# Patient Record
Sex: MALE | Race: WHITE | NOT HISPANIC OR LATINO | Employment: OTHER | URBAN - METROPOLITAN AREA
[De-identification: names, ages, dates, MRNs, and addresses within clinical notes are randomized per-mention and may not be internally consistent; named-entity substitution may affect disease eponyms.]

---

## 2017-01-23 RX ORDER — ERGOCALCIFEROL 1.25 MG/1
CAPSULE ORAL
Qty: 12 CAPSULE | Refills: 0 | Status: SHIPPED | OUTPATIENT
Start: 2017-01-23 | End: 2017-02-20 | Stop reason: SDUPTHER

## 2017-02-20 DIAGNOSIS — E55.9 HYPOVITAMINOSIS D: Primary | ICD-10-CM

## 2017-02-20 RX ORDER — ERGOCALCIFEROL 1.25 MG/1
CAPSULE ORAL
Qty: 12 CAPSULE | Refills: 0 | Status: SHIPPED | OUTPATIENT
Start: 2017-02-20 | End: 2017-05-16 | Stop reason: SDUPTHER

## 2017-02-20 RX ORDER — BETHANECHOL CHLORIDE 50 MG/1
TABLET ORAL
Qty: 90 TABLET | Refills: 0 | Status: SHIPPED | OUTPATIENT
Start: 2017-02-20 | End: 2017-03-23 | Stop reason: SDUPTHER

## 2017-02-21 ENCOUNTER — PATIENT MESSAGE (OUTPATIENT)
Dept: FAMILY MEDICINE | Facility: CLINIC | Age: 82
End: 2017-02-21

## 2017-03-10 ENCOUNTER — OFFICE VISIT (OUTPATIENT)
Dept: CARDIOLOGY | Facility: CLINIC | Age: 82
End: 2017-03-10
Payer: MEDICARE

## 2017-03-10 VITALS
BODY MASS INDEX: 47.74 KG/M2 | SYSTOLIC BLOOD PRESSURE: 120 MMHG | OXYGEN SATURATION: 99 % | WEIGHT: 315 LBS | HEART RATE: 63 BPM | HEIGHT: 68 IN | DIASTOLIC BLOOD PRESSURE: 64 MMHG

## 2017-03-10 DIAGNOSIS — Z87.898 HISTORY OF SEIZURE: ICD-10-CM

## 2017-03-10 DIAGNOSIS — Z86.718 PERSONAL HISTORY OF DVT (DEEP VEIN THROMBOSIS): ICD-10-CM

## 2017-03-10 DIAGNOSIS — I25.83 CORONARY ARTERY DISEASE DUE TO LIPID RICH PLAQUE: Primary | ICD-10-CM

## 2017-03-10 DIAGNOSIS — E78.00 HYPERCHOLESTEROLEMIA: ICD-10-CM

## 2017-03-10 DIAGNOSIS — I10 ESSENTIAL HYPERTENSION: ICD-10-CM

## 2017-03-10 DIAGNOSIS — I25.10 CORONARY ARTERY DISEASE DUE TO LIPID RICH PLAQUE: Primary | ICD-10-CM

## 2017-03-10 PROCEDURE — 1157F ADVNC CARE PLAN IN RCRD: CPT | Mod: S$GLB,,, | Performed by: INTERNAL MEDICINE

## 2017-03-10 PROCEDURE — 3074F SYST BP LT 130 MM HG: CPT | Mod: S$GLB,,, | Performed by: INTERNAL MEDICINE

## 2017-03-10 PROCEDURE — 3078F DIAST BP <80 MM HG: CPT | Mod: S$GLB,,, | Performed by: INTERNAL MEDICINE

## 2017-03-10 PROCEDURE — 1160F RVW MEDS BY RX/DR IN RCRD: CPT | Mod: S$GLB,,, | Performed by: INTERNAL MEDICINE

## 2017-03-10 PROCEDURE — 99999 PR PBB SHADOW E&M-EST. PATIENT-LVL III: CPT | Mod: PBBFAC,,, | Performed by: INTERNAL MEDICINE

## 2017-03-10 PROCEDURE — 1126F AMNT PAIN NOTED NONE PRSNT: CPT | Mod: S$GLB,,, | Performed by: INTERNAL MEDICINE

## 2017-03-10 PROCEDURE — 99214 OFFICE O/P EST MOD 30 MIN: CPT | Mod: S$GLB,,, | Performed by: INTERNAL MEDICINE

## 2017-03-10 PROCEDURE — 1159F MED LIST DOCD IN RCRD: CPT | Mod: S$GLB,,, | Performed by: INTERNAL MEDICINE

## 2017-03-10 PROCEDURE — 99499 UNLISTED E&M SERVICE: CPT | Mod: S$GLB,,, | Performed by: INTERNAL MEDICINE

## 2017-03-10 NOTE — PROGRESS NOTES
Subjective:    Patient ID:  Jaydon Mccarthy is a 85 y.o. male who presents for follow-up of No chief complaint on file.      Coronary Artery Disease   Pertinent negatives include no chest pain, dizziness, leg swelling, palpitations or shortness of breath.   Hypertension   Pertinent negatives include no chest pain, orthopnea, palpitations, PND or shortness of breath.     Previous history:  Here for follow-up of coronary artery disease.  He denies any worsening cardiopulmonary complaints.  He had a nuclear stresses risk stratification medication mainly to evaluate LAD ischemia.  This shows no significant multiple territory in the LAD distribution.  He denies any chest pain, shortness breath or palpitations.  He's express no PND, orthopnea or lower edema.  He denies any dizziness, presyncope or syncope.  He's no longer doing cardiac rehabilitation.  We discussed may be get a exercise bicycle.  He's no longer walking without a walker and only does a little bit of activity around the house.  Otherwise been in his usual state of health.    Today:  Patient is here for follow-up of coronary artery disease.  He denies any worsening cardiopulmonary complaints.  He's express no chest pain at all.  He's not overly active but is able to do all his daily activities without any problems.  He does have his good days and bad days.  He does have weakness occasionally but rests when he does.  He denies any PND, orthopnea or lower edema.  He's not expressing dizziness, presyncope or syncope.      Review of Systems   Constitution: Negative.   HENT: Negative.    Eyes: Negative.    Cardiovascular: Negative for chest pain, dyspnea on exertion, irregular heartbeat, leg swelling, near-syncope, orthopnea, palpitations, paroxysmal nocturnal dyspnea and syncope.   Respiratory: Negative for shortness of breath.    Skin: Negative.    Musculoskeletal: Negative.    Gastrointestinal: Negative for abdominal pain, constipation and diarrhea.    Genitourinary: Negative for dysuria.   Neurological: Negative for dizziness.   Psychiatric/Behavioral: Negative.         Objective:    Physical Exam   Constitutional: He is oriented to person, place, and time. He appears well-developed and well-nourished. No distress.   HENT:   Head: Normocephalic and atraumatic.   Eyes: Conjunctivae and EOM are normal. Pupils are equal, round, and reactive to light.   Neck: Normal range of motion. Neck supple. No thyromegaly present.   Cardiovascular: Normal rate, regular rhythm and normal heart sounds.    No murmur heard.  Pulmonary/Chest: Effort normal and breath sounds normal. No respiratory distress. He has no wheezes. He has no rales. He exhibits no tenderness.   Abdominal: Soft. Bowel sounds are normal.   Musculoskeletal: He exhibits no edema.   Neurological: He is alert and oriented to person, place, and time.   Skin: Skin is warm and dry.   Psychiatric: He has a normal mood and affect. His behavior is normal.         Assessment:       1. Coronary artery disease due to lipid rich plaque    2. Essential hypertension    3. History of seizure    4. Hypercholesterolemia    5. Personal history of DVT (deep vein thrombosis)         Plan:       -med manage LAD disease --> worsening sx consider PCI  -cont med tx  -asa held with bleed risk on triple tx  -on xarelto for OAC  -stable off ace *held with Scr issues and low SBP  -compression stockings for swelling    RTC 6 mo

## 2017-03-10 NOTE — MR AVS SNAPSHOT
[facility]  [address] [facility] [place] [address]  Phone: [phone]                  Jaydon ROSARIO Fabio   3/10/2017 9:40 AM   Office Visit    Description:  Male : 1932   Provider:  Efren Prado MD   Department:  [facility] - Cardiology           Reason for Visit     Coronary Artery Disease           Diagnoses this Visit        Comments    Coronary artery disease due to lipid rich plaque    -  Primary     Essential hypertension         History of seizure         Hypercholesterolemia         Personal history of DVT (deep vein thrombosis)                To Do List           Goals (5 Years of Data)     None      [facility]     [facility] On Call Nurse Care Line -  Assistance  Registered nurses in the [facility] provide clinical advisement, health education, appointment booking, and other advisory services.  Call for this free service at [phone].             Medications           Message regarding Medications     Verify the changes and/or additions to your medication regime listed below are the same as discussed with your clinician today.  If any of these changes or additions are incorrect, please notify your healthcare provider.             Verify that the below list of medications is an accurate representation of the medications you are currently taking.  If none reported, the list may be blank. If incorrect, please contact your healthcare provider. Carry this list with you in case of emergency.           Current Medications     atorvastatin (LIPITOR) 20 MG tablet Take 1 tablet (20 mg total) by mouth once daily.    bethanechol (URECHOLINE) 50 MG tablet TAKE ONE TABLET BY MOUTH THREE TIMES DAILY ON AN EMPTY STOMACH *DO NOT TAKE WITH MILK OR ANTACIDS, TAKE WITH WATER *    carbamazepine (TEGRETOL) 100 mg chewable tablet Take 2 tablets (200 mg total) by mouth once daily.    clopidogrel (PLAVIX) 75 mg tablet Take 1 tablet (75 mg total) by mouth once daily.     "finasteride (PROSCAR) 5 mg tablet TAKE ONE TABLET BY MOUTH EVERY DAY    levetiracetam (KEPPRA) 500 MG Tab Take 1 tablet (500 mg total) by mouth 2 (two) times daily.    metoprolol tartrate (LOPRESSOR) 25 MG tablet Take 0.5 tablets (12.5 mg total) by mouth 2 (two) times daily.    mupirocin (BACTROBAN) 2 % ointment     rivaroxaban (XARELTO) 20 mg Tab Take 1 tablet (20 mg total) by mouth once daily.    tamsulosin (FLOMAX) 0.4 mg Cp24 Take 1 capsule (0.4 mg total) by mouth once daily.    VITAMIN D2 50,000 unit capsule TAKE ONE CAPSULE BY MOUTH TWICE WEEKLY           Clinical Reference Information           Your Vitals Were     BP Pulse Height Weight SpO2 BMI    120/64 (BP Location: Left arm, Patient Position: Sitting, BP Method: Automatic) 63 5' 8" (1.727 m) 156.5 kg (345 lb 0.3 oz) 99% 52.46 kg/m2      Blood Pressure          Most Recent Value    BP  120/64      Allergies as of 3/10/2017     No Known Allergies      Immunizations Administered on Date of Encounter - 3/10/2017     None      Language Assistance Services     ATTENTION: Language assistance services are available, free of charge. Please call [phone].      ATENCIÓN: Si felecia marie, tiene a morales disposición servicios gratuitos de asistencia lingüística. Llame al [phone].     [facility] Ý: N?u b?n nói Ti?ng Vi?t, có các d?ch v? h? tr? ngôn ng? mi?n phí dành cho b?n. G?i s? [phone].         [facility] complies with applicable Federal civil rights laws and does not discriminate on the basis of race, color, national origin, age, disability, or sex.        "

## 2017-03-23 RX ORDER — BETHANECHOL CHLORIDE 50 MG/1
TABLET ORAL
Qty: 90 TABLET | Refills: 1 | Status: SHIPPED | OUTPATIENT
Start: 2017-03-23 | End: 2017-05-18 | Stop reason: SDUPTHER

## 2017-05-16 DIAGNOSIS — E55.9 HYPOVITAMINOSIS D: ICD-10-CM

## 2017-05-16 RX ORDER — ERGOCALCIFEROL 1.25 MG/1
CAPSULE ORAL
Qty: 12 CAPSULE | Refills: 0 | Status: SHIPPED | OUTPATIENT
Start: 2017-05-16 | End: 2017-06-19 | Stop reason: SDUPTHER

## 2017-05-18 RX ORDER — BETHANECHOL CHLORIDE 50 MG/1
TABLET ORAL
Qty: 90 TABLET | Refills: 0 | Status: SHIPPED | OUTPATIENT
Start: 2017-05-18 | End: 2017-06-19 | Stop reason: SDUPTHER

## 2017-06-19 DIAGNOSIS — E55.9 HYPOVITAMINOSIS D: ICD-10-CM

## 2017-06-19 RX ORDER — BETHANECHOL CHLORIDE 50 MG/1
TABLET ORAL
Qty: 90 TABLET | Refills: 0 | Status: SHIPPED | OUTPATIENT
Start: 2017-06-19 | End: 2017-07-19 | Stop reason: SDUPTHER

## 2017-06-19 RX ORDER — ERGOCALCIFEROL 1.25 MG/1
CAPSULE ORAL
Qty: 12 CAPSULE | Refills: 0 | Status: SHIPPED | OUTPATIENT
Start: 2017-06-19 | End: 2017-07-19 | Stop reason: SDUPTHER

## 2017-07-19 DIAGNOSIS — E55.9 HYPOVITAMINOSIS D: ICD-10-CM

## 2017-07-19 RX ORDER — ERGOCALCIFEROL 1.25 MG/1
CAPSULE ORAL
Qty: 12 CAPSULE | Refills: 0 | Status: SHIPPED | OUTPATIENT
Start: 2017-07-19 | End: 2017-09-18 | Stop reason: SDUPTHER

## 2017-07-19 RX ORDER — BETHANECHOL CHLORIDE 50 MG/1
TABLET ORAL
Qty: 90 TABLET | Refills: 0 | Status: SHIPPED | OUTPATIENT
Start: 2017-07-19 | End: 2017-09-06 | Stop reason: SDUPTHER

## 2017-09-05 RX ORDER — BETHANECHOL CHLORIDE 50 MG/1
TABLET ORAL
Qty: 90 TABLET | OUTPATIENT
Start: 2017-09-05

## 2017-09-06 ENCOUNTER — PATIENT MESSAGE (OUTPATIENT)
Dept: FAMILY MEDICINE | Facility: CLINIC | Age: 82
End: 2017-09-06

## 2017-09-07 RX ORDER — BETHANECHOL CHLORIDE 50 MG/1
TABLET ORAL
Qty: 90 TABLET | Refills: 3 | Status: SHIPPED | OUTPATIENT
Start: 2017-09-07 | End: 2017-09-18 | Stop reason: SDUPTHER

## 2017-09-07 RX ORDER — BETHANECHOL CHLORIDE 50 MG/1
TABLET ORAL
Qty: 90 TABLET | Refills: 3 | OUTPATIENT
Start: 2017-09-07

## 2017-09-07 RX ORDER — BETHANECHOL CHLORIDE 50 MG/1
TABLET ORAL
Qty: 90 TABLET | Refills: 0 | OUTPATIENT
Start: 2017-09-07

## 2017-09-18 ENCOUNTER — OFFICE VISIT (OUTPATIENT)
Dept: FAMILY MEDICINE | Facility: CLINIC | Age: 82
End: 2017-09-18
Payer: MEDICARE

## 2017-09-18 VITALS
HEART RATE: 66 BPM | OXYGEN SATURATION: 100 % | HEIGHT: 67 IN | BODY MASS INDEX: 24.94 KG/M2 | WEIGHT: 158.94 LBS | DIASTOLIC BLOOD PRESSURE: 64 MMHG | SYSTOLIC BLOOD PRESSURE: 134 MMHG | TEMPERATURE: 98 F

## 2017-09-18 DIAGNOSIS — E78.2 MIXED HYPERLIPIDEMIA: Chronic | ICD-10-CM

## 2017-09-18 DIAGNOSIS — M75.01 ADHESIVE BURSITIS OF RIGHT SHOULDER: ICD-10-CM

## 2017-09-18 DIAGNOSIS — I25.10 CORONARY ARTERY DISEASE INVOLVING NATIVE CORONARY ARTERY OF NATIVE HEART WITHOUT ANGINA PECTORIS: Chronic | ICD-10-CM

## 2017-09-18 DIAGNOSIS — Z79.01 CHRONIC ANTICOAGULATION: Chronic | ICD-10-CM

## 2017-09-18 DIAGNOSIS — Z00.00 ANNUAL PHYSICAL EXAM: Primary | ICD-10-CM

## 2017-09-18 DIAGNOSIS — Z86.718 PERSONAL HISTORY OF DVT (DEEP VEIN THROMBOSIS): Chronic | ICD-10-CM

## 2017-09-18 DIAGNOSIS — G40.909 NONINTRACTABLE EPILEPSY WITHOUT STATUS EPILEPTICUS, UNSPECIFIED EPILEPSY TYPE: Chronic | ICD-10-CM

## 2017-09-18 DIAGNOSIS — H93.13 TINNITUS, BILATERAL: ICD-10-CM

## 2017-09-18 DIAGNOSIS — D63.8 ANEMIA OF CHRONIC DISEASE: Chronic | ICD-10-CM

## 2017-09-18 DIAGNOSIS — H90.3 SENSORINEURAL HEARING LOSS (SNHL) OF BOTH EARS: ICD-10-CM

## 2017-09-18 DIAGNOSIS — N40.0 BENIGN NON-NODULAR PROSTATIC HYPERPLASIA WITHOUT LOWER URINARY TRACT SYMPTOMS: ICD-10-CM

## 2017-09-18 DIAGNOSIS — N40.0 BENIGN PROSTATIC HYPERPLASIA WITHOUT LOWER URINARY TRACT SYMPTOMS: ICD-10-CM

## 2017-09-18 DIAGNOSIS — M19.111 POST-TRAUMATIC OSTEOARTHRITIS OF RIGHT SHOULDER: ICD-10-CM

## 2017-09-18 DIAGNOSIS — I10 ESSENTIAL HYPERTENSION: Chronic | ICD-10-CM

## 2017-09-18 DIAGNOSIS — E55.9 HYPOVITAMINOSIS D: ICD-10-CM

## 2017-09-18 PROCEDURE — 99499 UNLISTED E&M SERVICE: CPT | Mod: S$GLB,,, | Performed by: FAMILY MEDICINE

## 2017-09-18 PROCEDURE — 99397 PER PM REEVAL EST PAT 65+ YR: CPT | Mod: S$GLB,,, | Performed by: FAMILY MEDICINE

## 2017-09-18 PROCEDURE — 99999 PR PBB SHADOW E&M-EST. PATIENT-LVL III: CPT | Mod: PBBFAC,,, | Performed by: FAMILY MEDICINE

## 2017-09-18 RX ORDER — ATORVASTATIN CALCIUM 20 MG/1
20 TABLET, FILM COATED ORAL DAILY
Qty: 90 TABLET | Refills: 3 | Status: SHIPPED | OUTPATIENT
Start: 2017-09-18 | End: 2018-03-22 | Stop reason: SDUPTHER

## 2017-09-18 RX ORDER — BETHANECHOL CHLORIDE 50 MG/1
TABLET ORAL
Qty: 90 TABLET | Refills: 3 | Status: SHIPPED | OUTPATIENT
Start: 2017-09-18 | End: 2018-03-22 | Stop reason: SDUPTHER

## 2017-09-18 RX ORDER — LEVETIRACETAM 500 MG/1
500 TABLET ORAL 2 TIMES DAILY
Qty: 180 TABLET | Refills: 3 | Status: SHIPPED | OUTPATIENT
Start: 2017-09-18 | End: 2018-03-22 | Stop reason: SDUPTHER

## 2017-09-18 RX ORDER — FINASTERIDE 5 MG/1
5 TABLET, FILM COATED ORAL DAILY
Qty: 90 TABLET | Refills: 3 | Status: SHIPPED | OUTPATIENT
Start: 2017-09-18 | End: 2018-03-22 | Stop reason: SDUPTHER

## 2017-09-18 RX ORDER — CLOPIDOGREL BISULFATE 75 MG/1
75 TABLET ORAL DAILY
Qty: 90 TABLET | Refills: 3 | Status: SHIPPED | OUTPATIENT
Start: 2017-09-18 | End: 2018-03-22 | Stop reason: SDUPTHER

## 2017-09-18 RX ORDER — ERGOCALCIFEROL 1.25 MG/1
CAPSULE ORAL
Qty: 12 CAPSULE | Refills: 0 | Status: SHIPPED | OUTPATIENT
Start: 2017-09-18 | End: 2017-10-31 | Stop reason: SDUPTHER

## 2017-09-18 RX ORDER — METOPROLOL TARTRATE 25 MG/1
12.5 TABLET, FILM COATED ORAL 2 TIMES DAILY
Qty: 30 TABLET | Refills: 11 | Status: SHIPPED | OUTPATIENT
Start: 2017-09-18 | End: 2018-10-11 | Stop reason: SDUPTHER

## 2017-09-18 RX ORDER — CARBAMAZEPINE 100 MG/1
200 TABLET, CHEWABLE ORAL DAILY
Qty: 180 TABLET | Refills: 3 | Status: SHIPPED | OUTPATIENT
Start: 2017-09-18 | End: 2018-03-22 | Stop reason: SDUPTHER

## 2017-09-18 RX ORDER — TAMSULOSIN HYDROCHLORIDE 0.4 MG/1
CAPSULE ORAL
Qty: 90 CAPSULE | Refills: 3 | Status: SHIPPED | OUTPATIENT
Start: 2017-09-18 | End: 2018-03-22 | Stop reason: SDUPTHER

## 2017-09-18 NOTE — PROGRESS NOTES
"Chief Complaint   Patient presents with    Annual Exam     SUBJECTIVE:   Jaydon Mccarthy is a 85 y.o. male presenting for his annual checkup.  Current Outpatient Prescriptions   Medication Sig Dispense Refill    atorvastatin (LIPITOR) 20 MG tablet Take 1 tablet (20 mg total) by mouth once daily. 90 tablet 3    bethanechol (URECHOLINE) 50 MG tablet TAKE ONE TABLET BY MOUTH THREE TIMES DAILY ON AN EMPTY STOMACH *DO NOT TAKE WITH MILK OR ANTACIDS, TAKE WITH WATER * 90 tablet 3    carbamazepine (TEGRETOL) 100 mg chewable tablet Take 2 tablets (200 mg total) by mouth once daily. 180 tablet 3    clopidogrel (PLAVIX) 75 mg tablet Take 1 tablet (75 mg total) by mouth once daily. 90 tablet 3    ergocalciferol (VITAMIN D2) 50,000 unit Cap TAKE ONE CAPSULE BY MOUTH TWICE WEEKLY 12 capsule 0    finasteride (PROSCAR) 5 mg tablet Take 1 tablet (5 mg total) by mouth once daily. 90 tablet 3    levetiracetam (KEPPRA) 500 MG Tab Take 1 tablet (500 mg total) by mouth 2 (two) times daily. 180 tablet 3    metoprolol tartrate (LOPRESSOR) 25 MG tablet Take 0.5 tablets (12.5 mg total) by mouth 2 (two) times daily. 30 tablet 11    mupirocin (BACTROBAN) 2 % ointment   1    rivaroxaban (XARELTO) 20 mg Tab Take 1 tablet (20 mg total) by mouth once daily. 30 tablet 11    tamsulosin (FLOMAX) 0.4 mg Cp24 Take 1 capsule (0.4 mg total) by mouth once daily. 90 capsule 3     No current facility-administered medications for this visit.      Allergies: Review of patient's allergies indicates no known allergies.     ROS:  Feeling well. No dyspnea or chest pain on exertion. No abdominal pain, change in bowel habits, black or bloody stools. No urinary tract or prostatic symptoms. No neurological complaints.    OBJECTIVE:   The patient appears well, alert, oriented x 3, in no distress.   /64   Pulse 66   Temp 97.8 °F (36.6 °C) (Oral)   Ht 5' 7" (1.702 m)   Wt 72.1 kg (158 lb 15.2 oz)   SpO2 100%   BMI 24.90 kg/m²   ENT normal.  Neck " supple. No adenopathy or thyromegaly. NITZA. Lungs are clear, good air entry, no wheezes, rhonchi or rales. S1 and S2 normal, no murmurs, regular rate and rhythm. Abdomen is soft without tenderness, guarding, mass or organomegaly.  exam: deferred.  Extremities show no edema, normal peripheral pulses. Neurological is normal without focal findings. Chronic vascular change from plavix on forearms and chest/face  Hearing is diminished.  Right shoulder with reduced ROM.  Antalgic wide based gait.    ASSESSMENT:   1. Annual physical exam    2. Benign non-nodular prostatic hyperplasia without lower urinary tract symptoms    3. Hypovitaminosis D    4. Nonintractable epilepsy without status epilepticus, unspecified epilepsy type    5. Post-traumatic osteoarthritis of right shoulder    6. Adhesive bursitis of right shoulder    7. Anemia of chronic disease    8. Benign prostatic hyperplasia without lower urinary tract symptoms    9. Coronary artery disease involving native coronary artery of native heart without angina pectoris    10. Chronic anticoagulation    11. Essential hypertension    12. Sensorineural hearing loss (SNHL) of both ears    13. Mixed hyperlipidemia    14. Personal history of DVT (deep vein thrombosis)    15. Tinnitus, bilateral          PLAN:   Jaydon was seen today for annual exam.    Diagnoses and all orders for this visit:    Annual physical exam  No screening tests due.  Keep him independent, has good family    Benign non-nodular prostatic hyperplasia without lower urinary tract symptoms  -     finasteride (PROSCAR) 5 mg tablet; Take 1 tablet (5 mg total) by mouth once daily.  The current medical regimen is effective;  continue present plan and medications.    Hypovitaminosis D  -     ergocalciferol (VITAMIN D2) 50,000 unit Cap; TAKE ONE CAPSULE BY MOUTH TWICE WEEKLY  The current medical regimen is effective;  continue present plan and medications.  Fall risk reduction ?  Nonintractable epilepsy  without status epilepticus, unspecified epilepsy type  No new seizures in last >10 + years  Post-traumatic osteoarthritis of right shoulder  stable  Adhesive bursitis of right shoulder  ROM reduced but nothing more to do  Anemia of chronic disease  Noted, monitor  Benign prostatic hyperplasia without lower urinary tract symptoms  The current medical regimen is effective;  continue present plan and medications.    Coronary artery disease involving native coronary artery of native heart without angina pectoris  The current medical regimen is effective;  continue present plan and medications.    Chronic anticoagulation  The current medical regimen is effective;  continue present plan and medications.    Essential hypertension  The current medical regimen is effective;  continue present plan and medications.    Sensorineural hearing loss (SNHL) of both ears  Noted, does not bother him  Mixed hyperlipidemia  The current medical regimen is effective;  continue present plan and medications.    Personal history of DVT (deep vein thrombosis)  The current medical regimen is effective;  continue present plan and medications.    Tinnitus, bilateral  Noted, does not bother him.  Other orders  -     atorvastatin (LIPITOR) 20 MG tablet; Take 1 tablet (20 mg total) by mouth once daily.  -     bethanechol (URECHOLINE) 50 MG tablet; TAKE ONE TABLET BY MOUTH THREE TIMES DAILY ON AN EMPTY STOMACH *DO NOT TAKE WITH MILK OR ANTACIDS, TAKE WITH WATER *  -     clopidogrel (PLAVIX) 75 mg tablet; Take 1 tablet (75 mg total) by mouth once daily.  -     levetiracetam (KEPPRA) 500 MG Tab; Take 1 tablet (500 mg total) by mouth 2 (two) times daily.  -     metoprolol tartrate (LOPRESSOR) 25 MG tablet; Take 0.5 tablets (12.5 mg total) by mouth 2 (two) times daily.  -     rivaroxaban (XARELTO) 20 mg Tab; Take 1 tablet (20 mg total) by mouth once daily.  -     tamsulosin (FLOMAX) 0.4 mg Cp24; Take 1 capsule (0.4 mg total) by mouth once daily.  -      carbamazepine (TEGRETOL) 100 mg chewable tablet; Take 2 tablets (200 mg total) by mouth once daily.

## 2017-10-18 ENCOUNTER — TELEPHONE (OUTPATIENT)
Dept: FAMILY MEDICINE | Facility: CLINIC | Age: 82
End: 2017-10-18

## 2017-10-18 NOTE — TELEPHONE ENCOUNTER
----- Message from Javier Villanueva MD sent at 9/27/2017  9:25 PM CDT -----  Schedule 6 month recall, thank you!

## 2017-10-31 DIAGNOSIS — E55.9 HYPOVITAMINOSIS D: ICD-10-CM

## 2017-11-02 RX ORDER — ERGOCALCIFEROL 1.25 MG/1
CAPSULE ORAL
Qty: 12 CAPSULE | Refills: 0 | Status: SHIPPED | OUTPATIENT
Start: 2017-11-02 | End: 2018-02-23 | Stop reason: SDUPTHER

## 2017-11-06 ENCOUNTER — LAB VISIT (OUTPATIENT)
Dept: LAB | Facility: HOSPITAL | Age: 82
End: 2017-11-06
Attending: FAMILY MEDICINE
Payer: MEDICARE

## 2017-11-06 DIAGNOSIS — E55.9 HYPOVITAMINOSIS D: ICD-10-CM

## 2017-11-06 LAB — 25(OH)D3+25(OH)D2 SERPL-MCNC: 45 NG/ML

## 2017-11-06 PROCEDURE — 82306 VITAMIN D 25 HYDROXY: CPT

## 2017-11-06 PROCEDURE — 36415 COLL VENOUS BLD VENIPUNCTURE: CPT

## 2017-11-20 ENCOUNTER — TELEPHONE (OUTPATIENT)
Dept: FAMILY MEDICINE | Facility: CLINIC | Age: 82
End: 2017-11-20

## 2017-11-20 NOTE — TELEPHONE ENCOUNTER
They wanted to verify that both Plavix and Xeralto were  orderd for patient . Informed him that both were ordered on 09/18/2017.

## 2017-11-20 NOTE — TELEPHONE ENCOUNTER
----- Message from Dariana Carlson sent at 11/20/2017 10:05 AM CST -----  Contact: [facility] [phone]  [facility] is requesting to speak to you regarding pt scripts. Wants to verify meds. Pls call [facility] [phone]. Thanks........Mayuri

## 2017-11-20 NOTE — TELEPHONE ENCOUNTER
----- Message from Dariana Carlson sent at 11/20/2017 10:05 AM CST -----  Contact: [facility] [phone]  [facility] is requesting to speak to you regarding pt scripts. Wants to verify meds. Pls call [facility] [phone]. Thanks........Mayuri

## 2017-12-20 DIAGNOSIS — E55.9 HYPOVITAMINOSIS D: ICD-10-CM

## 2017-12-20 RX ORDER — ERGOCALCIFEROL 1.25 MG/1
CAPSULE ORAL
Qty: 12 CAPSULE | OUTPATIENT
Start: 2017-12-20

## 2018-02-23 DIAGNOSIS — E55.9 HYPOVITAMINOSIS D: ICD-10-CM

## 2018-02-23 RX ORDER — ERGOCALCIFEROL 1.25 MG/1
CAPSULE ORAL
Qty: 12 CAPSULE | Refills: 0 | Status: SHIPPED | OUTPATIENT
Start: 2018-02-23 | End: 2018-05-18 | Stop reason: SDUPTHER

## 2018-03-22 ENCOUNTER — OFFICE VISIT (OUTPATIENT)
Dept: FAMILY MEDICINE | Facility: CLINIC | Age: 83
End: 2018-03-22
Payer: MEDICARE

## 2018-03-22 VITALS
WEIGHT: 160.25 LBS | OXYGEN SATURATION: 99 % | HEIGHT: 68 IN | DIASTOLIC BLOOD PRESSURE: 60 MMHG | TEMPERATURE: 98 F | BODY MASS INDEX: 24.29 KG/M2 | SYSTOLIC BLOOD PRESSURE: 120 MMHG

## 2018-03-22 DIAGNOSIS — Z79.01 CHRONIC ANTICOAGULATION: Chronic | ICD-10-CM

## 2018-03-22 DIAGNOSIS — D63.8 ANEMIA OF CHRONIC DISEASE: Chronic | ICD-10-CM

## 2018-03-22 DIAGNOSIS — I25.10 CORONARY ARTERY DISEASE INVOLVING NATIVE CORONARY ARTERY OF NATIVE HEART WITHOUT ANGINA PECTORIS: Chronic | ICD-10-CM

## 2018-03-22 DIAGNOSIS — E78.2 MIXED HYPERLIPIDEMIA: Chronic | ICD-10-CM

## 2018-03-22 DIAGNOSIS — G40.909 NONINTRACTABLE EPILEPSY WITHOUT STATUS EPILEPTICUS, UNSPECIFIED EPILEPSY TYPE: Chronic | ICD-10-CM

## 2018-03-22 DIAGNOSIS — N40.0 BENIGN NON-NODULAR PROSTATIC HYPERPLASIA WITHOUT LOWER URINARY TRACT SYMPTOMS: ICD-10-CM

## 2018-03-22 DIAGNOSIS — N40.0 BENIGN PROSTATIC HYPERPLASIA WITHOUT LOWER URINARY TRACT SYMPTOMS: Primary | ICD-10-CM

## 2018-03-22 PROCEDURE — 99499 UNLISTED E&M SERVICE: CPT | Mod: S$GLB,,, | Performed by: FAMILY MEDICINE

## 2018-03-22 PROCEDURE — 99214 OFFICE O/P EST MOD 30 MIN: CPT | Mod: S$GLB,,, | Performed by: FAMILY MEDICINE

## 2018-03-22 PROCEDURE — 81000 URINALYSIS NONAUTO W/SCOPE: CPT

## 2018-03-22 PROCEDURE — 99999 PR PBB SHADOW E&M-EST. PATIENT-LVL III: CPT | Mod: PBBFAC,,, | Performed by: FAMILY MEDICINE

## 2018-03-22 RX ORDER — CARBAMAZEPINE 100 MG/1
200 TABLET, CHEWABLE ORAL DAILY
Qty: 180 TABLET | Refills: 3 | Status: SHIPPED | OUTPATIENT
Start: 2018-03-22 | End: 2019-04-23 | Stop reason: SDUPTHER

## 2018-03-22 RX ORDER — FINASTERIDE 5 MG/1
5 TABLET, FILM COATED ORAL DAILY
Qty: 90 TABLET | Refills: 3 | Status: SHIPPED | OUTPATIENT
Start: 2018-03-22 | End: 2019-06-06 | Stop reason: SDUPTHER

## 2018-03-22 RX ORDER — CLOPIDOGREL BISULFATE 75 MG/1
75 TABLET ORAL DAILY
Qty: 90 TABLET | Refills: 3 | Status: SHIPPED | OUTPATIENT
Start: 2018-03-22 | End: 2019-05-10 | Stop reason: SDUPTHER

## 2018-03-22 RX ORDER — LEVETIRACETAM 500 MG/1
500 TABLET ORAL 2 TIMES DAILY
Qty: 180 TABLET | Refills: 3 | Status: SHIPPED | OUTPATIENT
Start: 2018-03-22 | End: 2019-05-10 | Stop reason: SDUPTHER

## 2018-03-22 RX ORDER — BETHANECHOL CHLORIDE 50 MG/1
TABLET ORAL
Qty: 90 TABLET | Refills: 3 | Status: SHIPPED | OUTPATIENT
Start: 2018-03-22 | End: 2018-10-11 | Stop reason: SDUPTHER

## 2018-03-22 RX ORDER — TAMSULOSIN HYDROCHLORIDE 0.4 MG/1
CAPSULE ORAL
Qty: 90 CAPSULE | Refills: 3 | Status: SHIPPED | OUTPATIENT
Start: 2018-03-22 | End: 2019-05-10 | Stop reason: SDUPTHER

## 2018-03-22 RX ORDER — ATORVASTATIN CALCIUM 20 MG/1
20 TABLET, FILM COATED ORAL DAILY
Qty: 90 TABLET | Refills: 3 | Status: SHIPPED | OUTPATIENT
Start: 2018-03-22 | End: 2019-05-10 | Stop reason: SDUPTHER

## 2018-03-23 ENCOUNTER — PATIENT MESSAGE (OUTPATIENT)
Dept: FAMILY MEDICINE | Facility: CLINIC | Age: 83
End: 2018-03-23

## 2018-03-23 LAB
AMORPH CRY URNS QL MICRO: ABNORMAL
BILIRUB UR QL STRIP: NEGATIVE
CAOX CRY URNS QL MICRO: ABNORMAL
CLARITY UR: ABNORMAL
COLOR UR: YELLOW
GLUCOSE UR QL STRIP: NEGATIVE
HGB UR QL STRIP: NEGATIVE
KETONES UR QL STRIP: NEGATIVE
LEUKOCYTE ESTERASE UR QL STRIP: ABNORMAL
MICROSCOPIC COMMENT: ABNORMAL
NITRITE UR QL STRIP: NEGATIVE
PH UR STRIP: 5 [PH] (ref 5–8)
PROT UR QL STRIP: NEGATIVE
SP GR UR STRIP: 1.03 (ref 1–1.03)
URN SPEC COLLECT METH UR: ABNORMAL
UROBILINOGEN UR STRIP-ACNC: NEGATIVE EU/DL
WBC #/AREA URNS HPF: 2 /HPF (ref 0–5)

## 2018-03-23 RX ORDER — CIPROFLOXACIN 500 MG/1
500 TABLET ORAL 2 TIMES DAILY
Qty: 14 TABLET | Refills: 0 | Status: SHIPPED | OUTPATIENT
Start: 2018-03-23 | End: 2018-03-30

## 2018-03-30 NOTE — PROGRESS NOTES
Patient, Jaydon Mccarthy (MRN #[id number]), presented with a recent Platelet count less than 150 K/uL consistent with the definition of thrombocytopenia (ICD10 - D69.6).    Platelets   Date Value Ref Range Status   10/04/2016 138 (L) 150 - 350 K/uL Final     The patient's thrombocytopenia was monitored, evaluated, addressed and/or treated. This addendum to the medical record is made on 03/30/2018.

## 2018-04-19 DIAGNOSIS — E55.9 HYPOVITAMINOSIS D: ICD-10-CM

## 2018-04-23 RX ORDER — ERGOCALCIFEROL 1.25 MG/1
CAPSULE ORAL
Qty: 12 CAPSULE | OUTPATIENT
Start: 2018-04-23

## 2018-05-18 DIAGNOSIS — E55.9 HYPOVITAMINOSIS D: ICD-10-CM

## 2018-05-18 RX ORDER — ERGOCALCIFEROL 1.25 MG/1
CAPSULE ORAL
Qty: 12 CAPSULE | Refills: 0 | Status: ON HOLD | OUTPATIENT
Start: 2018-05-18 | End: 2020-12-09 | Stop reason: CLARIF

## 2018-05-29 ENCOUNTER — HOSPITAL ENCOUNTER (INPATIENT)
Facility: HOSPITAL | Age: 83
LOS: 7 days | Discharge: NURSING, REHAB OR LONG-TERM CARE | DRG: 481 | End: 2018-06-05
Attending: EMERGENCY MEDICINE | Admitting: EMERGENCY MEDICINE
Payer: MEDICARE

## 2018-05-29 DIAGNOSIS — N40.0 BENIGN PROSTATIC HYPERPLASIA WITHOUT LOWER URINARY TRACT SYMPTOMS: ICD-10-CM

## 2018-05-29 DIAGNOSIS — Z79.01 CHRONIC ANTICOAGULATION: Chronic | ICD-10-CM

## 2018-05-29 DIAGNOSIS — M75.01 ADHESIVE BURSITIS OF RIGHT SHOULDER: ICD-10-CM

## 2018-05-29 DIAGNOSIS — H93.13 TINNITUS, BILATERAL: ICD-10-CM

## 2018-05-29 DIAGNOSIS — E78.2 MIXED HYPERLIPIDEMIA: Chronic | ICD-10-CM

## 2018-05-29 DIAGNOSIS — I10 ESSENTIAL HYPERTENSION: Chronic | ICD-10-CM

## 2018-05-29 DIAGNOSIS — M19.111 POST-TRAUMATIC OSTEOARTHRITIS OF RIGHT SHOULDER: ICD-10-CM

## 2018-05-29 DIAGNOSIS — Z86.718 PERSONAL HISTORY OF DVT (DEEP VEIN THROMBOSIS): Chronic | ICD-10-CM

## 2018-05-29 DIAGNOSIS — D63.8 ANEMIA OF CHRONIC DISEASE: Chronic | ICD-10-CM

## 2018-05-29 DIAGNOSIS — G40.909 NONINTRACTABLE EPILEPSY WITHOUT STATUS EPILEPTICUS, UNSPECIFIED EPILEPSY TYPE: Chronic | ICD-10-CM

## 2018-05-29 DIAGNOSIS — Z01.810 PREOP CARDIOVASCULAR EXAM: ICD-10-CM

## 2018-05-29 DIAGNOSIS — S72.142A INTERTROCHANTERIC FRACTURE OF LEFT HIP, CLOSED, INITIAL ENCOUNTER: Primary | ICD-10-CM
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
## 2018-05-29 DIAGNOSIS — G40.909 SEIZURE DISORDER: ICD-10-CM

## 2018-05-29 DIAGNOSIS — I25.10 CORONARY ARTERY DISEASE INVOLVING NATIVE CORONARY ARTERY OF NATIVE HEART WITHOUT ANGINA PECTORIS: Chronic | ICD-10-CM

## 2018-05-29 DIAGNOSIS — H90.3 SENSORINEURAL HEARING LOSS (SNHL) OF BOTH EARS: ICD-10-CM

## 2018-05-29 LAB
ALBUMIN SERPL BCP-MCNC: 3.7 G/DL
ALP SERPL-CCNC: 175 U/L
ALT SERPL W/O P-5'-P-CCNC: 26 U/L
ANION GAP SERPL CALC-SCNC: 10 MMOL/L
AST SERPL-CCNC: 27 U/L
BASOPHILS # BLD AUTO: 0.03 K/UL
BASOPHILS NFR BLD: 0.3 %
BILIRUB SERPL-MCNC: 0.4 MG/DL
BUN SERPL-MCNC: 20 MG/DL
CALCIUM SERPL-MCNC: 8.7 MG/DL
CHLORIDE SERPL-SCNC: 110 MMOL/L
CO2 SERPL-SCNC: 21 MMOL/L
CREAT SERPL-MCNC: 1.2 MG/DL
DIFFERENTIAL METHOD: ABNORMAL
EOSINOPHIL # BLD AUTO: 0.2 K/UL
EOSINOPHIL NFR BLD: 2.2 %
ERYTHROCYTE [DISTWIDTH] IN BLOOD BY AUTOMATED COUNT: 13 %
EST. GFR  (AFRICAN AMERICAN): >60 ML/MIN/1.73 M^2
EST. GFR  (NON AFRICAN AMERICAN): 54 ML/MIN/1.73 M^2
GLUCOSE SERPL-MCNC: 110 MG/DL
HCT VFR BLD AUTO: 33.7 %
HGB BLD-MCNC: 12 G/DL
INR PPP: 1
LYMPHOCYTES # BLD AUTO: 1.4 K/UL
LYMPHOCYTES NFR BLD: 15.8 %
MCH RBC QN AUTO: 31.3 PG
MCHC RBC AUTO-ENTMCNC: 35.6 G/DL
MCV RBC AUTO: 88 FL
MONOCYTES # BLD AUTO: 0.7 K/UL
MONOCYTES NFR BLD: 8.4 %
NEUTROPHILS # BLD AUTO: 6.4 K/UL
NEUTROPHILS NFR BLD: 73.1 %
PLATELET # BLD AUTO: 185 K/UL
PMV BLD AUTO: 10 FL
POTASSIUM SERPL-SCNC: 4.3 MMOL/L
PROT SERPL-MCNC: 6.3 G/DL
PROTHROMBIN TIME: 10.8 SEC
RBC # BLD AUTO: 3.84 M/UL
SODIUM SERPL-SCNC: 141 MMOL/L
WBC # BLD AUTO: 8.8 K/UL

## 2018-05-29 PROCEDURE — 25000003 PHARM REV CODE 250: Performed by: EMERGENCY MEDICINE

## 2018-05-29 PROCEDURE — 12000002 HC ACUTE/MED SURGE SEMI-PRIVATE ROOM

## 2018-05-29 PROCEDURE — 93010 ELECTROCARDIOGRAM REPORT: CPT | Mod: ,,, | Performed by: INTERNAL MEDICINE

## 2018-05-29 PROCEDURE — 85025 COMPLETE CBC W/AUTO DIFF WBC: CPT

## 2018-05-29 PROCEDURE — 80053 COMPREHEN METABOLIC PANEL: CPT

## 2018-05-29 PROCEDURE — 93005 ELECTROCARDIOGRAM TRACING: CPT

## 2018-05-29 PROCEDURE — 85610 PROTHROMBIN TIME: CPT

## 2018-05-29 PROCEDURE — 99285 EMERGENCY DEPT VISIT HI MDM: CPT

## 2018-05-29 RX ORDER — ACETAMINOPHEN 325 MG/1
650 TABLET ORAL
Status: COMPLETED | OUTPATIENT
Start: 2018-05-29 | End: 2018-05-29

## 2018-05-29 RX ADMIN — ACETAMINOPHEN 650 MG: 325 TABLET ORAL at 09:05

## 2018-05-30 ENCOUNTER — ANESTHESIA (OUTPATIENT)
Dept: SURGERY | Facility: HOSPITAL | Age: 83
DRG: 481 | End: 2018-05-30
Payer: MEDICARE

## 2018-05-30 ENCOUNTER — ANESTHESIA EVENT (OUTPATIENT)
Dept: SURGERY | Facility: HOSPITAL | Age: 83
DRG: 481 | End: 2018-05-30
Payer: MEDICARE

## 2018-05-30 PROBLEM — G40.909 SEIZURE DISORDER: Status: ACTIVE | Noted: 2018-05-30

## 2018-05-30 PROBLEM — Z01.810 PREOP CARDIOVASCULAR EXAM: Status: ACTIVE | Noted: 2018-05-30

## 2018-05-30 LAB
ALBUMIN SERPL BCP-MCNC: 3.4 G/DL
ALP SERPL-CCNC: 157 U/L
ALT SERPL W/O P-5'-P-CCNC: 24 U/L
ANION GAP SERPL CALC-SCNC: 6 MMOL/L
AST SERPL-CCNC: 22 U/L
BASOPHILS # BLD AUTO: 0.02 K/UL
BASOPHILS NFR BLD: 0.2 %
BILIRUB SERPL-MCNC: 0.5 MG/DL
BUN SERPL-MCNC: 19 MG/DL
CALCIUM SERPL-MCNC: 8.5 MG/DL
CHLORIDE SERPL-SCNC: 110 MMOL/L
CO2 SERPL-SCNC: 23 MMOL/L
CREAT SERPL-MCNC: 1.1 MG/DL
DIFFERENTIAL METHOD: ABNORMAL
EOSINOPHIL # BLD AUTO: 0.1 K/UL
EOSINOPHIL NFR BLD: 1.1 %
ERYTHROCYTE [DISTWIDTH] IN BLOOD BY AUTOMATED COUNT: 13.1 %
EST. GFR  (AFRICAN AMERICAN): >60 ML/MIN/1.73 M^2
EST. GFR  (NON AFRICAN AMERICAN): >60 ML/MIN/1.73 M^2
GLUCOSE SERPL-MCNC: 135 MG/DL
HCT VFR BLD AUTO: 31.2 %
HGB BLD-MCNC: 10.9 G/DL
LYMPHOCYTES # BLD AUTO: 1.4 K/UL
LYMPHOCYTES NFR BLD: 16.9 %
MCH RBC QN AUTO: 31.1 PG
MCHC RBC AUTO-ENTMCNC: 34.9 G/DL
MCV RBC AUTO: 89 FL
MONOCYTES # BLD AUTO: 0.8 K/UL
MONOCYTES NFR BLD: 10.1 %
NEUTROPHILS # BLD AUTO: 6 K/UL
NEUTROPHILS NFR BLD: 71.6 %
PLATELET # BLD AUTO: 168 K/UL
PMV BLD AUTO: 10.3 FL
POTASSIUM SERPL-SCNC: 4 MMOL/L
PROT SERPL-MCNC: 5.8 G/DL
RBC # BLD AUTO: 3.51 M/UL
SODIUM SERPL-SCNC: 139 MMOL/L
WBC # BLD AUTO: 8.34 K/UL

## 2018-05-30 PROCEDURE — 25000003 PHARM REV CODE 250: Performed by: ANESTHESIOLOGY

## 2018-05-30 PROCEDURE — D9220A PRA ANESTHESIA: Mod: CRNA,,, | Performed by: NURSE ANESTHETIST, CERTIFIED REGISTERED

## 2018-05-30 PROCEDURE — 80053 COMPREHEN METABOLIC PANEL: CPT

## 2018-05-30 PROCEDURE — 37000008 HC ANESTHESIA 1ST 15 MINUTES: Performed by: ORTHOPAEDIC SURGERY

## 2018-05-30 PROCEDURE — 36415 COLL VENOUS BLD VENIPUNCTURE: CPT

## 2018-05-30 PROCEDURE — 63600175 PHARM REV CODE 636 W HCPCS: Performed by: ANESTHESIOLOGY

## 2018-05-30 PROCEDURE — 27201423 OPTIME MED/SURG SUP & DEVICES STERILE SUPPLY: Performed by: ORTHOPAEDIC SURGERY

## 2018-05-30 PROCEDURE — S5010 5% DEXTROSE AND 0.45% SALINE: HCPCS | Performed by: HOSPITALIST

## 2018-05-30 PROCEDURE — S5010 5% DEXTROSE AND 0.45% SALINE: HCPCS | Performed by: EMERGENCY MEDICINE

## 2018-05-30 PROCEDURE — 11000001 HC ACUTE MED/SURG PRIVATE ROOM

## 2018-05-30 PROCEDURE — 0QS704Z REPOSITION LEFT UPPER FEMUR WITH INTERNAL FIXATION DEVICE, OPEN APPROACH: ICD-10-PCS | Performed by: ORTHOPAEDIC SURGERY

## 2018-05-30 PROCEDURE — 25000003 PHARM REV CODE 250: Performed by: INTERNAL MEDICINE

## 2018-05-30 PROCEDURE — 25000003 PHARM REV CODE 250: Performed by: EMERGENCY MEDICINE

## 2018-05-30 PROCEDURE — 36000711: Performed by: ORTHOPAEDIC SURGERY

## 2018-05-30 PROCEDURE — 85025 COMPLETE CBC W/AUTO DIFF WBC: CPT

## 2018-05-30 PROCEDURE — 99223 1ST HOSP IP/OBS HIGH 75: CPT | Mod: ,,, | Performed by: INTERNAL MEDICINE

## 2018-05-30 PROCEDURE — 36000710: Performed by: ORTHOPAEDIC SURGERY

## 2018-05-30 PROCEDURE — 25000003 PHARM REV CODE 250: Performed by: NURSE ANESTHETIST, CERTIFIED REGISTERED

## 2018-05-30 PROCEDURE — C1769 GUIDE WIRE: HCPCS | Performed by: ORTHOPAEDIC SURGERY

## 2018-05-30 PROCEDURE — 25000003 PHARM REV CODE 250: Performed by: ORTHOPAEDIC SURGERY

## 2018-05-30 PROCEDURE — 25000003 PHARM REV CODE 250: Performed by: HOSPITALIST

## 2018-05-30 PROCEDURE — 71000033 HC RECOVERY, INTIAL HOUR: Performed by: ORTHOPAEDIC SURGERY

## 2018-05-30 PROCEDURE — C1713 ANCHOR/SCREW BN/BN,TIS/BN: HCPCS | Performed by: ORTHOPAEDIC SURGERY

## 2018-05-30 PROCEDURE — 37000009 HC ANESTHESIA EA ADD 15 MINS: Performed by: ORTHOPAEDIC SURGERY

## 2018-05-30 PROCEDURE — D9220A PRA ANESTHESIA: Mod: ANES,,, | Performed by: ANESTHESIOLOGY

## 2018-05-30 DEVICE — ROD TFNA HELI BLADE 105MM STRL: Type: IMPLANTABLE DEVICE | Site: HIP | Status: FUNCTIONAL

## 2018-05-30 DEVICE — NAIL IM CANN 130 DEG 11X170: Type: IMPLANTABLE DEVICE | Site: HIP | Status: FUNCTIONAL

## 2018-05-30 DEVICE — SCREW LOCKING 40MM: Type: IMPLANTABLE DEVICE | Site: HIP | Status: FUNCTIONAL

## 2018-05-30 DEVICE — WIRE GUIDE 3.2MM 400MM: Type: IMPLANTABLE DEVICE | Site: HIP | Status: FUNCTIONAL

## 2018-05-30 RX ORDER — ETOMIDATE 2 MG/ML
INJECTION INTRAVENOUS
Status: DISCONTINUED | OUTPATIENT
Start: 2018-05-30 | End: 2018-05-30

## 2018-05-30 RX ORDER — DEXTROSE MONOHYDRATE AND SODIUM CHLORIDE 5; .45 G/100ML; G/100ML
INJECTION, SOLUTION INTRAVENOUS CONTINUOUS
Status: DISCONTINUED | OUTPATIENT
Start: 2018-05-30 | End: 2018-05-31

## 2018-05-30 RX ORDER — ACETAMINOPHEN 325 MG/1
650 TABLET ORAL EVERY 8 HOURS PRN
Status: DISCONTINUED | OUTPATIENT
Start: 2018-05-30 | End: 2018-05-31

## 2018-05-30 RX ORDER — ESMOLOL HYDROCHLORIDE 10 MG/ML
INJECTION INTRAVENOUS
Status: DISCONTINUED | OUTPATIENT
Start: 2018-05-30 | End: 2018-05-30

## 2018-05-30 RX ORDER — SODIUM CHLORIDE, SODIUM LACTATE, POTASSIUM CHLORIDE, CALCIUM CHLORIDE 600; 310; 30; 20 MG/100ML; MG/100ML; MG/100ML; MG/100ML
INJECTION, SOLUTION INTRAVENOUS CONTINUOUS PRN
Status: DISCONTINUED | OUTPATIENT
Start: 2018-05-30 | End: 2018-05-30

## 2018-05-30 RX ORDER — TAMSULOSIN HYDROCHLORIDE 0.4 MG/1
0.4 CAPSULE ORAL DAILY
Status: DISCONTINUED | OUTPATIENT
Start: 2018-05-30 | End: 2018-06-05 | Stop reason: HOSPADM

## 2018-05-30 RX ORDER — CARBAMAZEPINE 100 MG/1
200 TABLET, CHEWABLE ORAL DAILY
Status: DISCONTINUED | OUTPATIENT
Start: 2018-05-30 | End: 2018-06-05 | Stop reason: HOSPADM

## 2018-05-30 RX ORDER — FENTANYL CITRATE 50 UG/ML
INJECTION, SOLUTION INTRAMUSCULAR; INTRAVENOUS
Status: DISCONTINUED | OUTPATIENT
Start: 2018-05-30 | End: 2018-05-30

## 2018-05-30 RX ORDER — ROCURONIUM BROMIDE 10 MG/ML
INJECTION, SOLUTION INTRAVENOUS
Status: DISCONTINUED | OUTPATIENT
Start: 2018-05-30 | End: 2018-05-30

## 2018-05-30 RX ORDER — FENTANYL CITRATE 50 UG/ML
25 INJECTION, SOLUTION INTRAMUSCULAR; INTRAVENOUS EVERY 5 MIN PRN
Status: DISCONTINUED | OUTPATIENT
Start: 2018-05-30 | End: 2018-05-31

## 2018-05-30 RX ORDER — ATORVASTATIN CALCIUM 10 MG/1
20 TABLET, FILM COATED ORAL NIGHTLY
Status: DISCONTINUED | OUTPATIENT
Start: 2018-05-30 | End: 2018-06-05 | Stop reason: HOSPADM

## 2018-05-30 RX ORDER — SUCCINYLCHOLINE CHLORIDE 20 MG/ML
INJECTION INTRAMUSCULAR; INTRAVENOUS
Status: DISCONTINUED | OUTPATIENT
Start: 2018-05-30 | End: 2018-05-30

## 2018-05-30 RX ORDER — METOPROLOL TARTRATE 25 MG/1
12.5 TABLET ORAL 2 TIMES DAILY
Status: DISCONTINUED | OUTPATIENT
Start: 2018-05-30 | End: 2018-06-05 | Stop reason: HOSPADM

## 2018-05-30 RX ORDER — ACETAMINOPHEN AND CODEINE PHOSPHATE 300; 60 MG/1; MG/1
1 TABLET ORAL EVERY 6 HOURS PRN
Status: DISCONTINUED | OUTPATIENT
Start: 2018-05-30 | End: 2018-05-31

## 2018-05-30 RX ORDER — FINASTERIDE 5 MG/1
5 TABLET, FILM COATED ORAL DAILY
Status: DISCONTINUED | OUTPATIENT
Start: 2018-05-30 | End: 2018-06-05 | Stop reason: HOSPADM

## 2018-05-30 RX ORDER — HYDROMORPHONE HYDROCHLORIDE 1 MG/ML
0.1 INJECTION, SOLUTION INTRAMUSCULAR; INTRAVENOUS; SUBCUTANEOUS EVERY 5 MIN PRN
Status: DISCONTINUED | OUTPATIENT
Start: 2018-05-30 | End: 2018-05-31

## 2018-05-30 RX ORDER — SODIUM CHLORIDE 0.9 % (FLUSH) 0.9 %
3 SYRINGE (ML) INJECTION
Status: DISCONTINUED | OUTPATIENT
Start: 2018-05-30 | End: 2018-06-05 | Stop reason: HOSPADM

## 2018-05-30 RX ORDER — BETHANECHOL CHLORIDE 25 MG/1
50 TABLET ORAL 3 TIMES DAILY
Status: DISCONTINUED | OUTPATIENT
Start: 2018-05-30 | End: 2018-06-05 | Stop reason: HOSPADM

## 2018-05-30 RX ORDER — FAMOTIDINE 20 MG/1
20 TABLET, FILM COATED ORAL 2 TIMES DAILY
Status: DISCONTINUED | OUTPATIENT
Start: 2018-05-30 | End: 2018-06-04

## 2018-05-30 RX ORDER — ONDANSETRON 8 MG/1
8 TABLET, ORALLY DISINTEGRATING ORAL EVERY 8 HOURS PRN
Status: DISCONTINUED | OUTPATIENT
Start: 2018-05-30 | End: 2018-06-05 | Stop reason: HOSPADM

## 2018-05-30 RX ORDER — ACETAMINOPHEN 10 MG/ML
1000 INJECTION, SOLUTION INTRAVENOUS ONCE
Status: COMPLETED | OUTPATIENT
Start: 2018-05-30 | End: 2018-05-30

## 2018-05-30 RX ORDER — ONDANSETRON 2 MG/ML
INJECTION INTRAMUSCULAR; INTRAVENOUS
Status: DISCONTINUED | OUTPATIENT
Start: 2018-05-30 | End: 2018-05-30

## 2018-05-30 RX ORDER — CEFAZOLIN SODIUM 2 G/50ML
2 SOLUTION INTRAVENOUS
Status: DISCONTINUED | OUTPATIENT
Start: 2018-05-30 | End: 2018-05-31

## 2018-05-30 RX ORDER — ERGOCALCIFEROL 1.25 MG/1
50000 CAPSULE ORAL
Status: DISCONTINUED | OUTPATIENT
Start: 2018-05-31 | End: 2018-06-05 | Stop reason: HOSPADM

## 2018-05-30 RX ORDER — LEVETIRACETAM 500 MG/1
500 TABLET ORAL 2 TIMES DAILY
Status: DISCONTINUED | OUTPATIENT
Start: 2018-05-30 | End: 2018-06-05 | Stop reason: HOSPADM

## 2018-05-30 RX ADMIN — FENTANYL CITRATE 50 MCG: 50 INJECTION INTRAMUSCULAR; INTRAVENOUS at 06:05

## 2018-05-30 RX ADMIN — ACETAMINOPHEN 1000 MG: 10 INJECTION, SOLUTION INTRAVENOUS at 07:05

## 2018-05-30 RX ADMIN — ETOMIDATE 2 MG: 2 INJECTION, SOLUTION INTRAVENOUS at 06:05

## 2018-05-30 RX ADMIN — BETHANECHOL CHLORIDE 50 MG: 25 TABLET ORAL at 09:05

## 2018-05-30 RX ADMIN — LEVETIRACETAM 500 MG: 500 TABLET, FILM COATED ORAL at 09:05

## 2018-05-30 RX ADMIN — ONDANSETRON 4 MG: 2 INJECTION, SOLUTION INTRAMUSCULAR; INTRAVENOUS at 06:05

## 2018-05-30 RX ADMIN — DEXTROSE AND SODIUM CHLORIDE: 5; .45 INJECTION, SOLUTION INTRAVENOUS at 02:05

## 2018-05-30 RX ADMIN — ACETAMINOPHEN AND CODEINE PHOSPHATE 1 TABLET: 300; 60 TABLET ORAL at 02:05

## 2018-05-30 RX ADMIN — CEFAZOLIN SODIUM 2 G: 1 POWDER, FOR SOLUTION INTRAMUSCULAR; INTRAVENOUS at 06:05

## 2018-05-30 RX ADMIN — SUCCINYLCHOLINE CHLORIDE 100 MG: 20 INJECTION, SOLUTION INTRAMUSCULAR; INTRAVENOUS at 06:05

## 2018-05-30 RX ADMIN — ETOMIDATE 10 MG: 2 INJECTION, SOLUTION INTRAVENOUS at 06:05

## 2018-05-30 RX ADMIN — FAMOTIDINE 20 MG: 20 TABLET ORAL at 09:05

## 2018-05-30 RX ADMIN — SODIUM CHLORIDE, SODIUM LACTATE, POTASSIUM CHLORIDE, AND CALCIUM CHLORIDE: .6; .31; .03; .02 INJECTION, SOLUTION INTRAVENOUS at 05:05

## 2018-05-30 RX ADMIN — DEXTROSE AND SODIUM CHLORIDE: 5; .45 INJECTION, SOLUTION INTRAVENOUS at 08:05

## 2018-05-30 RX ADMIN — ATORVASTATIN CALCIUM 20 MG: 10 TABLET, FILM COATED ORAL at 09:05

## 2018-05-30 RX ADMIN — ROCURONIUM BROMIDE 10 MG: 10 INJECTION, SOLUTION INTRAVENOUS at 06:05

## 2018-05-30 RX ADMIN — ROCURONIUM BROMIDE 5 MG: 10 INJECTION, SOLUTION INTRAVENOUS at 06:05

## 2018-05-30 RX ADMIN — ACETAMINOPHEN AND CODEINE PHOSPHATE 1 TABLET: 300; 60 TABLET ORAL at 09:05

## 2018-05-30 RX ADMIN — ESMOLOL HYDROCHLORIDE 10 MG: 10 INJECTION, SOLUTION INTRAVENOUS at 06:05

## 2018-05-30 RX ADMIN — ETOMIDATE 4 MG: 2 INJECTION, SOLUTION INTRAVENOUS at 06:05

## 2018-05-30 RX ADMIN — ACETAMINOPHEN AND CODEINE PHOSPHATE 1 TABLET: 300; 60 TABLET ORAL at 08:05

## 2018-05-30 RX ADMIN — Medication 12.5 MG: at 09:05

## 2018-05-30 RX ADMIN — Medication 12.5 MG: at 10:05

## 2018-05-30 NOTE — SUBJECTIVE & OBJECTIVE
Past Medical History:   Diagnosis Date    Anticoagulant long-term use     Arthritis     Cancer     right  hand    Coronary artery disease     Deep vein thrombosis     Hyperlipidemia     Hypertension     Seizures 1988    none since on tegretol       Past Surgical History:   Procedure Laterality Date    APPENDECTOMY      FRACTURE SURGERY Right     elbow    FRACTURE SURGERY Right     hip    JOSUÉ FILTER PLACEMENT      hx of deep vein thrombosis    TOTAL SHOULDER ARTHROPLASTY Right        Review of patient's allergies indicates:  No Known Allergies    No current facility-administered medications on file prior to encounter.      Current Outpatient Prescriptions on File Prior to Encounter   Medication Sig    atorvastatin (LIPITOR) 20 MG tablet Take 1 tablet (20 mg total) by mouth once daily.    bethanechol (URECHOLINE) 50 MG tablet TAKE ONE TABLET BY MOUTH THREE TIMES DAILY ON AN EMPTY STOMACH *DO NOT TAKE WITH MILK OR ANTACIDS, TAKE WITH WATER *    carBAMazepine (TEGRETOL) 100 mg chewable tablet Take 2 tablets (200 mg total) by mouth once daily.    clopidogrel (PLAVIX) 75 mg tablet Take 1 tablet (75 mg total) by mouth once daily.    ergocalciferol (ERGOCALCIFEROL) 50,000 unit Cap TAKE ONE CAPSULE BY MOUTH TWICE WEEKLY    finasteride (PROSCAR) 5 mg tablet Take 1 tablet (5 mg total) by mouth once daily.    levETIRAcetam (KEPPRA) 500 MG Tab Take 1 tablet (500 mg total) by mouth 2 (two) times daily.    metoprolol tartrate (LOPRESSOR) 25 MG tablet Take 0.5 tablets (12.5 mg total) by mouth 2 (two) times daily.    rivaroxaban (XARELTO) 20 mg Tab Take 1 tablet (20 mg total) by mouth once daily.    tamsulosin (FLOMAX) 0.4 mg Cp24 Take 1 capsule (0.4 mg total) by mouth once daily.    mupirocin (BACTROBAN) 2 % ointment      Family History     None        Social History Main Topics    Smoking status: Former Smoker     Packs/day: 0.50     Years: 6.00     Types: Cigarettes     Quit date: 10/27/1975     Smokeless tobacco: Never Used    Alcohol use No    Drug use: No    Sexual activity: Not Currently     Partners: Female     Review of Systems   Constitutional: Positive for activity change. Negative for appetite change.   HENT: Negative for congestion and dental problem.    Eyes: Negative for discharge and itching.   Respiratory: Negative for apnea and chest tightness.    Cardiovascular: Negative for chest pain and leg swelling.   Gastrointestinal: Negative for abdominal distention and abdominal pain.   Endocrine: Negative for cold intolerance and heat intolerance.   Genitourinary: Negative for difficulty urinating and dysuria.   Musculoskeletal: Positive for arthralgias.   Skin: Negative for color change and pallor.   Allergic/Immunologic: Negative for environmental allergies.   Neurological: Negative for dizziness and facial asymmetry.   Hematological: Negative for adenopathy. Does not bruise/bleed easily.   Psychiatric/Behavioral: Negative for agitation and behavioral problems.     Objective:     Vital Signs (Most Recent):  Temp: 98.7 °F (37.1 °C) (05/30/18 1130)  Pulse: 77 (05/30/18 1130)  Resp: 18 (05/30/18 1130)  BP: 105/61 (05/30/18 1130)  SpO2: 96 % (05/30/18 1130) Vital Signs (24h Range):  Temp:  [97.4 °F (36.3 °C)-99.5 °F (37.5 °C)] 98.7 °F (37.1 °C)  Pulse:  [70-82] 77  Resp:  [16-19] 18  SpO2:  [96 %-99 %] 96 %  BP: (105-152)/(61-81) 105/61     Weight: 72.5 kg (159 lb 13.3 oz)  Body mass index is 24.3 kg/m².    Physical Exam   Constitutional: No distress.   HENT:   Head: Normocephalic.   Eyes: Pupils are equal, round, and reactive to light.   Neck: Normal range of motion.   Cardiovascular: Normal rate and regular rhythm.    Pulmonary/Chest: Effort normal and breath sounds normal.   Abdominal: Soft. Bowel sounds are normal.   Musculoskeletal: Normal range of motion. He exhibits tenderness.   Neurological: He is alert. No cranial nerve deficit. Coordination normal.   Skin: Skin is warm and dry. He is not  diaphoretic.   Psychiatric: He has a normal mood and affect. His behavior is normal.       Significant Labs:   BMP:   Recent Labs  Lab 05/30/18 0445   *      K 4.0      CO2 23   BUN 19   CREATININE 1.1   CALCIUM 8.5*     CBC:   Recent Labs  Lab 05/29/18 2020 05/30/18 0445   WBC 8.80 8.34   HGB 12.0* 10.9*   HCT 33.7* 31.2*    168       Significant Imaging: reviewed.

## 2018-05-30 NOTE — CONSULTS
[facility]  Cardiology  Consult Note    Patient Name: Jaydon Mccarthy  MRN: [id number]  Admission Date: 5/29/2018  Hospital Length of Stay: 1 days  Code Status: Full Code   Attending Provider: Pop Martin III, MD   Consulting Provider: Easton Nuno MD  Primary Care Physician: Javier Villanueva MD  Principal Problem:Intertrochanteric fracture of left hip, closed, initial encounter    Patient information was obtained from patient and ER records.     Inpatient consult to Cardiology  Consult performed by: EASTON NUNO  Consult ordered by: ISAAK ROGERS  Reason for consult: preop CV eval        Subjective:     Chief Complaint:  Fall/hip fx     HPI:   86 y.o. Male, with a medical history of hypertension, hyperlipidemia, seizures, deep vein thrombosis, arthritis, coronary artery disease, anticoagulant long term use and cancer (right hand), presents to the ED via EMS transportation accompanied by his daughter s/p a mechanical fall that occurred about 1x hour ago. Pt reports tripping and falling onto his left hip while at home. He presently c/o left hip pain. Symptoms are acute, constant and mild (3/10). Pt notes that he has not ambulated since the fall. Pt denies abdominal pain and numbness or tingling to the lower extremities. No other associated symptoms. No alleviating factors.     Pt follows with Dr. Prado, last seen 3/10/17.  Pt reports nonsyncoal fall and injury to L hip, found to be fractured in ER.  He reports acceptable premorbid exercise capacity and reported that he can climb a flight of stairs.  He denies angina or SOB.  Review of med rx includes Plavix (had RCA stent in 2015) and Xarelto for hx of DVT and prior IVC filter.  Not clear why Xarelto has been continued.    Past Medical History:   Diagnosis Date    Anticoagulant long-term use     Arthritis     Cancer     right  hand    Coronary artery disease     Deep vein thrombosis     Hyperlipidemia     Hypertension      Seizures 1988    none since on tegretol       Past Surgical History:   Procedure Laterality Date    APPENDECTOMY      FRACTURE SURGERY Right     elbow    FRACTURE SURGERY Right     hip    JOSUÉ FILTER PLACEMENT      hx of deep vein thrombosis    TOTAL SHOULDER ARTHROPLASTY Right        Review of patient's allergies indicates:  No Known Allergies    No current facility-administered medications on file prior to encounter.      Current Outpatient Prescriptions on File Prior to Encounter   Medication Sig    atorvastatin (LIPITOR) 20 MG tablet Take 1 tablet (20 mg total) by mouth once daily.    bethanechol (URECHOLINE) 50 MG tablet TAKE ONE TABLET BY MOUTH THREE TIMES DAILY ON AN EMPTY STOMACH *DO NOT TAKE WITH MILK OR ANTACIDS, TAKE WITH WATER *    carBAMazepine (TEGRETOL) 100 mg chewable tablet Take 2 tablets (200 mg total) by mouth once daily.    clopidogrel (PLAVIX) 75 mg tablet Take 1 tablet (75 mg total) by mouth once daily.    ergocalciferol (ERGOCALCIFEROL) 50,000 unit Cap TAKE ONE CAPSULE BY MOUTH TWICE WEEKLY    finasteride (PROSCAR) 5 mg tablet Take 1 tablet (5 mg total) by mouth once daily.    levETIRAcetam (KEPPRA) 500 MG Tab Take 1 tablet (500 mg total) by mouth 2 (two) times daily.    metoprolol tartrate (LOPRESSOR) 25 MG tablet Take 0.5 tablets (12.5 mg total) by mouth 2 (two) times daily.    rivaroxaban (XARELTO) 20 mg Tab Take 1 tablet (20 mg total) by mouth once daily.    tamsulosin (FLOMAX) 0.4 mg Cp24 Take 1 capsule (0.4 mg total) by mouth once daily.    mupirocin (BACTROBAN) 2 % ointment      Family History     None        Social History Main Topics    Smoking status: Former Smoker     Packs/day: 0.50     Years: 6.00     Types: Cigarettes     Quit date: 10/27/1975    Smokeless tobacco: Never Used    Alcohol use No    Drug use: No    Sexual activity: Not Currently     Partners: Female     Review of Systems   Constitution: Negative for chills, diaphoresis, fever, weakness and  malaise/fatigue.   HENT: Negative for nosebleeds.    Eyes: Negative for blurred vision and double vision.   Cardiovascular: Negative for chest pain, claudication, cyanosis, dyspnea on exertion, leg swelling, orthopnea, palpitations, paroxysmal nocturnal dyspnea and syncope.   Respiratory: Negative for cough, shortness of breath and wheezing.    Skin: Negative for dry skin and poor wound healing.   Musculoskeletal: Positive for falls and joint pain (L hip). Negative for back pain, joint swelling and myalgias.   Gastrointestinal: Negative for abdominal pain, nausea and vomiting.   Genitourinary: Negative for hematuria.   Neurological: Positive for sensory change (presbycusis). Negative for dizziness, headaches, numbness and seizures.   Psychiatric/Behavioral: Negative for altered mental status and depression.     Objective:     Vital Signs (Most Recent):  Temp: 98.1 °F (36.7 °C) (05/30/18 0440)  Pulse: 76 (05/30/18 0440)  Resp: 18 (05/30/18 0440)  BP: 124/65 (05/30/18 0440)  SpO2: 98 % (05/30/18 0440) Vital Signs (24h Range):  Temp:  [97.4 °F (36.3 °C)-99.5 °F (37.5 °C)] 98.1 °F (36.7 °C)  Pulse:  [70-82] 76  Resp:  [16-18] 18  SpO2:  [98 %-99 %] 98 %  BP: (123-152)/(65-81) 124/65     Weight: 72.5 kg (159 lb 13.3 oz)  Body mass index is 24.3 kg/m².    SpO2: 98 %  O2 Device (Oxygen Therapy): room air    No intake or output data in the 24 hours ending 05/30/18 0621    Lines/Drains/Airways     Peripheral Intravenous Line                 Peripheral IV - Single Lumen 05/29/18 2020 Right Antecubital less than 1 day                Physical Exam   Constitutional: He is oriented to person, place, and time. He appears well-developed and well-nourished.   HENT:   Head: Normocephalic and atraumatic.   Eyes: Conjunctivae and EOM are normal. Pupils are equal, round, and reactive to light. No scleral icterus.   Neck: Normal range of motion. Neck supple. No JVD present. Carotid bruit is not present. No tracheal deviation present. No  thyromegaly present.   Cardiovascular: Normal rate, regular rhythm, S1 normal and S2 normal.  Exam reveals no gallop and no friction rub.    No murmur heard.  Pulmonary/Chest: Effort normal and breath sounds normal. He has no wheezes. He exhibits no tenderness.   Abdominal: Soft. He exhibits no distension.   Musculoskeletal: He exhibits no edema.   L hip pain   Neurological: He is alert and oriented to person, place, and time. He has normal strength. A cranial nerve deficit (presbycusis) is present.   Skin: Skin is warm and dry. No rash noted.   Psychiatric: He has a normal mood and affect. His behavior is normal.       Current Medications:   carBAMazepine  200 mg Oral Daily    famotidine  20 mg Oral BID    levETIRAcetam  500 mg Oral BID    metoprolol tartrate  12.5 mg Oral BID    tamsulosin  0.4 mg Oral Daily      dextrose 5 % and 0.45 % NaCl 100 mL/hr at 05/30/18 0212     acetaminophen, acetaminophen, acetaminophen-codeine 300-60mg, ondansetron    Laboratory:  CBC:    Recent Labs  Lab 10/30/15  0444 05/25/16  1529 10/04/16  1620 05/29/18  2020 05/30/18  0445   WHITE BLOOD CELL COUNT 8.91 5.72 5.68 8.80 8.34   HEMOGLOBIN 11.6 L 13.3 L 13.1 L 12.0 L 10.9 L   HEMATOCRIT 34.3 L 38.4 L 37.5 L 33.7 L 31.2 L   PLATELETS 254 142 L 138 L 185 168       CHEMISTRIES:    Recent Labs  Lab 09/17/15  1720  09/22/15  0056  10/28/15  0350 10/29/15  0338 10/30/15  0444 05/25/16  1529 10/04/16  1620 05/29/18 2020   GLUCOSE 119 H  < > 122 H  < > 107 101 147 H 114 H 103 110   SODIUM 140  < > 143  < > 139 138 135 L 144 145 141   POTASSIUM 4.0  < > 3.1 L  < > 4.0 3.6 4.0 4.3 4.3 4.3   BUN BLD 17  < > 14  < > 22 20 21 19 14 20   CREATININE 1.1  < > 0.8  < > 1.1 1.1 1.0 1.2 1.1 1.2   EGFR IF AFRICAN AMERICAN >60  < > >60  < > >60 >60 >60 >60 >60 >60   EGFR IF NON- >60  < > >60  < > >60 >60 >60 55 A >60 54 A   CALCIUM 9.0  < > 8.7  < > 8.5 L 9.3 8.9 9.0 8.9 8.7   MAGNESIUM 1.8  --  1.7  --  1.9  --   --   --   --   --     < > = values in this interval not displayed.    CARDIAC BIOMARKERS:    Recent Labs  Lab 09/17/15  1720 09/18/15  0202 09/22/15  0056 09/22/15  0531 09/22/15  1238   TROPONIN I 0.021 0.224 H 0.727 H 0.855 H 1.166 H       COAGS:    Recent Labs  Lab 10/27/15  1114 05/29/18 2020   INR 1.1 1.0       LIPIDS/LFTS:    Recent Labs  Lab 09/21/15  0150 09/26/15  0222 10/28/15  0350 05/25/16  1529 05/29/18 2020   CHOLESTEROL  --  140  --   --   --    TRIGLYCERIDES  --  100  --   --   --    HDL  --  22 L  --   --   --    LDL CHOLESTEROL  --  98.0  --   --   --    NON-HDL CHOLESTEROL  --  118  --   --   --    AST 50 H 26 17 25 27   ALT 72 H 36 19 22 26       BNP:    Recent Labs  Lab 09/17/15  1720   BNP 54       TSH:    Recent Labs  Lab 09/17/15  2308   TSH 1.756       Free T4:        Diagnostic Results:  ECG (personally reviewed tracings):  5/29/18 2034 SR 68    Chest X-Ray (personally reviewed image(s)): 5/29/18 NAD    Echo: 11/21/16    1 - Normal left ventricular systolic function (EF 55-60%).     Stress Test: L MPI 11/21/16  Nuclear Quantitative Functional Analysis:   LVEF: 60 %  Impression: ABNORMAL MYOCARDIAL PERFUSION  1. The perfusion scan is free of evidence for myocardial ischemia.   2. There is moderate intensity fixed defect in the inferolateral wall of the left ventricle, consistent with myocardial injury.   3. There is a mild to moderate intensity fixed defect in the inferior wall of the left ventricle, secondary to diaphragm attenuation.   4. Resting wall motion is physiologic.   5. Resting LV function is normal.   6. The ventricular volumes are normal at rest and stress.   7. The extracardiac distribution of radioactivity is normal.   8. When compared to the previous study from 07/23/2015, no change.    Cath: 9/25/15  B. HEMODYNAMIC RESULTS:  LVEDP: 10  C. ANGIOGRAPHIC RESULTS:       Patient has a right dominant coronary artery.      - Left Main Coronary Artery:             The LM is normal. There is JAS 3  flow.     - Left Anterior Descending Artery:             The mid LAD has a 80% stenosis. There is JAS 3 flow. The remaining portion of the vessel is diffusely diseased.     - Left Circumflex Artery:             The proximal LCX has subtotal. There is JAS 0 flow. The remaining portion of the vessel is diffusely diseased.     - Right Coronary Artery:             The mid RCA has a 80% stenosis. There is JAS 3 flow.  INTERVENTION:       Mid RCA:              The lesion was successfully intervened. Post-stenosis of 0% and post-JAS 3 flow. The vessel was accessed natively.  The following items were used: Stent Vision 3.0 X 23 and Blln Emerge 2.5 X12.  D. SUMMARY/POST-OPERATIVE DIAGNOSIS:  1. Three vessel coronary artery disease.  2. Successful PCI.  E. RECOMMENDATIONS:  1. Routine post PCI care.  2. Maximize medical management.  3. Risk factor reductions.  4. ASA 81mg.  5. Clopidogrel (Plavix) for one year.  6. Cardiac rehab referral.  7. Follow up with Dr. Efren Prado.      Assessment and Plan:     * Intertrochanteric fracture of left hip, closed, initial encounter    S/p nonsyncopal fall with L hip fx for planned operative repair.  Pt reports acceptable exercise capacity prior to injury.  While he not at low risk, his risk is not prohibitive for the planned surgery and anesthesia as deemed necessary.  No preop cardiac testing required.  It is acceptable to hold the plavix in the periop period if absolutely necessary.  It is also acceptable to hold the Xarelto in the periop period (see below).        CAD s/p PCI     Known 3V CAD s/p RCA PCI 9/2015. EF normal, asymptomatic from an anginal/CHF perspective.  Suggest continuation of BBL and statin.  OK to hold Plavix periop if absolutely necessary.  Resume atorva 20mg qhs        Personal history of DVT (deep vein thrombosis)    Pt has hx of LLE DVT on US 2012.  Apparently has been on Rivaroxaban for this and also has IVC filter.  OK to hold Xarelto  perioperatively.  Consider repeat venous US.  If neg, could consider stopping OAC.        Essential hypertension    Cont med rx        Hyperlipidemia    Resume atorva 20mg qhs        Preop cardiovascular exam    As above            VTE Risk Mitigation     None          Thank you for your consult. I will follow-up with patient. Please contact us if you have any additional questions.    Easton Serrano MD  Cardiology   [facility]

## 2018-05-30 NOTE — HPI
86 y.o. Male, with a medical history of hypertension, hyperlipidemia, seizures, deep vein thrombosis, arthritis, coronary artery disease, anticoagulant long term use and cancer (right hand), presents to the ED via EMS transportation accompanied by his daughter s/p a mechanical fall that occurred about 1x hour ago. Pt reports tripping and falling onto his left hip while at home. He presently c/o left hip pain. Symptoms are acute, constant and mild (3/10). Pt notes that he has not ambulated since the fall. Pt denies abdominal pain and numbness or tingling to the lower extremities. No other associated symptoms. No alleviating factors.     Pt follows with Dr. Prado, last seen 3/10/17.  Pt reports nonsyncoal fall and injury to L hip, found to be fractured in ER.  He reports acceptable premorbid exercise capacity and reported that he can climb a flight of stairs.  He denies angina or SOB.  Review of med rx includes Plavix (had RCA stent in 2015) and Xarelto for hx of DVT and prior IVC filter.  Not clear why Xarelto has been continued.

## 2018-05-30 NOTE — CONSULTS
[facility] Hospital Medicine  Consult Note    Patient Name: Jaydon Mccarthy  MRN: [id number]  Admission Date: 5/29/2018  Hospital Length of Stay: 1 days  Attending Physician: Pop Martin III, MD   Primary Care Provider: Javier Villanueva MD           Patient information was obtained from patient and ER records.     Consults  Subjective:     Principal Problem: Intertrochanteric fracture of left hip, closed, initial encounter    Chief Complaint:   Chief Complaint   Patient presents with    Fall     Pt reports left hip pain following trip and fall, no LOC.         HPI: See H&P.    Past Medical History:   Diagnosis Date    Anticoagulant long-term use     Arthritis     Cancer     right  hand    Coronary artery disease     Deep vein thrombosis     Hyperlipidemia     Hypertension     Seizures 1988    none since on tegretol       Past Surgical History:   Procedure Laterality Date    APPENDECTOMY      FRACTURE SURGERY Right     elbow    FRACTURE SURGERY Right     hip    JOSUÉ FILTER PLACEMENT      hx of deep vein thrombosis    TOTAL SHOULDER ARTHROPLASTY Right        Review of patient's allergies indicates:  No Known Allergies    No current facility-administered medications on file prior to encounter.      Current Outpatient Prescriptions on File Prior to Encounter   Medication Sig    atorvastatin (LIPITOR) 20 MG tablet Take 1 tablet (20 mg total) by mouth once daily.    bethanechol (URECHOLINE) 50 MG tablet TAKE ONE TABLET BY MOUTH THREE TIMES DAILY ON AN EMPTY STOMACH *DO NOT TAKE WITH MILK OR ANTACIDS, TAKE WITH WATER *    carBAMazepine (TEGRETOL) 100 mg chewable tablet Take 2 tablets (200 mg total) by mouth once daily.    clopidogrel (PLAVIX) 75 mg tablet Take 1 tablet (75 mg total) by mouth once daily.    ergocalciferol (ERGOCALCIFEROL) 50,000 unit Cap TAKE ONE CAPSULE BY MOUTH TWICE WEEKLY    finasteride (PROSCAR) 5 mg tablet Take 1 tablet (5 mg total) by mouth once daily.     levETIRAcetam (KEPPRA) 500 MG Tab Take 1 tablet (500 mg total) by mouth 2 (two) times daily.    metoprolol tartrate (LOPRESSOR) 25 MG tablet Take 0.5 tablets (12.5 mg total) by mouth 2 (two) times daily.    rivaroxaban (XARELTO) 20 mg Tab Take 1 tablet (20 mg total) by mouth once daily.    tamsulosin (FLOMAX) 0.4 mg Cp24 Take 1 capsule (0.4 mg total) by mouth once daily.    mupirocin (BACTROBAN) 2 % ointment      Family History     None        Social History Main Topics    Smoking status: Former Smoker     Packs/day: 0.50     Years: 6.00     Types: Cigarettes     Quit date: 10/27/1975    Smokeless tobacco: Never Used    Alcohol use No    Drug use: No    Sexual activity: Not Currently     Partners: Female     Review of Systems   Constitutional: Positive for activity change. Negative for appetite change.   HENT: Negative for congestion and dental problem.    Eyes: Negative for discharge and itching.   Respiratory: Negative for apnea and chest tightness.    Cardiovascular: Negative for chest pain and leg swelling.   Gastrointestinal: Negative for abdominal distention and abdominal pain.   Endocrine: Negative for cold intolerance and heat intolerance.   Genitourinary: Negative for difficulty urinating and dysuria.   Musculoskeletal: Positive for arthralgias.   Skin: Negative for color change and pallor.   Allergic/Immunologic: Negative for environmental allergies.   Neurological: Negative for dizziness and facial asymmetry.   Hematological: Negative for adenopathy. Does not bruise/bleed easily.   Psychiatric/Behavioral: Negative for agitation and behavioral problems.     Objective:     Vital Signs (Most Recent):  Temp: 98.7 °F (37.1 °C) (05/30/18 1130)  Pulse: 77 (05/30/18 1130)  Resp: 18 (05/30/18 1130)  BP: 105/61 (05/30/18 1130)  SpO2: 96 % (05/30/18 1130) Vital Signs (24h Range):  Temp:  [97.4 °F (36.3 °C)-99.5 °F (37.5 °C)] 98.7 °F (37.1 °C)  Pulse:  [70-82] 77  Resp:  [16-19] 18  SpO2:  [96 %-99 %] 96  %  BP: (105-152)/(61-81) 105/61     Weight: 72.5 kg (159 lb 13.3 oz)  Body mass index is 24.3 kg/m².    Physical Exam   Constitutional: No distress.   HENT:   Head: Normocephalic.   Eyes: Pupils are equal, round, and reactive to light.   Neck: Normal range of motion.   Cardiovascular: Normal rate and regular rhythm.    Pulmonary/Chest: Effort normal and breath sounds normal.   Abdominal: Soft. Bowel sounds are normal.   Musculoskeletal: Normal range of motion. He exhibits tenderness.   Neurological: He is alert. No cranial nerve deficit. Coordination normal.   Skin: Skin is warm and dry. He is not diaphoretic.   Psychiatric: He has a normal mood and affect. His behavior is normal.       Significant Labs:   BMP:   Recent Labs  Lab 05/30/18  0445   *      K 4.0      CO2 23   BUN 19   CREATININE 1.1   CALCIUM 8.5*     CBC:   Recent Labs  Lab 05/29/18  2020 05/30/18  0445   WBC 8.80 8.34   HGB 12.0* 10.9*   HCT 33.7* 31.2*    168       Significant Imaging: reviewed.    Assessment/Plan:     * Intertrochanteric fracture of left hip, closed, initial encounter    Has been admitted for ortho service for intervention,will speak with ortho since patient was  on xarelto until 24 hours ago.          Seizure disorder    Stable on keppra and Tegretol.          Anemia of chronic disease    Stable,will monitor.          Chronic anticoagulation    Was on Xaretol as above for DVT.          CAD s/p PCI       On medical treatment,last Echo show preserved EF,cardiology clear he patient at moderte risk.        Hyperlipidemia      On statin         Essential hypertension    Controlled with home medication,will monitor.          BPH (benign prostatic hyperplasia)    On proscar and flomax,          Personal history of DVT (deep vein thrombosis)    Was on xarelto until yesterday,has been hold for procedure,will check leg DVT study.            VTE Risk Mitigation         Ordered     Place MALDONADO hose  Until discontinued       05/30/18 0852              Thank you for your consult. I will follow-up with patient. Please contact us if you have any additional questions.    Ana Wing MD  Department of Hospital Medicine   [facility]

## 2018-05-30 NOTE — NURSING
Pt was admitted for a left hip fracture. Pt is awake alert and oriented. Pt is [place] and has hearing aids in bilaterally. Pt is cognitive and can answer all questions. Pt oriented to room bed and call light. Will continue to monitor.

## 2018-05-30 NOTE — ANESTHESIA PREPROCEDURE EVALUATION
05/30/2018  Jaydon Mccarthy is a 86 y.o., male.    CC: Fall     HPI: This 86 y.o. Male, with a medical history of hypertension, hyperlipidemia, seizures, deep vein thrombosis, arthritis, coronary artery disease, anticoagulant long term use and cancer (right hand), presents to the ED via EMS transportation accompanied by his daughter s/p a mechanical fall that occurred about 1x hour ago. Pt reports tripping and falling onto his left hip while at home. He presently c/o left hip pain. Symptoms are acute, constant and mild (3/10). Pt notes that he has not ambulated since the fall. Pt denies abdominal pain and numbness or tingling to the lower extremities. No other associated symptoms. No alleviating factors.     Pre-operative evaluation for Procedure(s) (LRB):  ORIF, FRACTURE, FEMUR, INTERTROCHANTERIC (Left)    Review of patient's allergies indicates:  No Known Allergies    No current facility-administered medications on file prior to encounter.      Current Outpatient Prescriptions on File Prior to Encounter   Medication Sig Dispense Refill    atorvastatin (LIPITOR) 20 MG tablet Take 1 tablet (20 mg total) by mouth once daily. 90 tablet 3    bethanechol (URECHOLINE) 50 MG tablet TAKE ONE TABLET BY MOUTH THREE TIMES DAILY ON AN EMPTY STOMACH *DO NOT TAKE WITH MILK OR ANTACIDS, TAKE WITH WATER * 90 tablet 3    carBAMazepine (TEGRETOL) 100 mg chewable tablet Take 2 tablets (200 mg total) by mouth once daily. 180 tablet 3    clopidogrel (PLAVIX) 75 mg tablet Take 1 tablet (75 mg total) by mouth once daily. 90 tablet 3    ergocalciferol (ERGOCALCIFEROL) 50,000 unit Cap TAKE ONE CAPSULE BY MOUTH TWICE WEEKLY 12 capsule 0    finasteride (PROSCAR) 5 mg tablet Take 1 tablet (5 mg total) by mouth once daily. 90 tablet 3    levETIRAcetam (KEPPRA) 500 MG Tab Take 1 tablet (500 mg total) by mouth 2 (two) times daily.  180 tablet 3    metoprolol tartrate (LOPRESSOR) 25 MG tablet Take 0.5 tablets (12.5 mg total) by mouth 2 (two) times daily. 30 tablet 11    rivaroxaban (XARELTO) 20 mg Tab Take 1 tablet (20 mg total) by mouth once daily. 30 tablet 11    tamsulosin (FLOMAX) 0.4 mg Cp24 Take 1 capsule (0.4 mg total) by mouth once daily. 90 capsule 3    mupirocin (BACTROBAN) 2 % ointment   1       Patient Active Problem List   Diagnosis    Personal history of DVT (deep vein thrombosis)    Adhesive bursitis of right shoulder    BPH (benign prostatic hyperplasia)    Tinnitus, bilateral    Sensorineural hearing loss (SNHL) of both ears    DJD of shoulder    Essential hypertension    Hyperlipidemia    CAD s/p PCI     Chronic anticoagulation    Anemia of chronic disease    Epilepsy    Intertrochanteric fracture of left hip, closed, initial encounter    Preop cardiovascular exam       Past Surgical History:   Procedure Laterality Date    APPENDECTOMY      FRACTURE SURGERY Right     elbow    FRACTURE SURGERY Right     hip    JOSUÉ FILTER PLACEMENT      hx of deep vein thrombosis    TOTAL SHOULDER ARTHROPLASTY Right            Recent Labs      05/30/18   0445   HCT  31.2*     Recent Labs      05/30/18   0445   PLT  168     Recent Labs      05/30/18   0445   K  4.0     Recent Labs      05/30/18   0445   CREATININE  1.1     Recent Labs      05/30/18   0445   GLU  135*     No results for input(s): PT in the last 72 hours.                      Anesthesia Evaluation     I have reviewed the Nursing Notes.      Review of Systems  Anesthesia Hx:  No problems with previous Anesthesia    Social:  Former Smoker    Hematology/Oncology:        Hematology Comments: Took plavix yesterday.  Hx dvt, s/p IVC filter   Cardiovascular:   Denies Pacemaker. Hypertension  Denies Valvular problems/Murmurs. CAD   CABG/stent (LAD stent 2015)  Denies Dysrhythmias.   Denies Angina.             denies PVD hyperlipidemia ECG has been reviewed.  Echo 2016:\  Grossly WNL   Pulmonary:  Pulmonary Normal    Renal/:   BPH    Hepatic/GI:  Hepatic/GI Normal    Musculoskeletal:   Arthritis     Neurological:   Denies CVA. Seizures, well controlled    Endocrine:  Endocrine Normal        Physical Exam  General:  Well nourished    Airway/Jaw/Neck:  AIRWAY FINDINGS: Normal           Mental Status:  Extremely hard of hearing but when he hears he appears to be oriented X 3 and able to answer questions appropriately       Anesthesia Plan  Type of Anesthesia, risks & benefits discussed:  Anesthesia Type:  general  Patient's Preference:   Intra-op Monitoring Plan: standard ASA monitors  Intra-op Monitoring Plan Comments:   Post Op Pain Control Plan: multimodal analgesia  Post Op Pain Control Plan Comments: As per surgeon's plan  Induction:   IV  Beta Blocker:  Patient is on a Beta-Blocker and has received one dose within the past 24 hours (No further documentation required).       Informed Consent: Patient understands risks and agrees with Anesthesia plan.  Questions answered. Anesthesia consent signed with patient.  ASA Score: 3  emergent   Day of Surgery Review of History & Physical:    H&P update referred to the surgeon.         Ready For Surgery From Anesthesia Perspective.     Placement Date: 10/06/16; Placement Time: 0739; Inserted by: CRNA; Airway Device: LMA; Mask Ventilation: Not Attempted; Intubated: Postinduction; Style: Cuffed; Placement Verified By: Auscultation, Capnometry; Intubation Findings: Positive EtCO2, Bilateral breath sounds; Removal Date: 10/06/16;  Removal Time: 0940    Present Prior to Hospital Arrival?: No; Placement Date: 10/29/15; Placement Time: 1521; Method of Intubation: Direct laryngoscopy; Inserted by: CRNA; Airway Device: Endotracheal Tube; Mask Ventilation: Easy; Intubated: Postinduction; Blade: Ravi #2; Airway Device Size: 7.5; Style: Cuffed; Cuff Inflation: Minimal occlusive pressure; Inflation Amount: 6; Placement Verified By:  Auscultation, Capnometry; Grade: Grade I; Complicating Factors: None; Intubation Findings: Positive EtCO2, Bilateral breath sounds, Atraumatic/Condition of teeth unchanged;  Depth of Insertion: 21; Securment: Lips; Complications: None; Breath Sounds: Equal Bilateral; Insertion Attempts: 1; Removal Date: 10/29/15;  Removal Time: 1715     Ref Range & Units 1yr ago  (11/21/16) 2yr ago  (9/18/15) 2yr ago  (7/23/15) 2yr ago  (7/23/15)     EF 55 - 65 55  60   55     Mitral Valve Regurgitation  MILD  TRIVIAL   TRIVIAL TO MILD     Diastolic Dysfunction  No  No  No  No     Est. PA Systolic Pressure  20.64  36.41   20.14     Pericardial Effusion  NONE    NONE     Tricuspid Valve Regurgitation  TRIVIAL TO MILD  MILD   TRIVIAL TO MILD

## 2018-05-30 NOTE — ASSESSMENT & PLAN NOTE
S/p nonsyncopal fall with L hip fx for planned operative repair.  Pt reports acceptable exercise capacity prior to injury.  While he not at low risk, his risk is not prohibitive for the planned surgery and anesthesia as deemed necessary.  No preop cardiac testing required.  It is acceptable to hold the plavix in the periop period if absolutely necessary.  It is also acceptable to hold the Xarelto in the periop period (see below).

## 2018-05-30 NOTE — PLAN OF CARE
"TN met with patient and patient's family at bedside to complete discharge needs assessment. TN explained duties of case management to patient and patient's son Vicente.  TN reviewed and provided  "[facility] Packet", "Discharge Planning Begins on Admission" brochure and discussed "Help at Home". Patient lives alone but stays with his son Vicente once a week. Patient receives help from his son and daughter when needed. TN also discussed his responsibilities to manage his health at home. Patient was informed to leave folder at bedside during hospital stay. Contact information added to white board.    Patient Preferred Pharmacy:   [facility] - [place] - [address]  [address]  [place] [address]  Phone: [phone] Fax: [phone]      Appointment Time Preference: mid afternoon       05/30/18 1500   Discharge Assessment   Assessment Type Discharge Planning Assessment   Confirmed/corrected address and phone number on facesheet? Yes   Assessment information obtained from? Patient   Communicated expected length of stay with patient/caregiver yes   Prior to hospitilization cognitive status: Alert/Oriented   Prior to hospitalization functional status: Independent;Assistive Equipment   Current cognitive status: Alert/Oriented   Current Functional Status: Independent;Assistive Equipment   Lives With alone  (visits son once a week )   Able to Return to Prior Arrangements yes   Is patient able to care for self after discharge? Yes   Who are your caregiver(s) and their phone number(s)? Darryl vivas @ [phone]   Patient's perception of discharge disposition home or selfcare   Readmission Within The Last 30 Days no previous admission in last 30 days   Patient currently being followed by outpatient case management? No   Patient currently receives any other outside agency services? No   Equipment Currently Used at Home walker, rolling;wheelchair;shower chair   Do you have any problems affording any of " your prescribed medications? No   Is the patient taking medications as prescribed? yes   Does the patient have transportation home? Yes   Transportation Available car;family or friend will provide   Does the patient receive services at the [facility]? No   Discharge Plan A Home   Discharge Plan B (TBD )   Patient/Family In Agreement With Plan yes

## 2018-05-30 NOTE — ASSESSMENT & PLAN NOTE
Pt has hx of LLE DVT on US 2012.  Apparently has been on Rivaroxaban for this and also has IVC filter.  OK to hold Xarelto perioperatively.  Consider repeat venous US.  If neg, could consider stopping OAC.

## 2018-05-30 NOTE — ASSESSMENT & PLAN NOTE
Known 3V CAD s/p RCA PCI 9/2015. EF normal, asymptomatic from an anginal/CHF perspective.  Suggest continuation of BBL and statin.  OK to hold Plavix periop if absolutely necessary.  Resume atorva 20mg qhs

## 2018-05-30 NOTE — HOSPITAL COURSE
This 86 y.o. Male, with a medical history of hypertension, hyperlipidemia, seizures, deep vein thrombosis, arthritis, coronary artery disease, anticoagulant long term use and cancer (right hand), presents to the ED via EMS transportation accompanied by his daughter s/p a mechanical fall that occurred about 1x hour ago before ER visit,. Pt reports tripping and falling onto his left hip while at home. He presently c/o left hip pain. Symptoms are acute, constant and mild (3/10). Pt notes that he has not ambulated since the fall. Pt denies abdominal pain and numbness or tingling,X ancelmo show acute displaced  IT fracture of left femur,ortho has admitted the patient with plan for intervention,cardiology has been consulted as well for cardiac clearance.he denies chest pain of SOB at this time.  S/P OIRF of left femur on 5.30.18 ,pain control,PT,OT.  SNF recommended,consulted SW,.placed PPD.  Stable,   WDL

## 2018-05-30 NOTE — PLAN OF CARE
05/30/18 1356   Discharge Assessment   Assessment Type Discharge Planning Assessment  (TN attempted to complete dc assessment. Patient not in room. Will attempt later )

## 2018-05-30 NOTE — H&P
CHIEF COMPLAINT:  Left hip fracture.    HISTORY OF PRESENT ILLNESS:  The patient is an 86-year-old male with a history   of fall at home yesterday evening, suffering a left hip intertrochanteric   fracture.  He was evaluated in the Emergency Room and was found to have a left   hip intertrochanteric fracture and was admitted.    PAST MEDICAL HISTORY:  For hypertension, hyperlipidemia, arthritis, cancer,   coronary artery disease, DVT and seizures, which have been controlled since   1988.    PAST SURGICAL HISTORY:  Appendectomy, fracture surgery in the left elbow, right   hip fracture surgery, total shoulder arthroplasty on the right and [place]   filter placement.    SOCIAL HISTORY:  Reveals he is a former smoker, quit in 1975.  He does not   consume alcohol.    ALLERGIES:  He has no known drug allergies.    HOME MEDICATIONS:  Lipitor, Urecholine, Tegretol, Plavix, vitamin D, Proscar,   Keppra, Lopressor, Xarelto, Flomax and Bactroban.    REVIEW OF SYSTEMS:  Positive for falls and left hip pain.    PHYSICAL EXAMINATION:  GENERAL:  Reveals an alert male in bed, is appropriate and conversant.  HEAD AND NECK:  Examination reveals he wears glasses.  He is nontender about his   head and neck.  CHEST:  Clear.  HEART:  Has a regular rate and rhythm.  ABDOMEN:  Soft and nontender.  EXTREMITIES:  He has an abrasion on his left elbow, which is dressed.  It is not   actively bleeding.  He has full range of motion.  There is no instability.    There is no crepitus.  He is nontender about the hands and wrists on both sides.    His right leg is nontender with palpation of range of motion.  His left hip is   painful with range of motion.  He is nontender about the distal thigh, knee,   tibia or ankle and foot.  He is able to plantarflex and dorsiflex his ankle.  He   has sensibility present to light touch.    IMAGING DATA:  X-rays were reviewed, which reveal a nondisplaced   intertrochanteric fracture of the left  hip.    LABORATORY DATA:  Laboratory studies reveal INR of 1.  His hemoglobin is 10.9.    ASSESSMENT:  An 86-year-old, sustained a fall with left hip intertrochanteric   fracture.  I discussed operative treatment with him including bed rest.  I   suggested he have intramedullary nailing as he did for left hip and get him out   of bed to be get moving as fast and ambulatory.  He is very much interested in   having surgery today.  We have had him sign consents for surgery.  Cardiology   evaluation has been performed and it is appreciated and surmise that he is   acceptable risk for surgery and anesthesia.    PLAN:  Continue to hold the Plavix and Xarelto preoperatively.      KQR/IN  dd: 05/30/2018 08:48:48 (CDT)  td: 05/30/2018 14:52:28 (CDT)  Doc ID   #[id number]  Job ID #[id number]    CC:

## 2018-05-30 NOTE — PLAN OF CARE
Problem: Fall Risk (Adult)  Goal: Absence of Falls  Patient will demonstrate the desired outcomes by discharge/transition of care.   Outcome: Ongoing (interventions implemented as appropriate)  Remains free of fall. Surgery on today. Vital signs are stable.  Pain being controlled with po medication.NPO. IVF for hydration. Will continue to monitor.

## 2018-05-30 NOTE — SUBJECTIVE & OBJECTIVE
Past Medical History:   Diagnosis Date    Anticoagulant long-term use     Arthritis     Cancer     right  hand    Coronary artery disease     Deep vein thrombosis     Hyperlipidemia     Hypertension     Seizures 1988    none since on tegretol       Past Surgical History:   Procedure Laterality Date    APPENDECTOMY      FRACTURE SURGERY Right     elbow    FRACTURE SURGERY Right     hip    JOSUÉ FILTER PLACEMENT      hx of deep vein thrombosis    TOTAL SHOULDER ARTHROPLASTY Right        Review of patient's allergies indicates:  No Known Allergies    No current facility-administered medications on file prior to encounter.      Current Outpatient Prescriptions on File Prior to Encounter   Medication Sig    atorvastatin (LIPITOR) 20 MG tablet Take 1 tablet (20 mg total) by mouth once daily.    bethanechol (URECHOLINE) 50 MG tablet TAKE ONE TABLET BY MOUTH THREE TIMES DAILY ON AN EMPTY STOMACH *DO NOT TAKE WITH MILK OR ANTACIDS, TAKE WITH WATER *    carBAMazepine (TEGRETOL) 100 mg chewable tablet Take 2 tablets (200 mg total) by mouth once daily.    clopidogrel (PLAVIX) 75 mg tablet Take 1 tablet (75 mg total) by mouth once daily.    ergocalciferol (ERGOCALCIFEROL) 50,000 unit Cap TAKE ONE CAPSULE BY MOUTH TWICE WEEKLY    finasteride (PROSCAR) 5 mg tablet Take 1 tablet (5 mg total) by mouth once daily.    levETIRAcetam (KEPPRA) 500 MG Tab Take 1 tablet (500 mg total) by mouth 2 (two) times daily.    metoprolol tartrate (LOPRESSOR) 25 MG tablet Take 0.5 tablets (12.5 mg total) by mouth 2 (two) times daily.    rivaroxaban (XARELTO) 20 mg Tab Take 1 tablet (20 mg total) by mouth once daily.    tamsulosin (FLOMAX) 0.4 mg Cp24 Take 1 capsule (0.4 mg total) by mouth once daily.    mupirocin (BACTROBAN) 2 % ointment      Family History     None        Social History Main Topics    Smoking status: Former Smoker     Packs/day: 0.50     Years: 6.00     Types: Cigarettes     Quit date: 10/27/1975     Smokeless tobacco: Never Used    Alcohol use No    Drug use: No    Sexual activity: Not Currently     Partners: Female     Review of Systems   Constitution: Negative for chills, diaphoresis, fever, weakness and malaise/fatigue.   HENT: Negative for nosebleeds.    Eyes: Negative for blurred vision and double vision.   Cardiovascular: Negative for chest pain, claudication, cyanosis, dyspnea on exertion, leg swelling, orthopnea, palpitations, paroxysmal nocturnal dyspnea and syncope.   Respiratory: Negative for cough, shortness of breath and wheezing.    Skin: Negative for dry skin and poor wound healing.   Musculoskeletal: Positive for falls and joint pain (L hip). Negative for back pain, joint swelling and myalgias.   Gastrointestinal: Negative for abdominal pain, nausea and vomiting.   Genitourinary: Negative for hematuria.   Neurological: Positive for sensory change (presbycusis). Negative for dizziness, headaches, numbness and seizures.   Psychiatric/Behavioral: Negative for altered mental status and depression.     Objective:     Vital Signs (Most Recent):  Temp: 98.1 °F (36.7 °C) (05/30/18 0440)  Pulse: 76 (05/30/18 0440)  Resp: 18 (05/30/18 0440)  BP: 124/65 (05/30/18 0440)  SpO2: 98 % (05/30/18 0440) Vital Signs (24h Range):  Temp:  [97.4 °F (36.3 °C)-99.5 °F (37.5 °C)] 98.1 °F (36.7 °C)  Pulse:  [70-82] 76  Resp:  [16-18] 18  SpO2:  [98 %-99 %] 98 %  BP: (123-152)/(65-81) 124/65     Weight: 72.5 kg (159 lb 13.3 oz)  Body mass index is 24.3 kg/m².    SpO2: 98 %  O2 Device (Oxygen Therapy): room air    No intake or output data in the 24 hours ending 05/30/18 0621    Lines/Drains/Airways     Peripheral Intravenous Line                 Peripheral IV - Single Lumen 05/29/18 2020 Right Antecubital less than 1 day                Physical Exam   Constitutional: He is oriented to person, place, and time. He appears well-developed and well-nourished.   HENT:   Head: Normocephalic and atraumatic.   Eyes: Conjunctivae  and EOM are normal. Pupils are equal, round, and reactive to light. No scleral icterus.   Neck: Normal range of motion. Neck supple. No JVD present. Carotid bruit is not present. No tracheal deviation present. No thyromegaly present.   Cardiovascular: Normal rate, regular rhythm, S1 normal and S2 normal.  Exam reveals no gallop and no friction rub.    No murmur heard.  Pulmonary/Chest: Effort normal and breath sounds normal. He has no wheezes. He exhibits no tenderness.   Abdominal: Soft. He exhibits no distension.   Musculoskeletal: He exhibits no edema.   L hip pain   Neurological: He is alert and oriented to person, place, and time. He has normal strength. A cranial nerve deficit (presbycusis) is present.   Skin: Skin is warm and dry. No rash noted.   Psychiatric: He has a normal mood and affect. His behavior is normal.       Current Medications:   carBAMazepine  200 mg Oral Daily    famotidine  20 mg Oral BID    levETIRAcetam  500 mg Oral BID    metoprolol tartrate  12.5 mg Oral BID    tamsulosin  0.4 mg Oral Daily      dextrose 5 % and 0.45 % NaCl 100 mL/hr at 05/30/18 0212     acetaminophen, acetaminophen, acetaminophen-codeine 300-60mg, ondansetron    Laboratory:  CBC:    Recent Labs  Lab 10/30/15  0444 05/25/16  1529 10/04/16  1620 05/29/18 2020 05/30/18  0445   WHITE BLOOD CELL COUNT 8.91 5.72 5.68 8.80 8.34   HEMOGLOBIN 11.6 L 13.3 L 13.1 L 12.0 L 10.9 L   HEMATOCRIT 34.3 L 38.4 L 37.5 L 33.7 L 31.2 L   PLATELETS 254 142 L 138 L 185 168       CHEMISTRIES:    Recent Labs  Lab 09/17/15  1720  09/22/15  0056  10/28/15  0350 10/29/15  0338 10/30/15  0444 05/25/16  1529 10/04/16  1620 05/29/18 2020   GLUCOSE 119 H  < > 122 H  < > 107 101 147 H 114 H 103 110   SODIUM 140  < > 143  < > 139 138 135 L 144 145 141   POTASSIUM 4.0  < > 3.1 L  < > 4.0 3.6 4.0 4.3 4.3 4.3   BUN BLD 17  < > 14  < > 22 20 21 19 14 20   CREATININE 1.1  < > 0.8  < > 1.1 1.1 1.0 1.2 1.1 1.2   EGFR IF AFRICAN AMERICAN >60  < > >60  <  > >60 >60 >60 >60 >60 >60   EGFR IF NON- >60  < > >60  < > >60 >60 >60 55 A >60 54 A   CALCIUM 9.0  < > 8.7  < > 8.5 L 9.3 8.9 9.0 8.9 8.7   MAGNESIUM 1.8  --  1.7  --  1.9  --   --   --   --   --    < > = values in this interval not displayed.    CARDIAC BIOMARKERS:    Recent Labs  Lab 09/17/15  1720 09/18/15  0202 09/22/15  0056 09/22/15  0531 09/22/15  1238   TROPONIN I 0.021 0.224 H 0.727 H 0.855 H 1.166 H       COAGS:    Recent Labs  Lab 10/27/15  1114 05/29/18 2020   INR 1.1 1.0       LIPIDS/LFTS:    Recent Labs  Lab 09/21/15  0150 09/26/15  0222 10/28/15  0350 05/25/16  1529 05/29/18 2020   CHOLESTEROL  --  140  --   --   --    TRIGLYCERIDES  --  100  --   --   --    HDL  --  22 L  --   --   --    LDL CHOLESTEROL  --  98.0  --   --   --    NON-HDL CHOLESTEROL  --  118  --   --   --    AST 50 H 26 17 25 27   ALT 72 H 36 19 22 26       BNP:    Recent Labs  Lab 09/17/15  1720   BNP 54       TSH:    Recent Labs  Lab 09/17/15  2308   TSH 1.756       Free T4:        Diagnostic Results:  ECG (personally reviewed tracings):  5/29/18 2034 SR 68    Chest X-Ray (personally reviewed image(s)): 5/29/18 NAD    Echo: 11/21/16    1 - Normal left ventricular systolic function (EF 55-60%).     Stress Test: L MPI 11/21/16  Nuclear Quantitative Functional Analysis:   LVEF: 60 %  Impression: ABNORMAL MYOCARDIAL PERFUSION  1. The perfusion scan is free of evidence for myocardial ischemia.   2. There is moderate intensity fixed defect in the inferolateral wall of the left ventricle, consistent with myocardial injury.   3. There is a mild to moderate intensity fixed defect in the inferior wall of the left ventricle, secondary to diaphragm attenuation.   4. Resting wall motion is physiologic.   5. Resting LV function is normal.   6. The ventricular volumes are normal at rest and stress.   7. The extracardiac distribution of radioactivity is normal.   8. When compared to the previous study from 07/23/2015, no  change.    Cath: 9/25/15  B. HEMODYNAMIC RESULTS:  LVEDP: 10  C. ANGIOGRAPHIC RESULTS:       Patient has a right dominant coronary artery.      - Left Main Coronary Artery:             The LM is normal. There is JAS 3 flow.     - Left Anterior Descending Artery:             The mid LAD has a 80% stenosis. There is JAS 3 flow. The remaining portion of the vessel is diffusely diseased.     - Left Circumflex Artery:             The proximal LCX has subtotal. There is JAS 0 flow. The remaining portion of the vessel is diffusely diseased.     - Right Coronary Artery:             The mid RCA has a 80% stenosis. There is JAS 3 flow.  INTERVENTION:       Mid RCA:              The lesion was successfully intervened. Post-stenosis of 0% and post-JAS 3 flow. The vessel was accessed natively.  The following items were used: Stent Vision 3.0 X 23 and Blln Emerge 2.5 X12.  D. SUMMARY/POST-OPERATIVE DIAGNOSIS:  1. Three vessel coronary artery disease.  2. Successful PCI.  E. RECOMMENDATIONS:  1. Routine post PCI care.  2. Maximize medical management.  3. Risk factor reductions.  4. ASA 81mg.  5. Clopidogrel (Plavix) for one year.  6. Cardiac rehab referral.  7. Follow up with Dr. Efren Prado.

## 2018-05-30 NOTE — ASSESSMENT & PLAN NOTE
Has been admitted for ortho service for intervention,will speak with ortho since patient was  on xarelto until 24 hours ago.

## 2018-05-31 LAB
ALBUMIN SERPL BCP-MCNC: 3.2 G/DL
ALP SERPL-CCNC: 136 U/L
ALT SERPL W/O P-5'-P-CCNC: 19 U/L
ANION GAP SERPL CALC-SCNC: 8 MMOL/L
AST SERPL-CCNC: 23 U/L
BASOPHILS # BLD AUTO: 0.02 K/UL
BASOPHILS NFR BLD: 0.3 %
BILIRUB SERPL-MCNC: 0.7 MG/DL
BUN SERPL-MCNC: 14 MG/DL
CALCIUM SERPL-MCNC: 8.2 MG/DL
CHLORIDE SERPL-SCNC: 107 MMOL/L
CO2 SERPL-SCNC: 23 MMOL/L
CREAT SERPL-MCNC: 1 MG/DL
DIFFERENTIAL METHOD: ABNORMAL
EOSINOPHIL # BLD AUTO: 0.1 K/UL
EOSINOPHIL NFR BLD: 1.7 %
ERYTHROCYTE [DISTWIDTH] IN BLOOD BY AUTOMATED COUNT: 13.3 %
EST. GFR  (AFRICAN AMERICAN): >60 ML/MIN/1.73 M^2
EST. GFR  (NON AFRICAN AMERICAN): >60 ML/MIN/1.73 M^2
GLUCOSE SERPL-MCNC: 125 MG/DL
HCT VFR BLD AUTO: 30.4 %
HGB BLD-MCNC: 10.5 G/DL
LYMPHOCYTES # BLD AUTO: 1.2 K/UL
LYMPHOCYTES NFR BLD: 16.4 %
MCH RBC QN AUTO: 31 PG
MCHC RBC AUTO-ENTMCNC: 34.5 G/DL
MCV RBC AUTO: 90 FL
MONOCYTES # BLD AUTO: 1.1 K/UL
MONOCYTES NFR BLD: 14.2 %
NEUTROPHILS # BLD AUTO: 5 K/UL
NEUTROPHILS NFR BLD: 67.4 %
PLATELET # BLD AUTO: 138 K/UL
PMV BLD AUTO: 9.9 FL
POTASSIUM SERPL-SCNC: 3.8 MMOL/L
PROT SERPL-MCNC: 5.6 G/DL
RBC # BLD AUTO: 3.39 M/UL
SODIUM SERPL-SCNC: 138 MMOL/L
WBC # BLD AUTO: 7.46 K/UL

## 2018-05-31 PROCEDURE — 25000003 PHARM REV CODE 250: Performed by: HOSPITALIST

## 2018-05-31 PROCEDURE — 25000003 PHARM REV CODE 250: Performed by: ORTHOPAEDIC SURGERY

## 2018-05-31 PROCEDURE — G8979 MOBILITY GOAL STATUS: HCPCS | Mod: CK

## 2018-05-31 PROCEDURE — 25000003 PHARM REV CODE 250: Performed by: INTERNAL MEDICINE

## 2018-05-31 PROCEDURE — 25000003 PHARM REV CODE 250: Performed by: EMERGENCY MEDICINE

## 2018-05-31 PROCEDURE — 80053 COMPREHEN METABOLIC PANEL: CPT

## 2018-05-31 PROCEDURE — 99232 SBSQ HOSP IP/OBS MODERATE 35: CPT | Mod: ,,, | Performed by: INTERNAL MEDICINE

## 2018-05-31 PROCEDURE — 85025 COMPLETE CBC W/AUTO DIFF WBC: CPT

## 2018-05-31 PROCEDURE — 36415 COLL VENOUS BLD VENIPUNCTURE: CPT

## 2018-05-31 PROCEDURE — 97110 THERAPEUTIC EXERCISES: CPT

## 2018-05-31 PROCEDURE — 97161 PT EVAL LOW COMPLEX 20 MIN: CPT

## 2018-05-31 PROCEDURE — G8978 MOBILITY CURRENT STATUS: HCPCS | Mod: CM

## 2018-05-31 PROCEDURE — 11000001 HC ACUTE MED/SURG PRIVATE ROOM

## 2018-05-31 PROCEDURE — 63600175 PHARM REV CODE 636 W HCPCS: Performed by: ORTHOPAEDIC SURGERY

## 2018-05-31 PROCEDURE — 97166 OT EVAL MOD COMPLEX 45 MIN: CPT

## 2018-05-31 RX ORDER — POLYETHYLENE GLYCOL 3350 17 G/17G
17 POWDER, FOR SOLUTION ORAL 2 TIMES DAILY
Status: DISCONTINUED | OUTPATIENT
Start: 2018-05-31 | End: 2018-06-05 | Stop reason: HOSPADM

## 2018-05-31 RX ORDER — OXYCODONE HYDROCHLORIDE 5 MG/1
5 TABLET ORAL EVERY 6 HOURS PRN
Status: DISCONTINUED | OUTPATIENT
Start: 2018-05-31 | End: 2018-06-05 | Stop reason: HOSPADM

## 2018-05-31 RX ORDER — CEFAZOLIN SODIUM 1 G/50ML
1 SOLUTION INTRAVENOUS
Status: COMPLETED | OUTPATIENT
Start: 2018-05-31 | End: 2018-06-01

## 2018-05-31 RX ORDER — ACETAMINOPHEN 10 MG/ML
1000 INJECTION, SOLUTION INTRAVENOUS EVERY 8 HOURS
Status: COMPLETED | OUTPATIENT
Start: 2018-05-31 | End: 2018-06-01

## 2018-05-31 RX ORDER — MORPHINE SULFATE 10 MG/ML
2 INJECTION INTRAMUSCULAR; INTRAVENOUS; SUBCUTANEOUS EVERY 6 HOURS PRN
Status: DISCONTINUED | OUTPATIENT
Start: 2018-05-31 | End: 2018-06-05 | Stop reason: HOSPADM

## 2018-05-31 RX ORDER — OXYCODONE HYDROCHLORIDE 5 MG/1
5 TABLET ORAL EVERY 6 HOURS PRN
Status: DISCONTINUED | OUTPATIENT
Start: 2018-05-31 | End: 2018-05-31

## 2018-05-31 RX ORDER — TRAMADOL HYDROCHLORIDE 50 MG/1
50 TABLET ORAL EVERY 6 HOURS PRN
Status: DISCONTINUED | OUTPATIENT
Start: 2018-05-31 | End: 2018-06-05 | Stop reason: HOSPADM

## 2018-05-31 RX ORDER — CLOPIDOGREL BISULFATE 75 MG/1
75 TABLET ORAL DAILY
Status: DISCONTINUED | OUTPATIENT
Start: 2018-06-01 | End: 2018-06-05 | Stop reason: HOSPADM

## 2018-05-31 RX ADMIN — CARBAMAZEPINE 200 MG: 100 TABLET, CHEWABLE ORAL at 08:05

## 2018-05-31 RX ADMIN — FINASTERIDE 5 MG: 5 TABLET, FILM COATED ORAL at 09:05

## 2018-05-31 RX ADMIN — CEFAZOLIN SODIUM 1 G: 1 INJECTION, SOLUTION INTRAVENOUS at 04:05

## 2018-05-31 RX ADMIN — RIVAROXABAN 20 MG: 20 TABLET, FILM COATED ORAL at 06:05

## 2018-05-31 RX ADMIN — POLYETHYLENE GLYCOL 3350 17 G: 17 POWDER, FOR SOLUTION ORAL at 10:05

## 2018-05-31 RX ADMIN — Medication 12.5 MG: at 08:05

## 2018-05-31 RX ADMIN — ACETAMINOPHEN 1000 MG: 10 INJECTION, SOLUTION INTRAVENOUS at 10:05

## 2018-05-31 RX ADMIN — BETHANECHOL CHLORIDE 50 MG: 25 TABLET ORAL at 08:05

## 2018-05-31 RX ADMIN — OXYCODONE HYDROCHLORIDE 5 MG: 5 TABLET ORAL at 10:05

## 2018-05-31 RX ADMIN — CEFAZOLIN SODIUM 1 G: 1 INJECTION, SOLUTION INTRAVENOUS at 08:05

## 2018-05-31 RX ADMIN — ACETAMINOPHEN 1000 MG: 10 INJECTION, SOLUTION INTRAVENOUS at 02:05

## 2018-05-31 RX ADMIN — ATORVASTATIN CALCIUM 20 MG: 10 TABLET, FILM COATED ORAL at 10:05

## 2018-05-31 RX ADMIN — POLYETHYLENE GLYCOL 3350 17 G: 17 POWDER, FOR SOLUTION ORAL at 08:05

## 2018-05-31 RX ADMIN — ERGOCALCIFEROL 50000 UNITS: 1.25 CAPSULE ORAL at 12:05

## 2018-05-31 RX ADMIN — BETHANECHOL CHLORIDE 50 MG: 25 TABLET ORAL at 10:05

## 2018-05-31 RX ADMIN — LEVETIRACETAM 500 MG: 500 TABLET, FILM COATED ORAL at 10:05

## 2018-05-31 RX ADMIN — TAMSULOSIN HYDROCHLORIDE 0.4 MG: 0.4 CAPSULE ORAL at 08:05

## 2018-05-31 RX ADMIN — FAMOTIDINE 20 MG: 20 TABLET ORAL at 10:05

## 2018-05-31 RX ADMIN — BETHANECHOL CHLORIDE 50 MG: 25 TABLET ORAL at 02:05

## 2018-05-31 RX ADMIN — FAMOTIDINE 20 MG: 20 TABLET ORAL at 08:05

## 2018-05-31 RX ADMIN — LEVETIRACETAM 500 MG: 500 TABLET, FILM COATED ORAL at 08:05

## 2018-05-31 NOTE — PROGRESS NOTES
[facility] Hospital Medicine  Progress Note    Patient Name: Jaydon Mccarthy  MRN: [id number]  Patient Class: IP- Inpatient   Admission Date: 5/29/2018  Length of Stay: 2 days  Attending Physician: Pop Martin III, MD  Primary Care Provider: Javier Villanueva MD        Subjective:     Principal Problem:Intertrochanteric fracture of left hip, closed, initial encounter    HPI:  See H&P.    Hospital Course:  This 86 y.o. Male, with a medical history of hypertension, hyperlipidemia, seizures, deep vein thrombosis, arthritis, coronary artery disease, anticoagulant long term use and cancer (right hand), presents to the ED via EMS transportation accompanied by his daughter s/p a mechanical fall that occurred about 1x hour ago before ER visit,. Pt reports tripping and falling onto his left hip while at home. He presently c/o left hip pain. Symptoms are acute, constant and mild (3/10). Pt notes that he has not ambulated since the fall. Pt denies abdominal pain and numbness or tingling,X ancelmo show acute displaced  IT fracture of left femur,ortho has admitted the patient with plan for intervention,cardiology has been consulted as well for cardiac clearance.he denies chest pain of SOB at this time.  S/P OIRF of left femur on 5.30.18 ,pain control,PT,OT.    Past Medical History:   Diagnosis Date    Anticoagulant long-term use     Arthritis     Cancer     right  hand    Coronary artery disease     Deep vein thrombosis     Hyperlipidemia     Hypertension     Seizures 1988    none since on tegretol       Past Surgical History:   Procedure Laterality Date    APPENDECTOMY      FRACTURE SURGERY Right     elbow    FRACTURE SURGERY Right     hip    JOSUÉ FILTER PLACEMENT      hx of deep vein thrombosis    TOTAL SHOULDER ARTHROPLASTY Right        Review of patient's allergies indicates:  No Known Allergies    No current facility-administered medications on file prior to encounter.      Current Outpatient  Prescriptions on File Prior to Encounter   Medication Sig    atorvastatin (LIPITOR) 20 MG tablet Take 1 tablet (20 mg total) by mouth once daily.    bethanechol (URECHOLINE) 50 MG tablet TAKE ONE TABLET BY MOUTH THREE TIMES DAILY ON AN EMPTY STOMACH *DO NOT TAKE WITH MILK OR ANTACIDS, TAKE WITH WATER *    carBAMazepine (TEGRETOL) 100 mg chewable tablet Take 2 tablets (200 mg total) by mouth once daily.    clopidogrel (PLAVIX) 75 mg tablet Take 1 tablet (75 mg total) by mouth once daily.    ergocalciferol (ERGOCALCIFEROL) 50,000 unit Cap TAKE ONE CAPSULE BY MOUTH TWICE WEEKLY    finasteride (PROSCAR) 5 mg tablet Take 1 tablet (5 mg total) by mouth once daily.    levETIRAcetam (KEPPRA) 500 MG Tab Take 1 tablet (500 mg total) by mouth 2 (two) times daily.    metoprolol tartrate (LOPRESSOR) 25 MG tablet Take 0.5 tablets (12.5 mg total) by mouth 2 (two) times daily.    rivaroxaban (XARELTO) 20 mg Tab Take 1 tablet (20 mg total) by mouth once daily.    tamsulosin (FLOMAX) 0.4 mg Cp24 Take 1 capsule (0.4 mg total) by mouth once daily.    mupirocin (BACTROBAN) 2 % ointment      Family History     None        Social History Main Topics    Smoking status: Former Smoker     Packs/day: 0.50     Years: 6.00     Types: Cigarettes     Quit date: 10/27/1975    Smokeless tobacco: Never Used    Alcohol use No    Drug use: No    Sexual activity: Not Currently     Partners: Female     Review of Systems   Constitutional: Positive for activity change. Negative for appetite change.   HENT: Negative for congestion and dental problem.    Eyes: Negative for discharge and itching.   Respiratory: Negative for apnea and chest tightness.    Cardiovascular: Negative for chest pain and leg swelling.   Gastrointestinal: Negative for abdominal distention and abdominal pain.   Endocrine: Negative for cold intolerance and heat intolerance.   Genitourinary: Negative for difficulty urinating and dysuria.   Musculoskeletal: Positive for  arthralgias.   Skin: Negative for color change and pallor.   Allergic/Immunologic: Negative for environmental allergies.   Neurological: Negative for dizziness and facial asymmetry.   Hematological: Negative for adenopathy. Does not bruise/bleed easily.   Psychiatric/Behavioral: Negative for agitation and behavioral problems.     Objective:     Vital Signs (Most Recent):  Temp: (!) 101 °F (38.3 °C) (05/31/18 0737)  Pulse: 89 (05/31/18 0737)  Resp: 18 (05/31/18 0737)  BP: 134/61 (05/31/18 0737)  SpO2: 96 % (05/31/18 0737) Vital Signs (24h Range):  Temp:  [98.1 °F (36.7 °C)-101 °F (38.3 °C)] 101 °F (38.3 °C)  Pulse:  [77-90] 89  Resp:  [16-28] 18  SpO2:  [94 %-100 %] 96 %  BP: (105-180)/(61-84) 134/61     Weight: 72.5 kg (159 lb 13.3 oz)  Body mass index is 24.3 kg/m².    Physical Exam   Constitutional: No distress.   HENT:   Head: Normocephalic.   Eyes: Pupils are equal, round, and reactive to light.   Neck: Normal range of motion.   Cardiovascular: Normal rate and regular rhythm.    Pulmonary/Chest: Effort normal and breath sounds normal.   Abdominal: Soft. Bowel sounds are normal.   Musculoskeletal: Normal range of motion. He exhibits tenderness.   Neurological: He is alert. No cranial nerve deficit. Coordination normal.   Skin: Skin is warm and dry. He is not diaphoretic.   Psychiatric: He has a normal mood and affect. His behavior is normal.       Significant Labs:   BMP:     Recent Labs  Lab 05/31/18  0406   *      K 3.8      CO2 23   BUN 14   CREATININE 1.0   CALCIUM 8.2*     CBC:     Recent Labs  Lab 05/29/18 2020 05/30/18  0445 05/31/18  0406   WBC 8.80 8.34 7.46   HGB 12.0* 10.9* 10.5*   HCT 33.7* 31.2* 30.4*    168 138*       Significant Imaging: reviewed.    Assessment/Plan:      * Intertrochanteric fracture of left hip, closed, initial encounter    Displaced.S/P OIRF of left femur on 5.30.18 ,pain control,PT,OT.          Seizure disorder    Stable on keppra and Tegretol.           Anemia of chronic disease    Stable,will monitor.          Chronic anticoagulation    Xarelto has been resumed.          CAD s/p PCI       On medical treatment,last Echo show preserved EF,cardiology clear he patient at moderte risk.        Hyperlipidemia      On statin         Essential hypertension    Controlled with home medication,will monitor.          BPH (benign prostatic hyperplasia)    On proscar and flomax,          Personal history of DVT (deep vein thrombosis)    US show still chronic DVT,on Xarelto.            VTE Risk Mitigation         Ordered     rivaroxaban tablet 20 mg  with dinner      05/31/18 0726     Place MALDONADO hose  Until discontinued      05/30/18 0828              Ana Wing MD  Department of Hospital Medicine   [facility] [place]

## 2018-05-31 NOTE — ASSESSMENT & PLAN NOTE
Pt has hx of LLE DVT on US 2012.  Apparently has been on Rivaroxaban for this and also has IVC filter.  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  Xarelto resumed

## 2018-05-31 NOTE — TRANSFER OF CARE
"Anesthesia Transfer of Care Note    Patient: Jaydon Mccarthy    Procedure(s) Performed: Procedure(s) (LRB):  ORIF, FRACTURE, FEMUR, INTERTROCHANTERIC (Left)    Patient location: PACU    Anesthesia Type: general    Transport from OR: Transported from OR on room air with adequate spontaneous ventilation    Post pain: adequate analgesia    Post assessment: no apparent anesthetic complications and tolerated procedure well    Post vital signs: stable    Level of consciousness: awake, alert and oriented    Nausea/Vomiting: no nausea/vomiting    Complications: none    Transfer of care protocol was followed      Last vitals:   Visit Vitals  BP (!) 180/84   Pulse 90   Temp 36.7 °C (98.1 °F) (Oral)   Resp 16   Ht 5' 8" (1.727 m)   Wt 72.5 kg (159 lb 13.3 oz)   SpO2 98%   BMI 24.30 kg/m²     "

## 2018-05-31 NOTE — OP NOTE
DATE OF PROCEDURE:  05/30/2018    SURGEON:  Mario Lord III, MD    ASSISTANT:  None.     PREOPERATIVE DIAGNOSIS:  Left hip intertrochanteric fracture.    POSTOPERATIVE DIAGNOSIS:  Left hip intertrochanteric fracture.    OPERATIVE PROCEDURE:  IM nailing, left hip.    INDICATION:  The patient is an 86-year-old male with history of a left hip   intertrochanteric fracture following a fall.  Recommendation made for IM   nailing.    PROCEDURE IN DETAIL:  After proper informed consent was obtained, the patient   was brought to the Operating Room and placed supine on the operating table.    General anesthesia was induced.  Left hip prepped in usual sterile fashion.  He   was placed in traction against perineal post.  All pressure points well padded.    Incision was carried out laterally.  Dissection was taken down to the greater   trochanter.  A guidewire was placed in the center position and the proximal   femur was reamed with a Synthes reamer.  A Synthes 11 mm short TFN was inserted.    A pin was placed in the center position of the femoral head using the proximal   locking guide.  It was drilled and reamed and a 105 mm helical blade was   impacted back into position.  This was verified in proper position and then a   locking screw was placed to the distal locking guide.  The guide was removed and   proper alignment confirmed AP and lateral planes.  The fracture was   anatomically reduced.  The placement of the hardware was appropriate.  The wound   was then thoroughly irrigated and closed with 0 Vicryl, 2-0 Vicryl and staples.    She was placed in sterile dressing and brought to recovery in stable   condition.  Estimated blood loss was minimal.  No complications noted.      KQR/ARABELLA  dd: 05/30/2018 19:25:03 (CDT)  td: 05/31/2018 01:53:48 (DELANO)  Doc ID   #[id number]  Job ID #[id number]    CC:

## 2018-05-31 NOTE — PLAN OF CARE
Problem: Occupational Therapy Goal  Goal: Occupational Therapy Goal  Goals to be met by: 6/14/18    Patient will increase functional independence with ADLs by performing:    Feeding with Supervision.  UE Dressing with Moderate Assistance.  Grooming while seated with Contact Guard Assistance.  Toileting from bedside commode with Moderate Assistance for hygiene and clothing management.   Sitting at edge of bed x30 minutes with Stand-by Assistance.  Toilet transfer to bedside commode with Moderate Assistance.  Upper extremity exercise program x7 reps per handout, with assistance as needed.    Outcome: Ongoing (interventions implemented as appropriate)  Pt would benefit from SNF after discharge from acute care.

## 2018-05-31 NOTE — PT/OT/SLP EVAL
Physical Therapy Evaluation    Patient Name:  Jaydon Mccarthy   MRN:  [id number]    Recommendations:     Discharge Recommendations:  nursing facility, skilled   Discharge Equipment Recommendations:  (TBD)   Barriers to discharge: Pt with decreased mobility.    Assessment:     Jaydon Mccarthy is a 86 y.o. male admitted with a medical diagnosis of Intertrochanteric fracture of left hip, closed, initial encounter.  He presents with the following impairments/functional limitations:  weakness, impaired endurance, impaired self care skills, impaired functional mobilty, impaired balance, decreased coordination, decreased upper extremity function, decreased lower extremity function, decreased safety awareness, pain, decreased ROM, impaired skin, edema.    Rehab Prognosis:  fair; patient would benefit from acute skilled PT services to address these deficits and reach maximum level of function.      Recent Surgery: Procedure(s) (LRB):  ORIF, FRACTURE, FEMUR, INTERTROCHANTERIC (Left) 1 Day Post-Op    Plan:     During this hospitalization, patient to be seen daily to address the above listed problems via gait training, therapeutic activities, therapeutic exercises  · Plan of Care Expires:  06/14/18   Plan of Care Reviewed with: patient, daughter    Subjective     Communicated with nurse Garcia prior to session.  Patient found in bed upon PT entry to room, agreeable to evaluation with max encouragement.      Chief Complaint: pain with movement  Patient comments/goals: Pt would like to get back in bed.   Pain/Comfort:  Pain Rating 1:  (Pt with minimal pain to LLE at rest and maximal pain with movement.)  Pain Addressed 1: Pre-medicate for activity, Cessation of Activity    Living Environment:  Pt reported living alone in a SS house with ~2 steps and B handrails at entry.  Pt's son and daughter check in on pt.    Prior to admission, patients level of function was independent with household ambulation.  Daughter does the driving.   Patient has the following equipment: walker, rolling, wheelchair, shower chair.  Upon discharge, patient will have assistance from family.    Objective:     Patient found with: peripheral IV     General Precautions: Standard, fall, seizure   Orthopedic Precautions:LLE weight bearing as tolerated   Braces: N/A     Exams:  · Cognitive Exam:  Patient was able to follow simple commands.   · Gross Motor Coordination:  impaired 2* weakness and pain  · Postural Exam:  Patient presented with the following abnormalities:    · -       Rounded shoulders  · Skin Integrity/Edema:      · -       Skin integrity: Visible skin intact and L hip dressing with minimal bloody drainage, nurse Garcia notified  · -       Edema: Mild LLE  · RLE ROM: WFL  · RLE Strength: WFL  · LLE ROM: WFL  · LLE Strength: limited by pain    Functional Mobility:  Pt limited by pain, required extra time and max encouragement to participate.  Pt with resistance as well, required education on acute skilled PT services and goals throughout session.    · Bed Mobility:     · Rolling Left:  dependence  · Rolling Right: dependence  · Scooting: dependence to scoot EOB  · Bridging: dependence and of 2 persons  · Supine to Sit: dependence and of 2 persons  · Sit to Supine: dependence and of 2 persons  · Balance: Pt with fair/fair- sit balance.  With encouragement, pt able to tolerate sitting EOB and participate in LE therex with PT and self-care with OT.  Pt with forward flexed trunk and R lateral trunk lean, able to correct with max encouragement and min A.        AM-PAC 6 CLICK MOBILITY  Total Score:8       Therapeutic Activities and Exercises:  BLE seated therex 10 reps: AP and LAQ with max encouragement    PROM to BLE in supine in all available planes    Patient left right sidelying and HOB elevated with all lines intact, call button in reach, bed alarm on, nurse Radha notified and daughter present.  B pressure relief boots placed.      GOALS:    Physical Therapy  Goals        Problem: Physical Therapy Goal    Goal Priority Disciplines Outcome Goal Variances Interventions   Physical Therapy Goal     PT/OT, PT      Description:  Goals to be met by: 18     Patient will increase functional independence with mobility by performin. Supine to sit with Moderate Assistance  2. Sit to supine with Moderate Assistance  3. Rolling to Left and Right with Moderate Assistance  4. Sitting at edge of bed x 30 minutes with Stand-by Assistance  5. Lower extremity exercise program x 20 reps per handout, with assistance as needed                      History:     Past Medical History:   Diagnosis Date    Anticoagulant long-term use     Arthritis     Cancer     right  hand    Coronary artery disease     Deep vein thrombosis     Hyperlipidemia     Hypertension     Seizures     none since on tegretol       Past Surgical History:   Procedure Laterality Date    APPENDECTOMY      FRACTURE SURGERY Right     elbow    FRACTURE SURGERY Right     hip    JOSUÉ FILTER PLACEMENT      hx of deep vein thrombosis    TOTAL SHOULDER ARTHROPLASTY Right        Clinical Decision Making:     History  Co-morbidities and personal factors that may impact the plan of care Examination  Body Structures and Functions, activity limitations and participation restrictions that may impact the plan of care Clinical Presentation   Decision Making/ Complexity Score   Co-morbidities:   [] Time since onset of injury / illness / exacerbation  [x] Status of current condition  []Patient's cognitive status and safety concerns    [x] Multiple Medical Problems (see med hx)  Personal Factors:   [x] Patient's age  [] Prior Level of function   [x] Patient's home situation (environment and family support)  [x] Patient's level of motivation  [x] Expected progression of patient      HISTORY:(criteria)    [] 66238 - no personal factors/history    [] 95855 - has 1-2 personal factor/comorbidity     [x] 58477 - has  >3 personal factor/comorbidity     Body Regions:  [] Objective examination findings  [] Head     []  Neck  [] Trunk   [] Upper Extremity  [x] Lower Extremity    Body Systems:  [x] For communication ability, affect, cognition, language, and learning style: the assessment of the ability to make needs known, consciousness, orientation (person, place, and time), expected emotional /behavioral responses, and learning preferences (eg, learning barriers, education  needs)  [x] For the neuromuscular system: a general assessment of gross coordinated movement (eg, balance, gait, locomotion, transfers, and transitions) and motor function  (motor control and motor learning)  [x] For the musculoskeletal system: the assessment of gross symmetry, gross range of motion, gross strength, height, and weight  [x] For the integumentary system: the assessment of pliability(texture), presence of scar formation, skin color, and skin integrity  [] For cardiovascular/pulmonary system: the assessment of heart rate, respiratory rate, blood pressure, and edema     Activity limitations:    [] Patient's cognitive status and saf ety concerns          [x] Status of current condition      [] Weight bearing restriction  [] Cardiopulmunary Restriction    Participation Restrictions:   [x] Goals and goal agreement with the patient     [x] Rehab potential (prognosis) and probable outcome      Examination of Body System: (criteria)    [] 82707 - addressing 1-2 elements    [] 43557 - addressing a total of 3 or more elements     [x] 75091 -  Addressing a total of 4 or more elements         Clinical Presentation: (criteria)  Stable - 38169     On examination of body system using standardized tests and measures patient presents with 4 or more elements from any of the following: body structures and functions, activity limitations, and/or participation restrictions.  Leading to a clinical presentation that is considered stable and/or  uncomplicated                              Clinical Decision Making  (Eval Complexity):  Low- 76570     Time Tracking:     PT Received On: 05/31/18  PT Start Time: 1119     PT Stop Time: 1153  PT Total Time (min): 34 min     Billable Minutes: Evaluation 15 min co-eval with OT and Therapeutic Exercise 14 min      Melly Ardon, PT  05/31/2018

## 2018-05-31 NOTE — ANESTHESIA POSTPROCEDURE EVALUATION
"Anesthesia Post Evaluation    Patient: Jaydon Mccarthy    Procedure(s) Performed: Procedure(s) (LRB):  ORIF, FRACTURE, FEMUR, INTERTROCHANTERIC (Left)    Final Anesthesia Type: general  Patient location during evaluation: PACU  Patient participation: Yes- Able to Participate  Level of consciousness: awake and alert  Post-procedure vital signs: reviewed and stable  Pain management: adequate  Airway patency: patent  PONV status at discharge: No PONV  Anesthetic complications: no      Cardiovascular status: blood pressure returned to baseline and hemodynamically stable  Respiratory status: unassisted and spontaneous ventilation  Hydration status: euvolemic  Follow-up not needed.        Visit Vitals  /61   Pulse 89   Temp (!) 38.3 °C (101 °F)   Resp 18   Ht 5' 8" (1.727 m)   Wt 72.5 kg (159 lb 13.3 oz)   SpO2 96%   BMI 24.30 kg/m²       Pain/Giorgi Score: Pain Assessment Performed: Yes (5/30/2018  8:30 PM)  Presence of Pain: complains of pain/discomfort (5/30/2018  8:30 PM)  Pain Rating Prior to Med Admin: 4 (5/30/2018  9:48 PM)  Pain Rating Post Med Admin: 0 (5/30/2018  9:48 PM)  Giorgi Score: 10 (5/30/2018  8:11 PM)      "

## 2018-05-31 NOTE — PLAN OF CARE
Problem: Physical Therapy Goal  Goal: Physical Therapy Goal  Goals to be met by: 18     Patient will increase functional independence with mobility by performin. Supine to sit with Moderate Assistance  2. Sit to supine with Moderate Assistance  3. Rolling to Left and Right with Moderate Assistance  4. Sitting at edge of bed x 30 minutes with Stand-by Assistance  5. Lower extremity exercise program x 20 reps per handout, with assistance as needed    Limited PT eval completed.  Pt limited by pain, will continue to assess.

## 2018-05-31 NOTE — NURSING
Noted small amount of drainage to right hip. Reinforced and placed 4 x 4,s. Placed radha to site. Blood pressure 99/55.doctor on call notified. Order to hold Metoprolol until primary care doctor see him. Will continue to monitor.

## 2018-05-31 NOTE — PROGRESS NOTES
[facility]  Cardiology  Progress Note    Patient Name: Jaydon Mccarthy  MRN: [id number]  Admission Date: 5/29/2018  Hospital Length of Stay: 2 days  Code Status: Full Code   Attending Physician: Pop Martin III, MD   Primary Care Physician: Javier Villanueva MD  Expected Discharge Date:   Principal Problem:Intertrochanteric fracture of left hip, closed, initial encounter    Subjective:     Interval Hx: uneventful surgery yesterday.  No cp/sob.    Past Medical History:   Diagnosis Date    Anticoagulant long-term use     Arthritis     Cancer     right  hand    Coronary artery disease     Deep vein thrombosis     Hyperlipidemia     Hypertension     Seizures 1988    none since on tegretol       Past Surgical History:   Procedure Laterality Date    APPENDECTOMY      FRACTURE SURGERY Right     elbow    FRACTURE SURGERY Right     hip    JOSUÉ FILTER PLACEMENT      hx of deep vein thrombosis    TOTAL SHOULDER ARTHROPLASTY Right        Review of patient's allergies indicates:  No Known Allergies    No current facility-administered medications on file prior to encounter.      Current Outpatient Prescriptions on File Prior to Encounter   Medication Sig    atorvastatin (LIPITOR) 20 MG tablet Take 1 tablet (20 mg total) by mouth once daily.    bethanechol (URECHOLINE) 50 MG tablet TAKE ONE TABLET BY MOUTH THREE TIMES DAILY ON AN EMPTY STOMACH *DO NOT TAKE WITH MILK OR ANTACIDS, TAKE WITH WATER *    carBAMazepine (TEGRETOL) 100 mg chewable tablet Take 2 tablets (200 mg total) by mouth once daily.    clopidogrel (PLAVIX) 75 mg tablet Take 1 tablet (75 mg total) by mouth once daily.    ergocalciferol (ERGOCALCIFEROL) 50,000 unit Cap TAKE ONE CAPSULE BY MOUTH TWICE WEEKLY    finasteride (PROSCAR) 5 mg tablet Take 1 tablet (5 mg total) by mouth once daily.    levETIRAcetam (KEPPRA) 500 MG Tab Take 1 tablet (500 mg total) by mouth 2 (two) times daily.    metoprolol tartrate (LOPRESSOR) 25 MG  tablet Take 0.5 tablets (12.5 mg total) by mouth 2 (two) times daily.    rivaroxaban (XARELTO) 20 mg Tab Take 1 tablet (20 mg total) by mouth once daily.    tamsulosin (FLOMAX) 0.4 mg Cp24 Take 1 capsule (0.4 mg total) by mouth once daily.    mupirocin (BACTROBAN) 2 % ointment      Family History     None        Social History Main Topics    Smoking status: Former Smoker     Packs/day: 0.50     Years: 6.00     Types: Cigarettes     Quit date: 10/27/1975    Smokeless tobacco: Never Used    Alcohol use No    Drug use: No    Sexual activity: Not Currently     Partners: Female     Review of Systems   Gastrointestinal: Negative for melena.   Genitourinary: Negative for hematuria.     Objective:     Vital Signs (Most Recent):  Temp: (!) 101 °F (38.3 °C) (05/31/18 0737)  Pulse: 89 (05/31/18 0737)  Resp: 18 (05/31/18 0737)  BP: 134/61 (05/31/18 0737)  SpO2: 96 % (05/31/18 0737) Vital Signs (24h Range):  Temp:  [98.1 °F (36.7 °C)-101 °F (38.3 °C)] 101 °F (38.3 °C)  Pulse:  [77-90] 89  Resp:  [16-28] 18  SpO2:  [94 %-100 %] 96 %  BP: (105-180)/(61-84) 134/61     Weight: 72.5 kg (159 lb 13.3 oz)  Body mass index is 24.3 kg/m².    SpO2: 96 %  O2 Device (Oxygen Therapy): room air      Intake/Output Summary (Last 24 hours) at 05/31/18 0838  Last data filed at 05/31/18 0000   Gross per 24 hour   Intake          2051.67 ml   Output              400 ml   Net          1651.67 ml       Lines/Drains/Airways     Peripheral Intravenous Line                 Peripheral IV - Single Lumen 05/29/18 2020 Right Antecubital 1 day                Physical Exam   Constitutional: He is oriented to person, place, and time. He appears well-developed and well-nourished.   HENT:   Head: Normocephalic and atraumatic.   Eyes: Conjunctivae and EOM are normal. Pupils are equal, round, and reactive to light. No scleral icterus.   Neck: Normal range of motion. Neck supple. No JVD present. Carotid bruit is not present. No tracheal deviation present. No  thyromegaly present.   Cardiovascular: Normal rate, regular rhythm, S1 normal and S2 normal.  Exam reveals no gallop and no friction rub.    No murmur heard.  Pulmonary/Chest: Effort normal and breath sounds normal. He has no wheezes. He exhibits no tenderness.   Abdominal: Soft. He exhibits no distension.   Musculoskeletal: He exhibits no edema.   L hip pain   Neurological: He is alert and oriented to person, place, and time. He has normal strength. A cranial nerve deficit (presbycusis) is present.   Skin: Skin is warm and dry. No rash noted.   Psychiatric: He has a normal mood and affect. His behavior is normal.       Current Medications:   acetaminophen  1,000 mg Intravenous Q8H    atorvastatin  20 mg Oral QHS    bethanechol  50 mg Oral TID    carBAMazepine  200 mg Oral Daily    ceFAZolin (ANCEF) IVPB  1 g Intravenous Q8H    [START ON 6/1/2018] clopidogrel  75 mg Oral Daily    ergocalciferol  50,000 Units Oral Twice Weekly    famotidine  20 mg Oral BID    finasteride  5 mg Oral Daily    levETIRAcetam  500 mg Oral BID    metoprolol tartrate  12.5 mg Oral BID    rivaroxaban  20 mg Oral with dinner    tamsulosin  0.4 mg Oral Daily      dextrose 5 % and 0.45 % NaCl 75 mL/hr at 05/30/18 1453     morphine, ondansetron, oxyCODONE, sodium chloride 0.9%, traMADol    Laboratory:  CBC:    Recent Labs  Lab 05/25/16  1529 10/04/16  1620 05/29/18  2020 05/30/18  0445 05/31/18  0406   WHITE BLOOD CELL COUNT 5.72 5.68 8.80 8.34 7.46   HEMOGLOBIN 13.3 L 13.1 L 12.0 L 10.9 L 10.5 L   HEMATOCRIT 38.4 L 37.5 L 33.7 L 31.2 L 30.4 L   PLATELETS 142 L 138 L 185 168 138 L       CHEMISTRIES:    Recent Labs  Lab 09/17/15  1720  09/22/15  0056  10/28/15  0350  05/25/16  1529 10/04/16  1620 05/29/18  2020 05/30/18  0445 05/31/18  0406   GLUCOSE 119 H  < > 122 H  < > 107  < > 114 H 103 110 135 H 125 H   SODIUM 140  < > 143  < > 139  < > 144 145 141 139 138   POTASSIUM 4.0  < > 3.1 L  < > 4.0  < > 4.3 4.3 4.3 4.0 3.8   BUN BLD  17  < > 14  < > 22  < > 19 14 20 19 14   CREATININE 1.1  < > 0.8  < > 1.1  < > 1.2 1.1 1.2 1.1 1.0   EGFR IF  >60  < > >60  < > >60  < > >60 >60 >60 >60 >60   EGFR IF NON- >60  < > >60  < > >60  < > 55 A >60 54 A >60 >60   CALCIUM 9.0  < > 8.7  < > 8.5 L  < > 9.0 8.9 8.7 8.5 L 8.2 L   MAGNESIUM 1.8  --  1.7  --  1.9  --   --   --   --   --   --    < > = values in this interval not displayed.    CARDIAC BIOMARKERS:    Recent Labs  Lab 09/17/15  1720 09/18/15  0202 09/22/15  0056 09/22/15  0531 09/22/15  1238   TROPONIN I 0.021 0.224 H 0.727 H 0.855 H 1.166 H       COAGS:    Recent Labs  Lab 10/27/15  1114 05/29/18 2020   INR 1.1 1.0       LIPIDS/LFTS:    Recent Labs  Lab 09/26/15  0222 10/28/15  0350 05/25/16  1529 05/29/18 2020 05/30/18  0445 05/31/18  0406   CHOLESTEROL 140  --   --   --   --   --    TRIGLYCERIDES 100  --   --   --   --   --    HDL 22 L  --   --   --   --   --    LDL CHOLESTEROL 98.0  --   --   --   --   --    NON-HDL CHOLESTEROL 118  --   --   --   --   --    AST 26 17 25 27 22 23   ALT 36 19 22 26 24 19       BNP:    Recent Labs  Lab 09/17/15  1720   BNP 54       TSH:    Recent Labs  Lab 09/17/15  2308   TSH 1.756       Free T4:        Diagnostic Results:  ECG (personally reviewed tracings):  5/29/18 2034 SR 68    Chest X-Ray (personally reviewed image(s)): 5/29/18 NAD    LE venous US 5/30/18  #1.   Likely chronic venous thrombosis involving the distal left lower extremity as detailed above.  #2.   No evidence of right lower extremity DVT.  #3.  2.7 cm Baker's cyst in the left popliteal fossa.    Echo: 11/21/16    1 - Normal left ventricular systolic function (EF 55-60%).     Stress Test: L MPI 11/21/16  Nuclear Quantitative Functional Analysis:   LVEF: 60 %  Impression: ABNORMAL MYOCARDIAL PERFUSION  1. The perfusion scan is free of evidence for myocardial ischemia.   2. There is moderate intensity fixed defect in the inferolateral wall of the left ventricle,  consistent with myocardial injury.   3. There is a mild to moderate intensity fixed defect in the inferior wall of the left ventricle, secondary to diaphragm attenuation.   4. Resting wall motion is physiologic.   5. Resting LV function is normal.   6. The ventricular volumes are normal at rest and stress.   7. The extracardiac distribution of radioactivity is normal.   8. When compared to the previous study from 07/23/2015, no change.    Cath: 9/25/15  B. HEMODYNAMIC RESULTS:  LVEDP: 10  C. ANGIOGRAPHIC RESULTS:       Patient has a right dominant coronary artery.      - Left Main Coronary Artery:             The LM is normal. There is JAS 3 flow.     - Left Anterior Descending Artery:             The mid LAD has a 80% stenosis. There is JAS 3 flow. The remaining portion of the vessel is diffusely diseased.     - Left Circumflex Artery:             The proximal LCX has subtotal. There is JAS 0 flow. The remaining portion of the vessel is diffusely diseased.     - Right Coronary Artery:             The mid RCA has a 80% stenosis. There is JAS 3 flow.  INTERVENTION:       Mid RCA:              The lesion was successfully intervened. Post-stenosis of 0% and post-JAS 3 flow. The vessel was accessed natively.  The following items were used: Stent Vision 3.0 X 23 and Blln Emerge 2.5 X12.  D. SUMMARY/POST-OPERATIVE DIAGNOSIS:  1. Three vessel coronary artery disease.  2. Successful PCI.  E. RECOMMENDATIONS:  1. Routine post PCI care.  2. Maximize medical management.  3. Risk factor reductions.  4. ASA 81mg.  5. Clopidogrel (Plavix) for one year.  6. Cardiac rehab referral.  7. Follow up with Dr. Efren Prado.      Assessment and Plan:     * Intertrochanteric fracture of left hip, closed, initial encounter    S/p operative repair yesterday.        CAD s/p PCI     Known 3V CAD s/p RCA PCI 9/2015. EF normal, asymptomatic from an anginal/CHF perspective.  Resume med rx        Personal history of DVT (deep vein  thrombosis)    Pt has hx of LLE DVT on US 2012.  Apparently has been on Rivaroxaban for this and also has IVC filter.  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  Xarelto resumed        Essential hypertension    Cont med rx        Hyperlipidemia    Cont atorva 20mg qhs        Preop cardiovascular exam    As above            VTE Risk Mitigation         Ordered     rivaroxaban tablet 20 mg  with dinner      05/31/18 0726     Place MALDONADO hose  Until discontinued      05/30/18 0852        Cardiology will sign off, pls call with questions.  Pt to follow up with Dr. Prado after discharge.    Easton Serrano MD  Cardiology  [facility]

## 2018-05-31 NOTE — SUBJECTIVE & OBJECTIVE
Past Medical History:   Diagnosis Date    Anticoagulant long-term use     Arthritis     Cancer     right  hand    Coronary artery disease     Deep vein thrombosis     Hyperlipidemia     Hypertension     Seizures 1988    none since on tegretol       Past Surgical History:   Procedure Laterality Date    APPENDECTOMY      FRACTURE SURGERY Right     elbow    FRACTURE SURGERY Right     hip    JOSUÉ FILTER PLACEMENT      hx of deep vein thrombosis    TOTAL SHOULDER ARTHROPLASTY Right        Review of patient's allergies indicates:  No Known Allergies    No current facility-administered medications on file prior to encounter.      Current Outpatient Prescriptions on File Prior to Encounter   Medication Sig    atorvastatin (LIPITOR) 20 MG tablet Take 1 tablet (20 mg total) by mouth once daily.    bethanechol (URECHOLINE) 50 MG tablet TAKE ONE TABLET BY MOUTH THREE TIMES DAILY ON AN EMPTY STOMACH *DO NOT TAKE WITH MILK OR ANTACIDS, TAKE WITH WATER *    carBAMazepine (TEGRETOL) 100 mg chewable tablet Take 2 tablets (200 mg total) by mouth once daily.    clopidogrel (PLAVIX) 75 mg tablet Take 1 tablet (75 mg total) by mouth once daily.    ergocalciferol (ERGOCALCIFEROL) 50,000 unit Cap TAKE ONE CAPSULE BY MOUTH TWICE WEEKLY    finasteride (PROSCAR) 5 mg tablet Take 1 tablet (5 mg total) by mouth once daily.    levETIRAcetam (KEPPRA) 500 MG Tab Take 1 tablet (500 mg total) by mouth 2 (two) times daily.    metoprolol tartrate (LOPRESSOR) 25 MG tablet Take 0.5 tablets (12.5 mg total) by mouth 2 (two) times daily.    rivaroxaban (XARELTO) 20 mg Tab Take 1 tablet (20 mg total) by mouth once daily.    tamsulosin (FLOMAX) 0.4 mg Cp24 Take 1 capsule (0.4 mg total) by mouth once daily.    mupirocin (BACTROBAN) 2 % ointment      Family History     None        Social History Main Topics    Smoking status: Former Smoker     Packs/day: 0.50     Years: 6.00     Types: Cigarettes     Quit date: 10/27/1975     Smokeless tobacco: Never Used    Alcohol use No    Drug use: No    Sexual activity: Not Currently     Partners: Female     Review of Systems   Gastrointestinal: Negative for melena.   Genitourinary: Negative for hematuria.     Objective:     Vital Signs (Most Recent):  Temp: (!) 101 °F (38.3 °C) (05/31/18 0737)  Pulse: 89 (05/31/18 0737)  Resp: 18 (05/31/18 0737)  BP: 134/61 (05/31/18 0737)  SpO2: 96 % (05/31/18 0737) Vital Signs (24h Range):  Temp:  [98.1 °F (36.7 °C)-101 °F (38.3 °C)] 101 °F (38.3 °C)  Pulse:  [77-90] 89  Resp:  [16-28] 18  SpO2:  [94 %-100 %] 96 %  BP: (105-180)/(61-84) 134/61     Weight: 72.5 kg (159 lb 13.3 oz)  Body mass index is 24.3 kg/m².    SpO2: 96 %  O2 Device (Oxygen Therapy): room air      Intake/Output Summary (Last 24 hours) at 05/31/18 0838  Last data filed at 05/31/18 0000   Gross per 24 hour   Intake          2051.67 ml   Output              400 ml   Net          1651.67 ml       Lines/Drains/Airways     Peripheral Intravenous Line                 Peripheral IV - Single Lumen 05/29/18 2020 Right Antecubital 1 day                Physical Exam   Constitutional: He is oriented to person, place, and time. He appears well-developed and well-nourished.   HENT:   Head: Normocephalic and atraumatic.   Eyes: Conjunctivae and EOM are normal. Pupils are equal, round, and reactive to light. No scleral icterus.   Neck: Normal range of motion. Neck supple. No JVD present. Carotid bruit is not present. No tracheal deviation present. No thyromegaly present.   Cardiovascular: Normal rate, regular rhythm, S1 normal and S2 normal.  Exam reveals no gallop and no friction rub.    No murmur heard.  Pulmonary/Chest: Effort normal and breath sounds normal. He has no wheezes. He exhibits no tenderness.   Abdominal: Soft. He exhibits no distension.   Musculoskeletal: He exhibits no edema.   L hip pain   Neurological: He is alert and oriented to person, place, and time. He has normal strength. A cranial  nerve deficit (presbycusis) is present.   Skin: Skin is warm and dry. No rash noted.   Psychiatric: He has a normal mood and affect. His behavior is normal.       Current Medications:   acetaminophen  1,000 mg Intravenous Q8H    atorvastatin  20 mg Oral QHS    bethanechol  50 mg Oral TID    carBAMazepine  200 mg Oral Daily    ceFAZolin (ANCEF) IVPB  1 g Intravenous Q8H    [START ON 6/1/2018] clopidogrel  75 mg Oral Daily    ergocalciferol  50,000 Units Oral Twice Weekly    famotidine  20 mg Oral BID    finasteride  5 mg Oral Daily    levETIRAcetam  500 mg Oral BID    metoprolol tartrate  12.5 mg Oral BID    rivaroxaban  20 mg Oral with dinner    tamsulosin  0.4 mg Oral Daily      dextrose 5 % and 0.45 % NaCl 75 mL/hr at 05/30/18 1453     morphine, ondansetron, oxyCODONE, sodium chloride 0.9%, traMADol    Laboratory:  CBC:    Recent Labs  Lab 05/25/16  1529 10/04/16  1620 05/29/18 2020 05/30/18  0445 05/31/18  0406   WHITE BLOOD CELL COUNT 5.72 5.68 8.80 8.34 7.46   HEMOGLOBIN 13.3 L 13.1 L 12.0 L 10.9 L 10.5 L   HEMATOCRIT 38.4 L 37.5 L 33.7 L 31.2 L 30.4 L   PLATELETS 142 L 138 L 185 168 138 L       CHEMISTRIES:    Recent Labs  Lab 09/17/15  1720  09/22/15  0056  10/28/15  0350  05/25/16  1529 10/04/16  1620 05/29/18 2020 05/30/18  0445 05/31/18  0406   GLUCOSE 119 H  < > 122 H  < > 107  < > 114 H 103 110 135 H 125 H   SODIUM 140  < > 143  < > 139  < > 144 145 141 139 138   POTASSIUM 4.0  < > 3.1 L  < > 4.0  < > 4.3 4.3 4.3 4.0 3.8   BUN BLD 17  < > 14  < > 22  < > 19 14 20 19 14   CREATININE 1.1  < > 0.8  < > 1.1  < > 1.2 1.1 1.2 1.1 1.0   EGFR IF  >60  < > >60  < > >60  < > >60 >60 >60 >60 >60   EGFR IF NON- >60  < > >60  < > >60  < > 55 A >60 54 A >60 >60   CALCIUM 9.0  < > 8.7  < > 8.5 L  < > 9.0 8.9 8.7 8.5 L 8.2 L   MAGNESIUM 1.8  --  1.7  --  1.9  --   --   --   --   --   --    < > = values in this interval not displayed.    CARDIAC BIOMARKERS:    Recent  Labs  Lab 09/17/15  1720 09/18/15  0202 09/22/15  0056 09/22/15  0531 09/22/15  1238   TROPONIN I 0.021 0.224 H 0.727 H 0.855 H 1.166 H       COAGS:    Recent Labs  Lab 10/27/15  1114 05/29/18 2020   INR 1.1 1.0       LIPIDS/LFTS:    Recent Labs  Lab 09/26/15  0222 10/28/15  0350 05/25/16  1529 05/29/18 2020 05/30/18  0445 05/31/18  0406   CHOLESTEROL 140  --   --   --   --   --    TRIGLYCERIDES 100  --   --   --   --   --    HDL 22 L  --   --   --   --   --    LDL CHOLESTEROL 98.0  --   --   --   --   --    NON-HDL CHOLESTEROL 118  --   --   --   --   --    AST 26 17 25 27 22 23   ALT 36 19 22 26 24 19       BNP:    Recent Labs  Lab 09/17/15  1720   BNP 54       TSH:    Recent Labs  Lab 09/17/15  2308   TSH 1.756       Free T4:        Diagnostic Results:  ECG (personally reviewed tracings):  5/29/18 2034 SR 68    Chest X-Ray (personally reviewed image(s)): 5/29/18 NAD    LE venous US 5/30/18  #1.   Likely chronic venous thrombosis involving the distal left lower extremity as detailed above.  #2.   No evidence of right lower extremity DVT.  #3.  2.7 cm Baker's cyst in the left popliteal fossa.    Echo: 11/21/16    1 - Normal left ventricular systolic function (EF 55-60%).     Stress Test: L MPI 11/21/16  Nuclear Quantitative Functional Analysis:   LVEF: 60 %  Impression: ABNORMAL MYOCARDIAL PERFUSION  1. The perfusion scan is free of evidence for myocardial ischemia.   2. There is moderate intensity fixed defect in the inferolateral wall of the left ventricle, consistent with myocardial injury.   3. There is a mild to moderate intensity fixed defect in the inferior wall of the left ventricle, secondary to diaphragm attenuation.   4. Resting wall motion is physiologic.   5. Resting LV function is normal.   6. The ventricular volumes are normal at rest and stress.   7. The extracardiac distribution of radioactivity is normal.   8. When compared to the previous study from 07/23/2015, no change.    Cath: 9/25/15  B.  HEMODYNAMIC RESULTS:  LVEDP: 10  C. ANGIOGRAPHIC RESULTS:       Patient has a right dominant coronary artery.      - Left Main Coronary Artery:             The LM is normal. There is JAS 3 flow.     - Left Anterior Descending Artery:             The mid LAD has a 80% stenosis. There is JAS 3 flow. The remaining portion of the vessel is diffusely diseased.     - Left Circumflex Artery:             The proximal LCX has subtotal. There is JAS 0 flow. The remaining portion of the vessel is diffusely diseased.     - Right Coronary Artery:             The mid RCA has a 80% stenosis. There is JAS 3 flow.  INTERVENTION:       Mid RCA:              The lesion was successfully intervened. Post-stenosis of 0% and post-JAS 3 flow. The vessel was accessed natively.  The following items were used: Stent Vision 3.0 X 23 and Blln Emerge 2.5 X12.  D. SUMMARY/POST-OPERATIVE DIAGNOSIS:  1. Three vessel coronary artery disease.  2. Successful PCI.  E. RECOMMENDATIONS:  1. Routine post PCI care.  2. Maximize medical management.  3. Risk factor reductions.  4. ASA 81mg.  5. Clopidogrel (Plavix) for one year.  6. Cardiac rehab referral.  7. Follow up with Dr. Efren Prado.

## 2018-05-31 NOTE — BRIEF OP NOTE
Operative Note         SUMMARY     Surgery Date: 5/30/2018     Surgeon(s) and Role:     * Pop Martin III, MD - Primary    Pre-op Diagnosis:  Closed 2-part intertrochanteric fracture of left femur [S72.142A]    Post-op Diagnosis: Closed 2-part intertrochanteric fracture of left femur [S72.142A]    Procedure(s) (LRB):  ORIF, FRACTURE, FEMUR, INTERTROCHANTERIC (Left)    Anesthesia: General    Findings/Key Components:      Estimated Blood Loss: min          Specimens     None

## 2018-05-31 NOTE — ASSESSMENT & PLAN NOTE
Known 3V CAD s/p RCA PCI 9/2015. EF normal, asymptomatic from an anginal/CHF perspective.  Resume med rx

## 2018-05-31 NOTE — PROGRESS NOTES
Ortho Daily Progress Note    Jaydon Mccarthy is a 86 y.o. male admitted on 5/29/2018      Chief Complaint/Reason for admission: Fall (Pt reports left hip pain following trip and fall, no LOC. )       Hospital Day: 2  Post Op Day: 1 Day Post-Op     The patient was seen and examined this morning at the bedside. Patient reports no acute issues overnight.  Patient reports that pain is adequately controlled.    _______________    Vitals:    05/31/18 0411 05/31/18 0737 05/31/18 1524 05/31/18 1535   BP: 131/63 134/61 (!) 92/52 (!) 99/55   Pulse: 80 89 77 75   Resp: 18 18 18    Temp: 99.1 °F (37.3 °C) (!) 101 °F (38.3 °C) 98.8 °F (37.1 °C)    TempSrc: Oral      SpO2: 96% 96% (!) 94%    Weight:       Height:           Vital Signs (Most Recent)  Temp: 98.8 °F (37.1 °C) (05/31/18 1524)  Pulse: 75 (05/31/18 1535)  Resp: 18 (05/31/18 1524)  BP: (!) 99/55 (05/31/18 1535)  SpO2: (!) 94 % (05/31/18 1524)    Vital Signs Range (Last 24H):  Temp:  [98.1 °F (36.7 °C)-101 °F (38.3 °C)]   Pulse:  [75-90]   Resp:  [16-28]   BP: ()/(52-84)   SpO2:  [94 %-100 %]       Physical:    AAOx3  Incision/ dressing clean/dry/intact  NVI Distally  Palpable distal pulses  CR<3sec      Recent Labs      05/29/18   2020  05/30/18   0445  05/31/18   0406   K  4.3  4.0  3.8   CALCIUM  8.7  8.5*  8.2*   WBC  8.80  8.34  7.46   HGB  12.0*  10.9*  10.5*   HCT  33.7*  31.2*  30.4*   PLT  185  168  138*   INR  1.0   --    --        I/O last 3 completed shifts:  In: 2051.7 [I.V.:2051.7]  Out: 400 [Urine:400]          Assessment:  A/P POD 1 s/p  Hip IMN             Plan:    PT/OT  Pain Control  DVT Prophylaxis    Discharge Planning        Isaias Ramos MD  [facility]

## 2018-05-31 NOTE — PT/OT/SLP EVAL
Occupational Therapy   Evaluation    Name: Jaydon Mccarthy  MRN: [id number]  Admitting Diagnosis:  Intertrochanteric fracture of left hip, closed, initial encounter 1 Day Post-Op    Recommendations:     Discharge Recommendations: nursing facility, skilled  Discharge Equipment Recommendations:  none  Barriers to discharge:  None    History:     Occupational Profile:  Living Environment: Pt lives alone in a single story home. Patient's daughter assist with doctors appointment  Previous level of function: independent  Roles and Routines: normal home routine  Equipment Owned:  walker, rolling, wheelchair, shower chair  Assistance upon Discharge: family has been assisting with care prior to admission    Past Medical History:   Diagnosis Date    Anticoagulant long-term use     Arthritis     Cancer     right  hand    Coronary artery disease     Deep vein thrombosis     Hyperlipidemia     Hypertension     Seizures 1988    none since on tegretol       Past Surgical History:   Procedure Laterality Date    APPENDECTOMY      FRACTURE SURGERY Right     elbow    FRACTURE SURGERY Right     hip    JOSUÉ FILTER PLACEMENT      hx of deep vein thrombosis    OPEN REDUCTION AND INTERNAL FIXATION (ORIF) OF INTERTROCHANTERIC FRACTURE OF FEMUR Left 5/30/2018    Procedure: ORIF, FRACTURE, FEMUR, INTERTROCHANTERIC;  Surgeon: Pop Martin III, MD;  Location: [facility];  Service: Orthopedics;  Laterality: Left;    TOTAL SHOULDER ARTHROPLASTY Right        Subjective     Chief Complaint: Pain with movment  Patient/Family stated goals: to get rid of pain  Communicated with:  Nurse Garcia prior to session.  Pain/Comfort:  · Pain Rating 1:  (Pt complains of severe pain with movement)    Patients cultural, spiritual, Yarsanism conflicts given the current situation:      Objective:     Patient found with: peripheral IV    General Precautions: Standard, fall, seizure   Orthopedic Precautions:LLE weight bearing as tolerated   Braces:  N/A     Occupational Performance:    Bed Mobility:    · Patient completed Rolling/Turning to Left with  dependent and 1  persons  · Patient completed Rolling/Turning to Right with dependent and 1  persons  · Patient completed Scooting/Bridging with dependent and 2 persons  · Patient completed Supine to Sit with dependent and 2 persons  · Patient completed Sit to Supine with dependent and 2 persons        Activities of Daily Living:  · Grooming: moderate assistance    · UB Dressing: maximal assistance and total assistance     · LB Dressing: total assistance and dependence      Cognitive/Visual Perceptual:  Cognitive/Psychosocial Skills:     -       Oriented to: Person, Place, Time and Situation   -       Follows Commands/attention:Follows two-step commands  -       Communication: clear/fluent  -       Memory: No Deficits noted  -       Safety awareness/insight to disability: intact   -       Mood/Affect/Coping skills/emotional control: responds to questions answered  Visual/Perceptual:      -Intact    Physical Exam:  Upper Extremity Range of Motion:     -       Right Upper Extremity: limited secondary to posture  -       Left Upper Extremity: limited secondary too posture  Upper Extremity Strength:    -       Right Upper Extremity: fair  -       Left Upper Extremity: fair   Strength:    -       Right Upper Extremity: fair  -       Left Upper Extremity: fair  Fine Motor Coordination:    -       Intact  Gross motor coordination:   WFL    Patient left supine with all lines intact, call button in reach and daughter present    AMPAC 6 Click:  AMPAC Total Score: 8    Treatment & Education:  Sitting edge of bed for grooming  Verbal and tactile cues to maintain posture  Education:    Assessment:     Jaydon Mccarthy is a 86 y.o. male with a medical diagnosis of Intertrochanteric fracture of left hip, closed, initial encounter.  He presents with good family support of daughter.  Performance deficits affecting function are  "weakness, impaired endurance, impaired functional mobilty, impaired self care skills, gait instability, impaired balance, decreased coordination, decreased upper extremity function, decreased lower extremity function, pain, decreased ROM, impaired coordination, impaired joint extensibility, orthopedic precautions, impaired muscle length.      Rehab Prognosis:  fair; patient would benefit from acute skilled OT services to address these deficits and reach maximum level of function.         Clinical Decision Makin.  OT Mod:  "Pt evaluation falls under moderate complexity for evaluation coding due to identification of 3-5 performance deficits noted as stated above. Eval required Min/Mod assistance to complete on this date and detailed assessment(s) were utilized. Moreover, an expanded review of history and occupational profile obtained with additional review of cognitive, physical and psychosocial hx."     Plan:     Patient to be seen 5 x/week to address the above listed problems via self-care/home management, therapeutic activities  · Plan of Care Expires:    · Plan of Care Reviewed with: patient, daughter    This Plan of care has been discussed with the patient who was involved in its development and understands and is in agreement with the identified goals and treatment plan    GOALS:    Occupational Therapy Goals        Problem: Occupational Therapy Goal    Goal Priority Disciplines Outcome Interventions   Occupational Therapy Goal     OT, PT/OT Ongoing (interventions implemented as appropriate)    Description:  Goals to be met by: 18    Patient will increase functional independence with ADLs by performing:    Feeding with Supervision.  UE Dressing with Moderate Assistance.  Grooming while seated with Contact Guard Assistance.  Toileting from bedside commode with Moderate Assistance for hygiene and clothing management.   Sitting at edge of bed x30 minutes with Stand-by Assistance.  Toilet transfer to " bedside commode with Moderate Assistance.  Upper extremity exercise program x7 reps per handout, with assistance as needed.                      Time Tracking:     OT Date of Treatment: 05/31/18  OT Start Time: 1115  OT Stop Time: 1153  OT Total Time (min): 38 min    Billable Minutes:Evaluation 38 with co treatment with PT    Sarai Chacon OT  5/31/2018

## 2018-05-31 NOTE — SUBJECTIVE & OBJECTIVE
Past Medical History:   Diagnosis Date    Anticoagulant long-term use     Arthritis     Cancer     right  hand    Coronary artery disease     Deep vein thrombosis     Hyperlipidemia     Hypertension     Seizures 1988    none since on tegretol       Past Surgical History:   Procedure Laterality Date    APPENDECTOMY      FRACTURE SURGERY Right     elbow    FRACTURE SURGERY Right     hip    JOSUÉ FILTER PLACEMENT      hx of deep vein thrombosis    TOTAL SHOULDER ARTHROPLASTY Right        Review of patient's allergies indicates:  No Known Allergies    No current facility-administered medications on file prior to encounter.      Current Outpatient Prescriptions on File Prior to Encounter   Medication Sig    atorvastatin (LIPITOR) 20 MG tablet Take 1 tablet (20 mg total) by mouth once daily.    bethanechol (URECHOLINE) 50 MG tablet TAKE ONE TABLET BY MOUTH THREE TIMES DAILY ON AN EMPTY STOMACH *DO NOT TAKE WITH MILK OR ANTACIDS, TAKE WITH WATER *    carBAMazepine (TEGRETOL) 100 mg chewable tablet Take 2 tablets (200 mg total) by mouth once daily.    clopidogrel (PLAVIX) 75 mg tablet Take 1 tablet (75 mg total) by mouth once daily.    ergocalciferol (ERGOCALCIFEROL) 50,000 unit Cap TAKE ONE CAPSULE BY MOUTH TWICE WEEKLY    finasteride (PROSCAR) 5 mg tablet Take 1 tablet (5 mg total) by mouth once daily.    levETIRAcetam (KEPPRA) 500 MG Tab Take 1 tablet (500 mg total) by mouth 2 (two) times daily.    metoprolol tartrate (LOPRESSOR) 25 MG tablet Take 0.5 tablets (12.5 mg total) by mouth 2 (two) times daily.    rivaroxaban (XARELTO) 20 mg Tab Take 1 tablet (20 mg total) by mouth once daily.    tamsulosin (FLOMAX) 0.4 mg Cp24 Take 1 capsule (0.4 mg total) by mouth once daily.    mupirocin (BACTROBAN) 2 % ointment      Family History     None        Social History Main Topics    Smoking status: Former Smoker     Packs/day: 0.50     Years: 6.00     Types: Cigarettes     Quit date: 10/27/1975     Smokeless tobacco: Never Used    Alcohol use No    Drug use: No    Sexual activity: Not Currently     Partners: Female     Review of Systems   Constitutional: Positive for activity change. Negative for appetite change.   HENT: Negative for congestion and dental problem.    Eyes: Negative for discharge and itching.   Respiratory: Negative for apnea and chest tightness.    Cardiovascular: Negative for chest pain and leg swelling.   Gastrointestinal: Negative for abdominal distention and abdominal pain.   Endocrine: Negative for cold intolerance and heat intolerance.   Genitourinary: Negative for difficulty urinating and dysuria.   Musculoskeletal: Positive for arthralgias.   Skin: Negative for color change and pallor.   Allergic/Immunologic: Negative for environmental allergies.   Neurological: Negative for dizziness and facial asymmetry.   Hematological: Negative for adenopathy. Does not bruise/bleed easily.   Psychiatric/Behavioral: Negative for agitation and behavioral problems.     Objective:     Vital Signs (Most Recent):  Temp: (!) 101 °F (38.3 °C) (05/31/18 0737)  Pulse: 89 (05/31/18 0737)  Resp: 18 (05/31/18 0737)  BP: 134/61 (05/31/18 0737)  SpO2: 96 % (05/31/18 0737) Vital Signs (24h Range):  Temp:  [98.1 °F (36.7 °C)-101 °F (38.3 °C)] 101 °F (38.3 °C)  Pulse:  [77-90] 89  Resp:  [16-28] 18  SpO2:  [94 %-100 %] 96 %  BP: (105-180)/(61-84) 134/61     Weight: 72.5 kg (159 lb 13.3 oz)  Body mass index is 24.3 kg/m².    Physical Exam   Constitutional: No distress.   HENT:   Head: Normocephalic.   Eyes: Pupils are equal, round, and reactive to light.   Neck: Normal range of motion.   Cardiovascular: Normal rate and regular rhythm.    Pulmonary/Chest: Effort normal and breath sounds normal.   Abdominal: Soft. Bowel sounds are normal.   Musculoskeletal: Normal range of motion. He exhibits tenderness.   Neurological: He is alert. No cranial nerve deficit. Coordination normal.   Skin: Skin is warm and dry. He is  not diaphoretic.   Psychiatric: He has a normal mood and affect. His behavior is normal.       Significant Labs:   BMP:     Recent Labs  Lab 05/31/18  0406   *      K 3.8      CO2 23   BUN 14   CREATININE 1.0   CALCIUM 8.2*     CBC:     Recent Labs  Lab 05/29/18 2020 05/30/18  0445 05/31/18  0406   WBC 8.80 8.34 7.46   HGB 12.0* 10.9* 10.5*   HCT 33.7* 31.2* 30.4*    168 138*       Significant Imaging: reviewed.

## 2018-06-01 LAB
ALBUMIN SERPL BCP-MCNC: 2.7 G/DL
ALP SERPL-CCNC: 111 U/L
ALT SERPL W/O P-5'-P-CCNC: 9 U/L
ANION GAP SERPL CALC-SCNC: 4 MMOL/L
AST SERPL-CCNC: 19 U/L
BASOPHILS # BLD AUTO: 0.02 K/UL
BASOPHILS # BLD AUTO: 0.02 K/UL
BASOPHILS NFR BLD: 0.2 %
BASOPHILS NFR BLD: 0.2 %
BILIRUB SERPL-MCNC: 0.5 MG/DL
BUN SERPL-MCNC: 17 MG/DL
CALCIUM SERPL-MCNC: 8.1 MG/DL
CHLORIDE SERPL-SCNC: 108 MMOL/L
CO2 SERPL-SCNC: 26 MMOL/L
CREAT SERPL-MCNC: 1.2 MG/DL
DIFFERENTIAL METHOD: ABNORMAL
DIFFERENTIAL METHOD: ABNORMAL
EOSINOPHIL # BLD AUTO: 0.1 K/UL
EOSINOPHIL # BLD AUTO: 0.1 K/UL
EOSINOPHIL NFR BLD: 1.2 %
EOSINOPHIL NFR BLD: 1.2 %
ERYTHROCYTE [DISTWIDTH] IN BLOOD BY AUTOMATED COUNT: 12.9 %
ERYTHROCYTE [DISTWIDTH] IN BLOOD BY AUTOMATED COUNT: 12.9 %
EST. GFR  (AFRICAN AMERICAN): >60 ML/MIN/1.73 M^2
EST. GFR  (NON AFRICAN AMERICAN): 54 ML/MIN/1.73 M^2
GLUCOSE SERPL-MCNC: 132 MG/DL
HCT VFR BLD AUTO: 25.6 %
HCT VFR BLD AUTO: 25.6 %
HGB BLD-MCNC: 9 G/DL
HGB BLD-MCNC: 9 G/DL
LYMPHOCYTES # BLD AUTO: 1.1 K/UL
LYMPHOCYTES # BLD AUTO: 1.1 K/UL
LYMPHOCYTES NFR BLD: 10.7 %
LYMPHOCYTES NFR BLD: 10.7 %
MCH RBC QN AUTO: 31.4 PG
MCH RBC QN AUTO: 31.4 PG
MCHC RBC AUTO-ENTMCNC: 35.2 G/DL
MCHC RBC AUTO-ENTMCNC: 35.2 G/DL
MCV RBC AUTO: 89 FL
MCV RBC AUTO: 89 FL
MONOCYTES # BLD AUTO: 1.2 K/UL
MONOCYTES # BLD AUTO: 1.2 K/UL
MONOCYTES NFR BLD: 11.6 %
MONOCYTES NFR BLD: 11.6 %
NEUTROPHILS # BLD AUTO: 7.9 K/UL
NEUTROPHILS # BLD AUTO: 7.9 K/UL
NEUTROPHILS NFR BLD: 76.1 %
NEUTROPHILS NFR BLD: 76.1 %
PLATELET # BLD AUTO: 133 K/UL
PLATELET # BLD AUTO: 133 K/UL
PMV BLD AUTO: 10.1 FL
PMV BLD AUTO: 10.1 FL
POTASSIUM SERPL-SCNC: 3.8 MMOL/L
PROT SERPL-MCNC: 5.5 G/DL
RBC # BLD AUTO: 2.87 M/UL
RBC # BLD AUTO: 2.87 M/UL
SODIUM SERPL-SCNC: 138 MMOL/L
WBC # BLD AUTO: 10.33 K/UL
WBC # BLD AUTO: 10.33 K/UL

## 2018-06-01 PROCEDURE — 25000003 PHARM REV CODE 250: Performed by: INTERNAL MEDICINE

## 2018-06-01 PROCEDURE — 36415 COLL VENOUS BLD VENIPUNCTURE: CPT

## 2018-06-01 PROCEDURE — 25000003 PHARM REV CODE 250: Performed by: EMERGENCY MEDICINE

## 2018-06-01 PROCEDURE — 94761 N-INVAS EAR/PLS OXIMETRY MLT: CPT

## 2018-06-01 PROCEDURE — 25000003 PHARM REV CODE 250: Performed by: ORTHOPAEDIC SURGERY

## 2018-06-01 PROCEDURE — 97530 THERAPEUTIC ACTIVITIES: CPT

## 2018-06-01 PROCEDURE — 63600175 PHARM REV CODE 636 W HCPCS: Performed by: ORTHOPAEDIC SURGERY

## 2018-06-01 PROCEDURE — 86580 TB INTRADERMAL TEST: CPT | Performed by: HOSPITALIST

## 2018-06-01 PROCEDURE — 85025 COMPLETE CBC W/AUTO DIFF WBC: CPT

## 2018-06-01 PROCEDURE — 97110 THERAPEUTIC EXERCISES: CPT

## 2018-06-01 PROCEDURE — 63600175 PHARM REV CODE 636 W HCPCS: Performed by: HOSPITALIST

## 2018-06-01 PROCEDURE — 25000003 PHARM REV CODE 250: Performed by: HOSPITALIST

## 2018-06-01 PROCEDURE — 80053 COMPREHEN METABOLIC PANEL: CPT

## 2018-06-01 PROCEDURE — 11000001 HC ACUTE MED/SURG PRIVATE ROOM

## 2018-06-01 RX ORDER — ACETAMINOPHEN 325 MG/1
650 TABLET ORAL EVERY 6 HOURS PRN
Status: DISCONTINUED | OUTPATIENT
Start: 2018-06-01 | End: 2018-06-05 | Stop reason: HOSPADM

## 2018-06-01 RX ORDER — DOCUSATE SODIUM 100 MG/1
100 CAPSULE, LIQUID FILLED ORAL DAILY
Status: DISCONTINUED | OUTPATIENT
Start: 2018-06-01 | End: 2018-06-05 | Stop reason: HOSPADM

## 2018-06-01 RX ADMIN — BETHANECHOL CHLORIDE 50 MG: 25 TABLET ORAL at 03:06

## 2018-06-01 RX ADMIN — RIVAROXABAN 20 MG: 20 TABLET, FILM COATED ORAL at 05:06

## 2018-06-01 RX ADMIN — Medication 5 UNITS: at 10:06

## 2018-06-01 RX ADMIN — BETHANECHOL CHLORIDE 50 MG: 25 TABLET ORAL at 08:06

## 2018-06-01 RX ADMIN — FAMOTIDINE 20 MG: 20 TABLET ORAL at 08:06

## 2018-06-01 RX ADMIN — Medication 12.5 MG: at 08:06

## 2018-06-01 RX ADMIN — TAMSULOSIN HYDROCHLORIDE 0.4 MG: 0.4 CAPSULE ORAL at 08:06

## 2018-06-01 RX ADMIN — LEVETIRACETAM 500 MG: 500 TABLET, FILM COATED ORAL at 08:06

## 2018-06-01 RX ADMIN — CLOPIDOGREL BISULFATE 75 MG: 75 TABLET ORAL at 08:06

## 2018-06-01 RX ADMIN — POLYETHYLENE GLYCOL 3350 17 G: 17 POWDER, FOR SOLUTION ORAL at 09:06

## 2018-06-01 RX ADMIN — CEFAZOLIN SODIUM 1 G: 1 INJECTION, SOLUTION INTRAVENOUS at 12:06

## 2018-06-01 RX ADMIN — CARBAMAZEPINE 200 MG: 100 TABLET, CHEWABLE ORAL at 08:06

## 2018-06-01 RX ADMIN — DOCUSATE SODIUM 100 MG: 100 CAPSULE, LIQUID FILLED ORAL at 11:06

## 2018-06-01 RX ADMIN — OXYCODONE HYDROCHLORIDE 5 MG: 5 TABLET ORAL at 08:06

## 2018-06-01 RX ADMIN — ATORVASTATIN CALCIUM 20 MG: 10 TABLET, FILM COATED ORAL at 08:06

## 2018-06-01 RX ADMIN — ACETAMINOPHEN 1000 MG: 10 INJECTION, SOLUTION INTRAVENOUS at 05:06

## 2018-06-01 RX ADMIN — FINASTERIDE 5 MG: 5 TABLET, FILM COATED ORAL at 08:06

## 2018-06-01 RX ADMIN — ACETAMINOPHEN 650 MG: 325 TABLET, FILM COATED ORAL at 04:06

## 2018-06-01 NOTE — PLAN OF CARE
Problem: Physical Therapy Goal  Goal: Physical Therapy Goal  Goals to be met by: 18     Patient will increase functional independence with mobility by performin. Supine to sit with Min Assistance  2. Sit to supine with Moderate Assistance  3. Rolling to Left and Right with SBA  4. Sitting at edge of bed x 30 minutes with Stand-by Assistance  5. Lower extremity exercise program x 20 reps per handout, with assistance as needed  6. Sit to stand with CGA/RW  7. Gait 20-30 ft min A/RW  8. Bed to chair with min A/RW     Outcome: Ongoing (interventions implemented as appropriate)  Pt able to stand x 2 trials with min A/RW today.  Pt had difficulty weight shifting 2* LLE pain to take more than 2-3 side-steps with assistance.  Will continue to assess.

## 2018-06-01 NOTE — PLAN OF CARE
Problem: Patient Care Overview  Goal: Plan of Care Review  Outcome: Ongoing (interventions implemented as appropriate)  Veronica Reyna RN Registered Nurse Signed   Nursing          []Hide copied text  []Hover for attribution information  Pt has remained free from falls and denies pain. Bed is locked and low with call light within reach. Side rails are up x 2. Bed alarm on and audible. Will continue to monitor.

## 2018-06-01 NOTE — SUBJECTIVE & OBJECTIVE
Past Medical History:   Diagnosis Date    Anticoagulant long-term use     Arthritis     Cancer     right  hand    Coronary artery disease     Deep vein thrombosis     Hyperlipidemia     Hypertension     Seizures 1988    none since on tegretol       Past Surgical History:   Procedure Laterality Date    APPENDECTOMY      FRACTURE SURGERY Right     elbow    FRACTURE SURGERY Right     hip    JOSUÉ FILTER PLACEMENT      hx of deep vein thrombosis    OPEN REDUCTION AND INTERNAL FIXATION (ORIF) OF INTERTROCHANTERIC FRACTURE OF FEMUR Left 5/30/2018    Procedure: ORIF, FRACTURE, FEMUR, INTERTROCHANTERIC;  Surgeon: Pop Martin III, MD;  Location: [facility];  Service: Orthopedics;  Laterality: Left;    TOTAL SHOULDER ARTHROPLASTY Right        Review of patient's allergies indicates:  No Known Allergies    No current facility-administered medications on file prior to encounter.      Current Outpatient Prescriptions on File Prior to Encounter   Medication Sig    atorvastatin (LIPITOR) 20 MG tablet Take 1 tablet (20 mg total) by mouth once daily.    bethanechol (URECHOLINE) 50 MG tablet TAKE ONE TABLET BY MOUTH THREE TIMES DAILY ON AN EMPTY STOMACH *DO NOT TAKE WITH MILK OR ANTACIDS, TAKE WITH WATER *    carBAMazepine (TEGRETOL) 100 mg chewable tablet Take 2 tablets (200 mg total) by mouth once daily.    clopidogrel (PLAVIX) 75 mg tablet Take 1 tablet (75 mg total) by mouth once daily.    ergocalciferol (ERGOCALCIFEROL) 50,000 unit Cap TAKE ONE CAPSULE BY MOUTH TWICE WEEKLY    finasteride (PROSCAR) 5 mg tablet Take 1 tablet (5 mg total) by mouth once daily.    levETIRAcetam (KEPPRA) 500 MG Tab Take 1 tablet (500 mg total) by mouth 2 (two) times daily.    metoprolol tartrate (LOPRESSOR) 25 MG tablet Take 0.5 tablets (12.5 mg total) by mouth 2 (two) times daily.    rivaroxaban (XARELTO) 20 mg Tab Take 1 tablet (20 mg total) by mouth once daily.    tamsulosin (FLOMAX) 0.4 mg Cp24 Take 1 capsule (0.4  mg total) by mouth once daily.    mupirocin (BACTROBAN) 2 % ointment      Family History     None        Social History Main Topics    Smoking status: Former Smoker     Packs/day: 0.50     Years: 6.00     Types: Cigarettes     Quit date: 10/27/1975    Smokeless tobacco: Never Used    Alcohol use No    Drug use: No    Sexual activity: Not Currently     Partners: Female     Review of Systems   Constitutional: Positive for activity change. Negative for appetite change.   HENT: Negative for congestion and dental problem.    Eyes: Negative for discharge and itching.   Respiratory: Negative for apnea and chest tightness.    Cardiovascular: Negative for chest pain and leg swelling.   Gastrointestinal: Negative for abdominal distention and abdominal pain.   Endocrine: Negative for cold intolerance and heat intolerance.   Genitourinary: Negative for difficulty urinating and dysuria.   Musculoskeletal: Positive for arthralgias.   Skin: Negative for color change and pallor.   Allergic/Immunologic: Negative for environmental allergies.   Neurological: Negative for dizziness and facial asymmetry.   Hematological: Negative for adenopathy. Does not bruise/bleed easily.   Psychiatric/Behavioral: Negative for agitation and behavioral problems.     Objective:     Vital Signs (Most Recent):  Temp: 98.2 °F (36.8 °C) (06/01/18 0745)  Pulse: 79 (06/01/18 0745)  Resp: 18 (06/01/18 0745)  BP: (!) 123/55 (06/01/18 0745)  SpO2: 96 % (06/01/18 0800) Vital Signs (24h Range):  Temp:  [97.7 °F (36.5 °C)-99.1 °F (37.3 °C)] 98.2 °F (36.8 °C)  Pulse:  [75-86] 79  Resp:  [18] 18  SpO2:  [94 %-99 %] 96 %  BP: ()/(52-63) 123/55     Weight: 72.5 kg (159 lb 13.3 oz)  Body mass index is 24.3 kg/m².    Physical Exam   Constitutional: No distress.   HENT:   Head: Normocephalic.   Eyes: Pupils are equal, round, and reactive to light.   Neck: Normal range of motion.   Cardiovascular: Normal rate and regular rhythm.    Pulmonary/Chest: Effort  normal and breath sounds normal.   Abdominal: Soft. Bowel sounds are normal.   Musculoskeletal: Normal range of motion. He exhibits tenderness.   Neurological: He is alert. No cranial nerve deficit. Coordination normal.   Skin: Skin is warm and dry. He is not diaphoretic.   Psychiatric: He has a normal mood and affect. His behavior is normal.       Significant Labs:   BMP:     Recent Labs  Lab 06/01/18  0605   *      K 3.8      CO2 26   BUN 17   CREATININE 1.2   CALCIUM 8.1*     CBC:     Recent Labs  Lab 05/31/18  0406 06/01/18  0606   WBC 7.46 10.33  10.33   HGB 10.5* 9.0*  9.0*   HCT 30.4* 25.6*  25.6*   * 133*  133*       Significant Imaging: reviewed.

## 2018-06-01 NOTE — PT/OT/SLP PROGRESS
Occupational Therapy   Treatment    Name: Jaydon Mccarthy  MRN: [id number]  Admitting Diagnosis:  Intertrochanteric fracture of left hip, closed, initial encounter  2 Days Post-Op    Recommendations:     Discharge Recommendations: nursing facility, skilled  Discharge Equipment Recommendations:  none  Barriers to discharge:  Decreased caregiver support    Subjective     Communicated with: nurse Lorin prior to session.  Pain/Comfort:  · Pain Rating 1:  (pt able to tolerate therapy session)    Patients cultural, spiritual, Yazidi conflicts given the current situation:   no cocnerns    Objective:     Patient found with: peripheral IV    General Precautions: Standard, fall, seizure   Orthopedic Precautions:LLE weight bearing as tolerated   Braces: N/A     Occupational Performance:    Bed Mobility:    · Patient completed Rolling/Turning to Right with minimum assistance  · Patient completed Scooting/Bridging with minimum assistance  · Patient completed Supine to Sit with moderate assistance  · Patient completed Sit to Supine with 1 persons maximum of 2    Functional Mobility/Transfers:  · Patient completed Sit <> Stand Transfer with minimum assistance  with  rolling walker   · Functional Mobility: standing with weight shift with moderate assist of 2    Activities of Daily Living:  · Feeding:  supervision    · UB Dressing: moderate assistance     · toileting dependent    Patient left supine with all lines intact, call button in reach and nursing notified of patient being on the bed pan and the PCT notified    AMPAC 6 Click:  AMPA Total Score: 10    Treatment & Education:  2 sets of 10 with no theraband  Education:    Assessment:     Jaydon Mccarthy is a 86 y.o. male with a medical diagnosis of Intertrochanteric fracture of left hip, closed, initial encounter.  He presents with improvement noted in bed mobility and pain management.  Performance deficits affecting function are weakness, impaired endurance, impaired self care  skills, impaired functional mobilty, gait instability, impaired balance, decreased coordination, decreased lower extremity function, pain, decreased ROM, impaired joint extensibility, impaired muscle length, orthopedic precautions.      Rehab Prognosis:  good; patient would benefit from acute skilled OT services to address these deficits and reach maximum level of function.       Plan:     Patient to be seen 5 x/week to address the above listed problems via self-care/home management, therapeutic activities, therapeutic exercises  · Plan of Care Expires:    · Plan of Care Reviewed with: patient    This Plan of care has been discussed with the patient who was involved in its development and understands and is in agreement with the identified goals and treatment plan    GOALS:    Occupational Therapy Goals        Problem: Occupational Therapy Goal    Goal Priority Disciplines Outcome Interventions   Occupational Therapy Goal     OT, PT/OT Ongoing (interventions implemented as appropriate)    Description:  Goals to be met by: 6/14/18    Patient will increase functional independence with ADLs by performing:    Feeding with Supervision.  UE Dressing with Moderate Assistance.  Grooming while seated with Contact Guard Assistance.  Toileting from bedside commode with Moderate Assistance for hygiene and clothing management.   Sitting at edge of bed x30 minutes with Stand-by Assistance.  Toilet transfer to bedside commode with Moderate Assistance.  Upper extremity exercise program x7 reps per handout, with assistance as needed.                      Time Tracking:     OT Date of Treatment: 06/01/18  OT Start Time: 1010  OT Stop Time: 1041  OT Total Time (min): 31 min    Billable Minutes:Therapeutic Exercise 16 minutes  with total time of 31 minutes co treatment with PT    Sarai Chacon OT  6/1/2018

## 2018-06-01 NOTE — PROGRESS NOTES
Pasrr completed and signed by MD Abdi completed.  PASSR faxed to [facility] at:  [phone].   Awaiting [phone].    [facility] sent referral via [facility] to:     Oma Triana

## 2018-06-01 NOTE — ASSESSMENT & PLAN NOTE
Displaced.S/P [facility] of left femur on 5.30.18 ,pain control,PT,OT.  recommended SNF,consulted SW,placed PPD.

## 2018-06-01 NOTE — NURSING
Pt has remained free from falls and denies pain. Bed is locked and low with call light within reach. Side rails are up x 2. Bed alarm on and audible. Will continue to monitor.

## 2018-06-01 NOTE — PROGRESS NOTES
[facility] Hospital Medicine  Progress Note    Patient Name: Jaydon Mccarthy  MRN: [id number]  Patient Class: IP- Inpatient   Admission Date: 5/29/2018  Length of Stay: 3 days  Attending Physician: Pop Martin III, MD  Primary Care Provider: Javier Villanueva MD        Subjective:     Principal Problem:Intertrochanteric fracture of left hip, closed, initial encounter    HPI:  See H&P.    Hospital Course:  This 86 y.o. Male, with a medical history of hypertension, hyperlipidemia, seizures, deep vein thrombosis, arthritis, coronary artery disease, anticoagulant long term use and cancer (right hand), presents to the ED via EMS transportation accompanied by his daughter s/p a mechanical fall that occurred about 1x hour ago before ER visit,. Pt reports tripping and falling onto his left hip while at home. He presently c/o left hip pain. Symptoms are acute, constant and mild (3/10). Pt notes that he has not ambulated since the fall. Pt denies abdominal pain and numbness or tingling,X ancelmo show acute displaced  IT fracture of left femur,ortho has admitted the patient with plan for intervention,cardiology has been consulted as well for cardiac clearance.he denies chest pain of SOB at this time.  S/P OIRF of left femur on 5.30.18 ,pain control,PT,OT.  SNF recommended,consulted SW,.placed PPD.    Past Medical History:   Diagnosis Date    Anticoagulant long-term use     Arthritis     Cancer     right  hand    Coronary artery disease     Deep vein thrombosis     Hyperlipidemia     Hypertension     Seizures 1988    none since on tegretol       Past Surgical History:   Procedure Laterality Date    APPENDECTOMY      FRACTURE SURGERY Right     elbow    FRACTURE SURGERY Right     hip    JOSUÉ FILTER PLACEMENT      hx of deep vein thrombosis    OPEN REDUCTION AND INTERNAL FIXATION (ORIF) OF INTERTROCHANTERIC FRACTURE OF FEMUR Left 5/30/2018    Procedure: ORIF, FRACTURE, FEMUR, INTERTROCHANTERIC;   Surgeon: Pop Martin III, MD;  Location: [facility];  Service: Orthopedics;  Laterality: Left;    TOTAL SHOULDER ARTHROPLASTY Right        Review of patient's allergies indicates:  No Known Allergies    No current facility-administered medications on file prior to encounter.      Current Outpatient Prescriptions on File Prior to Encounter   Medication Sig    atorvastatin (LIPITOR) 20 MG tablet Take 1 tablet (20 mg total) by mouth once daily.    bethanechol (URECHOLINE) 50 MG tablet TAKE ONE TABLET BY MOUTH THREE TIMES DAILY ON AN EMPTY STOMACH *DO NOT TAKE WITH MILK OR ANTACIDS, TAKE WITH WATER *    carBAMazepine (TEGRETOL) 100 mg chewable tablet Take 2 tablets (200 mg total) by mouth once daily.    clopidogrel (PLAVIX) 75 mg tablet Take 1 tablet (75 mg total) by mouth once daily.    ergocalciferol (ERGOCALCIFEROL) 50,000 unit Cap TAKE ONE CAPSULE BY MOUTH TWICE WEEKLY    finasteride (PROSCAR) 5 mg tablet Take 1 tablet (5 mg total) by mouth once daily.    levETIRAcetam (KEPPRA) 500 MG Tab Take 1 tablet (500 mg total) by mouth 2 (two) times daily.    metoprolol tartrate (LOPRESSOR) 25 MG tablet Take 0.5 tablets (12.5 mg total) by mouth 2 (two) times daily.    rivaroxaban (XARELTO) 20 mg Tab Take 1 tablet (20 mg total) by mouth once daily.    tamsulosin (FLOMAX) 0.4 mg Cp24 Take 1 capsule (0.4 mg total) by mouth once daily.    mupirocin (BACTROBAN) 2 % ointment      Family History     None        Social History Main Topics    Smoking status: Former Smoker     Packs/day: 0.50     Years: 6.00     Types: Cigarettes     Quit date: 10/27/1975    Smokeless tobacco: Never Used    Alcohol use No    Drug use: No    Sexual activity: Not Currently     Partners: Female     Review of Systems   Constitutional: Positive for activity change. Negative for appetite change.   HENT: Negative for congestion and dental problem.    Eyes: Negative for discharge and itching.   Respiratory: Negative for apnea and chest  tightness.    Cardiovascular: Negative for chest pain and leg swelling.   Gastrointestinal: Negative for abdominal distention and abdominal pain.   Endocrine: Negative for cold intolerance and heat intolerance.   Genitourinary: Negative for difficulty urinating and dysuria.   Musculoskeletal: Positive for arthralgias.   Skin: Negative for color change and pallor.   Allergic/Immunologic: Negative for environmental allergies.   Neurological: Negative for dizziness and facial asymmetry.   Hematological: Negative for adenopathy. Does not bruise/bleed easily.   Psychiatric/Behavioral: Negative for agitation and behavioral problems.     Objective:     Vital Signs (Most Recent):  Temp: 98.2 °F (36.8 °C) (06/01/18 0745)  Pulse: 79 (06/01/18 0745)  Resp: 18 (06/01/18 0745)  BP: (!) 123/55 (06/01/18 0745)  SpO2: 96 % (06/01/18 0800) Vital Signs (24h Range):  Temp:  [97.7 °F (36.5 °C)-99.1 °F (37.3 °C)] 98.2 °F (36.8 °C)  Pulse:  [75-86] 79  Resp:  [18] 18  SpO2:  [94 %-99 %] 96 %  BP: ()/(52-63) 123/55     Weight: 72.5 kg (159 lb 13.3 oz)  Body mass index is 24.3 kg/m².    Physical Exam   Constitutional: No distress.   HENT:   Head: Normocephalic.   Eyes: Pupils are equal, round, and reactive to light.   Neck: Normal range of motion.   Cardiovascular: Normal rate and regular rhythm.    Pulmonary/Chest: Effort normal and breath sounds normal.   Abdominal: Soft. Bowel sounds are normal.   Musculoskeletal: Normal range of motion. He exhibits tenderness.   Neurological: He is alert. No cranial nerve deficit. Coordination normal.   Skin: Skin is warm and dry. He is not diaphoretic.   Psychiatric: He has a normal mood and affect. His behavior is normal.       Significant Labs:   BMP:     Recent Labs  Lab 06/01/18  0605   *      K 3.8      CO2 26   BUN 17   CREATININE 1.2   CALCIUM 8.1*     CBC:     Recent Labs  Lab 05/31/18  0406 06/01/18  0606   WBC 7.46 10.33  10.33   HGB 10.5* 9.0*  9.0*   HCT 30.4*  25.6*  25.6*   * 133*  133*       Significant Imaging: reviewed.    Assessment/Plan:      * Intertrochanteric fracture of left hip, closed, initial encounter    Displaced.S/P OIRF of left femur on 5.30.18 ,pain control,PT,OT.  recommended SNF,consulted SW,placed PPD.          Seizure disorder    Stable on keppra and Tegretol.          Anemia of chronic disease    Stable,will monitor.          Chronic anticoagulation    Xarelto has been resumed.          CAD s/p PCI       On medical treatment,last Echo show preserved EF,cardiology clear he patient at moderte risk.        Hyperlipidemia      On statin         Essential hypertension    Controlled with home medication,will monitor.          BPH (benign prostatic hyperplasia)    On proscar and flomax,          Personal history of DVT (deep vein thrombosis)    US show still chronic DVT,on Xarelto.            VTE Risk Mitigation         Ordered     rivaroxaban tablet 20 mg  with dinner      05/31/18 0726     Place MALDONADO hose  Until discontinued      05/30/18 0888              Ana Wing MD  Department of Hospital Medicine   [facility]

## 2018-06-02 LAB
ALBUMIN SERPL BCP-MCNC: 2.8 G/DL
ALP SERPL-CCNC: 108 U/L
ALT SERPL W/O P-5'-P-CCNC: 8 U/L
ANION GAP SERPL CALC-SCNC: 8 MMOL/L
AST SERPL-CCNC: 20 U/L
BASOPHILS # BLD AUTO: 0.02 K/UL
BASOPHILS # BLD AUTO: 0.02 K/UL
BASOPHILS NFR BLD: 0.2 %
BASOPHILS NFR BLD: 0.2 %
BILIRUB SERPL-MCNC: 0.7 MG/DL
BUN SERPL-MCNC: 20 MG/DL
CALCIUM SERPL-MCNC: 8.5 MG/DL
CHLORIDE SERPL-SCNC: 109 MMOL/L
CO2 SERPL-SCNC: 23 MMOL/L
CREAT SERPL-MCNC: 1.2 MG/DL
DIFFERENTIAL METHOD: ABNORMAL
DIFFERENTIAL METHOD: ABNORMAL
EOSINOPHIL # BLD AUTO: 0.2 K/UL
EOSINOPHIL # BLD AUTO: 0.2 K/UL
EOSINOPHIL NFR BLD: 1.4 %
EOSINOPHIL NFR BLD: 1.4 %
ERYTHROCYTE [DISTWIDTH] IN BLOOD BY AUTOMATED COUNT: 13.1 %
ERYTHROCYTE [DISTWIDTH] IN BLOOD BY AUTOMATED COUNT: 13.1 %
EST. GFR  (AFRICAN AMERICAN): >60 ML/MIN/1.73 M^2
EST. GFR  (NON AFRICAN AMERICAN): 54 ML/MIN/1.73 M^2
GLUCOSE SERPL-MCNC: 123 MG/DL
HCT VFR BLD AUTO: 27.1 %
HCT VFR BLD AUTO: 27.1 %
HGB BLD-MCNC: 9.4 G/DL
HGB BLD-MCNC: 9.4 G/DL
LYMPHOCYTES # BLD AUTO: 1.4 K/UL
LYMPHOCYTES # BLD AUTO: 1.4 K/UL
LYMPHOCYTES NFR BLD: 13.5 %
LYMPHOCYTES NFR BLD: 13.5 %
MCH RBC QN AUTO: 31.1 PG
MCH RBC QN AUTO: 31.1 PG
MCHC RBC AUTO-ENTMCNC: 34.7 G/DL
MCHC RBC AUTO-ENTMCNC: 34.7 G/DL
MCV RBC AUTO: 90 FL
MCV RBC AUTO: 90 FL
MONOCYTES # BLD AUTO: 1.2 K/UL
MONOCYTES # BLD AUTO: 1.2 K/UL
MONOCYTES NFR BLD: 11.6 %
MONOCYTES NFR BLD: 11.6 %
NEUTROPHILS # BLD AUTO: 7.6 K/UL
NEUTROPHILS # BLD AUTO: 7.6 K/UL
NEUTROPHILS NFR BLD: 73.1 %
NEUTROPHILS NFR BLD: 73.1 %
PLATELET # BLD AUTO: 164 K/UL
PLATELET # BLD AUTO: 164 K/UL
PMV BLD AUTO: 10.1 FL
PMV BLD AUTO: 10.1 FL
POCT GLUCOSE: 117 MG/DL (ref 70–110)
POTASSIUM SERPL-SCNC: 3.9 MMOL/L
PROT SERPL-MCNC: 5.9 G/DL
RBC # BLD AUTO: 3.02 M/UL
RBC # BLD AUTO: 3.02 M/UL
SODIUM SERPL-SCNC: 140 MMOL/L
TB INDURATION 48 - 72 HR READ: 0 MM
WBC # BLD AUTO: 10.39 K/UL
WBC # BLD AUTO: 10.39 K/UL

## 2018-06-02 PROCEDURE — 97530 THERAPEUTIC ACTIVITIES: CPT | Performed by: SPECIALIST

## 2018-06-02 PROCEDURE — 25000003 PHARM REV CODE 250: Performed by: ORTHOPAEDIC SURGERY

## 2018-06-02 PROCEDURE — 25000003 PHARM REV CODE 250: Performed by: EMERGENCY MEDICINE

## 2018-06-02 PROCEDURE — 25000003 PHARM REV CODE 250: Performed by: INTERNAL MEDICINE

## 2018-06-02 PROCEDURE — 85025 COMPLETE CBC W/AUTO DIFF WBC: CPT

## 2018-06-02 PROCEDURE — 97530 THERAPEUTIC ACTIVITIES: CPT

## 2018-06-02 PROCEDURE — 11000001 HC ACUTE MED/SURG PRIVATE ROOM

## 2018-06-02 PROCEDURE — 99900035 HC TECH TIME PER 15 MIN (STAT)

## 2018-06-02 PROCEDURE — 36415 COLL VENOUS BLD VENIPUNCTURE: CPT

## 2018-06-02 PROCEDURE — 25000003 PHARM REV CODE 250: Performed by: HOSPITALIST

## 2018-06-02 PROCEDURE — 94799 UNLISTED PULMONARY SVC/PX: CPT

## 2018-06-02 PROCEDURE — 80053 COMPREHEN METABOLIC PANEL: CPT

## 2018-06-02 RX ADMIN — Medication 12.5 MG: at 08:06

## 2018-06-02 RX ADMIN — CARBAMAZEPINE 200 MG: 100 TABLET, CHEWABLE ORAL at 09:06

## 2018-06-02 RX ADMIN — CLOPIDOGREL BISULFATE 75 MG: 75 TABLET ORAL at 09:06

## 2018-06-02 RX ADMIN — BETHANECHOL CHLORIDE 50 MG: 25 TABLET ORAL at 09:06

## 2018-06-02 RX ADMIN — FAMOTIDINE 20 MG: 20 TABLET ORAL at 08:06

## 2018-06-02 RX ADMIN — BETHANECHOL CHLORIDE 50 MG: 25 TABLET ORAL at 03:06

## 2018-06-02 RX ADMIN — DOCUSATE SODIUM 100 MG: 100 CAPSULE, LIQUID FILLED ORAL at 09:06

## 2018-06-02 RX ADMIN — LEVETIRACETAM 500 MG: 500 TABLET, FILM COATED ORAL at 09:06

## 2018-06-02 RX ADMIN — RIVAROXABAN 20 MG: 20 TABLET, FILM COATED ORAL at 05:06

## 2018-06-02 RX ADMIN — TAMSULOSIN HYDROCHLORIDE 0.4 MG: 0.4 CAPSULE ORAL at 09:06

## 2018-06-02 RX ADMIN — OXYCODONE HYDROCHLORIDE 5 MG: 5 TABLET ORAL at 05:06

## 2018-06-02 RX ADMIN — FINASTERIDE 5 MG: 5 TABLET, FILM COATED ORAL at 09:06

## 2018-06-02 RX ADMIN — FAMOTIDINE 20 MG: 20 TABLET ORAL at 09:06

## 2018-06-02 RX ADMIN — LEVETIRACETAM 500 MG: 500 TABLET, FILM COATED ORAL at 08:06

## 2018-06-02 RX ADMIN — OXYCODONE HYDROCHLORIDE 5 MG: 5 TABLET ORAL at 09:06

## 2018-06-02 RX ADMIN — ATORVASTATIN CALCIUM 20 MG: 10 TABLET, FILM COATED ORAL at 08:06

## 2018-06-02 RX ADMIN — BETHANECHOL CHLORIDE 50 MG: 25 TABLET ORAL at 08:06

## 2018-06-02 RX ADMIN — Medication 12.5 MG: at 09:06

## 2018-06-02 NOTE — PT/OT/SLP PROGRESS
"Physical Therapy Treatment    Patient Name:  Jaydon Mccarthy   MRN:  [id number]    Recommendations:     Discharge Recommendations:  nursing facility, skilled   Discharge Equipment Recommendations: none   Barriers to discharge: Decreased Mobility     Assessment:     Jaydon Mccarthy is a 86 y.o. male admitted with a medical diagnosis of Intertrochanteric fracture of left hip, closed, initial encounter.  He presents with the following impairments/functional limitations:  weakness, impaired endurance, impaired self care skills, impaired functional mobilty, impaired balance, decreased lower extremity function, pain, decreased ROM, decreased safety awareness, orthopedic precautions, impaired skin.  Pt performed sit stand from EOB x 3 trials with RW min A x 2 people.  Pt required verbal cueing for appropraite weight shift to his L side to bear weight through his involved extremity.        Rehab Prognosis:  fair; patient would benefit from acute skilled PT services to address these deficits and reach maximum level of function.      Recent Surgery: Procedure(s) (LRB):  ORIF, FRACTURE, FEMUR, INTERTROCHANTERIC (Left) 3 Days Post-Op    Plan:     During this hospitalization, patient to be seen daily to address the above listed problems via gait training, therapeutic activities, therapeutic exercises  · Plan of Care Expires:  06/14/18   Plan of Care Reviewed with: patient    Subjective     Communicated with nursing prior to session.  Patient found supine with HOB elevated and OT present upon PT entry to room, agreeable to treatment.      Chief Complaint: Pt reports he is still having trouble moving his L LE.    Patient comments/goals: Pt states " I'll try and do some standing".    Pain/Comfort:  · Pain Rating 1: 0/10 (Pt didn't verbalize having any pain prior to session.)  · Pain Addressed 1: Pre-medicate for activity, Reposition, Distraction, Cessation of Activity  · Pain Rating Post-Intervention 1:  (Pt didn't verbalize having any " pain prior to session.)    Patients cultural, spiritual, Congregation conflicts given the current situation:      Objective:     Patient found with: peripheral IV     General Precautions: Standard, fall, seizure, hearing impaired   Orthopedic Precautions:LLE weight bearing as tolerated   Braces: N/A     Functional Mobility:  · Bed Mobility:     · Scooting to EOB: minimum assistance  · Supine to Sit: moderate assistance  · Sit to Supine: moderate assistance  · Transfers:  · Sit to Stand:  Pt performed sit stand from EOB x 3 trials with RW min A x 2 people.  Requiring verbal cueing on proper hand placement when pushing up from the bed.  · Balance: pt with good sitting and poor standing balance.        AM-PAC 6 CLICK MOBILITY  Turning over in bed (including adjusting bedclothes, sheets and blankets)?: 3  Sitting down on and standing up from a chair with arms (e.g., wheelchair, bedside commode, etc.): 3  Moving from lying on back to sitting on the side of the bed?: 3  Moving to and from a bed to a chair (including a wheelchair)?: 1  Need to walk in hospital room?: 2  Climbing 3-5 steps with a railing?: 1  Total Score: 13           Patient left HOB elevated with all lines intact, call button in reach and OT present..    GOALS:    Physical Therapy Goals        Problem: Physical Therapy Goal    Goal Priority Disciplines Outcome Goal Variances Interventions   Physical Therapy Goal     PT/OT, PT Ongoing (interventions implemented as appropriate)     Description:  Goals to be met by: 18     Patient will increase functional independence with mobility by performin. Supine to sit with Min Assistance  2. Sit to supine with Moderate Assistance  3. Rolling to Left and Right with SBA  4. Sitting at edge of bed x 30 minutes with Stand-by Assistance  5. Lower extremity exercise program x 20 reps per handout, with assistance as needed  6. Sit to stand with CGA/RW  7. Gait 20-30 ft min A/RW  8. Bed to chair with min A/RW                        Time Tracking:     PT Received On: 06/02/18  PT Start Time: 1030     PT Stop Time: 1055  PT Total Time (min): 25 min     Billable Minutes: Therapeutic Activity 12 Co- treat with OT    Treatment Type: Treatment  PT/PTA: PTA     PTA Visit Number: 1     Sam Hernandez PTA  06/02/2018

## 2018-06-02 NOTE — SUBJECTIVE & OBJECTIVE
Past Medical History:   Diagnosis Date    Anticoagulant long-term use     Arthritis     Cancer     right  hand    Coronary artery disease     Deep vein thrombosis     Hyperlipidemia     Hypertension     Seizures 1988    none since on tegretol       Past Surgical History:   Procedure Laterality Date    APPENDECTOMY      FRACTURE SURGERY Right     elbow    FRACTURE SURGERY Right     hip    JOSUÉ FILTER PLACEMENT      hx of deep vein thrombosis    OPEN REDUCTION AND INTERNAL FIXATION (ORIF) OF INTERTROCHANTERIC FRACTURE OF FEMUR Left 5/30/2018    Procedure: ORIF, FRACTURE, FEMUR, INTERTROCHANTERIC;  Surgeon: Pop Martin III, MD;  Location: [facility];  Service: Orthopedics;  Laterality: Left;    TOTAL SHOULDER ARTHROPLASTY Right        Review of patient's allergies indicates:  No Known Allergies    No current facility-administered medications on file prior to encounter.      Current Outpatient Prescriptions on File Prior to Encounter   Medication Sig    atorvastatin (LIPITOR) 20 MG tablet Take 1 tablet (20 mg total) by mouth once daily.    bethanechol (URECHOLINE) 50 MG tablet TAKE ONE TABLET BY MOUTH THREE TIMES DAILY ON AN EMPTY STOMACH *DO NOT TAKE WITH MILK OR ANTACIDS, TAKE WITH WATER *    carBAMazepine (TEGRETOL) 100 mg chewable tablet Take 2 tablets (200 mg total) by mouth once daily.    clopidogrel (PLAVIX) 75 mg tablet Take 1 tablet (75 mg total) by mouth once daily.    ergocalciferol (ERGOCALCIFEROL) 50,000 unit Cap TAKE ONE CAPSULE BY MOUTH TWICE WEEKLY    finasteride (PROSCAR) 5 mg tablet Take 1 tablet (5 mg total) by mouth once daily.    levETIRAcetam (KEPPRA) 500 MG Tab Take 1 tablet (500 mg total) by mouth 2 (two) times daily.    metoprolol tartrate (LOPRESSOR) 25 MG tablet Take 0.5 tablets (12.5 mg total) by mouth 2 (two) times daily.    rivaroxaban (XARELTO) 20 mg Tab Take 1 tablet (20 mg total) by mouth once daily.    tamsulosin (FLOMAX) 0.4 mg Cp24 Take 1 capsule (0.4  mg total) by mouth once daily.    mupirocin (BACTROBAN) 2 % ointment      Family History     None        Social History Main Topics    Smoking status: Former Smoker     Packs/day: 0.50     Years: 6.00     Types: Cigarettes     Quit date: 10/27/1975    Smokeless tobacco: Never Used    Alcohol use No    Drug use: No    Sexual activity: Not Currently     Partners: Female     Review of Systems   Constitutional: Positive for activity change. Negative for appetite change.   HENT: Negative for congestion and dental problem.    Eyes: Negative for discharge and itching.   Respiratory: Negative for apnea and chest tightness.    Cardiovascular: Negative for chest pain and leg swelling.   Gastrointestinal: Negative for abdominal distention and abdominal pain.   Endocrine: Negative for cold intolerance and heat intolerance.   Genitourinary: Negative for difficulty urinating and dysuria.   Musculoskeletal: Positive for arthralgias.   Skin: Negative for color change and pallor.   Allergic/Immunologic: Negative for environmental allergies.   Neurological: Negative for dizziness and facial asymmetry.   Hematological: Negative for adenopathy. Does not bruise/bleed easily.   Psychiatric/Behavioral: Negative for agitation and behavioral problems.     Objective:     Vital Signs (Most Recent):  Temp: 98.6 °F (37 °C) (06/02/18 0404)  Pulse: 91 (06/02/18 0404)  Resp: 18 (06/02/18 0404)  BP: (!) 141/73 (06/02/18 0404)  SpO2: 98 % (06/02/18 0404) Vital Signs (24h Range):  Temp:  [98 °F (36.7 °C)-101.4 °F (38.6 °C)] 98.6 °F (37 °C)  Pulse:  [79-92] 91  Resp:  [18-19] 18  SpO2:  [96 %-99 %] 98 %  BP: (110-141)/(55-73) 141/73     Weight: 72.5 kg (159 lb 13.3 oz)  Body mass index is 24.3 kg/m².    Physical Exam   Constitutional: No distress.   HENT:   Head: Normocephalic.   Eyes: Pupils are equal, round, and reactive to light.   Neck: Normal range of motion.   Cardiovascular: Normal rate and regular rhythm.    Pulmonary/Chest: Effort  normal and breath sounds normal.   Abdominal: Soft. Bowel sounds are normal.   Musculoskeletal: Normal range of motion. He exhibits tenderness.   Neurological: He is alert. No cranial nerve deficit. Coordination normal.   Skin: Skin is warm and dry. He is not diaphoretic.   Psychiatric: He has a normal mood and affect. His behavior is normal.       Significant Labs:   BMP:     Recent Labs  Lab 06/02/18  0433   *      K 3.9      CO2 23   BUN 20   CREATININE 1.2   CALCIUM 8.5*     CBC:     Recent Labs  Lab 06/01/18  0606 06/02/18  0433   WBC 10.33  10.33 10.39  10.39   HGB 9.0*  9.0* 9.4*  9.4*   HCT 25.6*  25.6* 27.1*  27.1*   *  133* 164  164       Significant Imaging: reviewed.

## 2018-06-02 NOTE — PLAN OF CARE
Problem: Pressure Ulcer Risk (Memo Scale) (Adult,Obstetrics,Pediatric)  Goal: Skin Integrity  Patient will demonstrate the desired outcomes by discharge/transition of care.    06/01/18 6933   Pressure Ulcer Risk (Memo Scale) (Adult,Obstetrics,Pediatric)   Skin Integrity making progress toward outcome

## 2018-06-02 NOTE — PLAN OF CARE
Problem: Fall Risk (Adult)  Goal: Identify Related Risk Factors and Signs and Symptoms  Related risk factors and signs and symptoms are identified upon initiation of Human Response Clinical Practice Guideline (CPG)    06/01/18 8980   Fall Risk   Related Risk Factors (Fall Risk) age-related changes;culprit medication(s);gait/mobility problems   Signs and Symptoms (Fall Risk) presence of risk factors

## 2018-06-02 NOTE — PROGRESS NOTES
Ortho Daily Progress Note    Jaydon Mccarthy is a 86 y.o. male admitted on 5/29/2018      Chief Complaint/Reason for admission: Fall (Pt reports left hip pain following trip and fall, no LOC. )       Hospital Day: 4  Post Op Day: 3 Days Post-Op     The patient was seen and examined this morning at the bedside. Patient reports no acute issues overnight.  Patient reports that pain is adequately controlled.    _______________    Vitals:    06/01/18 2334 06/02/18 0404 06/02/18 0843 06/02/18 1210   BP: 138/62 (!) 141/73 132/62 (!) 124/57   Pulse: 82 91 97 84   Resp: 18 18 17 17   Temp: 98 °F (36.7 °C) 98.6 °F (37 °C) 98.3 °F (36.8 °C) 98.3 °F (36.8 °C)   TempSrc: Oral Oral Oral Oral   SpO2: 99% 98% 97% 98%   Weight:       Height:           Vital Signs (Most Recent)  Temp: 98.3 °F (36.8 °C) (06/02/18 1210)  Pulse: 84 (06/02/18 1210)  Resp: 17 (06/02/18 1210)  BP: (!) 124/57 (06/02/18 1210)  SpO2: 98 % (06/02/18 1210)    Vital Signs Range (Last 24H):  Temp:  [98 °F (36.7 °C)-101.4 °F (38.6 °C)]   Pulse:  [82-97]   Resp:  [17-19]   BP: (112-141)/(57-73)   SpO2:  [97 %-99 %]       Physical:    AAOx3  Incision/ dressing clean/dry/intact  NVI Distally  Palpable distal pulses  CR<3sec      Recent Labs      05/31/18   0406  06/01/18   0605  06/01/18   0606  06/02/18   0433   K  3.8  3.8   --   3.9   CALCIUM  8.2*  8.1*   --   8.5*   WBC  7.46   --   10.33  10.33  10.39  10.39   HGB  10.5*   --   9.0*  9.0*  9.4*  9.4*   HCT  30.4*   --   25.6*  25.6*  27.1*  27.1*   PLT  138*   --   133*  133*  164  164       I/O last 3 completed shifts:  In: 360 [P.O.:360]  Out: 1601 [Urine:1600; Stool:1]          Assessment:  A/P s/p left hip IMN              Plan:    PT/OT: WBAT  Pain Control  DVT Prophylaxis    Discharge Planning        Isaias Ramos MD  [facility]

## 2018-06-02 NOTE — PLAN OF CARE
Problem: Physical Therapy Goal  Goal: Physical Therapy Goal  Goals to be met by: 18     Patient will increase functional independence with mobility by performin. Supine to sit with Min Assistance  2. Sit to supine with Moderate Assistance  3. Rolling to Left and Right with SBA  4. Sitting at edge of bed x 30 minutes with Stand-by Assistance  5. Lower extremity exercise program x 20 reps per handout, with assistance as needed  6. Sit to stand with CGA/RW  7. Gait 20-30 ft min A/RW  8. Bed to chair with min A/RW      Outcome: Ongoing (interventions implemented as appropriate)  Pt performed sit stand from EOB x 3 trials with RW min A x 2 people.  Pt required verbal cueing for appropraite weight shift to his L side to bear weight through his involved extremity.

## 2018-06-02 NOTE — PT/OT/SLP PROGRESS
Occupational Therapy   Treatment    Name: Jaydon Mccarthy  MRN: [id number]  Admitting Diagnosis:  Intertrochanteric fracture of left hip, closed, initial encounter  3 Days Post-Op    Recommendations:     Discharge Recommendations: nursing facility, skilled  Discharge Equipment Recommendations:  none  Barriers to discharge:  Decreased caregiver support    Subjective     Communicated with: RN (Jennifer) prior to session.  Pain/Comfort:  · Pain Rating 1:  (Did not rate)  · Location - Side 1: Left  · Location 1: hip  · Pain Addressed 1: Pre-medicate for activity, Reposition, Distraction, Cessation of Activity, Nurse notified  · Pain Rating Post-Intervention 1: 0/10    Patients cultural, spiritual, Sabianism conflicts given the current situation:      Objective:     Patient found with:      General Precautions: Standard, fall, seizure, hearing impaired   Orthopedic Precautions:LLE weight bearing as tolerated   Braces: N/A     Occupational Performance:    Bed Mobility:    · Patient completed Scooting/Bridging with total assistance  · Patient completed Supine to Sit with moderate assistance  · Patient completed Sit to Supine with moderate assistance     Functional Mobility/Transfers:  · Patient completed Sit <> Stand Transfer with minimum assistance and of 2 persons  with  rolling walker     Pt performed weight shift in stance, but unable to take steps.  Pt presented with posterior leaning and anterior pelvic tilt in stance with mod VCs and tactile cues to maintain static standing balance.    Activities of Daily Living:  · Grooming: stand by assistance for washing face with washcloth  · UB Dressing: moderate assistance donning gown as robe    Patient left HOB elevated with all lines intact, call button in reach, bed alarm on and RN (Jennifer) present    [facility] 6 Click:  [facility] Total Score: 13    Treatment & Education:  Pt sat EOB with SBA for ADLs for 10 min.  Education:    Assessment:     Jaydon Mccarthy is a 86 y.o. male with a medical  diagnosis of Intertrochanteric fracture of left hip, closed, initial encounter.  He tolerated OT session well.  Performance deficits affecting function are weakness, impaired endurance, impaired self care skills, impaired functional mobilty, impaired balance, decreased lower extremity function, pain, decreased ROM, decreased safety awareness, orthopedic precautions, impaired skin.      Rehab Prognosis:  Good; patient would benefit from acute skilled OT services to address these deficits and reach maximum level of function.       Plan:     Patient to be seen 5 x/week to address the above listed problems via self-care/home management, therapeutic activities, therapeutic exercises  · Plan of Care Expires:    · Plan of Care Reviewed with: patient    This Plan of care has been discussed with the patient who was involved in its development and understands and is in agreement with the identified goals and treatment plan    GOALS:    Occupational Therapy Goals        Problem: Occupational Therapy Goal    Goal Priority Disciplines Outcome Interventions   Occupational Therapy Goal     OT, PT/OT Ongoing (interventions implemented as appropriate)    Description:  Goals to be met by: 6/14/18    Patient will increase functional independence with ADLs by performing:    Feeding with Supervision.  UE Dressing with Moderate Assistance. - Goal Met 6/2/18  Grooming while seated with Contact Guard Assistance.   Toileting from bedside commode with Moderate Assistance for hygiene and clothing management.   Sitting at edge of bed x30 minutes with Stand-by Assistance.  Toilet transfer to bedside commode with Moderate Assistance.  Upper extremity exercise program x7 reps per handout, with assistance as needed.                       Time Tracking:     OT Date of Treatment: 06/02/18  OT Start Time: 1028  OT Stop Time: 1055  OT Total Time (min): 27 min (Cotx with PTA)    Billable Minutes:Therapeutic Activity 13    Davina Rendon  OT  6/2/2018

## 2018-06-02 NOTE — PLAN OF CARE
Problem: Occupational Therapy Goal  Goal: Occupational Therapy Goal  Goals to be met by: 6/14/18    Patient will increase functional independence with ADLs by performing:    Feeding with Supervision.  UE Dressing with Moderate Assistance. - Goal Met 6/2/18  Grooming while seated with Contact Guard Assistance.   Toileting from bedside commode with Moderate Assistance for hygiene and clothing management.   Sitting at edge of bed x30 minutes with Stand-by Assistance.  Toilet transfer to bedside commode with Moderate Assistance.  Upper extremity exercise program x7 reps per handout, with assistance as needed.      Outcome: Ongoing (interventions implemented as appropriate)  Pt with improved bed mobility and tolerance today.

## 2018-06-02 NOTE — PLAN OF CARE
Problem: Patient Care Overview  Goal: Plan of Care Review  Outcome: Ongoing (interventions implemented as appropriate)  All systems assessed. No falls or injuries this shift. Left hip dressing with large amount of sanguinous dried blood.Noted reinforced per off-going nurse. Denies pain. Repositioned up in bed. Without distress. Will continue to monitor.

## 2018-06-02 NOTE — ASSESSMENT & PLAN NOTE
Displaced.S/P OIRF of left femur on 5.30.18 ,pain control,PT,OT.  recommended SNF,consulted SW,placed PPD.  Stable,pain is controlled.

## 2018-06-02 NOTE — PROGRESS NOTES
[facility] Hospital Medicine  Progress Note    Patient Name: Jaydon Mccarthy  MRN: [id number]  Patient Class: IP- Inpatient   Admission Date: 5/29/2018  Length of Stay: 4 days  Attending Physician: Pop Martin III, MD  Primary Care Provider: Javier Villanueva MD        Subjective:     Principal Problem:Intertrochanteric fracture of left hip, closed, initial encounter    HPI:  See H&P.    Hospital Course:  This 86 y.o. Male, with a medical history of hypertension, hyperlipidemia, seizures, deep vein thrombosis, arthritis, coronary artery disease, anticoagulant long term use and cancer (right hand), presents to the ED via EMS transportation accompanied by his daughter s/p a mechanical fall that occurred about 1x hour ago before ER visit,. Pt reports tripping and falling onto his left hip while at home. He presently c/o left hip pain. Symptoms are acute, constant and mild (3/10). Pt notes that he has not ambulated since the fall. Pt denies abdominal pain and numbness or tingling,X ancelmo show acute displaced  IT fracture of left femur,ortho has admitted the patient with plan for intervention,cardiology has been consulted as well for cardiac clearance.he denies chest pain of SOB at this time.  S/P OIRF of left femur on 5.30.18 ,pain control,PT,OT.  SNF recommended,consulted SW,.placed PPD.  Stable,    Past Medical History:   Diagnosis Date    Anticoagulant long-term use     Arthritis     Cancer     right  hand    Coronary artery disease     Deep vein thrombosis     Hyperlipidemia     Hypertension     Seizures 1988    none since on tegretol       Past Surgical History:   Procedure Laterality Date    APPENDECTOMY      FRACTURE SURGERY Right     elbow    FRACTURE SURGERY Right     hip    JOSUÉ FILTER PLACEMENT      hx of deep vein thrombosis    OPEN REDUCTION AND INTERNAL FIXATION (ORIF) OF INTERTROCHANTERIC FRACTURE OF FEMUR Left 5/30/2018    Procedure: ORIF, FRACTURE, FEMUR,  INTERTROCHANTERIC;  Surgeon: Pop Martin III, MD;  Location: [facility];  Service: Orthopedics;  Laterality: Left;    TOTAL SHOULDER ARTHROPLASTY Right        Review of patient's allergies indicates:  No Known Allergies    No current facility-administered medications on file prior to encounter.      Current Outpatient Prescriptions on File Prior to Encounter   Medication Sig    atorvastatin (LIPITOR) 20 MG tablet Take 1 tablet (20 mg total) by mouth once daily.    bethanechol (URECHOLINE) 50 MG tablet TAKE ONE TABLET BY MOUTH THREE TIMES DAILY ON AN EMPTY STOMACH *DO NOT TAKE WITH MILK OR ANTACIDS, TAKE WITH WATER *    carBAMazepine (TEGRETOL) 100 mg chewable tablet Take 2 tablets (200 mg total) by mouth once daily.    clopidogrel (PLAVIX) 75 mg tablet Take 1 tablet (75 mg total) by mouth once daily.    ergocalciferol (ERGOCALCIFEROL) 50,000 unit Cap TAKE ONE CAPSULE BY MOUTH TWICE WEEKLY    finasteride (PROSCAR) 5 mg tablet Take 1 tablet (5 mg total) by mouth once daily.    levETIRAcetam (KEPPRA) 500 MG Tab Take 1 tablet (500 mg total) by mouth 2 (two) times daily.    metoprolol tartrate (LOPRESSOR) 25 MG tablet Take 0.5 tablets (12.5 mg total) by mouth 2 (two) times daily.    rivaroxaban (XARELTO) 20 mg Tab Take 1 tablet (20 mg total) by mouth once daily.    tamsulosin (FLOMAX) 0.4 mg Cp24 Take 1 capsule (0.4 mg total) by mouth once daily.    mupirocin (BACTROBAN) 2 % ointment      Family History     None        Social History Main Topics    Smoking status: Former Smoker     Packs/day: 0.50     Years: 6.00     Types: Cigarettes     Quit date: 10/27/1975    Smokeless tobacco: Never Used    Alcohol use No    Drug use: No    Sexual activity: Not Currently     Partners: Female     Review of Systems   Constitutional: Positive for activity change. Negative for appetite change.   HENT: Negative for congestion and dental problem.    Eyes: Negative for discharge and itching.   Respiratory: Negative for  apnea and chest tightness.    Cardiovascular: Negative for chest pain and leg swelling.   Gastrointestinal: Negative for abdominal distention and abdominal pain.   Endocrine: Negative for cold intolerance and heat intolerance.   Genitourinary: Negative for difficulty urinating and dysuria.   Musculoskeletal: Positive for arthralgias.   Skin: Negative for color change and pallor.   Allergic/Immunologic: Negative for environmental allergies.   Neurological: Negative for dizziness and facial asymmetry.   Hematological: Negative for adenopathy. Does not bruise/bleed easily.   Psychiatric/Behavioral: Negative for agitation and behavioral problems.     Objective:     Vital Signs (Most Recent):  Temp: 98.6 °F (37 °C) (06/02/18 0404)  Pulse: 91 (06/02/18 0404)  Resp: 18 (06/02/18 0404)  BP: (!) 141/73 (06/02/18 0404)  SpO2: 98 % (06/02/18 0404) Vital Signs (24h Range):  Temp:  [98 °F (36.7 °C)-101.4 °F (38.6 °C)] 98.6 °F (37 °C)  Pulse:  [79-92] 91  Resp:  [18-19] 18  SpO2:  [96 %-99 %] 98 %  BP: (110-141)/(55-73) 141/73     Weight: 72.5 kg (159 lb 13.3 oz)  Body mass index is 24.3 kg/m².    Physical Exam   Constitutional: No distress.   HENT:   Head: Normocephalic.   Eyes: Pupils are equal, round, and reactive to light.   Neck: Normal range of motion.   Cardiovascular: Normal rate and regular rhythm.    Pulmonary/Chest: Effort normal and breath sounds normal.   Abdominal: Soft. Bowel sounds are normal.   Musculoskeletal: Normal range of motion. He exhibits tenderness.   Neurological: He is alert. No cranial nerve deficit. Coordination normal.   Skin: Skin is warm and dry. He is not diaphoretic.   Psychiatric: He has a normal mood and affect. His behavior is normal.       Significant Labs:   BMP:     Recent Labs  Lab 06/02/18 0433   *      K 3.9      CO2 23   BUN 20   CREATININE 1.2   CALCIUM 8.5*     CBC:     Recent Labs  Lab 06/01/18  0606 06/02/18 0433   WBC 10.33  10.33 10.39  10.39   HGB 9.0*   9.0* 9.4*  9.4*   HCT 25.6*  25.6* 27.1*  27.1*   *  133* 164  164       Significant Imaging: reviewed.    Assessment/Plan:      * Intertrochanteric fracture of left hip, closed, initial encounter    Displaced.S/P OIRF of left femur on 5.30.18 ,pain control,PT,OT.  recommended SNF,consulted SW,placed PPD.  Stable,pain is controlled.        Seizure disorder    Stable on keppra and Tegretol.          Anemia of chronic disease    Stable,will monitor.          Chronic anticoagulation    Xarelto has been resumed.          CAD s/p PCI       On medical treatment,last Echo show preserved EF,cardiology clear he patient at moderte risk.        Hyperlipidemia      On statin         Essential hypertension    Controlled with home medication,will monitor.          BPH (benign prostatic hyperplasia)    On proscar and flomax,          Personal history of DVT (deep vein thrombosis)    US show still chronic DVT,on Xarelto.            VTE Risk Mitigation         Ordered     rivaroxaban tablet 20 mg  with dinner      05/31/18 0750     Place MALDONADO hose  Until discontinued      05/30/18 0837              Ana Wing MD  Department of Hospital Medicine   [facility]

## 2018-06-02 NOTE — PROGRESS NOTES
Pt in bed comfortable, had PT    Afebrile this AM    PE    L hip dressing intact, small bloody drainage from prox incision, moves L foot well    A/P    87 y/o M s/p fall with L hip IT fx, s/p IM nailing 5/30    PT/OT - WBAT, ambulate    Dressing changes    Anemia - Hgb 9, blood loss, observe    dvt proph - xarelto, TEDs, SCD    Cards - stable appreciate assistance    Disp - SNF eval in progress, ? transfer Monday

## 2018-06-03 LAB
BASOPHILS # BLD AUTO: 0.03 K/UL
BASOPHILS NFR BLD: 0.4 %
DIFFERENTIAL METHOD: ABNORMAL
EOSINOPHIL # BLD AUTO: 0.2 K/UL
EOSINOPHIL NFR BLD: 2.5 %
ERYTHROCYTE [DISTWIDTH] IN BLOOD BY AUTOMATED COUNT: 12.9 %
HCT VFR BLD AUTO: 24.4 %
HGB BLD-MCNC: 8.5 G/DL
LYMPHOCYTES # BLD AUTO: 1.4 K/UL
LYMPHOCYTES NFR BLD: 17.4 %
MCH RBC QN AUTO: 31.5 PG
MCHC RBC AUTO-ENTMCNC: 34.8 G/DL
MCV RBC AUTO: 90 FL
MONOCYTES # BLD AUTO: 1.1 K/UL
MONOCYTES NFR BLD: 13.9 %
NEUTROPHILS # BLD AUTO: 5.2 K/UL
NEUTROPHILS NFR BLD: 65.7 %
PLATELET # BLD AUTO: 173 K/UL
PMV BLD AUTO: 9.6 FL
RBC # BLD AUTO: 2.7 M/UL
TB INDURATION 48 - 72 HR READ: 0 MM
WBC # BLD AUTO: 7.91 K/UL

## 2018-06-03 PROCEDURE — 25000003 PHARM REV CODE 250: Performed by: EMERGENCY MEDICINE

## 2018-06-03 PROCEDURE — 25000003 PHARM REV CODE 250: Performed by: ORTHOPAEDIC SURGERY

## 2018-06-03 PROCEDURE — 36415 COLL VENOUS BLD VENIPUNCTURE: CPT

## 2018-06-03 PROCEDURE — 11000001 HC ACUTE MED/SURG PRIVATE ROOM

## 2018-06-03 PROCEDURE — 85025 COMPLETE CBC W/AUTO DIFF WBC: CPT

## 2018-06-03 PROCEDURE — 97530 THERAPEUTIC ACTIVITIES: CPT

## 2018-06-03 PROCEDURE — 25000003 PHARM REV CODE 250: Performed by: INTERNAL MEDICINE

## 2018-06-03 PROCEDURE — 25000003 PHARM REV CODE 250: Performed by: HOSPITALIST

## 2018-06-03 RX ADMIN — LEVETIRACETAM 500 MG: 500 TABLET, FILM COATED ORAL at 09:06

## 2018-06-03 RX ADMIN — Medication 12.5 MG: at 08:06

## 2018-06-03 RX ADMIN — TAMSULOSIN HYDROCHLORIDE 0.4 MG: 0.4 CAPSULE ORAL at 09:06

## 2018-06-03 RX ADMIN — RIVAROXABAN 20 MG: 20 TABLET, FILM COATED ORAL at 04:06

## 2018-06-03 RX ADMIN — CLOPIDOGREL BISULFATE 75 MG: 75 TABLET ORAL at 09:06

## 2018-06-03 RX ADMIN — OXYCODONE HYDROCHLORIDE 5 MG: 5 TABLET ORAL at 09:06

## 2018-06-03 RX ADMIN — FAMOTIDINE 20 MG: 20 TABLET ORAL at 08:06

## 2018-06-03 RX ADMIN — CARBAMAZEPINE 200 MG: 100 TABLET, CHEWABLE ORAL at 09:06

## 2018-06-03 RX ADMIN — POLYETHYLENE GLYCOL 3350 17 G: 17 POWDER, FOR SOLUTION ORAL at 08:06

## 2018-06-03 RX ADMIN — Medication 12.5 MG: at 09:06

## 2018-06-03 RX ADMIN — BETHANECHOL CHLORIDE 50 MG: 25 TABLET ORAL at 03:06

## 2018-06-03 RX ADMIN — BETHANECHOL CHLORIDE 50 MG: 25 TABLET ORAL at 09:06

## 2018-06-03 RX ADMIN — OXYCODONE HYDROCHLORIDE 5 MG: 5 TABLET ORAL at 06:06

## 2018-06-03 RX ADMIN — ATORVASTATIN CALCIUM 20 MG: 10 TABLET, FILM COATED ORAL at 08:06

## 2018-06-03 RX ADMIN — FINASTERIDE 5 MG: 5 TABLET, FILM COATED ORAL at 09:06

## 2018-06-03 RX ADMIN — BETHANECHOL CHLORIDE 50 MG: 25 TABLET ORAL at 08:06

## 2018-06-03 RX ADMIN — LEVETIRACETAM 500 MG: 500 TABLET, FILM COATED ORAL at 08:06

## 2018-06-03 RX ADMIN — FAMOTIDINE 20 MG: 20 TABLET ORAL at 09:06

## 2018-06-03 NOTE — PT/OT/SLP PROGRESS
"Physical Therapy Treatment    Patient Name:  Jaydon Mccarthy   MRN:  [id number]    Recommendations:     Discharge Recommendations:  nursing facility, skilled   Discharge Equipment Recommendations: none   Barriers to discharge: Decreased Mobility     Assessment:     Jaydon Mccarthy is a 86 y.o. male admitted with a medical diagnosis of Intertrochanteric fracture of left hip, closed, initial encounter.  He presents with the following impairments/functional limitations:  weakness, impaired endurance, impaired self care skills, impaired functional mobilty, impaired balance, decreased lower extremity function, pain, decreased ROM, decreased safety awareness, orthopedic precautions, impaired skin.  Pt performed sit to stand from EOB x 3 trials with RW, Mod to Min assist.  Pt requiring verbal cueing on proper hand placement when pushing up from the EOB.  Pt continues to lean back on his heels and requires constant reminding on appropriate weight shift to prevent posterior lean.      Rehab Prognosis:  Fair; patient would benefit from acute skilled PT services to address these deficits and reach maximum level of function.      Recent Surgery: Procedure(s) (LRB):  ORIF, FRACTURE, FEMUR, INTERTROCHANTERIC (Left) 4 Days Post-Op    Plan:     During this hospitalization, patient to be seen daily to address the above listed problems via gait training, therapeutic activities, therapeutic exercises  · Plan of Care Expires:  06/14/18   Plan of Care Reviewed with: patient    Subjective     Communicated with nursing prior to session.  Patient found supine with HOB elevated upon PT entry to room, agreeable to treatment.      Chief Complaint: Pt reports no complaints at this time.   Patient comments/goals: Pt states " I think I'm going to try and walk today".   Pain/Comfort:  · Pain Rating 1: 0/10  · Pain Rating Post-Intervention 1:  (Pt verbalized having pain but didn't give a number. Nursing was notified post treatment session.)    Patients " cultural, spiritual, Jew conflicts given the current situation:      Objective:     Patient found with: peripheral IV     General Precautions: Standard, fall, seizure, hearing impaired   Orthopedic Precautions:LLE weight bearing as tolerated   Braces: N/A     Functional Mobility:  · Bed Mobility:     · Scooting to EOB: contact guard assistance  · Scooting to HOB: dependent x 2   · Supine to Sit: minimum assistance for L LE management.   · Sit to Supine: moderate assistance for B LE management   · Transfers:     · Sit to Stand:  Pt performed sit to stand from EOB x 3 trials with RW, Mod to Min assist.  Pt requiring verbal cueing on proper hand placement when pushing up from the EOB.  Pt continues to lean back on his heels and requires constant reminding on appropriate weight shift to prevent posterior lean.  · Balance:  Pt with good sitting and fair standing balance.       AM-PAC 6 CLICK MOBILITY  Turning over in bed (including adjusting bedclothes, sheets and blankets)?: 3  Sitting down on and standing up from a chair with arms (e.g., wheelchair, bedside commode, etc.): 3  Moving from lying on back to sitting on the side of the bed?: 3  Moving to and from a bed to a chair (including a wheelchair)?: 1  Need to walk in hospital room?: 1  Climbing 3-5 steps with a railing?: 1  Total Score: 12           Patient left supine with all lines intact, call button in reach and food and nutrition present..    GOALS:    Physical Therapy Goals        Problem: Physical Therapy Goal    Goal Priority Disciplines Outcome Goal Variances Interventions   Physical Therapy Goal     PT/OT, PT Ongoing (interventions implemented as appropriate)     Description:  Goals to be met by: 18     Patient will increase functional independence with mobility by performin. Supine to sit with Min Assistance  2. Sit to supine with Moderate Assistance  3. Rolling to Left and Right with SBA  4. Sitting at edge of bed x 30 minutes with  Stand-by Assistance  5. Lower extremity exercise program x 20 reps per handout, with assistance as needed  6. Sit to stand with CGA/RW  7. Gait 20-30 ft min A/RW  8. Bed to chair with min A/RW                       Time Tracking:     PT Received On: 06/03/18  PT Start Time: 0919     PT Stop Time: 0940  PT Total Time (min): 21 min     Billable Minutes: Therapeutic Activity 21    Treatment Type: Treatment  PT/PTA: PTA     PTA Visit Number: 2     Sam Hernandez PTA  06/03/2018

## 2018-06-03 NOTE — PLAN OF CARE
Problem: Patient Care Overview  Goal: Plan of Care Review  Outcome: Ongoing (interventions implemented as appropriate)  Pt remains free of falls; AAOx3; IV CDI; VSSAF; telebox #[id number]; pressure dressing on left elbow; Aquacel bandages on left hip and thigh moderate amount of drainage will continue to monitor; bruises on upper extremities; bruise on left buttock; no c/o pain; safety precautions in place; will continue to monitor

## 2018-06-03 NOTE — PROGRESS NOTES
[facility] Hospital Medicine  Progress Note    Patient Name: Jaydon Mccarthy  MRN: [id number]  Patient Class: IP- Inpatient   Admission Date: 5/29/2018  Length of Stay: 5 days  Attending Physician: Pop Martin III, MD  Primary Care Provider: Javier Villanueva MD        Subjective:     Principal Problem:Intertrochanteric fracture of left hip, closed, initial encounter    HPI:  See H&P.    Hospital Course:  This 86 y.o. Male, with a medical history of hypertension, hyperlipidemia, seizures, deep vein thrombosis, arthritis, coronary artery disease, anticoagulant long term use and cancer (right hand), presents to the ED via EMS transportation accompanied by his daughter s/p a mechanical fall that occurred about 1x hour ago before ER visit,. Pt reports tripping and falling onto his left hip while at home. He presently c/o left hip pain. Symptoms are acute, constant and mild (3/10). Pt notes that he has not ambulated since the fall. Pt denies abdominal pain and numbness or tingling,X ancelmo show acute displaced  IT fracture of left femur,ortho has admitted the patient with plan for intervention,cardiology has been consulted as well for cardiac clearance.he denies chest pain of SOB at this time.  S/P OIRF of left femur on 5.30.18 ,pain control,PT,OT.  SNF recommended,consulted SW,.placed PPD.  Stable,    Past Medical History:   Diagnosis Date    Anticoagulant long-term use     Arthritis     Cancer     right  hand    Coronary artery disease     Deep vein thrombosis     Hyperlipidemia     Hypertension     Seizures 1988    none since on tegretol       Past Surgical History:   Procedure Laterality Date    APPENDECTOMY      FRACTURE SURGERY Right     elbow    FRACTURE SURGERY Right     hip    JOSUÉ FILTER PLACEMENT      hx of deep vein thrombosis    OPEN REDUCTION AND INTERNAL FIXATION (ORIF) OF INTERTROCHANTERIC FRACTURE OF FEMUR Left 5/30/2018    Procedure: ORIF, FRACTURE, FEMUR,  INTERTROCHANTERIC;  Surgeon: Pop Martin III, MD;  Location: [facility];  Service: Orthopedics;  Laterality: Left;    TOTAL SHOULDER ARTHROPLASTY Right        Review of patient's allergies indicates:  No Known Allergies    No current facility-administered medications on file prior to encounter.      Current Outpatient Prescriptions on File Prior to Encounter   Medication Sig    atorvastatin (LIPITOR) 20 MG tablet Take 1 tablet (20 mg total) by mouth once daily.    bethanechol (URECHOLINE) 50 MG tablet TAKE ONE TABLET BY MOUTH THREE TIMES DAILY ON AN EMPTY STOMACH *DO NOT TAKE WITH MILK OR ANTACIDS, TAKE WITH WATER *    carBAMazepine (TEGRETOL) 100 mg chewable tablet Take 2 tablets (200 mg total) by mouth once daily.    clopidogrel (PLAVIX) 75 mg tablet Take 1 tablet (75 mg total) by mouth once daily.    ergocalciferol (ERGOCALCIFEROL) 50,000 unit Cap TAKE ONE CAPSULE BY MOUTH TWICE WEEKLY    finasteride (PROSCAR) 5 mg tablet Take 1 tablet (5 mg total) by mouth once daily.    levETIRAcetam (KEPPRA) 500 MG Tab Take 1 tablet (500 mg total) by mouth 2 (two) times daily.    metoprolol tartrate (LOPRESSOR) 25 MG tablet Take 0.5 tablets (12.5 mg total) by mouth 2 (two) times daily.    rivaroxaban (XARELTO) 20 mg Tab Take 1 tablet (20 mg total) by mouth once daily.    tamsulosin (FLOMAX) 0.4 mg Cp24 Take 1 capsule (0.4 mg total) by mouth once daily.    mupirocin (BACTROBAN) 2 % ointment      Family History     None        Social History Main Topics    Smoking status: Former Smoker     Packs/day: 0.50     Years: 6.00     Types: Cigarettes     Quit date: 10/27/1975    Smokeless tobacco: Never Used    Alcohol use No    Drug use: No    Sexual activity: Not Currently     Partners: Female     Review of Systems   Constitutional: Positive for activity change. Negative for appetite change.   HENT: Negative for congestion and dental problem.    Eyes: Negative for discharge and itching.   Respiratory: Negative for  apnea and chest tightness.    Cardiovascular: Negative for chest pain and leg swelling.   Gastrointestinal: Negative for abdominal distention and abdominal pain.   Endocrine: Negative for cold intolerance and heat intolerance.   Genitourinary: Negative for difficulty urinating and dysuria.   Musculoskeletal: Positive for arthralgias.   Skin: Negative for color change and pallor.   Allergic/Immunologic: Negative for environmental allergies.   Neurological: Negative for dizziness and facial asymmetry.   Hematological: Negative for adenopathy. Does not bruise/bleed easily.   Psychiatric/Behavioral: Negative for agitation and behavioral problems.     Objective:     Vital Signs (Most Recent):  Temp: 98.4 °F (36.9 °C) (06/03/18 0755)  Pulse: 85 (06/03/18 0755)  Resp: 18 (06/03/18 0755)  BP: (!) 121/57 (06/03/18 0755)  SpO2: 95 % (06/03/18 0755) Vital Signs (24h Range):  Temp:  [98.1 °F (36.7 °C)-98.9 °F (37.2 °C)] 98.4 °F (36.9 °C)  Pulse:  [84-98] 85  Resp:  [17-20] 18  SpO2:  [95 %-99 %] 95 %  BP: (121-141)/(57-66) 121/57     Weight: 72.5 kg (159 lb 13.3 oz)  Body mass index is 24.3 kg/m².    Physical Exam   Constitutional: No distress.   HENT:   Head: Normocephalic.   Eyes: Pupils are equal, round, and reactive to light.   Neck: Normal range of motion.   Cardiovascular: Normal rate and regular rhythm.    Pulmonary/Chest: Effort normal and breath sounds normal.   Abdominal: Soft. Bowel sounds are normal.   Musculoskeletal: Normal range of motion. He exhibits tenderness.   Neurological: He is alert. No cranial nerve deficit. Coordination normal.   Skin: Skin is warm and dry. He is not diaphoretic.   Psychiatric: He has a normal mood and affect. His behavior is normal.       Significant Labs:   BMP:     Recent Labs  Lab 06/02/18 0433   *      K 3.9      CO2 23   BUN 20   CREATININE 1.2   CALCIUM 8.5*     CBC:     Recent Labs  Lab 06/02/18 0433 06/03/18  0444   WBC 10.39  10.39 7.91   HGB 9.4*  9.4*  8.5*   HCT 27.1*  27.1* 24.4*     164 173       Significant Imaging: reviewed.    Assessment/Plan:      * Intertrochanteric fracture of left hip, closed, initial encounter    Displaced.S/P OIRF of left femur on 5.30.18 ,pain control,PT,OT.  recommended SNF,consulted SW,placed PPD.  Stable,pain is controlled.  Stable at this time.        Seizure disorder    Stable on keppra and Tegretol.          Anemia of chronic disease    Stable,will monitor.          Chronic anticoagulation    Xarelto has been resumed.          CAD s/p PCI       On medical treatment,last Echo show preserved EF,cardiology clear he patient at moderte risk.        Hyperlipidemia      On statin         Essential hypertension    Controlled with home medication,will monitor.          BPH (benign prostatic hyperplasia)    On proscar and flomax,          Personal history of DVT (deep vein thrombosis)    US show still chronic DVT,on Xarelto.            VTE Risk Mitigation         Ordered     rivaroxaban tablet 20 mg  with dinner      05/31/18 0726     Place MALDONADO hose  Until discontinued      05/30/18 0896              Ana Wing MD  Department of Hospital Medicine   [facility]

## 2018-06-03 NOTE — PLAN OF CARE
Problem: Physical Therapy Goal  Goal: Physical Therapy Goal  Goals to be met by: 18     Patient will increase functional independence with mobility by performin. Supine to sit with Min Assistance  2. Sit to supine with Moderate Assistance  3. Rolling to Left and Right with SBA  4. Sitting at edge of bed x 30 minutes with Stand-by Assistance  5. Lower extremity exercise program x 20 reps per handout, with assistance as needed  6. Sit to stand with CGA/RW  7. Gait 20-30 ft min A/RW  8. Bed to chair with min A/RW      Outcome: Ongoing (interventions implemented as appropriate)  Pt performed sit to stand from EOB x 3 trials with RW, Mod to Min assist.  Pt requiring verbal cueing on proper hand placement when pushing up from the EOB.  Pt continues to lean back on his heels and requires constant reminding on appropriate weight shift to prevent posterior lean.

## 2018-06-03 NOTE — SUBJECTIVE & OBJECTIVE
Past Medical History:   Diagnosis Date    Anticoagulant long-term use     Arthritis     Cancer     right  hand    Coronary artery disease     Deep vein thrombosis     Hyperlipidemia     Hypertension     Seizures 1988    none since on tegretol       Past Surgical History:   Procedure Laterality Date    APPENDECTOMY      FRACTURE SURGERY Right     elbow    FRACTURE SURGERY Right     hip    JOSUÉ FILTER PLACEMENT      hx of deep vein thrombosis    OPEN REDUCTION AND INTERNAL FIXATION (ORIF) OF INTERTROCHANTERIC FRACTURE OF FEMUR Left 5/30/2018    Procedure: ORIF, FRACTURE, FEMUR, INTERTROCHANTERIC;  Surgeon: Pop Martin III, MD;  Location: [facility];  Service: Orthopedics;  Laterality: Left;    TOTAL SHOULDER ARTHROPLASTY Right        Review of patient's allergies indicates:  No Known Allergies    No current facility-administered medications on file prior to encounter.      Current Outpatient Prescriptions on File Prior to Encounter   Medication Sig    atorvastatin (LIPITOR) 20 MG tablet Take 1 tablet (20 mg total) by mouth once daily.    bethanechol (URECHOLINE) 50 MG tablet TAKE ONE TABLET BY MOUTH THREE TIMES DAILY ON AN EMPTY STOMACH *DO NOT TAKE WITH MILK OR ANTACIDS, TAKE WITH WATER *    carBAMazepine (TEGRETOL) 100 mg chewable tablet Take 2 tablets (200 mg total) by mouth once daily.    clopidogrel (PLAVIX) 75 mg tablet Take 1 tablet (75 mg total) by mouth once daily.    ergocalciferol (ERGOCALCIFEROL) 50,000 unit Cap TAKE ONE CAPSULE BY MOUTH TWICE WEEKLY    finasteride (PROSCAR) 5 mg tablet Take 1 tablet (5 mg total) by mouth once daily.    levETIRAcetam (KEPPRA) 500 MG Tab Take 1 tablet (500 mg total) by mouth 2 (two) times daily.    metoprolol tartrate (LOPRESSOR) 25 MG tablet Take 0.5 tablets (12.5 mg total) by mouth 2 (two) times daily.    rivaroxaban (XARELTO) 20 mg Tab Take 1 tablet (20 mg total) by mouth once daily.    tamsulosin (FLOMAX) 0.4 mg Cp24 Take 1 capsule (0.4  mg total) by mouth once daily.    mupirocin (BACTROBAN) 2 % ointment      Family History     None        Social History Main Topics    Smoking status: Former Smoker     Packs/day: 0.50     Years: 6.00     Types: Cigarettes     Quit date: 10/27/1975    Smokeless tobacco: Never Used    Alcohol use No    Drug use: No    Sexual activity: Not Currently     Partners: Female     Review of Systems   Constitutional: Positive for activity change. Negative for appetite change.   HENT: Negative for congestion and dental problem.    Eyes: Negative for discharge and itching.   Respiratory: Negative for apnea and chest tightness.    Cardiovascular: Negative for chest pain and leg swelling.   Gastrointestinal: Negative for abdominal distention and abdominal pain.   Endocrine: Negative for cold intolerance and heat intolerance.   Genitourinary: Negative for difficulty urinating and dysuria.   Musculoskeletal: Positive for arthralgias.   Skin: Negative for color change and pallor.   Allergic/Immunologic: Negative for environmental allergies.   Neurological: Negative for dizziness and facial asymmetry.   Hematological: Negative for adenopathy. Does not bruise/bleed easily.   Psychiatric/Behavioral: Negative for agitation and behavioral problems.     Objective:     Vital Signs (Most Recent):  Temp: 98.4 °F (36.9 °C) (06/03/18 0755)  Pulse: 85 (06/03/18 0755)  Resp: 18 (06/03/18 0755)  BP: (!) 121/57 (06/03/18 0755)  SpO2: 95 % (06/03/18 0755) Vital Signs (24h Range):  Temp:  [98.1 °F (36.7 °C)-98.9 °F (37.2 °C)] 98.4 °F (36.9 °C)  Pulse:  [84-98] 85  Resp:  [17-20] 18  SpO2:  [95 %-99 %] 95 %  BP: (121-141)/(57-66) 121/57     Weight: 72.5 kg (159 lb 13.3 oz)  Body mass index is 24.3 kg/m².    Physical Exam   Constitutional: No distress.   HENT:   Head: Normocephalic.   Eyes: Pupils are equal, round, and reactive to light.   Neck: Normal range of motion.   Cardiovascular: Normal rate and regular rhythm.    Pulmonary/Chest: Effort  normal and breath sounds normal.   Abdominal: Soft. Bowel sounds are normal.   Musculoskeletal: Normal range of motion. He exhibits tenderness.   Neurological: He is alert. No cranial nerve deficit. Coordination normal.   Skin: Skin is warm and dry. He is not diaphoretic.   Psychiatric: He has a normal mood and affect. His behavior is normal.       Significant Labs:   BMP:     Recent Labs  Lab 06/02/18  0433   *      K 3.9      CO2 23   BUN 20   CREATININE 1.2   CALCIUM 8.5*     CBC:     Recent Labs  Lab 06/02/18  0433 06/03/18  0444   WBC 10.39  10.39 7.91   HGB 9.4*  9.4* 8.5*   HCT 27.1*  27.1* 24.4*     164 173       Significant Imaging: reviewed.

## 2018-06-03 NOTE — NURSING
Called Dr. Martin about patient's op site.Op site is still oozing blood with decreasing H and H. Spoke with Dr. Ramos. Dressing changed to 4x4 and ABD pads with pressure as ordered.

## 2018-06-03 NOTE — PROGRESS NOTES
Ortho Daily Progress Note    Jaydon Mccarthy is a 86 y.o. male admitted on 5/29/2018      Chief Complaint/Reason for admission: Fall (Pt reports left hip pain following trip and fall, no LOC. )       Hospital Day: 5  Post Op Day: 4 Days Post-Op     The patient was seen and examined this morning at the bedside. Patient reports no acute issues overnight.  Patient reports that pain is adequately controlled.    _______________    Vitals:    06/02/18 2002 06/02/18 2258 06/03/18 0301 06/03/18 0755   BP:  (!) 141/63 (!) 126/57 (!) 121/57   Pulse: 98 92 87 85   Resp: 20 18 18 18   Temp:  98.7 °F (37.1 °C) 98.9 °F (37.2 °C) 98.4 °F (36.9 °C)   TempSrc:  Oral Oral Oral   SpO2: 98% 99% 99% 95%   Weight:       Height:           Vital Signs (Most Recent)  Temp: 98.4 °F (36.9 °C) (06/03/18 0755)  Pulse: 85 (06/03/18 0755)  Resp: 18 (06/03/18 0755)  BP: (!) 121/57 (06/03/18 0755)  SpO2: 95 % (06/03/18 0755)    Vital Signs Range (Last 24H):  Temp:  [98.1 °F (36.7 °C)-98.9 °F (37.2 °C)]   Pulse:  [84-98]   Resp:  [17-20]   BP: (121-141)/(57-66)   SpO2:  [95 %-99 %]       Physical:    AAOx3    Drainage from proximal wound serosanguenous  More absorbant dressing in place    NVI Distally  Palpable distal pulses  CR<3sec      Recent Labs      06/01/18   0605  06/01/18   0606  06/02/18   0433  06/03/18   0444   K  3.8   --   3.9   --    CALCIUM  8.1*   --   8.5*   --    WBC   --   10.33  10.33  10.39  10.39  7.91   HGB   --   9.0*  9.0*  9.4*  9.4*  8.5*   HCT   --   25.6*  25.6*  27.1*  27.1*  24.4*   PLT   --   133*  133*  164  164  173       I/O last 3 completed shifts:  In: 360 [P.O.:360]  Out: 1325 [Urine:1325]          Assessment:  A/P s/p left hip IMN               Plan:    PT/OT: WBAT  Pain Control  DVT Prophylaxis    Dressing to proximal wound changed back to more absorbant dressing    Discharge Planning        Isaias Ramos MD  [facility]

## 2018-06-03 NOTE — ASSESSMENT & PLAN NOTE
Displaced.S/P OIRF of left femur on 5.30.18 ,pain control,PT,OT.  recommended SNF,consulted SW,placed PPD.  Stable,pain is controlled.  Stable at this time.

## 2018-06-03 NOTE — PLAN OF CARE
Problem: Patient Care Overview  Goal: Plan of Care Review  Outcome: Ongoing (interventions implemented as appropriate)  All systems assessed. No falls or injuries this shift. Dressing change as ordered to left hip, tolerated well. Turned q 2 hours. Denies pain.

## 2018-06-04 LAB
ALBUMIN SERPL BCP-MCNC: 2.8 G/DL
ALP SERPL-CCNC: 110 U/L
ALT SERPL W/O P-5'-P-CCNC: 23 U/L
ANION GAP SERPL CALC-SCNC: 9 MMOL/L
AST SERPL-CCNC: 35 U/L
BASOPHILS # BLD AUTO: 0.04 K/UL
BASOPHILS NFR BLD: 0.5 %
BILIRUB SERPL-MCNC: 1 MG/DL
BUN SERPL-MCNC: 21 MG/DL
CALCIUM SERPL-MCNC: 8.6 MG/DL
CHLORIDE SERPL-SCNC: 106 MMOL/L
CO2 SERPL-SCNC: 23 MMOL/L
CREAT SERPL-MCNC: 1 MG/DL
DIFFERENTIAL METHOD: ABNORMAL
EOSINOPHIL # BLD AUTO: 0.3 K/UL
EOSINOPHIL NFR BLD: 3.8 %
ERYTHROCYTE [DISTWIDTH] IN BLOOD BY AUTOMATED COUNT: 13.2 %
EST. GFR  (AFRICAN AMERICAN): >60 ML/MIN/1.73 M^2
EST. GFR  (NON AFRICAN AMERICAN): >60 ML/MIN/1.73 M^2
GLUCOSE SERPL-MCNC: 108 MG/DL
HCT VFR BLD AUTO: 25.4 %
HGB BLD-MCNC: 8.7 G/DL
LYMPHOCYTES # BLD AUTO: 1.9 K/UL
LYMPHOCYTES NFR BLD: 21.3 %
MCH RBC QN AUTO: 30.7 PG
MCHC RBC AUTO-ENTMCNC: 34.3 G/DL
MCV RBC AUTO: 90 FL
MONOCYTES # BLD AUTO: 1 K/UL
MONOCYTES NFR BLD: 10.9 %
NEUTROPHILS # BLD AUTO: 5.6 K/UL
NEUTROPHILS NFR BLD: 63.5 %
PLATELET # BLD AUTO: 211 K/UL
PMV BLD AUTO: 9.4 FL
POTASSIUM SERPL-SCNC: 3.7 MMOL/L
PROT SERPL-MCNC: 6.2 G/DL
RBC # BLD AUTO: 2.83 M/UL
SODIUM SERPL-SCNC: 138 MMOL/L
TB INDURATION 48 - 72 HR READ: 0 MM
WBC # BLD AUTO: 8.79 K/UL

## 2018-06-04 PROCEDURE — 25000003 PHARM REV CODE 250: Performed by: HOSPITALIST

## 2018-06-04 PROCEDURE — 80053 COMPREHEN METABOLIC PANEL: CPT

## 2018-06-04 PROCEDURE — 36415 COLL VENOUS BLD VENIPUNCTURE: CPT

## 2018-06-04 PROCEDURE — 25000003 PHARM REV CODE 250: Performed by: ORTHOPAEDIC SURGERY

## 2018-06-04 PROCEDURE — 97530 THERAPEUTIC ACTIVITIES: CPT

## 2018-06-04 PROCEDURE — 85025 COMPLETE CBC W/AUTO DIFF WBC: CPT

## 2018-06-04 PROCEDURE — 25000003 PHARM REV CODE 250: Performed by: EMERGENCY MEDICINE

## 2018-06-04 PROCEDURE — 97116 GAIT TRAINING THERAPY: CPT

## 2018-06-04 PROCEDURE — 25000003 PHARM REV CODE 250: Performed by: INTERNAL MEDICINE

## 2018-06-04 PROCEDURE — 11000001 HC ACUTE MED/SURG PRIVATE ROOM

## 2018-06-04 PROCEDURE — 97110 THERAPEUTIC EXERCISES: CPT

## 2018-06-04 RX ORDER — DOCUSATE SODIUM 100 MG/1
100 CAPSULE, LIQUID FILLED ORAL DAILY
Refills: 0 | COMMUNITY
Start: 2018-06-05 | End: 2019-10-02

## 2018-06-04 RX ORDER — OXYCODONE AND ACETAMINOPHEN 7.5; 325 MG/1; MG/1
1 TABLET ORAL EVERY 4 HOURS PRN
Qty: 30 TABLET | Refills: 0 | Status: SHIPPED | OUTPATIENT
Start: 2018-06-04 | End: 2018-06-14

## 2018-06-04 RX ORDER — FAMOTIDINE 20 MG/1
20 TABLET, FILM COATED ORAL DAILY
Status: DISCONTINUED | OUTPATIENT
Start: 2018-06-05 | End: 2018-06-05

## 2018-06-04 RX ADMIN — OXYCODONE HYDROCHLORIDE 5 MG: 5 TABLET ORAL at 09:06

## 2018-06-04 RX ADMIN — ERGOCALCIFEROL 50000 UNITS: 1.25 CAPSULE ORAL at 03:06

## 2018-06-04 RX ADMIN — POLYETHYLENE GLYCOL 3350 17 G: 17 POWDER, FOR SOLUTION ORAL at 09:06

## 2018-06-04 RX ADMIN — LEVETIRACETAM 500 MG: 500 TABLET, FILM COATED ORAL at 08:06

## 2018-06-04 RX ADMIN — ATORVASTATIN CALCIUM 20 MG: 10 TABLET, FILM COATED ORAL at 08:06

## 2018-06-04 RX ADMIN — CARBAMAZEPINE 200 MG: 100 TABLET, CHEWABLE ORAL at 09:06

## 2018-06-04 RX ADMIN — TRAMADOL HYDROCHLORIDE 50 MG: 50 TABLET, FILM COATED ORAL at 03:06

## 2018-06-04 RX ADMIN — LEVETIRACETAM 500 MG: 500 TABLET, FILM COATED ORAL at 09:06

## 2018-06-04 RX ADMIN — RIVAROXABAN 20 MG: 20 TABLET, FILM COATED ORAL at 03:06

## 2018-06-04 RX ADMIN — POLYETHYLENE GLYCOL 3350 17 G: 17 POWDER, FOR SOLUTION ORAL at 08:06

## 2018-06-04 RX ADMIN — TAMSULOSIN HYDROCHLORIDE 0.4 MG: 0.4 CAPSULE ORAL at 09:06

## 2018-06-04 RX ADMIN — Medication 12.5 MG: at 09:06

## 2018-06-04 RX ADMIN — BETHANECHOL CHLORIDE 50 MG: 25 TABLET ORAL at 09:06

## 2018-06-04 RX ADMIN — Medication 12.5 MG: at 08:06

## 2018-06-04 RX ADMIN — BETHANECHOL CHLORIDE 50 MG: 25 TABLET ORAL at 03:06

## 2018-06-04 RX ADMIN — DOCUSATE SODIUM 100 MG: 100 CAPSULE, LIQUID FILLED ORAL at 09:06

## 2018-06-04 RX ADMIN — FAMOTIDINE 20 MG: 20 TABLET ORAL at 09:06

## 2018-06-04 RX ADMIN — FINASTERIDE 5 MG: 5 TABLET, FILM COATED ORAL at 09:06

## 2018-06-04 RX ADMIN — BETHANECHOL CHLORIDE 50 MG: 25 TABLET ORAL at 08:06

## 2018-06-04 RX ADMIN — CLOPIDOGREL BISULFATE 75 MG: 75 TABLET ORAL at 09:06

## 2018-06-04 NOTE — PROGRESS NOTES
[facility] Hospital Medicine  Progress Note    Patient Name: Jaydon Mccarthy  MRN: [id number]  Patient Class: IP- Inpatient   Admission Date: 5/29/2018  Length of Stay: 6 days  Attending Physician: Pop Martin III, MD  Primary Care Provider: Javier Villanueva MD        Subjective:     Principal Problem:Intertrochanteric fracture of left hip, closed, initial encounter    HPI:  See H&P.    Hospital Course:  This 86 y.o. Male, with a medical history of hypertension, hyperlipidemia, seizures, deep vein thrombosis, arthritis, coronary artery disease, anticoagulant long term use and cancer (right hand), presents to the ED via EMS transportation accompanied by his daughter s/p a mechanical fall that occurred about 1x hour ago before ER visit,. Pt reports tripping and falling onto his left hip while at home. He presently c/o left hip pain. Symptoms are acute, constant and mild (3/10). Pt notes that he has not ambulated since the fall. Pt denies abdominal pain and numbness or tingling,X ancelmo show acute displaced  IT fracture of left femur,ortho has admitted the patient with plan for intervention,cardiology has been consulted as well for cardiac clearance.he denies chest pain of SOB at this time.  S/P OIRF of left femur on 5.30.18 ,pain control,PT,OT.  SNF recommended,consulted SW,.placed PPD.  Stable,    Past Medical History:   Diagnosis Date    Anticoagulant long-term use     Arthritis     Cancer     right  hand    Coronary artery disease     Deep vein thrombosis     Hyperlipidemia     Hypertension     Seizures 1988    none since on tegretol       Past Surgical History:   Procedure Laterality Date    APPENDECTOMY      FRACTURE SURGERY Right     elbow    FRACTURE SURGERY Right     hip    JOSUÉ FILTER PLACEMENT      hx of deep vein thrombosis    OPEN REDUCTION AND INTERNAL FIXATION (ORIF) OF INTERTROCHANTERIC FRACTURE OF FEMUR Left 5/30/2018    Procedure: ORIF, FRACTURE, FEMUR,  INTERTROCHANTERIC;  Surgeon: Pop Martin III, MD;  Location: [facility];  Service: Orthopedics;  Laterality: Left;    TOTAL SHOULDER ARTHROPLASTY Right        Review of patient's allergies indicates:  No Known Allergies    No current facility-administered medications on file prior to encounter.      Current Outpatient Prescriptions on File Prior to Encounter   Medication Sig    atorvastatin (LIPITOR) 20 MG tablet Take 1 tablet (20 mg total) by mouth once daily.    bethanechol (URECHOLINE) 50 MG tablet TAKE ONE TABLET BY MOUTH THREE TIMES DAILY ON AN EMPTY STOMACH *DO NOT TAKE WITH MILK OR ANTACIDS, TAKE WITH WATER *    carBAMazepine (TEGRETOL) 100 mg chewable tablet Take 2 tablets (200 mg total) by mouth once daily.    clopidogrel (PLAVIX) 75 mg tablet Take 1 tablet (75 mg total) by mouth once daily.    ergocalciferol (ERGOCALCIFEROL) 50,000 unit Cap TAKE ONE CAPSULE BY MOUTH TWICE WEEKLY    finasteride (PROSCAR) 5 mg tablet Take 1 tablet (5 mg total) by mouth once daily.    levETIRAcetam (KEPPRA) 500 MG Tab Take 1 tablet (500 mg total) by mouth 2 (two) times daily.    metoprolol tartrate (LOPRESSOR) 25 MG tablet Take 0.5 tablets (12.5 mg total) by mouth 2 (two) times daily.    rivaroxaban (XARELTO) 20 mg Tab Take 1 tablet (20 mg total) by mouth once daily.    tamsulosin (FLOMAX) 0.4 mg Cp24 Take 1 capsule (0.4 mg total) by mouth once daily.    mupirocin (BACTROBAN) 2 % ointment      Family History     None        Social History Main Topics    Smoking status: Former Smoker     Packs/day: 0.50     Years: 6.00     Types: Cigarettes     Quit date: 10/27/1975    Smokeless tobacco: Never Used    Alcohol use No    Drug use: No    Sexual activity: Not Currently     Partners: Female     Review of Systems   Constitutional: Positive for activity change. Negative for appetite change.   HENT: Negative for congestion and dental problem.    Eyes: Negative for discharge and itching.   Respiratory: Negative for  apnea and chest tightness.    Cardiovascular: Negative for chest pain and leg swelling.   Gastrointestinal: Negative for abdominal distention and abdominal pain.   Endocrine: Negative for cold intolerance and heat intolerance.   Genitourinary: Negative for difficulty urinating and dysuria.   Musculoskeletal: Positive for arthralgias.   Skin: Negative for color change and pallor.   Allergic/Immunologic: Negative for environmental allergies.   Neurological: Negative for dizziness and facial asymmetry.   Hematological: Negative for adenopathy. Does not bruise/bleed easily.   Psychiatric/Behavioral: Negative for agitation and behavioral problems.     Objective:     Vital Signs (Most Recent):  Temp: 98.3 °F (36.8 °C) (06/04/18 0746)  Pulse: 80 (06/04/18 0746)  Resp: 18 (06/04/18 0746)  BP: 135/60 (06/04/18 0746)  SpO2: 100 % (06/04/18 0746) Vital Signs (24h Range):  Temp:  [98.1 °F (36.7 °C)-99.7 °F (37.6 °C)] 98.3 °F (36.8 °C)  Pulse:  [80-94] 80  Resp:  [17-18] 18  SpO2:  [97 %-100 %] 100 %  BP: (116-141)/(59-73) 135/60     Weight: 72.5 kg (159 lb 13.3 oz)  Body mass index is 24.3 kg/m².    Physical Exam   Constitutional: No distress.   HENT:   Head: Normocephalic.   Eyes: Pupils are equal, round, and reactive to light.   Neck: Normal range of motion.   Cardiovascular: Normal rate and regular rhythm.    Pulmonary/Chest: Effort normal and breath sounds normal.   Abdominal: Soft. Bowel sounds are normal.   Musculoskeletal: Normal range of motion. He exhibits tenderness.   Neurological: He is alert. No cranial nerve deficit. Coordination normal.   Skin: Skin is warm and dry. He is not diaphoretic.   Psychiatric: He has a normal mood and affect. His behavior is normal.       Significant Labs:   BMP:   No results for input(s): GLU, NA, K, CL, CO2, BUN, CREATININE, CALCIUM, MG in the last 48 hours.  CBC:     Recent Labs  Lab 06/03/18  0444 06/04/18  0452   WBC 7.91 8.79   HGB 8.5* 8.7*   HCT 24.4* 25.4*    211        Significant Imaging: reviewed.    Assessment/Plan:      * Intertrochanteric fracture of left hip, closed, initial encounter    Displaced.S/P OIRF of left femur on 5.30.18 ,pain control,PT,OT.  recommended SNF,consulted SW,placed PPD.  Stable,pain is controlled.  Stable at this time.        Seizure disorder    Stable on keppra and Tegretol.          Anemia of chronic disease    Stable,will monitor.          Chronic anticoagulation    Xarelto has been resumed.          CAD s/p PCI       On medical treatment,last Echo show preserved EF,cardiology clear he patient at moderte risk.        Hyperlipidemia      On statin         Essential hypertension    Controlled with home medication,will monitor.          BPH (benign prostatic hyperplasia)    On proscar and flomax,          Personal history of DVT (deep vein thrombosis)    US show still chronic DVT,on Xarelto.            VTE Risk Mitigation         Ordered     rivaroxaban tablet 20 mg  with dinner      05/31/18 0726     Place MALDONADO hose  Until discontinued      05/30/18 0832              Ana Wing MD  Department of Hospital Medicine   [facility] [place]

## 2018-06-04 NOTE — NURSING
"Walked in the pt's room; Pt stated "when did his room get so off balance? This table is about to fall over."; checked VS; VS stable /71, HR 87, O2 100 RA, Temp 98.7; will continue to monitor  "

## 2018-06-04 NOTE — PLAN OF CARE
Problem: Physical Therapy Goal  Goal: Physical Therapy Goal  Goals to be met by: 18     Patient will increase functional independence with mobility by performin. Supine to sit with Min Assistance  2. Sit to supine with Moderate Assistance  3. Rolling to Left and Right with SBA  4. Sitting at edge of bed x 30 minutes with Stand-by Assistance  5. Lower extremity exercise program x 20 reps per handout, with assistance as needed  6. Sit to stand with CGA/RW  7. Gait 20-30 ft min A/RW  8. Bed to chair with min A/RW      Outcome: Ongoing (interventions implemented as appropriate)  Pt making good progress toward treatment goals ( pt ambulated ~ 4-5 steps using RW, Max A ) .

## 2018-06-04 NOTE — PROGRESS NOTES
TN contacted Radha at [facility] at [phone] regarding an update. Radha stated she just received the clinicals; reviewing now; will contact TN with an update.    7225 TN contacted Radha at [facility] at [phone] to inquire of review. Radha accepted pt. TN contacted Mary Ellen at [phone], informing pt has been accepted at [facility]. TN provided accepting physician at [facility], Dr. Lopez. Mary Ellen will convey to Brenda of [facility].    8527 TN contacted Lisandra of [facility] informing pt has a discharge; will work on authorization and contact TN.

## 2018-06-04 NOTE — SUBJECTIVE & OBJECTIVE
Past Medical History:   Diagnosis Date    Anticoagulant long-term use     Arthritis     Cancer     right  hand    Coronary artery disease     Deep vein thrombosis     Hyperlipidemia     Hypertension     Seizures 1988    none since on tegretol       Past Surgical History:   Procedure Laterality Date    APPENDECTOMY      FRACTURE SURGERY Right     elbow    FRACTURE SURGERY Right     hip    JOSUÉ FILTER PLACEMENT      hx of deep vein thrombosis    OPEN REDUCTION AND INTERNAL FIXATION (ORIF) OF INTERTROCHANTERIC FRACTURE OF FEMUR Left 5/30/2018    Procedure: ORIF, FRACTURE, FEMUR, INTERTROCHANTERIC;  Surgeon: Pop Martin III, MD;  Location: [facility];  Service: Orthopedics;  Laterality: Left;    TOTAL SHOULDER ARTHROPLASTY Right        Review of patient's allergies indicates:  No Known Allergies    No current facility-administered medications on file prior to encounter.      Current Outpatient Prescriptions on File Prior to Encounter   Medication Sig    atorvastatin (LIPITOR) 20 MG tablet Take 1 tablet (20 mg total) by mouth once daily.    bethanechol (URECHOLINE) 50 MG tablet TAKE ONE TABLET BY MOUTH THREE TIMES DAILY ON AN EMPTY STOMACH *DO NOT TAKE WITH MILK OR ANTACIDS, TAKE WITH WATER *    carBAMazepine (TEGRETOL) 100 mg chewable tablet Take 2 tablets (200 mg total) by mouth once daily.    clopidogrel (PLAVIX) 75 mg tablet Take 1 tablet (75 mg total) by mouth once daily.    ergocalciferol (ERGOCALCIFEROL) 50,000 unit Cap TAKE ONE CAPSULE BY MOUTH TWICE WEEKLY    finasteride (PROSCAR) 5 mg tablet Take 1 tablet (5 mg total) by mouth once daily.    levETIRAcetam (KEPPRA) 500 MG Tab Take 1 tablet (500 mg total) by mouth 2 (two) times daily.    metoprolol tartrate (LOPRESSOR) 25 MG tablet Take 0.5 tablets (12.5 mg total) by mouth 2 (two) times daily.    rivaroxaban (XARELTO) 20 mg Tab Take 1 tablet (20 mg total) by mouth once daily.    tamsulosin (FLOMAX) 0.4 mg Cp24 Take 1 capsule (0.4  mg total) by mouth once daily.    mupirocin (BACTROBAN) 2 % ointment      Family History     None        Social History Main Topics    Smoking status: Former Smoker     Packs/day: 0.50     Years: 6.00     Types: Cigarettes     Quit date: 10/27/1975    Smokeless tobacco: Never Used    Alcohol use No    Drug use: No    Sexual activity: Not Currently     Partners: Female     Review of Systems   Constitutional: Positive for activity change. Negative for appetite change.   HENT: Negative for congestion and dental problem.    Eyes: Negative for discharge and itching.   Respiratory: Negative for apnea and chest tightness.    Cardiovascular: Negative for chest pain and leg swelling.   Gastrointestinal: Negative for abdominal distention and abdominal pain.   Endocrine: Negative for cold intolerance and heat intolerance.   Genitourinary: Negative for difficulty urinating and dysuria.   Musculoskeletal: Positive for arthralgias.   Skin: Negative for color change and pallor.   Allergic/Immunologic: Negative for environmental allergies.   Neurological: Negative for dizziness and facial asymmetry.   Hematological: Negative for adenopathy. Does not bruise/bleed easily.   Psychiatric/Behavioral: Negative for agitation and behavioral problems.     Objective:     Vital Signs (Most Recent):  Temp: 98.3 °F (36.8 °C) (06/04/18 0746)  Pulse: 80 (06/04/18 0746)  Resp: 18 (06/04/18 0746)  BP: 135/60 (06/04/18 0746)  SpO2: 100 % (06/04/18 0746) Vital Signs (24h Range):  Temp:  [98.1 °F (36.7 °C)-99.7 °F (37.6 °C)] 98.3 °F (36.8 °C)  Pulse:  [80-94] 80  Resp:  [17-18] 18  SpO2:  [97 %-100 %] 100 %  BP: (116-141)/(59-73) 135/60     Weight: 72.5 kg (159 lb 13.3 oz)  Body mass index is 24.3 kg/m².    Physical Exam   Constitutional: No distress.   HENT:   Head: Normocephalic.   Eyes: Pupils are equal, round, and reactive to light.   Neck: Normal range of motion.   Cardiovascular: Normal rate and regular rhythm.    Pulmonary/Chest: Effort  normal and breath sounds normal.   Abdominal: Soft. Bowel sounds are normal.   Musculoskeletal: Normal range of motion. He exhibits tenderness.   Neurological: He is alert. No cranial nerve deficit. Coordination normal.   Skin: Skin is warm and dry. He is not diaphoretic.   Psychiatric: He has a normal mood and affect. His behavior is normal.       Significant Labs:   BMP:   No results for input(s): GLU, NA, K, CL, CO2, BUN, CREATININE, CALCIUM, MG in the last 48 hours.  CBC:     Recent Labs  Lab 06/03/18  0444 06/04/18  0452   WBC 7.91 8.79   HGB 8.5* 8.7*   HCT 24.4* 25.4*    211       Significant Imaging: reviewed.

## 2018-06-04 NOTE — PLAN OF CARE
Problem: Patient Care Overview  Goal: Plan of Care Review  Outcome: Ongoing (interventions implemented as appropriate)  Pt was confused for a few minutes during shift assessed vitals VSSAF; urinal at bedside; IV changed by charge nurse; telesitter in pt's room; telebox# [id number]; absorbing wound dressing marked by the hour; pt ambulated with 2 person assist from bed to chair; c/o mild pain; meds to control pain; will continue to monitor

## 2018-06-04 NOTE — PROGRESS NOTES
Pt in chair did well with PT          Temp:  [98.1 °F (36.7 °C)-99.7 °F (37.6 °C)] 98.2 °F (36.8 °C)  Pulse:  [80-94] 81  Resp:  [17-18] 18  SpO2:  [97 %-100 %] 98 %  BP: (116-141)/(59-73) 118/59        PE:    L hip dressing with spot d/c, moves foot well        Recent Labs  Lab 06/04/18  0452   WBC 8.79   RBC 2.83*   HGB 8.7*   HCT 25.4*      MCV 90   MCH 30.7   MCHC 34.3         Current Facility-Administered Medications:     acetaminophen tablet 650 mg, 650 mg, Oral, Q6H PRN, Pop Martin III, MD, 650 mg at 06/01/18 1659    atorvastatin tablet 20 mg, 20 mg, Oral, QHS, Easton Serrano MD, 20 mg at 06/03/18 2016    bethanechol tablet 50 mg, 50 mg, Oral, TID, Pop Martin III, MD, 50 mg at 06/04/18 0951    carBAMazepine chewable tablet 200 mg, 200 mg, Oral, Daily, Daksha Welsh MD, 200 mg at 06/04/18 0951    clopidogrel tablet 75 mg, 75 mg, Oral, Daily, Pop Martin III, MD, 75 mg at 06/04/18 0952    docusate sodium capsule 100 mg, 100 mg, Oral, Daily, Ana Wing MD, 100 mg at 06/04/18 0952    ergocalciferol capsule 50,000 Units, 50,000 Units, Oral, Twice Weekly, Pop Martin III, MD, 50,000 Units at 05/31/18 1209    famotidine tablet 20 mg, 20 mg, Oral, BID, Daksha Welsh MD, 20 mg at 06/04/18 0952    finasteride tablet 5 mg, 5 mg, Oral, Daily, Pop Martin III, MD, 5 mg at 06/04/18 0952    levETIRAcetam tablet 500 mg, 500 mg, Oral, BID, Daksha Welsh MD, 500 mg at 06/04/18 0952    metoprolol tartrate (LOPRESSOR) split tablet 12.5 mg, 12.5 mg, Oral, BID, Daksha Welsh MD, 12.5 mg at 06/04/18 0951    morphine injection 2 mg, 2 mg, Intravenous, Q6H PRN, Pop Martin III, MD    ondansetron disintegrating tablet 8 mg, 8 mg, Oral, Q8H PRN, Daksha Welsh MD    oxyCODONE immediate release tablet 5 mg, 5 mg, Oral, Q6H PRN, Isaias Ramos MD, 5 mg at 06/04/18 0952    polyethylene glycol packet 17 g, 17 g, Oral, BID, Ana  MD [place], 17 g at 06/04/18 0951    rivaroxaban tablet 20 mg, 20 mg, Oral, with dinner, Pop Martin III, MD, 20 mg at 06/03/18 1622    sodium chloride 0.9% flush 3 mL, 3 mL, Intravenous, PRN, Adalberto Wright MD    tamsulosin 24 hr capsule 0.4 mg, 0.4 mg, Oral, Daily, Daksha Welsh MD, 0.4 mg at 06/04/18 0952    traMADol tablet 50 mg, 50 mg, Oral, Q6H PRN, Pop Martin III, MD      A/P:    87 y/o M s/p fall with L hip IT fx, s/p IM nailing 5/30     PT/OT - WBAT, ambulate     Dressing changes     Anemia - Hgb 8.7, blood loss, observe     dvt proph - xarelto, TEDs, SCD     Cards - stable appreciate assistance     Disp - SNF transfer when arranged     F/U Dr Mario gonzalez Wed

## 2018-06-04 NOTE — UM SECONDARY REVIEW
[facility]    Level of Care Issue     S/p IM nailing. Awaiting SNF placement.    LOS: approved an agreement with D/C plan     Approved per  list

## 2018-06-04 NOTE — PT/OT/SLP PROGRESS
"Physical Therapy Treatment    Patient Name:  Jaydon Mccarthy   MRN:  [id number]    Recommendations:     Discharge Recommendations:  nursing facility, skilled   Discharge Equipment Recommendations:   none  Barriers to discharge: decreased mobility     Assessment:     Jaydon Mccarthy is a 86 y.o. male admitted with a medical diagnosis of Intertrochanteric fracture of left hip, closed, initial encounter.  He presents with the following impairments/functional limitations:  weakness, impaired endurance, gait instability, impaired functional mobilty, impaired balance, impaired self care skills, impaired cognition, decreased coordination, decreased upper extremity function, decreased lower extremity function, pain, decreased safety awareness, decreased ROM, edema, impaired joint extensibility, orthopedic precautions . Pt is making good progress toward treatment goals.    Rehab Prognosis:  fair+ ; patient would benefit from acute skilled PT services to address these deficits and reach maximum level of function.      Recent Surgery: Procedure(s) (LRB):  ORIF, FRACTURE, FEMUR, INTERTROCHANTERIC (Left) 5 Days Post-Op    Plan:     During this hospitalization, patient to be seen daily to address the above listed problems via gait training, therapeutic activities, therapeutic exercises  · Plan of Care Expires:  06/14/18   Plan of Care Reviewed with: patient    Subjective     Communicated with nurse Rodriguez prior to session.  Patient found supine in bed upon PT entry to room, agreeable to treatment.      Chief Complaint: LLE pain   Patient comments/goals: to get back to PLOF.  Pain/Comfort:  ·  no pain at rest, " a lot of pain " with movement. Pt unable to rate in number.     Patients cultural, spiritual, Church conflicts given the current situation:      Objective:     Patient found with: telemetry     General Precautions: Standard, fall, seizure   Orthopedic Precautions:LLE weight bearing as tolerated   Braces: N/A     Functional " Mobility:  · Bed Mobility:     · Rolling Right: minimum assistance  · Scooting: minimum assistance  · Supine to Sit: minimum assistance and moderate assistance  · Transfers:     · Sit to Stand: x 2 trials from bed maximal assistance with rolling walker. V/T cues for safety, proper technique and sequence. Noted with posterior lean upon standing, required max V/T cues for upright posture.   · Gait:    Patient ambulated  ~ 4-5 steps on even surface with Rolling Walker ( chair followed ) with Maximum Assistance.  Pt with demonstarting a  3-point gait with decreased lazaro, increased time in double stance, decreased velocity of limb motion, decreased step length, decreased stride length, decreased swing-to-stance ratio, decreased toe-to-floor clearance and decreased weight-shifting ability.Impairments contributing to gait deviations include impaired balance, impaired coordination, decreased flexibility, pain, impaired postural control, decreased ROM and decreased strength. V/T cues for safety technique and walker management.    · Balance:  Good with sitting balance, poor with standing.       AM-PAC 6 CLICK MOBILITY  Turning over in bed (including adjusting bedclothes, sheets and blankets)?: 3  Sitting down on and standing up from a chair with arms (e.g., wheelchair, bedside commode, etc.): 2  Moving from lying on back to sitting on the side of the bed?: 3  Moving to and from a bed to a chair (including a wheelchair)?: 2  Need to walk in hospital room?: 2  Climbing 3-5 steps with a railing?: 1  Total Score: 13       Therapeutic Activities and Exercises:   pt performed bed mobility, transfer and gait training as above.  Pt performed seated BLE ( AAROM LLE )  x 15 reps x 2 : AP, LAQ, HS, hip flexion and pillow squeezes. Performed BLE x 10 reps : AP, GS, QS while sitting in reclined chair. VC's for proper technique and sequence. Pt tolerated well.  Encouraged pt to perform BLE AP,GS,QS while in reclined chair/bed. Pt and pt  daughter verbalize understanding.      Patient left up in reclined chair on green air cushion  with all lines intact, call button in reach, nurse notified and daughter present..    GOALS:    Physical Therapy Goals        Problem: Physical Therapy Goal    Goal Priority Disciplines Outcome Goal Variances Interventions   Physical Therapy Goal     PT/OT, PT Ongoing (interventions implemented as appropriate)     Description:  Goals to be met by: 18     Patient will increase functional independence with mobility by performin. Supine to sit with Min Assistance  2. Sit to supine with Moderate Assistance  3. Rolling to Left and Right with SBA  4. Sitting at edge of bed x 30 minutes with Stand-by Assistance  5. Lower extremity exercise program x 20 reps per handout, with assistance as needed  6. Sit to stand with CGA/RW  7. Gait 20-30 ft min A/RW  8. Bed to chair with min A/RW                       Time Tracking:     PT Received On: 18  PT Start Time: 1116     PT Stop Time: 1158  PT Total Time (min): 42 min     Billable Minutes: Gait Training 15, Therapeutic Activity 10 and Therapeutic Exercise 17    Treatment Type: Treatment  PT/PTA: PTA     PTA Visit Number: 3     Poppy Escamilla, PTA  2018

## 2018-06-05 VITALS
SYSTOLIC BLOOD PRESSURE: 125 MMHG | HEIGHT: 68 IN | OXYGEN SATURATION: 100 % | DIASTOLIC BLOOD PRESSURE: 59 MMHG | TEMPERATURE: 99 F | RESPIRATION RATE: 18 BRPM | WEIGHT: 159.81 LBS | BODY MASS INDEX: 24.22 KG/M2 | HEART RATE: 83 BPM

## 2018-06-05 LAB
BASOPHILS # BLD AUTO: 0.03 K/UL
BASOPHILS NFR BLD: 0.5 %
DIFFERENTIAL METHOD: ABNORMAL
EOSINOPHIL # BLD AUTO: 0.3 K/UL
EOSINOPHIL NFR BLD: 5.3 %
ERYTHROCYTE [DISTWIDTH] IN BLOOD BY AUTOMATED COUNT: 13.3 %
HCT VFR BLD AUTO: 24.3 %
HGB BLD-MCNC: 8.3 G/DL
LYMPHOCYTES # BLD AUTO: 1.2 K/UL
LYMPHOCYTES NFR BLD: 18.8 %
MCH RBC QN AUTO: 30.5 PG
MCHC RBC AUTO-ENTMCNC: 34.2 G/DL
MCV RBC AUTO: 89 FL
MONOCYTES # BLD AUTO: 0.7 K/UL
MONOCYTES NFR BLD: 11.8 %
NEUTROPHILS # BLD AUTO: 4 K/UL
NEUTROPHILS NFR BLD: 63.6 %
PLATELET # BLD AUTO: 203 K/UL
PMV BLD AUTO: 9.4 FL
RBC # BLD AUTO: 2.72 M/UL
WBC # BLD AUTO: 6.21 K/UL

## 2018-06-05 PROCEDURE — 97161 PT EVAL LOW COMPLEX 20 MIN: CPT

## 2018-06-05 PROCEDURE — 97116 GAIT TRAINING THERAPY: CPT

## 2018-06-05 PROCEDURE — 36415 COLL VENOUS BLD VENIPUNCTURE: CPT

## 2018-06-05 PROCEDURE — 25000003 PHARM REV CODE 250: Performed by: EMERGENCY MEDICINE

## 2018-06-05 PROCEDURE — 97110 THERAPEUTIC EXERCISES: CPT

## 2018-06-05 PROCEDURE — G8979 MOBILITY GOAL STATUS: HCPCS | Mod: CK

## 2018-06-05 PROCEDURE — 25000003 PHARM REV CODE 250: Performed by: ORTHOPAEDIC SURGERY

## 2018-06-05 PROCEDURE — 25000003 PHARM REV CODE 250: Performed by: HOSPITALIST

## 2018-06-05 PROCEDURE — G8978 MOBILITY CURRENT STATUS: HCPCS | Mod: CM

## 2018-06-05 PROCEDURE — 85025 COMPLETE CBC W/AUTO DIFF WBC: CPT

## 2018-06-05 PROCEDURE — G8980 MOBILITY D/C STATUS: HCPCS | Mod: CL

## 2018-06-05 RX ORDER — FAMOTIDINE 20 MG/1
20 TABLET, FILM COATED ORAL 2 TIMES DAILY
Status: DISCONTINUED | OUTPATIENT
Start: 2018-06-05 | End: 2018-06-05 | Stop reason: HOSPADM

## 2018-06-05 RX ADMIN — FINASTERIDE 5 MG: 5 TABLET, FILM COATED ORAL at 09:06

## 2018-06-05 RX ADMIN — CARBAMAZEPINE 200 MG: 100 TABLET, CHEWABLE ORAL at 09:06

## 2018-06-05 RX ADMIN — TAMSULOSIN HYDROCHLORIDE 0.4 MG: 0.4 CAPSULE ORAL at 09:06

## 2018-06-05 RX ADMIN — TRAMADOL HYDROCHLORIDE 50 MG: 50 TABLET, FILM COATED ORAL at 06:06

## 2018-06-05 RX ADMIN — CLOPIDOGREL BISULFATE 75 MG: 75 TABLET ORAL at 09:06

## 2018-06-05 RX ADMIN — BETHANECHOL CHLORIDE 50 MG: 25 TABLET ORAL at 09:06

## 2018-06-05 RX ADMIN — LEVETIRACETAM 500 MG: 500 TABLET, FILM COATED ORAL at 09:06

## 2018-06-05 RX ADMIN — OXYCODONE HYDROCHLORIDE 5 MG: 5 TABLET ORAL at 09:06

## 2018-06-05 RX ADMIN — Medication 12.5 MG: at 09:06

## 2018-06-05 RX ADMIN — FAMOTIDINE 20 MG: 20 TABLET ORAL at 09:06

## 2018-06-05 RX ADMIN — RIVAROXABAN 20 MG: 20 TABLET, FILM COATED ORAL at 04:06

## 2018-06-05 RX ADMIN — BETHANECHOL CHLORIDE 50 MG: 25 TABLET ORAL at 03:06

## 2018-06-05 RX ADMIN — DOCUSATE SODIUM 100 MG: 100 CAPSULE, LIQUID FILLED ORAL at 09:06

## 2018-06-05 NOTE — PROGRESS NOTES
Azalea with [facility] called to follow up.  Provided her auth and case # from [facility] and gave her [place], Karla's phone # so that she can follow up with her on DC and for any other needs.

## 2018-06-05 NOTE — PLAN OF CARE
[facility]  [address]  [place] [address]  [phone] after hours  [phone]  Fax      Facility Transfer Orders                        06/05/2018    Admit to: [facility]    Diagnoses:  Active Hospital Problems    Diagnosis  POA    *Intertrochanteric fracture of left hip, closed, initial encounter [S72.142A]  Yes    Preop cardiovascular exam [Z01.810]  Not Applicable    Seizure disorder [G40.909]  Yes    Hyperlipidemia [E78.5]  Yes     Chronic     Last lipid 9/25/2015 controlled on atorvastatin      CAD s/p PCI  [I25.10]  Yes     Chronic     Hx MI, Stent to RCA Sept 25, 2015 follow by cardiology        Anemia of chronic disease [D63.8]  Yes     Chronic    Chronic anticoagulation [Z79.01]  Not Applicable     Chronic    Essential hypertension [I10]  Yes     Chronic     Controlled on metoprolol 12.5 twice daily      Personal history of DVT (deep vein thrombosis) [Z86.718]  Not Applicable     Chronic     Hx dvt with deidra filter placement, On xarelto      BPH (benign prostatic hyperplasia) [N40.0]  Yes     Follow by urology on finasteride, tamsulosin, bethanechol         Resolved Hospital Problems    Diagnosis Date Resolved POA   No resolved problems to display.       Allergies:Review of patient's allergies indicates:  No Known Allergies    Vitals:   Every shift (Skilled Nursing patients)    Diet: Cardiac     Activity:   As tolerated with PT    Nursing Precautions:     - Aspiration precautions:             - Total assistance with meals            -  Upright 90 degrees befor during and after meals             -  Suction at bedside            - Fall precautions     - Decubitus precautions:        -  for positioning   - Pressure reducing foam mattress   - Turn patient every two hours. Use wedge pillows to anchor patient          CONSULTS:     PT to evaluate and treat - five times a week     OT to evaluate and treat - five times a  week     Nutrition to evaluate and recommend diet      LABS: per NH protocol    Medications: Discontinue all previous medication orders, if any. See new list below.   ( Must reconcile meds in EPIC first. Must also review meds and put STOP dates for certain meds such as antibiotics as this list does not have stop dates, then remove this reminder)     Jaydon Mccarthy   Home Medication Instructions JEAN:[id number]    Printed on:06/05/18 1043   Medication Information                      atorvastatin (LIPITOR) 20 MG tablet  Take 1 tablet (20 mg total) by mouth once daily.             bethanechol (URECHOLINE) 50 MG tablet  TAKE ONE TABLET BY MOUTH THREE TIMES DAILY ON AN EMPTY STOMACH *DO NOT TAKE WITH MILK OR ANTACIDS, TAKE WITH WATER *             carBAMazepine (TEGRETOL) 100 mg chewable tablet  Take 2 tablets (200 mg total) by mouth once daily.             clopidogrel (PLAVIX) 75 mg tablet  Take 1 tablet (75 mg total) by mouth once daily.             docusate sodium (COLACE) 100 MG capsule  Take 1 capsule (100 mg total) by mouth once daily.             ergocalciferol (ERGOCALCIFEROL) 50,000 unit Cap  TAKE ONE CAPSULE BY MOUTH TWICE WEEKLY             finasteride (PROSCAR) 5 mg tablet  Take 1 tablet (5 mg total) by mouth once daily.             levETIRAcetam (KEPPRA) 500 MG Tab  Take 1 tablet (500 mg total) by mouth 2 (two) times daily.             metoprolol tartrate (LOPRESSOR) 25 MG tablet  Take 0.5 tablets (12.5 mg total) by mouth 2 (two) times daily.             mupirocin (BACTROBAN) 2 % ointment               oxyCODONE-acetaminophen (PERCOCET) 7.5-325 mg per tablet  Take 1 tablet by mouth every 4 (four) hours as needed for Pain.             rivaroxaban (XARELTO) 20 mg Tab  Take 1 tablet (20 mg total) by mouth once daily.             tamsulosin (FLOMAX) 0.4 mg Cp24  Take 1 capsule (0.4 mg total) by mouth once daily.                       _________________________________  Dr SUPRIYA Mandujano   06/05/2018

## 2018-06-05 NOTE — PLAN OF CARE
Problem: Fall Risk (Adult)  Goal: Absence of Falls  Patient will demonstrate the desired outcomes by discharge/transition of care.   Outcome: Revised   06/05/18 0613   Fall Risk (Adult)   Absence of Falls making progress toward outcome

## 2018-06-05 NOTE — PROGRESS NOTES
Orders, 142, Pasrr & PPD sent via fax to [facility].  Awaiting call report information.    1600:  Call report information received from Azalea at [facility].  Patient to go to room 134, 1st floor nsg station.  Request for Van transportation completed via phone to MICHEAL @ [phone].  ETA 6pm    1615:  Call report information given to nurseJennifer.  Transfer packet placed with pt's chart..

## 2018-06-05 NOTE — PLAN OF CARE
Problem: Occupational Therapy Goal  Goal: Occupational Therapy Goal  Goals to be met by: 6/14/18    Patient will increase functional independence with ADLs by performing:    Feeding with Supervision.  UE Dressing with Moderate Assistance. - Goal Met 6/2/18  Grooming while seated with Contact Guard Assistance.   Toileting from bedside commode with Moderate Assistance for hygiene and clothing management.   Sitting at edge of bed x30 minutes with Stand-by Assistance.  Toilet transfer to bedside commode with Moderate Assistance.  Upper extremity exercise program x7 reps per handout, with assistance as needed.  Met 6/5      Outcome: Ongoing (interventions implemented as appropriate)  Patient will benefit from OT to address functional deficits.

## 2018-06-05 NOTE — PLAN OF CARE
Problem: Patient Care Overview  Goal: Plan of Care Review  Outcome: Ongoing (interventions implemented as appropriate)  All systems assessed. No falls or injuries this shift. Turned and repositioned in bed. Dressing dry/intact to left hip. Will continue to monitor.

## 2018-06-05 NOTE — PROGRESS NOTES
TN received a call from Mary Ellen with [facility]. Authorization approved for SNF at [facility]. Auth#- [id number], Case#- [id number].

## 2018-06-05 NOTE — PLAN OF CARE
Problem: Physical Therapy Goal  Goal: Physical Therapy Goal  Goals to be met by: 18     Patient will increase functional independence with mobility by performin. Supine to sit with Min Assistance  2. Sit to supine with Moderate Assistance  3. Rolling to Left and Right with SBA  4. Sitting at edge of bed x 30 minutes with Stand-by Assistance  5. Lower extremity exercise program x 20 reps per handout, with assistance as needed  6. Sit to stand with CGA/RW  7. Gait 20-30 ft min A/RW  8. Bed to chair with min A/RW      Pt able to ambulate 12 ft with RW and Max A with close follow of bedside chair and VC given to improve gait pattern.

## 2018-06-05 NOTE — PT/OT/SLP PROGRESS
Occupational Therapy   Treatment    Name: Jaydon Mccarthy  MRN: [id number]  Admitting Diagnosis:  Intertrochanteric fracture of left hip, closed, initial encounter  6 Days Post-Op    Recommendations:     Discharge Recommendations: nursing facility, skilled  Discharge Equipment Recommendations:   (TBD)  Barriers to discharge:  Decreased caregiver support    Subjective       Pain/Comfort:  · Pain Rating 1: 4/10  · Location - Side 1: Left  · Location 1: hip  · Pain Addressed 1: Pre-medicate for activity, Cessation of Activity    Patients cultural, spiritual, Lutheran conflicts given the current situation: none    Objective:     Patient found with: telemetry    General Precautions: Standard, fall, seizure   Orthopedic Precautions:LLE weight bearing as tolerated   Braces: N/A     Occupational Performance:    Bed Mobility:    · N/T     Functional Mobility/Transfers:  · The patient was able to reposition/scoot in the chair with CGA      Activities of Daily Living:  · N/T    Patient left up in chair with all lines intact, call button in reach and daughter present    AMPA 6 Click:  AMPA Total Score: 13    Treatment & Education:  Pt was educated in use of theraband for UE therex. Pt was able to perform UE therex using red theraband. Pt was able to perform B shldr horiz abd x10,B shldr diagonals, B shldr flex x10 and B elbow flex and ext x10 reps. Pt was able to perform with CGA and demo and rest between reps. The patient was given red theraband and written HEP for UE therex.  Education:    Assessment:     Jaydon Mccarthy is a 86 y.o. male with a medical diagnosis of Intertrochanteric fracture of left hip, closed, initial encounter.  He presents with improved activity tolerence.  Performance deficits affecting function are weakness, impaired endurance, impaired self care skills, impaired functional mobilty, gait instability, decreased lower extremity function, decreased upper extremity function, decreased safety awareness, pain,  decreased ROM, orthopedic precautions, impaired skin.      Rehab Prognosis:  good; patient would benefit from acute skilled OT services to address these deficits and reach maximum level of function.       Plan:     Patient to be seen 5 x/week to address the above listed problems via therapeutic activities, therapeutic exercises  · Plan of Care Expires: 06/17/18  · Plan of Care Reviewed with: patient, daughter    This Plan of care has been discussed with the patient who was involved in its development and understands and is in agreement with the identified goals and treatment plan    GOALS:    Occupational Therapy Goals        Problem: Occupational Therapy Goal    Goal Priority Disciplines Outcome Interventions   Occupational Therapy Goal     OT, PT/OT Ongoing (interventions implemented as appropriate)    Description:  Goals to be met by: 6/14/18    Patient will increase functional independence with ADLs by performing:    Feeding with Supervision.  UE Dressing with Moderate Assistance. - Goal Met 6/2/18  Grooming while seated with Contact Guard Assistance.   Toileting from bedside commode with Moderate Assistance for hygiene and clothing management.   Sitting at edge of bed x30 minutes with Stand-by Assistance.  Toilet transfer to bedside commode with Moderate Assistance.  Upper extremity exercise program x7 reps per handout, with assistance as needed.  Met 6/5                        Time Tracking:     OT Date of Treatment: 06/05/18  OT Start Time: 1136  OT Stop Time: 1154  OT Total Time (min): 18 min    Billable Minutes:Therapeutic Exercise 18    Ilda Dyer OT  6/5/2018

## 2018-06-05 NOTE — PT/OT/SLP PROGRESS
Physical Therapy Treatment    Patient Name:  Jaydon Mccarthy   MRN:  [id number]    Recommendations:     Discharge Recommendations:  nursing facility, skilled   Discharge Equipment Recommendations: none   Barriers to discharge: decreased mobility    Assessment:     Jaydon Mccarthy is a 86 y.o. male admitted with a medical diagnosis of Intertrochanteric fracture of left hip, closed, initial encounter.  He presents with the following impairments/functional limitations:  impaired endurance, weakness, impaired self care skills, impaired functional mobilty, gait instability, impaired balance, decreased coordination, decreased lower extremity function, decreased ROM, pain, decreased safety awareness, orthopedic precautions, impaired joint extensibility .    Rehab Prognosis:  Fair +; patient would benefit from acute skilled PT services to address these deficits and reach maximum level of function.      Recent Surgery: Procedure(s) (LRB):  ORIF, FRACTURE, FEMUR, INTERTROCHANTERIC (Left) 6 Days Post-Op    Plan:     During this hospitalization, patient to be seen daily to address the above listed problems via therapeutic exercises, therapeutic activities, gait training  · Plan of Care Expires:  06/14/18   Plan of Care Reviewed with: patient    Subjective     Communicated with nurse prior to session.  Patient found up in bed with daughter present upon PT entry to room, agreeable to treatment.      Chief Complaint: pain to left hip   Patient comments/goals: to get home  Pain/Comfort:  · Pain Rating 1: 4/10  · Location - Side 1: Left  · Location 1: hip  · Pain Addressed 1: Pre-medicate for activity, Reposition, Distraction, Cessation of Activity    Patients cultural, spiritual, Cheondoism conflicts given the current situation: none    Objective:     Patient found with: telemetry     General Precautions: Standard, fall, seizure   Orthopedic Precautions:LLE weight bearing as tolerated   Braces: N/A     Functional Mobility:  · Bed  Mobility:     · Scooting: minimum assistance  · Supine to Sit: minimum assistance  · Transfers:     · Sit to Stand:  maximal assistance with rolling walker  · Gait: Pt ambulated 12 ft (followed by bedside chair) with Max A and VC given for correct advancement of BLE and upright posture.   · Balance: good sitting, poor standing.      AM-PAC 6 CLICK MOBILITY  Turning over in bed (including adjusting bedclothes, sheets and blankets)?: 3  Sitting down on and standing up from a chair with arms (e.g., wheelchair, bedside commode, etc.): 2  Moving from lying on back to sitting on the side of the bed?: 3  Moving to and from a bed to a chair (including a wheelchair)?: 2  Need to walk in hospital room?: 2  Climbing 3-5 steps with a railing?: 1  Total Score: 13       Therapeutic Activities and Exercises:   Pt performed bed mobility transfers and gait as listen above  Pt performed BLE seated exercises: AP, LAQs, HS. Pt required VC for correct performance of exercises.    Patient left up in chair with all lines intact, call button in reach, nurse notified and daughter present..    GOALS:    Physical Therapy Goals        Problem: Physical Therapy Goal    Goal Priority Disciplines Outcome Goal Variances Interventions   Physical Therapy Goal     PT/OT, PT Ongoing (interventions implemented as appropriate)     Description:  Goals to be met by: 18     Patient will increase functional independence with mobility by performin. Supine to sit with Min Assistance  2. Sit to supine with Moderate Assistance  3. Rolling to Left and Right with SBA  4. Sitting at edge of bed x 30 minutes with Stand-by Assistance  5. Lower extremity exercise program x 20 reps per handout, with assistance as needed  6. Sit to stand with CGA/RW  7. Gait 20-30 ft min A/RW  8. Bed to chair with min A/RW                       Time Tracking:     PT Received On: 18  PT Start Time: 1059     PT Stop Time: 1127  PT Total Time (min): 28 min     Billable  Minutes: Gait Training 10 and Therapeutic Exercise 18    Treatment Type: Treatment  PT/PTA: PTA     PTA Visit Number: 3     Heron Quinonez, PTA  06/05/2018

## 2018-06-05 NOTE — PLAN OF CARE
06/05/18 1617   Final Note   Assessment Type Final Discharge Note   Discharge Disposition SNF   What phone number can be called within the next 1-3 days to see how you are doing after discharge? ([phone])   Hospital Follow Up  Appt(s) scheduled? Yes   Discharge plans and expectations educations in teach back method with documentation complete? Yes   Right Care Referral Info   Post Acute Recommendation SNF / Sub-Acute Rehab   Referral Type SNF    Facility Name [facility]

## 2018-06-05 NOTE — PROGRESS NOTES
Pt in bed no c/o          Temp:  [97.9 °F (36.6 °C)-98.8 °F (37.1 °C)] 98.2 °F (36.8 °C)  Pulse:  [75-85] 78  Resp:  [16-18] 18  SpO2:  [97 %-100 %] 98 %  BP: (124-136)/(61-63) 124/62        PE:    L hip dressing dry        Recent Labs  Lab 06/05/18  0457   WBC 6.21   RBC 2.72*   HGB 8.3*   HCT 24.3*      MCV 89   MCH 30.5   MCHC 34.2         Current Facility-Administered Medications:     acetaminophen tablet 650 mg, 650 mg, Oral, Q6H PRN, Pop Martin III, MD, 650 mg at 06/01/18 1659    atorvastatin tablet 20 mg, 20 mg, Oral, QHS, Easton Serrano MD, 20 mg at 06/04/18 2044    bethanechol tablet 50 mg, 50 mg, Oral, TID, Pop Martin III, MD, 50 mg at 06/05/18 0940    carBAMazepine chewable tablet 200 mg, 200 mg, Oral, Daily, Daksha Welsh MD, 200 mg at 06/05/18 0940    clopidogrel tablet 75 mg, 75 mg, Oral, Daily, Pop Martin III, MD, 75 mg at 06/05/18 0940    docusate sodium capsule 100 mg, 100 mg, Oral, Daily, Ana Wing MD, 100 mg at 06/05/18 0940    ergocalciferol capsule 50,000 Units, 50,000 Units, Oral, Twice Weekly, Pop Martin III, MD, 50,000 Units at 06/04/18 1521    famotidine tablet 20 mg, 20 mg, Oral, BID, Pop Martin III, MD, 20 mg at 06/05/18 0940    finasteride tablet 5 mg, 5 mg, Oral, Daily, Pop Martin III, MD, 5 mg at 06/05/18 0940    levETIRAcetam tablet 500 mg, 500 mg, Oral, BID, Daksha Welsh MD, 500 mg at 06/05/18 0940    metoprolol tartrate (LOPRESSOR) split tablet 12.5 mg, 12.5 mg, Oral, BID, Daksha Welsh MD, 12.5 mg at 06/05/18 0940    morphine injection 2 mg, 2 mg, Intravenous, Q6H PRN, Pop Martin III, MD    ondansetron disintegrating tablet 8 mg, 8 mg, Oral, Q8H PRN, Daksha Welsh MD    oxyCODONE immediate release tablet 5 mg, 5 mg, Oral, Q6H PRN, Isaias Ramos MD, 5 mg at 06/05/18 0954    polyethylene glycol packet 17 g, 17 g, Oral, BID, Ana Wing MD, 17 g at 06/04/18 2044     rivaroxaban tablet 20 mg, 20 mg, Oral, with dinner, Pop Martin III, MD, 20 mg at 06/04/18 1521    sodium chloride 0.9% flush 3 mL, 3 mL, Intravenous, PRN, Adalberto Wright MD    tamsulosin 24 hr capsule 0.4 mg, 0.4 mg, Oral, Daily, Daksha Welsh MD, 0.4 mg at 06/05/18 0940    traMADol tablet 50 mg, 50 mg, Oral, Q6H PRN, Pop Martin III, MD, 50 mg at 06/04/18 1520      A/P:    85 y/o M s/p fall with L hip IT fx, s/p IM nailing 5/30     PT/OT - WBAT, ambulate     Dressing changes     Anemia - Hgb 8.3, blood loss, observe     dvt proph - xarelto, TEDs, SCD     Cards - stable appreciate assistance     Disp - SNF transfer when arranged     F/U Dr Mario gonzalez Wed

## 2018-06-05 NOTE — PT/OT/SLP PROGRESS
Occupational Therapy      Patient Name:  Jaydon Mccarthy   MRN:  [id number]    Patient not seen today secondary to Patient unwilling to participate (Patient  requested pain meds prior to getting OOB with OT). The nurse, Jennifer was notified and pain meds was requested. Will follow-up later.    Ilda Dyer, OT  6/5/2018

## 2018-06-05 NOTE — PROGRESS NOTES
Received call from Azalea at [facility] trying to follow up on DC. Spoke with Mary Ellen with [facility] who stated that she medical director has reviewed patient and although the H&H has dropped some, the patient seems medically stable.  However, the patient did not work with OT this morning and the patient must work with therapy and have notes in prior to receiving auth.  Updated TNRosalba, on patient.

## 2018-06-06 NOTE — PT/OT/SLP DISCHARGE
Occupational Therapy Discharge Summary    Jaydon Mccarthy  MRN: [id number]   Principal Problem: Intertrochanteric fracture of left hip, closed, initial encounter      Patient Discharged from acute Occupational Therapy on 6/5/18.  Please refer to prior OT notes for functional status.    Assessment:      Goals partially met. Patient appropriate for care in another setting.    Objective:     GOALS:    Occupational Therapy Goals        Problem: Occupational Therapy Goal    Goal Priority Disciplines Outcome Interventions   Occupational Therapy Goal     OT, PT/OT Ongoing (interventions implemented as appropriate)    Description:  Goals to be met by: 6/14/18    Patient will increase functional independence with ADLs by performing:    Feeding with Supervision.  UE Dressing with Moderate Assistance. - Goal Met 6/2/18  Grooming while seated with Contact Guard Assistance.   Toileting from bedside commode with Moderate Assistance for hygiene and clothing management.   Sitting at edge of bed x30 minutes with Stand-by Assistance.  Toilet transfer to bedside commode with Moderate Assistance.  Upper extremity exercise program x7 reps per handout, with assistance as needed.  Met 6/5                        Reasons for Discontinuation of Therapy Services  Transfer to alternate level of care.      Plan:     Patient Discharged to: Skilled Nursing Facility    Ilda Dyer OT  6/6/2018

## 2018-06-06 NOTE — PT/OT/SLP DISCHARGE
Physical Therapy Discharge Summary    Name: Jaydon Mccarthy  MRN: [id number]   Principal Problem: Intertrochanteric fracture of left hip, closed, initial encounter     Patient Discharged from acute Physical Therapy on 18.  Please refer to prior PT notes for functional status.     Assessment:     Patient was discharged unexpectedly.  Information required to complete an accurate discharge summary is unknown.  Refer to therapy initial evaluation and last progress note for initial and most recent functional status and goal achievement.  Recommendations made may be found in medical record.    Objective:     GOALS:    Physical Therapy Goals        Problem: Physical Therapy Goal    Goal Priority Disciplines Outcome Goal Variances Interventions   Physical Therapy Goal     PT/OT, PT Ongoing (interventions implemented as appropriate)     Description:  Goals to be met by: 18     Patient will increase functional independence with mobility by performin. Supine to sit with Min Assistance  2. Sit to supine with Moderate Assistance  3. Rolling to Left and Right with SBA  4. Sitting at edge of bed x 30 minutes with Stand-by Assistance  5. Lower extremity exercise program x 20 reps per handout, with assistance as needed  6. Sit to stand with CGA/RW  7. Gait 20-30 ft min A/RW  8. Bed to chair with min A/RW                       Reasons for Discontinuation of Therapy Services  Transfer to alternate level of care.      Plan:     Patient Discharged to: Skilled Nursing Facility.    Melly Ardon, PT  2018

## 2018-06-29 NOTE — DISCHARGE SUMMARY
DATE OF ADMISSION:  05/29/2018.    DATE OF DISCHARGE:  06/05/2018.    HOSPITAL COURSE:  The patient is an 86-year-old male with a history of a fall at   home, suffering a left hip intertrochanteric fracture.  He was seen in the   Emergency Room and found to have an intertrochanteric fracture and was admitted.    He was taken to surgery on 05/30/2018 where he underwent IM nailing of his   left hip.  Consultation was obtained from Cardiology and Hospital Medicine.  He   underwent treatment with physical therapy postoperatively and improved and was   discharged on 06/05/2018 to Skilled Nursing with Xarelto, Paola and GINAs and   perform dressing changes every 3 days and follow up with Dr. Martin following   Wednesday.      [facility]  dd: 06/27/2018 20:24:43 (CDT)  td: 06/28/2018 16:46:31 (CDT)  Doc ID   #[id number]  Job ID #[id number]    CC:

## 2018-07-05 DIAGNOSIS — E55.9 HYPOVITAMINOSIS D: ICD-10-CM

## 2018-07-05 RX ORDER — ERGOCALCIFEROL 1.25 MG/1
CAPSULE ORAL
Qty: 12 CAPSULE | OUTPATIENT
Start: 2018-07-05

## 2018-07-20 ENCOUNTER — OFFICE VISIT (OUTPATIENT)
Dept: FAMILY MEDICINE | Facility: CLINIC | Age: 83
End: 2018-07-20
Payer: MEDICARE

## 2018-07-20 VITALS
SYSTOLIC BLOOD PRESSURE: 126 MMHG | TEMPERATURE: 98 F | WEIGHT: 152.31 LBS | RESPIRATION RATE: 16 BRPM | DIASTOLIC BLOOD PRESSURE: 60 MMHG | HEIGHT: 68 IN | OXYGEN SATURATION: 97 % | BODY MASS INDEX: 23.08 KG/M2 | HEART RATE: 70 BPM

## 2018-07-20 DIAGNOSIS — N61.1 BREAST ABSCESS: Primary | ICD-10-CM

## 2018-07-20 PROCEDURE — 99214 OFFICE O/P EST MOD 30 MIN: CPT | Mod: S$GLB,,, | Performed by: PHYSICIAN ASSISTANT

## 2018-07-20 PROCEDURE — 99999 PR PBB SHADOW E&M-EST. PATIENT-LVL IV: CPT | Mod: PBBFAC,,, | Performed by: PHYSICIAN ASSISTANT

## 2018-07-20 RX ORDER — CLINDAMYCIN HYDROCHLORIDE 300 MG/1
300 CAPSULE ORAL 3 TIMES DAILY
Qty: 30 CAPSULE | Refills: 0 | Status: SHIPPED | OUTPATIENT
Start: 2018-07-20 | End: 2018-07-30

## 2018-07-20 NOTE — PROGRESS NOTES
Subjective:       Patient ID: Jaydon Mccarthy is a 86 y.o. male with multiple medical diagnoses as listed in the medical history and problem list that presents for Insect Bite (3 days on chest right side)  .    Chief Complaint: Insect Bite (3 days on chest right side)      Insect Bite   This is a new problem. The current episode started in the past 7 days (3 days per patient but did not tell daughter until yesterday ). The problem has been gradually worsening. Associated symptoms include a rash. Pertinent negatives include no chills, fever or headaches. Associated symptoms comments: Itching  Painful   Swelling   No discharge. Treatments tried: neosporin         Review of Systems   Constitutional: Positive for activity change. Negative for chills and fever.   Skin: Positive for rash. Negative for wound.   Neurological: Negative for headaches.         PAST MEDICAL HISTORY:  Past Medical History:   Diagnosis Date    Anticoagulant long-term use     Arthritis     Cancer     right  hand    Coronary artery disease     Deep vein thrombosis     Hyperlipidemia     Hypertension     Seizures 1988    none since on tegretol       SOCIAL HISTORY:  Social History     Social History    Marital status:      Spouse name: N/A    Number of children: N/A    Years of education: N/A     Occupational History    Not on file.     Social History Main Topics    Smoking status: Former Smoker     Packs/day: 0.50     Years: 6.00     Types: Cigarettes     Quit date: 10/27/1975    Smokeless tobacco: Never Used    Alcohol use No    Drug use: No    Sexual activity: Not Currently     Partners: Female     Other Topics Concern    Not on file     Social History Narrative    No narrative on file       ALLERGIES AND MEDICATIONS: updated and reviewed.  Review of patient's allergies indicates:  No Known Allergies  Current Outpatient Prescriptions   Medication Sig Dispense Refill    atorvastatin (LIPITOR) 20 MG tablet Take 1 tablet (20  "mg total) by mouth once daily. 90 tablet 3    bethanechol (URECHOLINE) 50 MG tablet TAKE ONE TABLET BY MOUTH THREE TIMES DAILY ON AN EMPTY STOMACH *DO NOT TAKE WITH MILK OR ANTACIDS, TAKE WITH WATER * 90 tablet 3    carBAMazepine (TEGRETOL) 100 mg chewable tablet Take 2 tablets (200 mg total) by mouth once daily. 180 tablet 3    clopidogrel (PLAVIX) 75 mg tablet Take 1 tablet (75 mg total) by mouth once daily. 90 tablet 3    ergocalciferol (ERGOCALCIFEROL) 50,000 unit Cap TAKE ONE CAPSULE BY MOUTH TWICE WEEKLY 12 capsule 0    finasteride (PROSCAR) 5 mg tablet Take 1 tablet (5 mg total) by mouth once daily. 90 tablet 3    levETIRAcetam (KEPPRA) 500 MG Tab Take 1 tablet (500 mg total) by mouth 2 (two) times daily. 180 tablet 3    metoprolol tartrate (LOPRESSOR) 25 MG tablet Take 0.5 tablets (12.5 mg total) by mouth 2 (two) times daily. 30 tablet 11    rivaroxaban (XARELTO) 20 mg Tab Take 1 tablet (20 mg total) by mouth once daily. 30 tablet 11    tamsulosin (FLOMAX) 0.4 mg Cp24 Take 1 capsule (0.4 mg total) by mouth once daily. 90 capsule 3    clindamycin (CLEOCIN) 300 MG capsule Take 1 capsule (300 mg total) by mouth 3 (three) times daily. for 10 days 30 capsule 0    docusate sodium (COLACE) 100 MG capsule Take 1 capsule (100 mg total) by mouth once daily.  0     No current facility-administered medications for this visit.          Objective:   /60   Pulse 70   Temp 98.4 °F (36.9 °C) (Oral)   Resp 16   Ht 5' 8" (1.727 m)   Wt 69.1 kg (152 lb 5.4 oz)   SpO2 97%   BMI 23.16 kg/m²      Physical Exam   Constitutional: He is oriented to person, place, and time.   Cardiovascular: Normal rate and regular rhythm.    Pulmonary/Chest: Effort normal and breath sounds normal.   Neurological: He is alert and oriented to person, place, and time.   Skin:                Assessment:       1. Breast abscess        Plan:       Breast abscess  -     clindamycin (CLEOCIN) 300 MG capsule; Take 1 capsule (300 mg " total) by mouth 3 (three) times daily. for 10 days  Dispense: 30 capsule; Refill: 0    Warm compresses  Area marked. They have derm appt Monday and they will look at it.   Would like them to come back after completion of abx to re-exam the area.           No Follow-up on file.

## 2018-07-20 NOTE — PATIENT INSTRUCTIONS
Abscess (Antibiotic Treatment Only)  An abscess (sometimes called a boil) happens when bacteria get trapped under the skin and start to grow. Pus forms inside the abscess as the body responds to the bacteria. An abscess can happen with an insect bite, ingrown hair, blocked oil gland, pimple, cyst, or puncture wound.  In the early stages, your wound may be red and tender. For this stage, you may get antibiotics. If the abscess does not get better with antibiotics, it will need to be drained with a small cut.  Home care  These tips will help you care for your abscess at home:  · Soak the wound in hot water or apply hot packs (small towel soaked in hot water) to the area for 20 minutes at a time. Do this 3 to 4 times a day.  · Do not cut, squeeze, or pop the boil yourself.  · Apply antibiotic cream or ointment to the skin 3 to 4 times a day, unless something else was prescribed. Some ointments include an antibiotic plus a pain reliever.  · If your doctor prescribed antibiotics, do not stop taking them until you have finished the medicine or the doctor tells you to stop.  · You may use an over-the-counter pain medicine to control pain, unless another pain medicine was prescribed. If you have chronic liver or kidney disease or ever had a stomach ulcer or gastrointestinal bleeding, talk with your doctor before using these any of these.  Follow-up care  Follow up with your healthcare provider, or as advised. Check your wound each day for the signs of worsening infection listed below.  When to seek medical advice  Get prompt medical attention if any of these occur:  · An increase in redness or swelling  · Red streaks in the skin leading away from the abscess  · An increase in local pain or swelling  · Fever of 100.4ºF (38ºC) or higher, or as directed by your healthcare provider  · Pus or fluid coming from the abscess  · Boil returns after getting better  Date Last Reviewed: 9/1/2016  © 2171-6919 [employer] [address] [place] [address]. All rights reserved. This information is not intended as a substitute for professional medical care. Always follow your healthcare professional's instructions.

## 2018-09-12 DIAGNOSIS — E55.9 HYPOVITAMINOSIS D: ICD-10-CM

## 2018-09-12 RX ORDER — ERGOCALCIFEROL 1.25 MG/1
CAPSULE ORAL
Qty: 8 CAPSULE | OUTPATIENT
Start: 2018-09-12

## 2018-10-11 DIAGNOSIS — Z86.718 PERSONAL HISTORY OF DVT (DEEP VEIN THROMBOSIS): Chronic | ICD-10-CM

## 2018-10-11 DIAGNOSIS — G40.909 NONINTRACTABLE EPILEPSY WITHOUT STATUS EPILEPTICUS, UNSPECIFIED EPILEPSY TYPE: Chronic | ICD-10-CM

## 2018-10-11 DIAGNOSIS — Z79.01 CHRONIC ANTICOAGULATION: Chronic | ICD-10-CM

## 2018-10-11 DIAGNOSIS — I25.10 CORONARY ARTERY DISEASE INVOLVING NATIVE CORONARY ARTERY OF NATIVE HEART WITHOUT ANGINA PECTORIS: Chronic | ICD-10-CM

## 2018-10-11 DIAGNOSIS — N40.0 BENIGN NON-NODULAR PROSTATIC HYPERPLASIA WITHOUT LOWER URINARY TRACT SYMPTOMS: ICD-10-CM

## 2018-10-11 RX ORDER — BETHANECHOL CHLORIDE 50 MG/1
TABLET ORAL
Qty: 90 TABLET | Refills: 3 | Status: SHIPPED | OUTPATIENT
Start: 2018-10-11 | End: 2019-02-15 | Stop reason: SDUPTHER

## 2018-10-11 RX ORDER — RIVAROXABAN 20 MG/1
TABLET, FILM COATED ORAL
Qty: 30 TABLET | Refills: 11 | Status: SHIPPED | OUTPATIENT
Start: 2018-10-11 | End: 2019-10-09 | Stop reason: SDUPTHER

## 2018-10-11 RX ORDER — METOPROLOL TARTRATE 25 MG/1
TABLET, FILM COATED ORAL
Qty: 30 TABLET | Refills: 11 | Status: SHIPPED | OUTPATIENT
Start: 2018-10-11 | End: 2019-10-09 | Stop reason: SDUPTHER

## 2018-11-20 ENCOUNTER — OFFICE VISIT (OUTPATIENT)
Dept: FAMILY MEDICINE | Facility: CLINIC | Age: 83
End: 2018-11-20
Payer: MEDICARE

## 2018-11-20 VITALS
DIASTOLIC BLOOD PRESSURE: 70 MMHG | HEART RATE: 68 BPM | OXYGEN SATURATION: 98 % | WEIGHT: 155.88 LBS | HEIGHT: 68 IN | TEMPERATURE: 98 F | SYSTOLIC BLOOD PRESSURE: 106 MMHG | BODY MASS INDEX: 23.63 KG/M2 | RESPIRATION RATE: 16 BRPM

## 2018-11-20 DIAGNOSIS — J06.9 UPPER RESPIRATORY TRACT INFECTION, UNSPECIFIED TYPE: Primary | ICD-10-CM

## 2018-11-20 PROCEDURE — 99999 PR PBB SHADOW E&M-EST. PATIENT-LVL IV: CPT | Mod: PBBFAC,,, | Performed by: PHYSICIAN ASSISTANT

## 2018-11-20 PROCEDURE — 99213 OFFICE O/P EST LOW 20 MIN: CPT | Mod: S$GLB,,, | Performed by: PHYSICIAN ASSISTANT

## 2018-11-20 PROCEDURE — 1101F PT FALLS ASSESS-DOCD LE1/YR: CPT | Mod: CPTII,S$GLB,, | Performed by: PHYSICIAN ASSISTANT

## 2018-11-20 RX ORDER — ALBUTEROL SULFATE 90 UG/1
2 AEROSOL, METERED RESPIRATORY (INHALATION) EVERY 6 HOURS PRN
Qty: 1 EACH | Refills: 0 | Status: SHIPPED | OUTPATIENT
Start: 2018-11-20 | End: 2019-10-02

## 2018-11-20 NOTE — PROGRESS NOTES
Subjective:       Patient ID: Jaydon Mccarthy is a 86 y.o. male with multiple medical diagnoses as listed in the medical history and problem list that presents for URI  .    Chief Complaint: URI      URI    This is a new problem. The current episode started in the past 7 days (3 days ). The problem has been gradually improving (mildly better ). Associated symptoms include rhinorrhea, sneezing, a sore throat and wheezing. Pertinent negatives include no abdominal pain, congestion, coughing, diarrhea, ear pain, headaches, nausea, sinus pain or vomiting. He has tried nothing for the symptoms.     Review of Systems   Constitutional: Negative for chills, fatigue and fever.   HENT: Positive for rhinorrhea, sneezing and sore throat. Negative for congestion, ear pain, postnasal drip, sinus pressure and sinus pain.    Eyes: Negative for pain, discharge, redness and itching.   Respiratory: Positive for wheezing. Negative for cough, chest tightness and shortness of breath.         Hx asthma as child    Gastrointestinal: Negative for abdominal pain, constipation, diarrhea, nausea and vomiting.   Musculoskeletal: Negative for myalgias.   Neurological: Negative for headaches.         PAST MEDICAL HISTORY:  Past Medical History:   Diagnosis Date    Anticoagulant long-term use     Arthritis     Cancer     right  hand    Coronary artery disease     Deep vein thrombosis     Hyperlipidemia     Hypertension     Seizures 1988    none since on tegretol       SOCIAL HISTORY:  Social History     Socioeconomic History    Marital status:      Spouse name: Not on file    Number of children: Not on file    Years of education: Not on file    Highest education level: Not on file   Social Needs    Financial resource strain: Not on file    Food insecurity - worry: Not on file    Food insecurity - inability: Not on file    Transportation needs - medical: Not on file    Transportation needs - non-medical: Not on file    Occupational History    Not on file   Tobacco Use    Smoking status: Former Smoker     Packs/day: 0.50     Years: 6.00     Pack years: 3.00     Types: Cigarettes     Last attempt to quit: 10/27/1975     Years since quittin.0    Smokeless tobacco: Never Used   Substance and Sexual Activity    Alcohol use: No    Drug use: No    Sexual activity: Not Currently     Partners: Female   Other Topics Concern    Not on file   Social History Narrative    Not on file       ALLERGIES AND MEDICATIONS: updated and reviewed.  Review of patient's allergies indicates:  No Known Allergies  Current Outpatient Medications   Medication Sig Dispense Refill    albuterol (PROVENTIL/VENTOLIN HFA) 90 mcg/actuation inhaler Inhale 2 puffs into the lungs every 6 (six) hours as needed for Wheezing. 1 each 0    atorvastatin (LIPITOR) 20 MG tablet Take 1 tablet (20 mg total) by mouth once daily. 90 tablet 3    bethanechol (URECHOLINE) 50 MG tablet TAKE ONE TABLET BY MOUTH THREE TIMES DAILY ON AN EMPTY STOMACH *DO NOT TAKE WITH MILK OR ANTACIDS, TAKE WITH WATER * 90 tablet 3    carBAMazepine (TEGRETOL) 100 mg chewable tablet Take 2 tablets (200 mg total) by mouth once daily. 180 tablet 3    clopidogrel (PLAVIX) 75 mg tablet Take 1 tablet (75 mg total) by mouth once daily. 90 tablet 3    docusate sodium (COLACE) 100 MG capsule Take 1 capsule (100 mg total) by mouth once daily.  0    ergocalciferol (ERGOCALCIFEROL) 50,000 unit Cap TAKE ONE CAPSULE BY MOUTH TWICE WEEKLY 12 capsule 0    finasteride (PROSCAR) 5 mg tablet Take 1 tablet (5 mg total) by mouth once daily. 90 tablet 3    levETIRAcetam (KEPPRA) 500 MG Tab Take 1 tablet (500 mg total) by mouth 2 (two) times daily. 180 tablet 3    metoprolol tartrate (LOPRESSOR) 25 MG tablet TAKE 1/2 TABLET BY MOUTH TWICE DAILY 30 tablet 11    tamsulosin (FLOMAX) 0.4 mg Cp24 Take 1 capsule (0.4 mg total) by mouth once daily. 90 capsule 3    XARELTO 20 mg Tab TAKE ONE TABLET BY MOUTH  "DAILY 30 tablet 11     No current facility-administered medications for this visit.          Objective:   /70   Pulse 68   Temp 97.8 °F (36.6 °C) (Oral)   Resp 16   Ht 5' 8" (1.727 m)   Wt 70.7 kg (155 lb 13.8 oz)   SpO2 98%   BMI 23.70 kg/m²      Physical Exam   Constitutional: He is oriented to person, place, and time. No distress.   HENT:   Head: Normocephalic and atraumatic.   Right Ear: External ear and ear canal normal.   Left Ear: External ear and ear canal normal.   Nose: Rhinorrhea present. Right sinus exhibits no maxillary sinus tenderness and no frontal sinus tenderness. Left sinus exhibits no maxillary sinus tenderness and no frontal sinus tenderness.   Mouth/Throat: Uvula is midline and oropharynx is clear and moist. No posterior oropharyngeal erythema. No tonsillar exudate.   Air fluid levels   PND   Eyes: Conjunctivae and EOM are normal.   Cardiovascular: Normal rate and regular rhythm.   Pulmonary/Chest: Effort normal and breath sounds normal. He has no wheezes.   Negative egophony    Lymphadenopathy:     He has no cervical adenopathy.   Neurological: He is alert and oriented to person, place, and time.   Skin: Skin is warm. No erythema.           Assessment:       1. Upper respiratory tract infection, unspecified type        Plan:       Upper respiratory tract infection, unspecified type  -     albuterol (PROVENTIL/VENTOLIN HFA) 90 mcg/actuation inhaler; Inhale 2 puffs into the lungs every 6 (six) hours as needed for Wheezing.  Dispense: 1 each; Refill: 0  cordicin HBP or OTC anti-histamine           No Follow-up on file.  "

## 2019-01-09 ENCOUNTER — LAB VISIT (OUTPATIENT)
Dept: LAB | Facility: HOSPITAL | Age: 84
End: 2019-01-09
Attending: FAMILY MEDICINE
Payer: MEDICARE

## 2019-01-09 ENCOUNTER — OFFICE VISIT (OUTPATIENT)
Dept: FAMILY MEDICINE | Facility: CLINIC | Age: 84
End: 2019-01-09
Payer: MEDICARE

## 2019-01-09 VITALS — OXYGEN SATURATION: 98 % | HEART RATE: 89 BPM | HEIGHT: 70 IN | TEMPERATURE: 97 F | BODY MASS INDEX: 22.36 KG/M2

## 2019-01-09 DIAGNOSIS — R05.9 COUGH: ICD-10-CM

## 2019-01-09 DIAGNOSIS — R53.1 WEAKNESS: ICD-10-CM

## 2019-01-09 DIAGNOSIS — R30.0 DYSURIA: ICD-10-CM

## 2019-01-09 DIAGNOSIS — R53.1 WEAKNESS: Primary | ICD-10-CM

## 2019-01-09 LAB
ALBUMIN SERPL BCP-MCNC: 3.6 G/DL
ALP SERPL-CCNC: 147 U/L
ALT SERPL W/O P-5'-P-CCNC: 11 U/L
ANION GAP SERPL CALC-SCNC: 14 MMOL/L
AST SERPL-CCNC: 19 U/L
BASOPHILS # BLD AUTO: 0.03 K/UL
BASOPHILS NFR BLD: 0.3 %
BILIRUB SERPL-MCNC: 0.9 MG/DL
BUN SERPL-MCNC: 20 MG/DL
CALCIUM SERPL-MCNC: 9.4 MG/DL
CHLORIDE SERPL-SCNC: 105 MMOL/L
CO2 SERPL-SCNC: 21 MMOL/L
CREAT SERPL-MCNC: 1.4 MG/DL
DIFFERENTIAL METHOD: ABNORMAL
EOSINOPHIL # BLD AUTO: 0.1 K/UL
EOSINOPHIL NFR BLD: 1.5 %
ERYTHROCYTE [DISTWIDTH] IN BLOOD BY AUTOMATED COUNT: 13.1 %
ERYTHROCYTE [SEDIMENTATION RATE] IN BLOOD BY WESTERGREN METHOD: 61 MM/HR
EST. GFR  (AFRICAN AMERICAN): 52 ML/MIN/1.73 M^2
EST. GFR  (NON AFRICAN AMERICAN): 45 ML/MIN/1.73 M^2
GLUCOSE SERPL-MCNC: 122 MG/DL
HCT VFR BLD AUTO: 36.6 %
HGB BLD-MCNC: 12.9 G/DL
LYMPHOCYTES # BLD AUTO: 1.1 K/UL
LYMPHOCYTES NFR BLD: 11.6 %
MCH RBC QN AUTO: 31.2 PG
MCHC RBC AUTO-ENTMCNC: 35.2 G/DL
MCV RBC AUTO: 89 FL
MONOCYTES # BLD AUTO: 1.2 K/UL
MONOCYTES NFR BLD: 12.2 %
NEUTROPHILS # BLD AUTO: 7 K/UL
NEUTROPHILS NFR BLD: 74.4 %
PLATELET # BLD AUTO: 174 K/UL
PMV BLD AUTO: 10 FL
POTASSIUM SERPL-SCNC: 3.9 MMOL/L
PROT SERPL-MCNC: 7.3 G/DL
RBC # BLD AUTO: 4.13 M/UL
SODIUM SERPL-SCNC: 140 MMOL/L
WBC # BLD AUTO: 9.46 K/UL

## 2019-01-09 PROCEDURE — 85652 RBC SED RATE AUTOMATED: CPT

## 2019-01-09 PROCEDURE — 96372 PR INJECTION,THERAP/PROPH/DIAG2ST, IM OR SUBCUT: ICD-10-PCS | Mod: S$GLB,,, | Performed by: FAMILY MEDICINE

## 2019-01-09 PROCEDURE — 85025 COMPLETE CBC W/AUTO DIFF WBC: CPT

## 2019-01-09 PROCEDURE — 96372 THER/PROPH/DIAG INJ SC/IM: CPT | Mod: S$GLB,,, | Performed by: FAMILY MEDICINE

## 2019-01-09 PROCEDURE — 99999 PR PBB SHADOW E&M-EST. PATIENT-LVL III: ICD-10-PCS | Mod: PBBFAC,,, | Performed by: FAMILY MEDICINE

## 2019-01-09 PROCEDURE — 80053 COMPREHEN METABOLIC PANEL: CPT

## 2019-01-09 PROCEDURE — 36415 COLL VENOUS BLD VENIPUNCTURE: CPT | Mod: PO

## 2019-01-09 PROCEDURE — 99999 PR PBB SHADOW E&M-EST. PATIENT-LVL III: CPT | Mod: PBBFAC,,, | Performed by: FAMILY MEDICINE

## 2019-01-09 PROCEDURE — 99215 OFFICE O/P EST HI 40 MIN: CPT | Mod: 25,S$GLB,, | Performed by: FAMILY MEDICINE

## 2019-01-09 PROCEDURE — 1101F PT FALLS ASSESS-DOCD LE1/YR: CPT | Mod: CPTII,S$GLB,, | Performed by: FAMILY MEDICINE

## 2019-01-09 PROCEDURE — 99215 PR OFFICE/OUTPT VISIT, EST, LEVL V, 40-54 MIN: ICD-10-PCS | Mod: 25,S$GLB,, | Performed by: FAMILY MEDICINE

## 2019-01-09 PROCEDURE — 1101F PR PT FALLS ASSESS DOC 0-1 FALLS W/OUT INJ PAST YR: ICD-10-PCS | Mod: CPTII,S$GLB,, | Performed by: FAMILY MEDICINE

## 2019-01-09 RX ORDER — CEFTRIAXONE 1 G/1
1 INJECTION, POWDER, FOR SOLUTION INTRAMUSCULAR; INTRAVENOUS
Status: COMPLETED | OUTPATIENT
Start: 2019-01-09 | End: 2019-01-09

## 2019-01-09 RX ADMIN — CEFTRIAXONE 1 G: 1 INJECTION, POWDER, FOR SOLUTION INTRAMUSCULAR; INTRAVENOUS at 08:01

## 2019-01-09 NOTE — PROGRESS NOTES
Chief Complaint   Patient presents with    Cough    Epistaxis       SUBJECTIVE:  Jaydon Mccarthy is a 86 y.o. male here for new problem of cough and epistaxis mild with sinus disease, chills and with weakness and in WC per his daughter, over last 3-4 days..  Currently has co-morbidities including per problem list.      Past Medical History:   Diagnosis Date    Anticoagulant long-term use     Arthritis     Cancer     right  hand    Coronary artery disease     Deep vein thrombosis     Hyperlipidemia     Hypertension     Seizures 1988    none since on tegretol     Past Surgical History:   Procedure Laterality Date    APPENDECTOMY      ARTHROPLASTY, SHOULDER Right 9/24/2013    [facility]    EXCISION-SQUAMOUS-CELL-CARCINOMA-HAND Right 10/6/2016    [facility]    FRACTURE SURGERY Right     elbow    FRACTURE SURGERY Right     hip    JOSUÉ FILTER PLACEMENT      hx of deep vein thrombosis    OPEN REDUCTION INTERNAL FIXATION OLECRANON Right 10/29/2015    [facility]    OPEN REDUCTION INTERNAL FIXATION-HIP-INTERTROCHANTERIC Right 10/29/2015    [facility]    ORIF, FRACTURE, FEMUR, INTERTROCHANTERIC Left 5/30/2018    [facility] Pop Martin III, [facility]    RECONSTRUCTION-LOCAL TISSUE REARRANGEMENT Right 10/6/2016    [facility]    TOTAL SHOULDER ARTHROPLASTY Right      Social History     Socioeconomic History    Marital status:      Spouse name: Not on file    Number of children: Not on file    Years of education: Not on file    Highest education level: Not on file   Social Needs    Financial resource strain: Not on file    Food insecurity - worry: Not on file    Food insecurity - inability: Not on file    Transportation needs - medical: Not on file    Transportation needs - non-medical: Not on file   Occupational  History    Not on file   Tobacco Use    Smoking status: Former Smoker     Packs/day: 0.50     Years: 6.00     Pack years: 3.00     Types: Cigarettes     Last attempt to quit: 10/27/1975     Years since quittin.2    Smokeless tobacco: Never Used   Substance and Sexual Activity    Alcohol use: No    Drug use: No    Sexual activity: Not Currently     Partners: Female   Other Topics Concern    Not on file   Social History Narrative    Not on file     No family history on file.  Current Outpatient Medications on File Prior to Visit   Medication Sig Dispense Refill    albuterol (PROVENTIL/VENTOLIN HFA) 90 mcg/actuation inhaler Inhale 2 puffs into the lungs every 6 (six) hours as needed for Wheezing. 1 each 0    atorvastatin (LIPITOR) 20 MG tablet Take 1 tablet (20 mg total) by mouth once daily. 90 tablet 3    bethanechol (URECHOLINE) 50 MG tablet TAKE ONE TABLET BY MOUTH THREE TIMES DAILY ON AN EMPTY STOMACH *DO NOT TAKE WITH MILK OR ANTACIDS, TAKE WITH WATER * 90 tablet 3    carBAMazepine (TEGRETOL) 100 mg chewable tablet Take 2 tablets (200 mg total) by mouth once daily. 180 tablet 3    clopidogrel (PLAVIX) 75 mg tablet Take 1 tablet (75 mg total) by mouth once daily. 90 tablet 3    docusate sodium (COLACE) 100 MG capsule Take 1 capsule (100 mg total) by mouth once daily.  0    ergocalciferol (ERGOCALCIFEROL) 50,000 unit Cap TAKE ONE CAPSULE BY MOUTH TWICE WEEKLY 12 capsule 0    finasteride (PROSCAR) 5 mg tablet Take 1 tablet (5 mg total) by mouth once daily. 90 tablet 3    levETIRAcetam (KEPPRA) 500 MG Tab Take 1 tablet (500 mg total) by mouth 2 (two) times daily. 180 tablet 3    metoprolol tartrate (LOPRESSOR) 25 MG tablet TAKE 1/2 TABLET BY MOUTH TWICE DAILY 30 tablet 11    tamsulosin (FLOMAX) 0.4 mg Cp24 Take 1 capsule (0.4 mg total) by mouth once daily. 90 capsule 3    XARELTO 20 mg Tab TAKE ONE TABLET BY MOUTH DAILY 30 tablet 11     No current facility-administered medications on file  "prior to visit.      Review of patient's allergies indicates:  No Known Allergies      Review of Systems   Constitutional: Positive for chills and malaise/fatigue. Negative for fever.   HENT: Positive for congestion, hearing loss and nosebleeds. Negative for ear pain.    Eyes: Negative.    Respiratory: Positive for cough and shortness of breath. Negative for hemoptysis, sputum production and wheezing.    Cardiovascular: Negative.    Gastrointestinal: Negative.    Genitourinary: Negative.    Musculoskeletal: Negative.    Skin: Negative.    Neurological: Negative.    Endo/Heme/Allergies: Negative.    Psychiatric/Behavioral: Negative.        OBJECTIVE:  Pulse 89   Temp 97.1 °F (36.2 °C) (Oral)   Ht 5' 10" (1.778 m)   SpO2 98%   BMI 22.36 kg/m²     Wt Readings from Last 3 Encounters:   11/20/18 70.7 kg (155 lb 13.8 oz)   07/20/18 69.1 kg (152 lb 5.4 oz)   05/30/18 72.5 kg (159 lb 13.3 oz)     BP Readings from Last 3 Encounters:   11/20/18 106/70   07/20/18 126/60   06/05/18 (!) 125/59     He appears ill, he is alert oriented x 3  In WC  Nose with bloody discharge and some clear discharge  No sinus TTP  Posterior LN noted on neck  Lungs clear  Heart rate is normal, regular  He is clammy  Appears dehydrated.    Review of old Records:  Reviewed per epic and [facility]    Review of old labs:  Lab Results   Component Value Date    TSH 1.756 09/17/2015     Lab Results   Component Value Date    WBC 6.21 06/05/2018    HGB 8.3 (L) 06/05/2018    HCT 24.3 (L) 06/05/2018    MCV 89 06/05/2018     06/05/2018       Chemistry        Component Value Date/Time     06/04/2018 1655    K 3.7 06/04/2018 1655     06/04/2018 1655    CO2 23 06/04/2018 1655    BUN 21 06/04/2018 1655    CREATININE 1.0 06/04/2018 1655     06/04/2018 1655        Component Value Date/Time    CALCIUM 8.6 (L) 06/04/2018 1655    ALKPHOS 110 06/04/2018 1655    AST 35 06/04/2018 1655    ALT 23 06/04/2018 1655    BILITOT 1.0 06/04/2018 1655    " ESTGFRAFRICA >60 06/04/2018 1655    EGFRNONAA >60 06/04/2018 1655        Lab Results   Component Value Date    CHOL 140 09/26/2015    CHOL 204 (H) 01/21/2015    CHOL 207 (H) 01/15/2014     Lab Results   Component Value Date    HDL 22 (L) 09/26/2015    HDL 61 01/21/2015    HDL 62 01/15/2014     Lab Results   Component Value Date    LDLCALC 98.0 09/26/2015    LDLCALC 123.2 01/21/2015    LDLCALC 125.0 01/15/2014     Lab Results   Component Value Date    TRIG 100 09/26/2015    TRIG 99 01/21/2015    TRIG 100 01/15/2014     Lab Results   Component Value Date    CHOLHDL 15.7 (L) 09/26/2015    CHOLHDL 29.9 01/21/2015    CHOLHDL 30.0 01/15/2014         Review of old imaging:  n/a    ASSESSMENT:  Problem List Items Addressed This Visit     None      Visit Diagnoses     Weakness    -  Primary    Relevant Orders    Comprehensive metabolic panel    Sedimentation rate    CBC auto differential    Urinalysis    Urine culture    Cough        Relevant Orders    Comprehensive metabolic panel    Sedimentation rate    CBC auto differential    Urinalysis    Urine culture    Dysuria        Relevant Orders    Comprehensive metabolic panel    Sedimentation rate    CBC auto differential    Urinalysis    Urine culture          ICD-10-CM ICD-9-CM   1. Weakness R53.1 780.79   2. Cough R05 786.2   3. Dysuria R30.0 788.1         PLAN:  1. Weakness  Start with antibiotics empirically  - Comprehensive metabolic panel; Future  - Sedimentation rate; Future  - CBC auto differential; Future  - Urinalysis; Future  - Urine culture; Future    2. Cough    - Comprehensive metabolic panel; Future  - Sedimentation rate; Future  - CBC auto differential; Future  - Urinalysis; Future  - Urine culture; Future    3. Dysuria    - Comprehensive metabolic panel; Future  - Sedimentation rate; Future  - CBC auto differential; Future  - Urinalysis; Future  - Urine culture; Future    Get the work up and do rocephin for 24 hour coverage  Plan to come back tomorrow if not  improved or no obvious cause for another shot.     Medication List           Accurate as of 1/9/19  8:17 AM. If you have any questions, ask your nurse or doctor.               CONTINUE taking these medications    albuterol 90 mcg/actuation inhaler  Commonly known as:  PROVENTIL/VENTOLIN HFA  Inhale 2 puffs into the lungs every 6 (six) hours as needed for Wheezing.     atorvastatin 20 MG tablet  Commonly known as:  LIPITOR  Take 1 tablet (20 mg total) by mouth once daily.     bethanechol 50 MG tablet  Commonly known as:  URECHOLINE  TAKE ONE TABLET BY MOUTH THREE TIMES DAILY ON AN EMPTY STOMACH *DO NOT TAKE WITH MILK OR ANTACIDS, TAKE WITH WATER *     carBAMazepine 100 mg chewable tablet  Commonly known as:  TEGRETOL  Take 2 tablets (200 mg total) by mouth once daily.     clopidogrel 75 mg tablet  Commonly known as:  PLAVIX  Take 1 tablet (75 mg total) by mouth once daily.     docusate sodium 100 MG capsule  Commonly known as:  COLACE  Take 1 capsule (100 mg total) by mouth once daily.     ergocalciferol 50,000 unit Cap  Commonly known as:  ERGOCALCIFEROL  TAKE ONE CAPSULE BY MOUTH TWICE WEEKLY     finasteride 5 mg tablet  Commonly known as:  PROSCAR  Take 1 tablet (5 mg total) by mouth once daily.     levETIRAcetam 500 MG Tab  Commonly known as:  KEPPRA  Take 1 tablet (500 mg total) by mouth 2 (two) times daily.     metoprolol tartrate 25 MG tablet  Commonly known as:  LOPRESSOR  TAKE 1/2 TABLET BY MOUTH TWICE DAILY     tamsulosin 0.4 mg Cap  Commonly known as:  FLOMAX  Take 1 capsule (0.4 mg total) by mouth once daily.     XARELTO 20 mg Tab  Generic drug:  rivaroxaban  TAKE ONE TABLET BY MOUTH DAILY            No Follow-up on file.

## 2019-01-10 ENCOUNTER — PATIENT MESSAGE (OUTPATIENT)
Dept: FAMILY MEDICINE | Facility: CLINIC | Age: 84
End: 2019-01-10

## 2019-01-10 DIAGNOSIS — R05.9 COUGH: Primary | ICD-10-CM

## 2019-01-10 RX ORDER — PROMETHAZINE HYDROCHLORIDE AND CODEINE PHOSPHATE 6.25; 1 MG/5ML; MG/5ML
5 SOLUTION ORAL EVERY 4 HOURS PRN
Qty: 120 ML | Refills: 0 | Status: SHIPPED | OUTPATIENT
Start: 2019-01-10 | End: 2019-01-20

## 2019-01-10 RX ORDER — DOXYCYCLINE 100 MG/1
100 CAPSULE ORAL 2 TIMES DAILY
Qty: 28 CAPSULE | Refills: 0 | Status: SHIPPED | OUTPATIENT
Start: 2019-01-10 | End: 2019-02-09

## 2019-01-10 RX ORDER — PREDNISONE 5 MG/1
5 TABLET ORAL DAILY
Qty: 10 TABLET | Refills: 0 | Status: SHIPPED | OUTPATIENT
Start: 2019-01-10 | End: 2019-01-20

## 2019-02-15 DIAGNOSIS — N40.0 BENIGN NON-NODULAR PROSTATIC HYPERPLASIA WITHOUT LOWER URINARY TRACT SYMPTOMS: ICD-10-CM

## 2019-02-16 RX ORDER — BETHANECHOL CHLORIDE 50 MG/1
TABLET ORAL
Qty: 90 TABLET | Refills: 3 | Status: SHIPPED | OUTPATIENT
Start: 2019-02-16 | End: 2019-06-21 | Stop reason: SDUPTHER

## 2019-04-01 ENCOUNTER — TELEPHONE (OUTPATIENT)
Dept: FAMILY MEDICINE | Facility: CLINIC | Age: 84
End: 2019-04-01

## 2019-04-02 ENCOUNTER — OFFICE VISIT (OUTPATIENT)
Dept: FAMILY MEDICINE | Facility: CLINIC | Age: 84
End: 2019-04-02
Payer: MEDICARE

## 2019-04-02 VITALS
HEART RATE: 57 BPM | OXYGEN SATURATION: 100 % | BODY MASS INDEX: 22.29 KG/M2 | SYSTOLIC BLOOD PRESSURE: 124 MMHG | HEIGHT: 68 IN | WEIGHT: 147.06 LBS | DIASTOLIC BLOOD PRESSURE: 68 MMHG | TEMPERATURE: 97 F

## 2019-04-02 DIAGNOSIS — L72.9 INFECTED CYST OF SKIN: Primary | ICD-10-CM

## 2019-04-02 DIAGNOSIS — G40.909 SEIZURE DISORDER: ICD-10-CM

## 2019-04-02 DIAGNOSIS — L08.9 INFECTED CYST OF SKIN: Primary | ICD-10-CM

## 2019-04-02 PROBLEM — S72.142A INTERTROCHANTERIC FRACTURE OF LEFT HIP, CLOSED, INITIAL ENCOUNTER: Status: RESOLVED | Noted: 2018-05-29 | Resolved: 2019-04-02

## 2019-04-02 PROCEDURE — 1101F PT FALLS ASSESS-DOCD LE1/YR: CPT | Mod: CPTII,S$GLB,, | Performed by: FAMILY MEDICINE

## 2019-04-02 PROCEDURE — 99214 PR OFFICE/OUTPT VISIT, EST, LEVL IV, 30-39 MIN: ICD-10-PCS | Mod: S$GLB,,, | Performed by: FAMILY MEDICINE

## 2019-04-02 PROCEDURE — 99999 PR PBB SHADOW E&M-EST. PATIENT-LVL III: ICD-10-PCS | Mod: PBBFAC,,, | Performed by: FAMILY MEDICINE

## 2019-04-02 PROCEDURE — 99499 UNLISTED E&M SERVICE: CPT | Mod: S$GLB,,, | Performed by: FAMILY MEDICINE

## 2019-04-02 PROCEDURE — 1101F PR PT FALLS ASSESS DOC 0-1 FALLS W/OUT INJ PAST YR: ICD-10-PCS | Mod: CPTII,S$GLB,, | Performed by: FAMILY MEDICINE

## 2019-04-02 PROCEDURE — 99999 PR PBB SHADOW E&M-EST. PATIENT-LVL III: CPT | Mod: PBBFAC,,, | Performed by: FAMILY MEDICINE

## 2019-04-02 PROCEDURE — 99214 OFFICE O/P EST MOD 30 MIN: CPT | Mod: S$GLB,,, | Performed by: FAMILY MEDICINE

## 2019-04-02 PROCEDURE — 99499 RISK ADDL DX/OHS AUDIT: ICD-10-PCS | Mod: S$GLB,,, | Performed by: FAMILY MEDICINE

## 2019-04-02 RX ORDER — SULFAMETHOXAZOLE AND TRIMETHOPRIM 800; 160 MG/1; MG/1
1 TABLET ORAL 2 TIMES DAILY
Refills: 0 | COMMUNITY
Start: 2019-03-21 | End: 2019-04-02 | Stop reason: ALTCHOICE

## 2019-04-02 NOTE — PROGRESS NOTES
Chief Complaint   Patient presents with    Follow-up       SUBJECTIVE:  Jaydon Mccarthy is a 87 y.o. male here for new problem of EIC that was infected s/p drainage and bactrim.  He has improved small wound left.  Currently has co-morbidities including per problem list.      Past Medical History:   Diagnosis Date    Anticoagulant long-term use     Arthritis     Cancer     right  hand    Coronary artery disease     Deep vein thrombosis     Hyperlipidemia     Hypertension     Seizures 1988    none since on tegretol     Past Surgical History:   Procedure Laterality Date    APPENDECTOMY      ARTHROPLASTY, SHOULDER Right 9/24/2013    [facility]    EXCISION-SQUAMOUS-CELL-CARCINOMA-HAND Right 10/6/2016    [facility]    FRACTURE SURGERY Right     elbow    FRACTURE SURGERY Right     hip    JOSUÉ FILTER PLACEMENT      hx of deep vein thrombosis    OPEN REDUCTION INTERNAL FIXATION OLECRANON Right 10/29/2015    [facility]    OPEN REDUCTION INTERNAL FIXATION-HIP-INTERTROCHANTERIC Right 10/29/2015    [facility]    ORIF, FRACTURE, FEMUR, INTERTROCHANTERIC Left 5/30/2018    [facility] by Pop [facility] III[facility]    RECONSTRUCTION-LOCAL TISSUE REARRANGEMENT Right 10/6/2016    [facility]    TOTAL SHOULDER ARTHROPLASTY Right      Social History     Socioeconomic History    Marital status:      Spouse name: Not on file    Number of children: Not on file    Years of education: Not on file    Highest education level: Not on file   Occupational History    Not on file   Social Needs    Financial resource strain: Not on file    Food insecurity:     Worry: Not on file     Inability: Not on file    Transportation needs:     Medical: Not on file     Non-medical: Not on file   Tobacco Use    Smoking status: Former Smoker      Packs/day: 0.50     Years: 6.00     Pack years: 3.00     Types: Cigarettes     Last attempt to quit: 10/27/1975     Years since quittin.4    Smokeless tobacco: Never Used   Substance and Sexual Activity    Alcohol use: No    Drug use: No    Sexual activity: Not Currently     Partners: Female   Lifestyle    Physical activity:     Days per week: Not on file     Minutes per session: Not on file    Stress: Not on file   Relationships    Social connections:     Talks on phone: Not on file     Gets together: Not on file     Attends [employer] service: Not on file     Active member of club or organization: Not on file     Attends meetings of clubs or organizations: Not on file     Relationship status: Not on file    Intimate partner violence:     Fear of current or ex partner: Not on file     Emotionally abused: Not on file     Physically abused: Not on file     Forced sexual activity: Not on file   Other Topics Concern    Not on file   Social History Narrative    Not on file     History reviewed. No pertinent family history.  Current Outpatient Medications on File Prior to Visit   Medication Sig Dispense Refill    albuterol (PROVENTIL/VENTOLIN HFA) 90 mcg/actuation inhaler Inhale 2 puffs into the lungs every 6 (six) hours as needed for Wheezing. 1 each 0    atorvastatin (LIPITOR) 20 MG tablet Take 1 tablet (20 mg total) by mouth once daily. 90 tablet 3    bethanechol (URECHOLINE) 50 MG tablet TAKE ONE TABLET BY MOUTH THREE TIMES DAILY ON AN EMPTY STOMACH *DO NOT TAKE WITH MILK OR ANTACIDS, TAKE WITH WATER * 90 tablet 3    carBAMazepine (TEGRETOL) 100 mg chewable tablet Take 2 tablets (200 mg total) by mouth once daily. 180 tablet 3    clopidogrel (PLAVIX) 75 mg tablet Take 1 tablet (75 mg total) by mouth once daily. 90 tablet 3    docusate sodium (COLACE) 100 MG capsule Take 1 capsule (100 mg total) by mouth once daily.  0    ergocalciferol (ERGOCALCIFEROL) 50,000 unit Cap TAKE ONE CAPSULE BY MOUTH  "TWICE WEEKLY 12 capsule 0    finasteride (PROSCAR) 5 mg tablet Take 1 tablet (5 mg total) by mouth once daily. 90 tablet 3    levETIRAcetam (KEPPRA) 500 MG Tab Take 1 tablet (500 mg total) by mouth 2 (two) times daily. 180 tablet 3    metoprolol tartrate (LOPRESSOR) 25 MG tablet TAKE 1/2 TABLET BY MOUTH TWICE DAILY 30 tablet 11    tamsulosin (FLOMAX) 0.4 mg Cp24 Take 1 capsule (0.4 mg total) by mouth once daily. 90 capsule 3    XARELTO 20 mg Tab TAKE ONE TABLET BY MOUTH DAILY 30 tablet 11    [DISCONTINUED] sulfamethoxazole-trimethoprim 800-160mg (BACTRIM DS) 800-160 mg Tab Take 1 tablet by mouth 2 (two) times daily.  0     No current facility-administered medications on file prior to visit.      Review of patient's allergies indicates:  No Known Allergies      ROS    OBJECTIVE:  /68   Pulse (!) 57   Temp 97.1 °F (36.2 °C) (Oral)   Ht 5' 8" (1.727 m)   Wt 66.7 kg (147 lb 0.8 oz)   SpO2 100%   BMI 22.36 kg/m²     Wt Readings from Last 3 Encounters:   04/02/19 66.7 kg (147 lb 0.8 oz)   11/20/18 70.7 kg (155 lb 13.8 oz)   07/20/18 69.1 kg (152 lb 5.4 oz)     BP Readings from Last 3 Encounters:   04/02/19 124/68   11/20/18 106/70   07/20/18 126/60       He appears well, in no apparent distress.  Alert and oriented times three, pleasant and cooperative. Vital signs are as documented in vital signs section.  Posterior neck with 1.5 cm annular healing wound with central fibrinous exudate.      Review of old Records:      Review of old labs:    Review of old imaging:      ASSESSMENT:  Problem List Items Addressed This Visit     Seizure disorder      Other Visit Diagnoses     Infected cyst of skin    -  Primary          ICD-10-CM ICD-9-CM   1. Infected cyst of skin L72.9 706.2    L08.9    2. Seizure disorder G40.909 345.90         PLAN:  1. Seizure disorder  keppra disorder    2. Infected cyst of skin  Medi honey  Keep it clean  1 week PRN      Medication List with Changes/Refills   Current Medications    " ALBUTEROL (PROVENTIL/VENTOLIN HFA) 90 MCG/ACTUATION INHALER    Inhale 2 puffs into the lungs every 6 (six) hours as needed for Wheezing.    ATORVASTATIN (LIPITOR) 20 MG TABLET    Take 1 tablet (20 mg total) by mouth once daily.    BETHANECHOL (URECHOLINE) 50 MG TABLET    TAKE ONE TABLET BY MOUTH THREE TIMES DAILY ON AN EMPTY STOMACH *DO NOT TAKE WITH MILK OR ANTACIDS, TAKE WITH WATER *    CARBAMAZEPINE (TEGRETOL) 100 MG CHEWABLE TABLET    Take 2 tablets (200 mg total) by mouth once daily.    CLOPIDOGREL (PLAVIX) 75 MG TABLET    Take 1 tablet (75 mg total) by mouth once daily.    DOCUSATE SODIUM (COLACE) 100 MG CAPSULE    Take 1 capsule (100 mg total) by mouth once daily.    ERGOCALCIFEROL (ERGOCALCIFEROL) 50,000 UNIT CAP    TAKE ONE CAPSULE BY MOUTH TWICE WEEKLY    FINASTERIDE (PROSCAR) 5 MG TABLET    Take 1 tablet (5 mg total) by mouth once daily.    LEVETIRACETAM (KEPPRA) 500 MG TAB    Take 1 tablet (500 mg total) by mouth 2 (two) times daily.    METOPROLOL TARTRATE (LOPRESSOR) 25 MG TABLET    TAKE 1/2 TABLET BY MOUTH TWICE DAILY    TAMSULOSIN (FLOMAX) 0.4 MG CP24    Take 1 capsule (0.4 mg total) by mouth once daily.    XARELTO 20 MG TAB    TAKE ONE TABLET BY MOUTH DAILY   Discontinued Medications    SULFAMETHOXAZOLE-TRIMETHOPRIM 800-160MG (BACTRIM DS) 800-160 MG TAB    Take 1 tablet by mouth 2 (two) times daily.       Follow up in about 2 weeks (around 4/16/2019), or if symptoms worsen or fail to improve, for reassess acute condition.

## 2019-04-20 DIAGNOSIS — G40.909 NONINTRACTABLE EPILEPSY WITHOUT STATUS EPILEPTICUS, UNSPECIFIED EPILEPSY TYPE: Chronic | ICD-10-CM

## 2019-04-23 ENCOUNTER — OFFICE VISIT (OUTPATIENT)
Dept: FAMILY MEDICINE | Facility: CLINIC | Age: 84
End: 2019-04-23
Payer: MEDICARE

## 2019-04-23 VITALS
BODY MASS INDEX: 22.19 KG/M2 | DIASTOLIC BLOOD PRESSURE: 66 MMHG | HEIGHT: 68 IN | OXYGEN SATURATION: 98 % | SYSTOLIC BLOOD PRESSURE: 128 MMHG | TEMPERATURE: 98 F | HEART RATE: 57 BPM | WEIGHT: 146.38 LBS

## 2019-04-23 DIAGNOSIS — D48.9 NEOPLASM, UNCERTAIN WHETHER BENIGN OR MALIGNANT: Primary | ICD-10-CM

## 2019-04-23 DIAGNOSIS — L08.9 INFECTED SEBACEOUS CYST OF SKIN: ICD-10-CM

## 2019-04-23 DIAGNOSIS — L72.3 INFECTED SEBACEOUS CYST OF SKIN: ICD-10-CM

## 2019-04-23 DIAGNOSIS — G40.909 NONINTRACTABLE EPILEPSY WITHOUT STATUS EPILEPTICUS, UNSPECIFIED EPILEPSY TYPE: Chronic | ICD-10-CM

## 2019-04-23 PROCEDURE — 1101F PR PT FALLS ASSESS DOC 0-1 FALLS W/OUT INJ PAST YR: ICD-10-PCS | Mod: CPTII,S$GLB,, | Performed by: FAMILY MEDICINE

## 2019-04-23 PROCEDURE — 99999 PR PBB SHADOW E&M-EST. PATIENT-LVL IV: ICD-10-PCS | Mod: PBBFAC,,, | Performed by: FAMILY MEDICINE

## 2019-04-23 PROCEDURE — 99214 PR OFFICE/OUTPT VISIT, EST, LEVL IV, 30-39 MIN: ICD-10-PCS | Mod: S$GLB,,, | Performed by: FAMILY MEDICINE

## 2019-04-23 PROCEDURE — 99499 RISK ADDL DX/OHS AUDIT: ICD-10-PCS | Mod: S$GLB,,, | Performed by: FAMILY MEDICINE

## 2019-04-23 PROCEDURE — 1101F PT FALLS ASSESS-DOCD LE1/YR: CPT | Mod: CPTII,S$GLB,, | Performed by: FAMILY MEDICINE

## 2019-04-23 PROCEDURE — 99999 PR PBB SHADOW E&M-EST. PATIENT-LVL IV: CPT | Mod: PBBFAC,,, | Performed by: FAMILY MEDICINE

## 2019-04-23 PROCEDURE — 99499 UNLISTED E&M SERVICE: CPT | Mod: S$GLB,,, | Performed by: FAMILY MEDICINE

## 2019-04-23 PROCEDURE — 99214 OFFICE O/P EST MOD 30 MIN: CPT | Mod: S$GLB,,, | Performed by: FAMILY MEDICINE

## 2019-04-23 RX ORDER — CARBAMAZEPINE 100 MG/1
200 TABLET, CHEWABLE ORAL DAILY
Qty: 180 TABLET | Refills: 3 | Status: SHIPPED | OUTPATIENT
Start: 2019-04-23 | End: 2020-04-07

## 2019-04-23 RX ORDER — CEPHALEXIN 250 MG/1
250 CAPSULE ORAL 4 TIMES DAILY
Qty: 28 CAPSULE | Refills: 0 | Status: SHIPPED | OUTPATIENT
Start: 2019-04-23 | End: 2019-04-30

## 2019-04-23 NOTE — PROGRESS NOTES
Chief Complaint   Patient presents with    Follow-up       SUBJECTIVE:  Jaydon Mccarthy is a 87 y.o. male here for new problem of recurrence of the upper back EIC without inflammatio nor infection and also with new lesion on right temple. The lesion was picked at and bled and now has retraction and changes noted in the skin.  He has a h/o skin cancer and premalignant lesions.  Currently has co-morbidities including per problem list.      Past Medical History:   Diagnosis Date    Anticoagulant long-term use     Arthritis     Cancer     right  hand    Coronary artery disease     Deep vein thrombosis     Hyperlipidemia     Hypertension     Seizures 1988    none since on tegretol     Past Surgical History:   Procedure Laterality Date    APPENDECTOMY      ARTHROPLASTY, SHOULDER Right 9/24/2013    [facility]    EXCISION-SQUAMOUS-CELL-CARCINOMA-HAND Right 10/6/2016    [facility] Juan Gregory [facility] at [facility]    FRACTURE SURGERY Right     elbow    FRACTURE SURGERY Right     hip    JOSUÉ FILTER PLACEMENT      hx of deep vein thrombosis    OPEN REDUCTION INTERNAL FIXATION OLECRANON Right 10/29/2015    [facility] at [facility]    OPEN REDUCTION INTERNAL FIXATION-HIP-INTERTROCHANTERIC Right 10/29/2015    [facility]    ORIF, FRACTURE, FEMUR, INTERTROCHANTERIC Left 5/30/2018    Performed by Pop Martin III, MD at [facility]    RECONSTRUCTION-LOCAL TISSUE REARRANGEMENT Right 10/6/2016    [facility] at [facility]    TOTAL SHOULDER ARTHROPLASTY Right      Social History     Socioeconomic History    Marital status:      Spouse name: Not on file    Number of children: Not on file    Years of education: Not on file    Highest education level: Not on file   Occupational History    Not on file   Social Needs    Financial resource strain: Not on file    Food insecurity:     Worry: Not on file      Inability: Not on file    Transportation needs:     Medical: Not on file     Non-medical: Not on file   Tobacco Use    Smoking status: Former Smoker     Packs/day: 0.50     Years: 6.00     Pack years: 3.00     Types: Cigarettes     Last attempt to quit: 10/27/1975     Years since quittin.5    Smokeless tobacco: Never Used   Substance and Sexual Activity    Alcohol use: No    Drug use: No    Sexual activity: Not Currently     Partners: Female   Lifestyle    Physical activity:     Days per week: Not on file     Minutes per session: Not on file    Stress: Not on file   Relationships    Social connections:     Talks on phone: Not on file     Gets together: Not on file     Attends Sabianist service: Not on file     Active member of club or organization: Not on file     Attends meetings of clubs or organizations: Not on file     Relationship status: Not on file   Other Topics Concern    Not on file   Social History Narrative    Not on file     History reviewed. No pertinent family history.  Current Outpatient Medications on File Prior to Visit   Medication Sig Dispense Refill    atorvastatin (LIPITOR) 20 MG tablet Take 1 tablet (20 mg total) by mouth once daily. 90 tablet 3    bethanechol (URECHOLINE) 50 MG tablet TAKE ONE TABLET BY MOUTH THREE TIMES DAILY ON AN EMPTY STOMACH *DO NOT TAKE WITH MILK OR ANTACIDS, TAKE WITH WATER * 90 tablet 3    clopidogrel (PLAVIX) 75 mg tablet Take 1 tablet (75 mg total) by mouth once daily. 90 tablet 3    ergocalciferol (ERGOCALCIFEROL) 50,000 unit Cap TAKE ONE CAPSULE BY MOUTH TWICE WEEKLY 12 capsule 0    levETIRAcetam (KEPPRA) 500 MG Tab Take 1 tablet (500 mg total) by mouth 2 (two) times daily. 180 tablet 3    metoprolol tartrate (LOPRESSOR) 25 MG tablet TAKE 1/2 TABLET BY MOUTH TWICE DAILY 30 tablet 11    tamsulosin (FLOMAX) 0.4 mg Cp24 Take 1 capsule (0.4 mg total) by mouth once daily. 90 capsule 3    XARELTO 20 mg Tab TAKE ONE TABLET BY MOUTH DAILY 30  "tablet 11    [DISCONTINUED] carBAMazepine (TEGRETOL) 100 mg chewable tablet Take 2 tablets (200 mg total) by mouth once daily. 180 tablet 3    albuterol (PROVENTIL/VENTOLIN HFA) 90 mcg/actuation inhaler Inhale 2 puffs into the lungs every 6 (six) hours as needed for Wheezing. 1 each 0    docusate sodium (COLACE) 100 MG capsule Take 1 capsule (100 mg total) by mouth once daily.  0    finasteride (PROSCAR) 5 mg tablet Take 1 tablet (5 mg total) by mouth once daily. 90 tablet 3     No current facility-administered medications on file prior to visit.      Review of patient's allergies indicates:  No Known Allergies      ROS    OBJECTIVE:  /66 (BP Location: Right arm, Patient Position: Sitting, BP Method: Medium (Manual))   Pulse (!) 57   Temp 97.5 °F (36.4 °C) (Oral)   Ht 5' 8" (1.727 m)   Wt 66.4 kg (146 lb 6.2 oz)   SpO2 98%   BMI 22.26 kg/m²     Wt Readings from Last 3 Encounters:   04/23/19 66.4 kg (146 lb 6.2 oz)   04/02/19 66.7 kg (147 lb 0.8 oz)   11/20/18 70.7 kg (155 lb 13.8 oz)     BP Readings from Last 3 Encounters:   04/23/19 128/66   04/02/19 124/68   11/20/18 106/70       Appears his usual self, he is chronically ill appearing but not significantly newly debilitated, alert and oriented.  Right temple with 5 mm wound with surrounding crusting and raised border with skin retraction and very taut.  2 cm cyst without pain on upper back by prior surgery that is improved.    Review of old Records:  Reviewed per epic and     Review of old labs:      Review of old imaging:      ASSESSMENT:  Problem List Items Addressed This Visit     Epilepsy (Chronic)    Relevant Medications    carBAMazepine (TEGRETOL) 100 mg chewable tablet      Other Visit Diagnoses     Neoplasm, uncertain whether benign or malignant    -  Primary    Relevant Orders    Ambulatory referral to Dermatology    Infected sebaceous cyst of skin        Relevant Medications    cephALEXin (KEFLEX) 250 MG capsule          ICD-10-CM " ICD-9-CM   1. Neoplasm, uncertain whether benign or malignant D48.9 238.9   2. Nonintractable epilepsy without status epilepticus, unspecified epilepsy type G40.909 345.90   3. Infected sebaceous cyst of skin L72.3 706.2    L08.9          PLAN:  1. Nonintractable epilepsy without status epilepticus, unspecified epilepsy type  The current medical regimen is effective;  continue present plan and medications.    - carBAMazepine (TEGRETOL) 100 mg chewable tablet; Take 2 tablets (200 mg total) by mouth once daily.  Dispense: 180 tablet; Refill: 3    2. Neoplasm, uncertain whether benign or malignant  Get him to Dr. Arellano for definitive biopsy and treatment  - Ambulatory referral to Dermatology    3. Infected sebaceous cyst of skin  Treat skin and see about removal after.  - cephALEXin (KEFLEX) 250 MG capsule; Take 1 capsule (250 mg total) by mouth 4 (four) times daily. for 7 days  Dispense: 28 capsule; Refill: 0      Medication List with Changes/Refills   New Medications    CEPHALEXIN (KEFLEX) 250 MG CAPSULE    Take 1 capsule (250 mg total) by mouth 4 (four) times daily. for 7 days   Current Medications    ALBUTEROL (PROVENTIL/VENTOLIN HFA) 90 MCG/ACTUATION INHALER    Inhale 2 puffs into the lungs every 6 (six) hours as needed for Wheezing.    ATORVASTATIN (LIPITOR) 20 MG TABLET    Take 1 tablet (20 mg total) by mouth once daily.    BETHANECHOL (URECHOLINE) 50 MG TABLET    TAKE ONE TABLET BY MOUTH THREE TIMES DAILY ON AN EMPTY STOMACH *DO NOT TAKE WITH MILK OR ANTACIDS, TAKE WITH WATER *    CLOPIDOGREL (PLAVIX) 75 MG TABLET    Take 1 tablet (75 mg total) by mouth once daily.    DOCUSATE SODIUM (COLACE) 100 MG CAPSULE    Take 1 capsule (100 mg total) by mouth once daily.    ERGOCALCIFEROL (ERGOCALCIFEROL) 50,000 UNIT CAP    TAKE ONE CAPSULE BY MOUTH TWICE WEEKLY    FINASTERIDE (PROSCAR) 5 MG TABLET    Take 1 tablet (5 mg total) by mouth once daily.    LEVETIRACETAM (KEPPRA) 500 MG TAB    Take 1 tablet (500 mg total) by  mouth 2 (two) times daily.    METOPROLOL TARTRATE (LOPRESSOR) 25 MG TABLET    TAKE 1/2 TABLET BY MOUTH TWICE DAILY    TAMSULOSIN (FLOMAX) 0.4 MG CP24    Take 1 capsule (0.4 mg total) by mouth once daily.    XARELTO 20 MG TAB    TAKE ONE TABLET BY MOUTH DAILY   Changed and/or Refilled Medications    Modified Medication Previous Medication    CARBAMAZEPINE (TEGRETOL) 100 MG CHEWABLE TABLET carBAMazepine (TEGRETOL) 100 mg chewable tablet       Take 2 tablets (200 mg total) by mouth once daily.    Take 2 tablets (200 mg total) by mouth once daily.       No follow-ups on file.

## 2019-04-25 RX ORDER — CARBAMAZEPINE 100 MG/1
TABLET, CHEWABLE ORAL
Qty: 180 TABLET | Refills: 3 | OUTPATIENT
Start: 2019-04-25

## 2019-05-10 DIAGNOSIS — N40.0 BENIGN NON-NODULAR PROSTATIC HYPERPLASIA WITHOUT LOWER URINARY TRACT SYMPTOMS: ICD-10-CM

## 2019-05-10 DIAGNOSIS — G40.909 NONINTRACTABLE EPILEPSY WITHOUT STATUS EPILEPTICUS, UNSPECIFIED EPILEPSY TYPE: Chronic | ICD-10-CM

## 2019-05-10 DIAGNOSIS — E78.2 MIXED HYPERLIPIDEMIA: Chronic | ICD-10-CM

## 2019-05-10 DIAGNOSIS — Z79.01 CHRONIC ANTICOAGULATION: Chronic | ICD-10-CM

## 2019-05-10 RX ORDER — TAMSULOSIN HYDROCHLORIDE 0.4 MG/1
CAPSULE ORAL
Qty: 90 CAPSULE | Refills: 3 | Status: SHIPPED | OUTPATIENT
Start: 2019-05-10 | End: 2020-04-22

## 2019-05-10 RX ORDER — ATORVASTATIN CALCIUM 20 MG/1
TABLET, FILM COATED ORAL
Qty: 90 TABLET | Refills: 3 | Status: SHIPPED | OUTPATIENT
Start: 2019-05-10 | End: 2020-04-22

## 2019-05-10 RX ORDER — CLOPIDOGREL BISULFATE 75 MG/1
TABLET ORAL
Qty: 90 TABLET | Refills: 3 | Status: SHIPPED | OUTPATIENT
Start: 2019-05-10 | End: 2020-04-22

## 2019-05-10 RX ORDER — LEVETIRACETAM 500 MG/1
TABLET ORAL
Qty: 180 TABLET | Refills: 3 | Status: SHIPPED | OUTPATIENT
Start: 2019-05-10 | End: 2019-10-02

## 2019-06-06 DIAGNOSIS — N40.0 BENIGN NON-NODULAR PROSTATIC HYPERPLASIA WITHOUT LOWER URINARY TRACT SYMPTOMS: ICD-10-CM

## 2019-06-06 RX ORDER — FINASTERIDE 5 MG/1
TABLET, FILM COATED ORAL
Qty: 90 TABLET | Refills: 3 | Status: SHIPPED | OUTPATIENT
Start: 2019-06-06 | End: 2020-05-26

## 2019-06-21 DIAGNOSIS — N40.0 BENIGN NON-NODULAR PROSTATIC HYPERPLASIA WITHOUT LOWER URINARY TRACT SYMPTOMS: ICD-10-CM

## 2019-06-22 RX ORDER — BETHANECHOL CHLORIDE 50 MG/1
TABLET ORAL
Qty: 90 TABLET | Refills: 3 | Status: SHIPPED | OUTPATIENT
Start: 2019-06-22 | End: 2019-10-09 | Stop reason: SDUPTHER

## 2019-10-02 ENCOUNTER — HOSPITAL ENCOUNTER (OUTPATIENT)
Dept: RADIOLOGY | Facility: HOSPITAL | Age: 84
Discharge: HOME | End: 2019-10-02
Attending: INTERNAL MEDICINE
Payer: MEDICARE

## 2019-10-02 ENCOUNTER — INITIAL CONSULT (OUTPATIENT)
Dept: HEMATOLOGY/ONCOLOGY | Facility: CLINIC | Age: 84
End: 2019-10-02
Payer: MEDICARE

## 2019-10-02 VITALS
BODY MASS INDEX: 23.49 KG/M2 | OXYGEN SATURATION: 99 % | WEIGHT: 149.69 LBS | SYSTOLIC BLOOD PRESSURE: 131 MMHG | DIASTOLIC BLOOD PRESSURE: 69 MMHG | HEART RATE: 62 BPM | HEIGHT: 67 IN | TEMPERATURE: 98 F

## 2019-10-02 DIAGNOSIS — C44.320 SQUAMOUS CELL CARCINOMA OF FACE: Primary | ICD-10-CM

## 2019-10-02 DIAGNOSIS — C44.320 SQUAMOUS CELL CARCINOMA OF FACE: ICD-10-CM

## 2019-10-02 PROCEDURE — 71046 X-RAY EXAM CHEST 2 VIEWS: CPT | Mod: TC,FY

## 2019-10-02 PROCEDURE — 99205 PR OFFICE/OUTPT VISIT, NEW, LEVL V, 60-74 MIN: ICD-10-PCS | Mod: S$GLB,,, | Performed by: INTERNAL MEDICINE

## 2019-10-02 PROCEDURE — 1101F PT FALLS ASSESS-DOCD LE1/YR: CPT | Mod: CPTII,S$GLB,, | Performed by: INTERNAL MEDICINE

## 2019-10-02 PROCEDURE — 71046 XR CHEST PA AND LATERAL: ICD-10-PCS | Mod: 26,,, | Performed by: RADIOLOGY

## 2019-10-02 PROCEDURE — 71046 X-RAY EXAM CHEST 2 VIEWS: CPT | Mod: 26,,, | Performed by: RADIOLOGY

## 2019-10-02 PROCEDURE — 99999 PR PBB SHADOW E&M-EST. PATIENT-LVL III: CPT | Mod: PBBFAC,,, | Performed by: INTERNAL MEDICINE

## 2019-10-02 PROCEDURE — 99999 PR PBB SHADOW E&M-EST. PATIENT-LVL III: ICD-10-PCS | Mod: PBBFAC,,, | Performed by: INTERNAL MEDICINE

## 2019-10-02 PROCEDURE — 1101F PR PT FALLS ASSESS DOC 0-1 FALLS W/OUT INJ PAST YR: ICD-10-PCS | Mod: CPTII,S$GLB,, | Performed by: INTERNAL MEDICINE

## 2019-10-02 PROCEDURE — 99205 OFFICE O/P NEW HI 60 MIN: CPT | Mod: S$GLB,,, | Performed by: INTERNAL MEDICINE

## 2019-10-02 NOTE — PROGRESS NOTES
Chief Complaint :  Squamous cell  Carcinoma Skin    Hx of Present illness :  Patient is a 87 y.o. year old male who presents to the clinic today for  Oncology evaluation.   Skin Cancer excised from forehead about 25 years ago. Has had multiple skin lesions treated with cryotherapy. In 2016,  Had skin cancer excised right hand. 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       Allergies :    Review of patient's allergies indicates:  No Known Allergies    Occupation :   at [employer]; retired.     Transfusion :  none    Menstrual & obstetric Hx : N/A      Present Meds :   Medication List with Changes/Refills   Current Medications    ALBUTEROL (PROVENTIL/VENTOLIN HFA) 90 MCG/ACTUATION INHALER    Inhale 2 puffs into the lungs every 6 (six) hours as needed for Wheezing.    ATORVASTATIN (LIPITOR) 20 MG TABLET    TAKE ONE TABLET BY MOUTH ONCE DAILY    BETHANECHOL (URECHOLINE) 50 MG TABLET    TAKE ONE TABLET BY MOUTH THREE TIMES DAILY ON AN EMPTY STOMACH *DO NOT TAKE WITH MILK OR ANTACIDS, TAKE WITH WATER *    CARBAMAZEPINE (TEGRETOL) 100 MG CHEWABLE TABLET    Take 2 tablets (200 mg total) by mouth once daily.    CLOPIDOGREL (PLAVIX) 75 MG TABLET    TAKE ONE TABLET BY MOUTH ONCE DAILY    DOCUSATE SODIUM (COLACE) 100 MG CAPSULE    Take 1 capsule (100 mg total) by mouth once daily.    ERGOCALCIFEROL (ERGOCALCIFEROL) 50,000 UNIT CAP    TAKE ONE CAPSULE BY MOUTH TWICE WEEKLY    FINASTERIDE (PROSCAR) 5 MG TABLET    TAKE ONE TABLET BY MOUTH ONCE DAILY    LEVETIRACETAM (KEPPRA) 500 MG TAB    TAKE ONE TABLET BY MOUTH TWICE DAILY    METOPROLOL TARTRATE (LOPRESSOR) 25 MG TABLET    TAKE 1/2 TABLET BY MOUTH TWICE DAILY    TAMSULOSIN (FLOMAX) 0.4 MG CAP    TAKE ONE CAPSULE BY MOUTH ONCE DAILY    XARELTO 20 MG TAB    TAKE ONE TABLET BY MOUTH DAILY       Past Medical Hx :  Reviewed.  hx of DVT . No past Hx of Chemotherapy or radiation therapy. Hx of seizure activity. Hx of  BPH    Past Medical Hx :  Past Medical History:   Diagnosis Date    Anticoagulant long-term use     Arthritis     Cancer     right  hand    Coronary artery disease     Deep vein thrombosis     Hyperlipidemia     Hypertension     Seizures 1988    none since on tegretol       Travel Hx :  N/A    Immunization :  Immunization History   Administered Date(s) Administered    Influenza 10/03/2010, 2011    Influenza - High Dose - PF (65 years and older) 10/09/2014, 2016, 10/31/2017, 10/25/2018    PPD Test 2015, 2015, 2018    Pneumococcal Conjugate - 13 Valent 06/10/2016    Pneumococcal Polysaccharide - 23 Valent 10/31/2017    Zoster 10/31/2017       Family Hx :  No family history on file.    Social Hx :  Social History     Socioeconomic History    Marital status:      Spouse name: Not on file    Number of children: Not on file    Years of education: Not on file    Highest education level: Not on file   Occupational History    Not on file   Social Needs    Financial resource strain: Not on file    Food insecurity:     Worry: Not on file     Inability: Not on file    Transportation needs:     Medical: Not on file     Non-medical: Not on file   Tobacco Use    Smoking status: Former Smoker     Packs/day: 0.50     Years: 6.00     Pack years: 3.00     Types: Cigarettes     Last attempt to quit: 10/27/1975     Years since quittin.9    Smokeless tobacco: Never Used   Substance and Sexual Activity    Alcohol use: No    Drug use: No    Sexual activity: Not Currently     Partners: Female   Lifestyle    Physical activity:     Days per week: Not on file     Minutes per session: Not on file    Stress: Not on file   Relationships    Social connections:     Talks on phone: Not on file     Gets together: Not on file     Attends Mandaen service: Not on file     Active member of club or organization: Not on file     Attends meetings of clubs or organizations: Not on file      Relationship status: Not on file   Other Topics Concern    Not on file   Social History Narrative    Not on file       Surgery :  Skin Bx; Excision of squamous cell carcinoma right hand in 2016. Appendectomy.  Colonoscopy. About ten years ago.  IVC filter. Bilateral hip surgery.  Right shoulder surgery. Coronary angiogram and stent.     Symptoms :    Review of Systems   Constitutional: Negative for chills, fever, malaise/fatigue and weight loss.   HENT: Negative for congestion, hearing loss, nosebleeds, sore throat and tinnitus.    Eyes: Negative for blurred vision, double vision and photophobia.   Respiratory: Negative for cough, hemoptysis, shortness of breath and wheezing.    Cardiovascular: Negative for chest pain, palpitations, claudication and leg swelling.   Gastrointestinal: Negative for abdominal pain, blood in stool, constipation, diarrhea, heartburn, nausea and vomiting.   Genitourinary: Negative for dysuria, flank pain, frequency, hematuria and urgency.        Nocturia   Musculoskeletal: Negative for back pain, falls, joint pain, myalgias and neck pain.   Skin: Negative for itching and rash.   Neurological: Positive for weakness (of legs). Negative for dizziness, tingling, tremors, sensory change and headaches.   Endo/Heme/Allergies: Negative for environmental allergies. Does not bruise/bleed easily.   Psychiatric/Behavioral: Negative for depression and memory loss. The patient is not nervous/anxious and does not have insomnia.        Physical Exam :  Daughter present in the room.  Physical Exam   Constitutional: He is oriented to person, place, and time and well-developed, well-nourished, and in no distress. Vital signs are normal. No distress.   HENT:   Head: Normocephalic and atraumatic.   Right Ear: External ear normal.   Left Ear: External ear normal.   Nose: Nose normal.   Mouth/Throat: Oropharynx is clear and moist. No oropharyngeal exudate.   Eyes: Pupils are equal, round, and reactive to  light. Conjunctivae, EOM and lids are normal. Lids are everted and swept, no foreign bodies found. Right eye exhibits no discharge. Left eye exhibits no discharge. No scleral icterus.   Neck: Trachea normal, normal range of motion, full passive range of motion without pain and phonation normal. Neck supple. Normal carotid pulses, no hepatojugular reflux and no JVD present. No tracheal tenderness present. Carotid bruit is not present. No tracheal deviation present. No thyroid mass and no thyromegaly present.   Cardiovascular: Normal rate, regular rhythm, normal heart sounds, intact distal pulses and normal pulses. PMI is not displaced. Exam reveals no gallop and no friction rub.   No murmur heard.  Pulmonary/Chest: Effort normal and breath sounds normal. No stridor. No apnea. No respiratory distress. He has no wheezes. He has no rales. He exhibits no tenderness.   Abdominal: Soft. Normal appearance, normal aorta and bowel sounds are normal. He exhibits no distension and no mass. There is no hepatosplenomegaly. There is no tenderness. There is no rebound, no guarding and no CVA tenderness. No hernia.   Musculoskeletal: Normal range of motion. He exhibits edema. He exhibits no tenderness or deformity.   Lymphadenopathy:        Head (right side): No submental, no submandibular, no tonsillar, no preauricular, no posterior auricular and no occipital adenopathy present.        Head (left side): No submental, no submandibular, no tonsillar, no preauricular, no posterior auricular and no occipital adenopathy present.     He has no cervical adenopathy.     He has no axillary adenopathy.        Right: No inguinal, no supraclavicular and no epitrochlear adenopathy present.        Left: No inguinal, no supraclavicular and no epitrochlear adenopathy present.   Neurological: He is alert and oriented to person, place, and time. He has normal sensation, normal strength, normal reflexes and intact cranial nerves. He displays normal  reflexes. No cranial nerve deficit. He exhibits normal muscle tone. Coordination normal. GCS score is 15.   Uses walker   Skin: Skin is warm and dry. No rash noted. He is not diaphoretic. No cyanosis or erythema. No pallor. Nails show no clubbing.   Ell Healed scar right temple. Lesions Left Forehead and upper cheek.    Psychiatric: Mood, memory, affect and judgment normal.   Nursing note and vitals reviewed.        Labs & Imaging :  Pending.        Dx : squamous Cell carcinoma Right temple      Assessment & Plan:  Reviewed with patient and his daughter. Has appt to see plastic surgeon next week.  will get CBC,CMP,, chest x ray.  Review with .    Re evaluate with results.

## 2019-10-02 NOTE — LETTER
October 2, 2019      Deyvi Ayoub MD  [address] [facility] [address]  [address]  [facility]  [place] [address]           [facility]  [address]  [place] [address]  Phone: [phone]          Patient: Jaydon Mccarthy   MR Number: [id number]   YOB: 1932   Date of Visit: 10/2/2019       Dear Dr. Deyvi Ayoub:    Thank you for referring Jaydon Mccarthy to me for evaluation. Attached you will find relevant portions of my assessment and plan of care.    If you have questions, please do not hesitate to call me. I look forward to following Jaydon Mccarthy along with you.    Sincerely,    Leesa Smart MD    Enclosure  CC:  No Recipients    If you would like to receive this communication electronically, please contact [email or web address] or [phone] to request more information on [employer] access.    For providers and/or their staff who would like to refer a patient to [facility], please contact us through our one-stop-shop provider referral line, [facility] , at [phone].    If you feel you have received this communication in error or would no longer like to receive these types of communications, please e-mail [email or web address]

## 2019-10-08 DIAGNOSIS — I25.10 CORONARY ARTERY DISEASE INVOLVING NATIVE CORONARY ARTERY OF NATIVE HEART WITHOUT ANGINA PECTORIS: Chronic | ICD-10-CM

## 2019-10-08 DIAGNOSIS — Z86.718 PERSONAL HISTORY OF DVT (DEEP VEIN THROMBOSIS): Chronic | ICD-10-CM

## 2019-10-08 DIAGNOSIS — G40.909 NONINTRACTABLE EPILEPSY WITHOUT STATUS EPILEPTICUS, UNSPECIFIED EPILEPSY TYPE: Chronic | ICD-10-CM

## 2019-10-08 DIAGNOSIS — Z79.01 CHRONIC ANTICOAGULATION: Chronic | ICD-10-CM

## 2019-10-08 DIAGNOSIS — N40.0 BENIGN NON-NODULAR PROSTATIC HYPERPLASIA WITHOUT LOWER URINARY TRACT SYMPTOMS: ICD-10-CM

## 2019-10-09 RX ORDER — METOPROLOL TARTRATE 25 MG/1
TABLET, FILM COATED ORAL
Qty: 30 TABLET | Refills: 11 | Status: SHIPPED | OUTPATIENT
Start: 2019-10-09 | End: 2020-10-07

## 2019-10-09 RX ORDER — BETHANECHOL CHLORIDE 50 MG/1
TABLET ORAL
Qty: 90 TABLET | Refills: 3 | Status: SHIPPED | OUTPATIENT
Start: 2019-10-09 | End: 2020-02-07

## 2019-10-09 RX ORDER — RIVAROXABAN 20 MG/1
TABLET, FILM COATED ORAL
Qty: 30 TABLET | Refills: 11 | Status: SHIPPED | OUTPATIENT
Start: 2019-10-09 | End: 2020-09-28

## 2019-10-15 ENCOUNTER — OFFICE VISIT (OUTPATIENT)
Dept: HEMATOLOGY/ONCOLOGY | Facility: CLINIC | Age: 84
End: 2019-10-15
Payer: MEDICARE

## 2019-10-15 ENCOUNTER — TELEPHONE (OUTPATIENT)
Dept: HEMATOLOGY/ONCOLOGY | Facility: CLINIC | Age: 84
End: 2019-10-15

## 2019-10-15 VITALS
SYSTOLIC BLOOD PRESSURE: 155 MMHG | DIASTOLIC BLOOD PRESSURE: 70 MMHG | OXYGEN SATURATION: 98 % | HEIGHT: 67 IN | HEART RATE: 68 BPM | WEIGHT: 150.38 LBS | BODY MASS INDEX: 23.6 KG/M2 | TEMPERATURE: 98 F

## 2019-10-15 DIAGNOSIS — C44.320 SQUAMOUS CELL CARCINOMA OF FACE: Primary | ICD-10-CM

## 2019-10-15 PROCEDURE — 1101F PR PT FALLS ASSESS DOC 0-1 FALLS W/OUT INJ PAST YR: ICD-10-PCS | Mod: CPTII,S$GLB,, | Performed by: INTERNAL MEDICINE

## 2019-10-15 PROCEDURE — 99213 PR OFFICE/OUTPT VISIT, EST, LEVL III, 20-29 MIN: ICD-10-PCS | Mod: S$GLB,,, | Performed by: INTERNAL MEDICINE

## 2019-10-15 PROCEDURE — 1101F PT FALLS ASSESS-DOCD LE1/YR: CPT | Mod: CPTII,S$GLB,, | Performed by: INTERNAL MEDICINE

## 2019-10-15 PROCEDURE — 99999 PR PBB SHADOW E&M-EST. PATIENT-LVL III: ICD-10-PCS | Mod: PBBFAC,,, | Performed by: INTERNAL MEDICINE

## 2019-10-15 PROCEDURE — 99999 PR PBB SHADOW E&M-EST. PATIENT-LVL III: CPT | Mod: PBBFAC,,, | Performed by: INTERNAL MEDICINE

## 2019-10-15 PROCEDURE — 99213 OFFICE O/P EST LOW 20 MIN: CPT | Mod: S$GLB,,, | Performed by: INTERNAL MEDICINE

## 2019-12-13 ENCOUNTER — LAB VISIT (OUTPATIENT)
Dept: LAB | Facility: HOSPITAL | Age: 84
End: 2019-12-13
Attending: FAMILY MEDICINE
Payer: MEDICARE

## 2019-12-13 DIAGNOSIS — N30.00 ACUTE CYSTITIS WITHOUT HEMATURIA: ICD-10-CM

## 2019-12-13 DIAGNOSIS — N30.00 ACUTE CYSTITIS WITHOUT HEMATURIA: Primary | ICD-10-CM

## 2019-12-13 PROCEDURE — 87086 URINE CULTURE/COLONY COUNT: CPT

## 2019-12-13 RX ORDER — CIPROFLOXACIN 500 MG/1
500 TABLET ORAL 2 TIMES DAILY
Qty: 28 TABLET | Refills: 0 | Status: SHIPPED | OUTPATIENT
Start: 2019-12-13 | End: 2019-12-27

## 2019-12-13 NOTE — PROGRESS NOTES
Complicated UTI by history given daughter speaking with me.  Start cipro and get culture.  Medically necessary  F/u with me based on response  Gave ER precautions

## 2019-12-15 LAB — BACTERIA UR CULT: NORMAL

## 2020-02-07 DIAGNOSIS — N40.0 BENIGN NON-NODULAR PROSTATIC HYPERPLASIA WITHOUT LOWER URINARY TRACT SYMPTOMS: ICD-10-CM

## 2020-02-07 RX ORDER — BETHANECHOL CHLORIDE 50 MG/1
TABLET ORAL
Qty: 90 TABLET | Refills: 3 | Status: SHIPPED | OUTPATIENT
Start: 2020-02-07 | End: 2020-06-19

## 2020-02-11 ENCOUNTER — TELEPHONE (OUTPATIENT)
Dept: SURGERY | Facility: CLINIC | Age: 85
End: 2020-02-11

## 2020-02-11 NOTE — TELEPHONE ENCOUNTER
Luz Elena notified pt has never been seen here          ----- Message from Annalee Rodriguez sent at 2/11/2020 11:28 AM CST -----  Contact: [facility]- Luz Elena  Type: Patient Call Back    Who called: Luz Elena    What is the request in detail: She is requesting clinical notes. Please advise.    Can the clinic reply by [facility]?No    Would the patient rather a call back or a response via [facility]? Call    Best call back number: [phone]  Fax: [phone]    Additional Information:n/a

## 2020-04-07 DIAGNOSIS — G40.909 NONINTRACTABLE EPILEPSY WITHOUT STATUS EPILEPTICUS, UNSPECIFIED EPILEPSY TYPE: Chronic | ICD-10-CM

## 2020-04-07 RX ORDER — CARBAMAZEPINE 100 MG/1
TABLET, CHEWABLE ORAL
Qty: 180 TABLET | Refills: 3 | Status: SHIPPED | OUTPATIENT
Start: 2020-04-07 | End: 2021-04-21

## 2020-04-22 DIAGNOSIS — G40.909 NONINTRACTABLE EPILEPSY WITHOUT STATUS EPILEPTICUS, UNSPECIFIED EPILEPSY TYPE: Chronic | ICD-10-CM

## 2020-04-22 DIAGNOSIS — E78.2 MIXED HYPERLIPIDEMIA: Chronic | ICD-10-CM

## 2020-04-22 DIAGNOSIS — N40.0 BENIGN NON-NODULAR PROSTATIC HYPERPLASIA WITHOUT LOWER URINARY TRACT SYMPTOMS: ICD-10-CM

## 2020-04-22 DIAGNOSIS — Z79.01 CHRONIC ANTICOAGULATION: Chronic | ICD-10-CM

## 2020-04-22 RX ORDER — TAMSULOSIN HYDROCHLORIDE 0.4 MG/1
CAPSULE ORAL
Qty: 90 CAPSULE | Refills: 3 | Status: SHIPPED | OUTPATIENT
Start: 2020-04-22 | End: 2021-04-21

## 2020-04-22 RX ORDER — LEVETIRACETAM 500 MG/1
TABLET ORAL
Qty: 180 TABLET | Refills: 3 | Status: SHIPPED | OUTPATIENT
Start: 2020-04-22 | End: 2021-05-05

## 2020-04-22 RX ORDER — CLOPIDOGREL BISULFATE 75 MG/1
TABLET ORAL
Qty: 90 TABLET | Refills: 3 | Status: SHIPPED | OUTPATIENT
Start: 2020-04-22 | End: 2021-05-05

## 2020-04-22 RX ORDER — ATORVASTATIN CALCIUM 20 MG/1
TABLET, FILM COATED ORAL
Qty: 90 TABLET | Refills: 3 | Status: SHIPPED | OUTPATIENT
Start: 2020-04-22 | End: 2021-04-21

## 2020-05-23 DIAGNOSIS — N40.0 BENIGN NON-NODULAR PROSTATIC HYPERPLASIA WITHOUT LOWER URINARY TRACT SYMPTOMS: ICD-10-CM

## 2020-05-26 RX ORDER — FINASTERIDE 5 MG/1
TABLET, FILM COATED ORAL
Qty: 90 TABLET | Refills: 0 | Status: SHIPPED | OUTPATIENT
Start: 2020-05-26 | End: 2020-08-24

## 2020-10-05 ENCOUNTER — PATIENT MESSAGE (OUTPATIENT)
Dept: ADMINISTRATIVE | Facility: HOSPITAL | Age: 85
End: 2020-10-05

## 2020-11-10 ENCOUNTER — OFFICE VISIT (OUTPATIENT)
Dept: SURGERY | Facility: CLINIC | Age: 85
End: 2020-11-10
Payer: MEDICARE

## 2020-11-10 VITALS
SYSTOLIC BLOOD PRESSURE: 155 MMHG | WEIGHT: 150 LBS | BODY MASS INDEX: 23.54 KG/M2 | HEART RATE: 64 BPM | HEIGHT: 67 IN | DIASTOLIC BLOOD PRESSURE: 77 MMHG

## 2020-11-10 DIAGNOSIS — R22.0 MASS OF FACE: Primary | ICD-10-CM

## 2020-11-10 PROCEDURE — 1101F PR PT FALLS ASSESS DOC 0-1 FALLS W/OUT INJ PAST YR: ICD-10-PCS | Mod: CPTII,S$GLB,, | Performed by: SURGERY

## 2020-11-10 PROCEDURE — 1101F PT FALLS ASSESS-DOCD LE1/YR: CPT | Mod: CPTII,S$GLB,, | Performed by: SURGERY

## 2020-11-10 PROCEDURE — 1125F AMNT PAIN NOTED PAIN PRSNT: CPT | Mod: S$GLB,,, | Performed by: SURGERY

## 2020-11-10 PROCEDURE — 1159F PR MEDICATION LIST DOCUMENTED IN MEDICAL RECORD: ICD-10-PCS | Mod: S$GLB,,, | Performed by: SURGERY

## 2020-11-10 PROCEDURE — 1159F MED LIST DOCD IN RCRD: CPT | Mod: S$GLB,,, | Performed by: SURGERY

## 2020-11-10 PROCEDURE — 1125F PR PAIN SEVERITY QUANTIFIED, PAIN PRESENT: ICD-10-PCS | Mod: S$GLB,,, | Performed by: SURGERY

## 2020-11-10 PROCEDURE — 99204 OFFICE O/P NEW MOD 45 MIN: CPT | Mod: S$GLB,,, | Performed by: SURGERY

## 2020-11-10 PROCEDURE — 99204 PR OFFICE/OUTPT VISIT, NEW, LEVL IV, 45-59 MIN: ICD-10-PCS | Mod: S$GLB,,, | Performed by: SURGERY

## 2020-11-10 RX ORDER — MOMETASONE FUROATE 1 MG/G
CREAM TOPICAL
COMMUNITY
Start: 2020-11-06 | End: 2022-09-16

## 2020-11-10 RX ORDER — HYDROCODONE BITARTRATE AND ACETAMINOPHEN 10; 325 MG/1; MG/1
1 TABLET ORAL
COMMUNITY
Start: 2020-10-14 | End: 2022-06-02 | Stop reason: ALTCHOICE

## 2020-11-10 NOTE — PROGRESS NOTES
Answers for HPI/ROS submitted by the patient on 11/9/2020   activity change: No  unexpected weight change: No  neck pain: No  hearing loss: No  rhinorrhea: No  trouble swallowing: No  eye discharge: No  visual disturbance: No  chest tightness: No  wheezing: No  chest pain: No  palpitations: No  blood in stool: No  constipation: No  vomiting: No  diarrhea: No  polydipsia: No  polyuria: No  difficulty urinating: No  urgency: No  hematuria: No  joint swelling: No  arthralgias: No  headaches: Yes  weakness: No  confusion: No  dysphoric mood: No

## 2020-11-10 NOTE — PROGRESS NOTES
Subjective:       Patient ID: Jaydon Mccarthy is a 88 y.o. male.    Chief Complaint: Consult and Melanoma    HPI 89 yo male with right brow mass with associated pain with recent biopsy that showed actinic keratosis  Review of Systems   Constitutional: Negative.  Negative for activity change and unexpected weight change.   HENT: Negative.  Negative for hearing loss, rhinorrhea and trouble swallowing.    Eyes: Negative.  Negative for discharge and visual disturbance.   Respiratory: Negative.  Negative for chest tightness and wheezing.    Cardiovascular: Negative.  Negative for chest pain and palpitations.   Gastrointestinal: Negative.  Negative for blood in stool, constipation, diarrhea and vomiting.   Endocrine: Negative.  Negative for polydipsia and polyuria.   Genitourinary: Negative for difficulty urinating, hematuria and urgency.   Musculoskeletal: Negative.  Negative for arthralgias, joint swelling and neck pain.   Integumentary:  Negative.   Allergic/Immunologic: Negative.    Neurological: Positive for headaches. Negative for weakness.   Hematological: Negative.    Psychiatric/Behavioral: Negative.  Negative for confusion and dysphoric mood.   All other systems reviewed and are negative.        Objective:      Physical Exam  Vitals signs reviewed.   Constitutional:       Appearance: He is well-developed.   HENT:      Head: Normocephalic and atraumatic.        Right Ear: External ear normal.      Left Ear: External ear normal.      Nose: Nose normal.   Eyes:      Conjunctiva/sclera: Conjunctivae normal.      Pupils: Pupils are equal, round, and reactive to light.   Neck:      Musculoskeletal: Normal range of motion and neck supple.   Cardiovascular:      Rate and Rhythm: Normal rate and regular rhythm.      Heart sounds: Normal heart sounds.   Pulmonary:      Effort: Pulmonary effort is normal.      Breath sounds: Normal breath sounds.   Abdominal:      General: Bowel sounds are normal.      Palpations: Abdomen is  soft.   Musculoskeletal: Normal range of motion.   Skin:     General: Skin is warm and dry.   Neurological:      Mental Status: He is alert and oriented to person, place, and time.      Deep Tendon Reflexes: Reflexes are normal and symmetric.   Psychiatric:         Behavior: Behavior normal.         Thought Content: Thought content normal.         Assessment:       1. Mass of face      with symptoms  Plan:       I will get a CT scan of his head and then have him follow up

## 2020-11-10 NOTE — LETTER
November 10, 2020      Javier Villanueva MD  [address]  [place] [address]           [facility],  [address]  [place] [address]  Phone: [phone]  Fax: [phone]          Patient: Jaydon Mccarthy   MR Number: [id number]   YOB: 1932   Date of Visit: 11/10/2020       Dear Dr. Javier Villanueva:    Thank you for referring Jaydon Mccarthy to me for evaluation. Attached you will find relevant portions of my assessment and plan of care.    If you have questions, please do not hesitate to call me. I look forward to following Jaydon Mccarthy along with you.    Sincerely,    William Beckham MD    Enclosure  CC:  No Recipients    If you would like to receive this communication electronically, please contact [email or web address] or [phone] to request more information on [employer] access.    For providers and/or their staff who would like to refer a patient to [facility], please contact us through our one-stop-shop provider referral line, [facility], at [phone].    If you feel you have received this communication in error or would no longer like to receive these types of communications, please e-mail [email or web address]

## 2020-11-11 ENCOUNTER — PATIENT MESSAGE (OUTPATIENT)
Dept: SURGERY | Facility: CLINIC | Age: 85
End: 2020-11-11

## 2020-11-12 ENCOUNTER — HOSPITAL ENCOUNTER (OUTPATIENT)
Dept: RADIOLOGY | Facility: HOSPITAL | Age: 85
Discharge: HOME | End: 2020-11-12
Attending: SURGERY
Payer: MEDICARE

## 2020-11-12 DIAGNOSIS — R22.0 MASS OF FACE: ICD-10-CM

## 2020-11-12 PROCEDURE — 70470 CT HEAD/BRAIN W/O & W/DYE: CPT | Mod: 26,,, | Performed by: RADIOLOGY

## 2020-11-12 PROCEDURE — 25500020 PHARM REV CODE 255: Performed by: SURGERY

## 2020-11-12 PROCEDURE — 70470 CT HEAD WITH AND WITHOUT: ICD-10-PCS | Mod: 26,,, | Performed by: RADIOLOGY

## 2020-11-12 PROCEDURE — 70470 CT HEAD/BRAIN W/O & W/DYE: CPT | Mod: TC

## 2020-11-12 RX ADMIN — IOHEXOL 75 ML: 350 INJECTION, SOLUTION INTRAVENOUS at 01:11

## 2020-11-17 ENCOUNTER — OFFICE VISIT (OUTPATIENT)
Dept: SURGERY | Facility: CLINIC | Age: 85
End: 2020-11-17
Payer: MEDICARE

## 2020-11-17 VITALS
DIASTOLIC BLOOD PRESSURE: 83 MMHG | HEIGHT: 67 IN | WEIGHT: 150 LBS | HEART RATE: 63 BPM | SYSTOLIC BLOOD PRESSURE: 146 MMHG | BODY MASS INDEX: 23.54 KG/M2

## 2020-11-17 DIAGNOSIS — R22.0 MASS OF FACE: Primary | ICD-10-CM

## 2020-11-17 PROCEDURE — 1157F PR ADVANCE CARE PLAN OR EQUIV PRESENT IN MEDICAL RECORD: ICD-10-PCS | Mod: S$GLB,,, | Performed by: SURGERY

## 2020-11-17 PROCEDURE — 1101F PT FALLS ASSESS-DOCD LE1/YR: CPT | Mod: CPTII,S$GLB,, | Performed by: SURGERY

## 2020-11-17 PROCEDURE — 3288F FALL RISK ASSESSMENT DOCD: CPT | Mod: CPTII,S$GLB,, | Performed by: SURGERY

## 2020-11-17 PROCEDURE — 99214 PR OFFICE/OUTPT VISIT, EST, LEVL IV, 30-39 MIN: ICD-10-PCS | Mod: S$GLB,,, | Performed by: SURGERY

## 2020-11-17 PROCEDURE — 1126F PR PAIN SEVERITY QUANTIFIED, NO PAIN PRESENT: ICD-10-PCS | Mod: S$GLB,,, | Performed by: SURGERY

## 2020-11-17 PROCEDURE — 99214 OFFICE O/P EST MOD 30 MIN: CPT | Mod: S$GLB,,, | Performed by: SURGERY

## 2020-11-17 PROCEDURE — 1159F PR MEDICATION LIST DOCUMENTED IN MEDICAL RECORD: ICD-10-PCS | Mod: S$GLB,,, | Performed by: SURGERY

## 2020-11-17 PROCEDURE — 3288F PR FALLS RISK ASSESSMENT DOCUMENTED: ICD-10-PCS | Mod: CPTII,S$GLB,, | Performed by: SURGERY

## 2020-11-17 PROCEDURE — 1126F AMNT PAIN NOTED NONE PRSNT: CPT | Mod: S$GLB,,, | Performed by: SURGERY

## 2020-11-17 PROCEDURE — 1157F ADVNC CARE PLAN IN RCRD: CPT | Mod: S$GLB,,, | Performed by: SURGERY

## 2020-11-17 PROCEDURE — 1159F MED LIST DOCD IN RCRD: CPT | Mod: S$GLB,,, | Performed by: SURGERY

## 2020-11-17 PROCEDURE — 1101F PR PT FALLS ASSESS DOC 0-1 FALLS W/OUT INJ PAST YR: ICD-10-PCS | Mod: CPTII,S$GLB,, | Performed by: SURGERY

## 2020-11-17 NOTE — PROGRESS NOTES
Subjective:       Patient ID: Jaydon Mccarthy is a 88 y.o. male.    Chief Complaint: Follow-up    HPI right temporal mass at his zygoma that is soft tissue on CT scan and he is still symptomatic  Review of Systems   Constitutional: Negative.    HENT: Negative.    Eyes: Negative.    Respiratory: Negative.    Cardiovascular: Negative.    Gastrointestinal: Negative.    Endocrine: Negative.    Musculoskeletal: Negative.    Integumentary:  Negative.   Allergic/Immunologic: Negative.    Neurological: Negative.    Hematological: Negative.    Psychiatric/Behavioral: Negative.    All other systems reviewed and are negative.        Objective:      Physical Exam  Vitals signs reviewed.   Constitutional:       Appearance: He is well-developed.   HENT:      Head: Normocephalic and atraumatic.        Right Ear: External ear normal.      Left Ear: External ear normal.      Nose: Nose normal.   Eyes:      Conjunctiva/sclera: Conjunctivae normal.      Pupils: Pupils are equal, round, and reactive to light.   Neck:      Musculoskeletal: Normal range of motion and neck supple.   Cardiovascular:      Rate and Rhythm: Normal rate and regular rhythm.      Heart sounds: Normal heart sounds.   Pulmonary:      Effort: Pulmonary effort is normal.      Breath sounds: Normal breath sounds.   Abdominal:      General: Bowel sounds are normal.      Palpations: Abdomen is soft.   Musculoskeletal: Normal range of motion.   Skin:     General: Skin is warm and dry.   Neurological:      Mental Status: He is alert and oriented to person, place, and time.      Deep Tendon Reflexes: Reflexes are normal and symmetric.   Psychiatric:         Behavior: Behavior normal.         Thought Content: Thought content normal.         Assessment:       1. Mass of face      soft tissue  Plan:       I will get a core biopsy and then see him back for follow and this was discussed with him and his daughter

## 2020-11-18 ENCOUNTER — PATIENT MESSAGE (OUTPATIENT)
Dept: SURGERY | Facility: CLINIC | Age: 85
End: 2020-11-18

## 2020-11-23 ENCOUNTER — CLINICAL SUPPORT (OUTPATIENT)
Dept: FAMILY MEDICINE | Facility: CLINIC | Age: 85
End: 2020-11-23
Payer: MEDICARE

## 2020-11-23 VITALS — DIASTOLIC BLOOD PRESSURE: 80 MMHG | SYSTOLIC BLOOD PRESSURE: 130 MMHG | HEART RATE: 72 BPM

## 2020-11-23 DIAGNOSIS — Z23 NEEDS FLU SHOT: ICD-10-CM

## 2020-11-23 DIAGNOSIS — G40.909 NONINTRACTABLE EPILEPSY WITHOUT STATUS EPILEPTICUS, UNSPECIFIED EPILEPSY TYPE: Chronic | ICD-10-CM

## 2020-11-23 DIAGNOSIS — I10 ESSENTIAL HYPERTENSION: Chronic | ICD-10-CM

## 2020-11-23 DIAGNOSIS — D63.8 ANEMIA OF CHRONIC DISEASE: Chronic | ICD-10-CM

## 2020-11-23 DIAGNOSIS — Z00.00 ANNUAL PHYSICAL EXAM: Primary | ICD-10-CM

## 2020-11-23 DIAGNOSIS — C44.92 SQUAMOUS CELL CARCINOMA OF SKIN: ICD-10-CM

## 2020-11-23 DIAGNOSIS — N40.0 BENIGN PROSTATIC HYPERPLASIA WITHOUT LOWER URINARY TRACT SYMPTOMS: ICD-10-CM

## 2020-11-23 DIAGNOSIS — G40.909 SEIZURE DISORDER: ICD-10-CM

## 2020-11-23 DIAGNOSIS — E78.2 MIXED HYPERLIPIDEMIA: Chronic | ICD-10-CM

## 2020-11-23 DIAGNOSIS — H90.3 SENSORINEURAL HEARING LOSS (SNHL) OF BOTH EARS: ICD-10-CM

## 2020-11-23 DIAGNOSIS — Z79.01 CHRONIC ANTICOAGULATION: Chronic | ICD-10-CM

## 2020-11-23 PROBLEM — Z01.810 PREOP CARDIOVASCULAR EXAM: Status: RESOLVED | Noted: 2018-05-30 | Resolved: 2020-11-23

## 2020-11-23 PROCEDURE — G0008 FLU VACCINE - QUADRIVALENT - ADJUVANTED: ICD-10-PCS | Mod: S$GLB,,, | Performed by: FAMILY MEDICINE

## 2020-11-23 PROCEDURE — 99499 RISK ADDL DX/OHS AUDIT: ICD-10-PCS | Mod: S$GLB,,, | Performed by: FAMILY MEDICINE

## 2020-11-23 PROCEDURE — 99214 OFFICE O/P EST MOD 30 MIN: CPT | Mod: 25,S$GLB,, | Performed by: FAMILY MEDICINE

## 2020-11-23 PROCEDURE — 90694 VACC AIIV4 NO PRSRV 0.5ML IM: CPT | Mod: S$GLB,,, | Performed by: FAMILY MEDICINE

## 2020-11-23 PROCEDURE — 90694 FLU VACCINE - QUADRIVALENT - ADJUVANTED: ICD-10-PCS | Mod: S$GLB,,, | Performed by: FAMILY MEDICINE

## 2020-11-23 PROCEDURE — G0008 ADMIN INFLUENZA VIRUS VAC: HCPCS | Mod: S$GLB,,, | Performed by: FAMILY MEDICINE

## 2020-11-23 PROCEDURE — 99999 PR PBB SHADOW E&M-EST. PATIENT-LVL I: CPT | Mod: PBBFAC,,,

## 2020-11-23 PROCEDURE — 99999 PR PBB SHADOW E&M-EST. PATIENT-LVL I: ICD-10-PCS | Mod: PBBFAC,,,

## 2020-11-23 PROCEDURE — 99499 UNLISTED E&M SERVICE: CPT | Mod: S$GLB,,, | Performed by: FAMILY MEDICINE

## 2020-11-23 PROCEDURE — 99214 PR OFFICE/OUTPT VISIT, EST, LEVL IV, 30-39 MIN: ICD-10-PCS | Mod: 25,S$GLB,, | Performed by: FAMILY MEDICINE

## 2020-11-23 NOTE — PROGRESS NOTES
Chief Complaint   Patient presents with    Annual Exam     SUBJECTIVE:   Jaydon Mccarthy is a 88 y.o. male presenting for his annual checkup.  Current Outpatient Medications   Medication Sig Dispense Refill    atorvastatin (LIPITOR) 20 MG tablet TAKE ONE TABLET BY MOUTH ONCE DAILY 90 tablet 3    bethanechol (URECHOLINE) 50 MG tablet TAKE ONE TABLET BY MOUTH THREE TIMES DAILY ON AN EMPTY STOMACH *DO NOT TAKE WITH MILK OR ANTACIDS, TAKE WITH WATER * 90 tablet 3    carBAMazepine (TEGRETOL) 100 mg chewable tablet TAKE 2 TABLETS BY MOUTH DAILY 180 tablet 3    clopidogreL (PLAVIX) 75 mg tablet TAKE ONE TABLET BY MOUTH ONCE DAILY 90 tablet 3    ergocalciferol (ERGOCALCIFEROL) 50,000 unit Cap TAKE ONE CAPSULE BY MOUTH TWICE WEEKLY 12 capsule 0    finasteride (PROSCAR) 5 mg tablet TAKE ONE TABLET BY MOUTH DAILY 90 tablet 0    HYDROcodone-acetaminophen (NORCO)  mg per tablet Take 1 tablet by mouth every 4 to 6 hours as needed.      levETIRAcetam (KEPPRA) 500 MG Tab TAKE ONE TABLET BY MOUTH TWICE DAILY 180 tablet 3    metoprolol tartrate (LOPRESSOR) 25 MG tablet TAKE 1/2 TABLET BY MOUTH TWICE DAILY 30 tablet 11    mometasone 0.1% (ELOCON) 0.1 % cream APPLY to face TWICE DAILY FOR 1 WEEK      tamsulosin (FLOMAX) 0.4 mg Cap TAKE ONE CAPSULE BY MOUTH ONCE DAILY 90 capsule 3    XARELTO 20 mg Tab TAKE ONE TABLET BY MOUTH DAILY 30 tablet 11     No current facility-administered medications for this visit.      Allergies: Patient has no known allergies.     ROS:  Feeling well. No dyspnea or chest pain on exertion. No abdominal pain, change in bowel habits, black or bloody stools. No urinary tract or prostatic symptoms. No neurological complaints.    OBJECTIVE:   The patient appears well, alert, oriented x 3, in no distress.   /80   Pulse 72   ENT normal.  Neck supple. No adenopathy or thyromegaly. NITZA. Lungs are clear, good air entry, no wheezes, rhonchi or rales. S1 and S2 normal, no murmurs, regular rate  and rhythm. Abdomen is soft without tenderness, guarding, mass or organomegaly.  exam: deferred.  Extremities show no edema, normal peripheral pulses. Neurological is normal without focal findings. Deconditioned, gross sarcopenia, using walker as an aid    ASSESSMENT:   1. Annual physical exam    2. Needs flu shot    3. Anemia of chronic disease    4. Essential hypertension    5. Mixed hyperlipidemia    6. Benign prostatic hyperplasia without lower urinary tract symptoms    7. Squamous cell carcinoma of skin    8. Sensorineural hearing loss (SNHL) of both ears    9. Nonintractable epilepsy without status epilepticus, unspecified epilepsy type    10. Chronic anticoagulation    11. Seizure disorder          PLAN:   Jaydon was seen today for annual exam.    Diagnoses and all orders for this visit:    Annual physical exam  Counseled on age appropriate medical preventative services, including age appropriate cancer screenings, over all nutritional health, need for a consistent exercise regimen and an over all push towards maintaining a vigorous and active lifestyle.  Counseled on age appropriate vaccines and discussed upcoming health care needs based on age/gender.  Spent time with patient counseling on need for a good patient/doctor relationship moving forward.  Discussed use of common OTC medications and supplements.  Discussed common dietary aids and use of caffeine and the need for good sleep hygiene and stress management.    Needs flu shot  -     Influenza (FLUAD) - Quadrivalent (Adjuvanted) *Preferred* (65+) (PF)    Anemia of chronic disease  Noted  No signs of severe anemia  Monitor PRN    Essential hypertension  The current medical regimen is effective;  continue present plan and medications.    Mixed hyperlipidemia  Continue treatment.    Benign prostatic hyperplasia without lower urinary tract symptoms  The current medical regimen is effective;  continue present plan and medications.    Squamous cell carcinoma  of skin  Has new appointment for over the right eye to see if has another lesion  Sensorineural hearing loss (SNHL) of both ears  Has hearing aids  Still a challenge  Nonintractable epilepsy without status epilepticus, unspecified epilepsy type  Stable  No new seizure  Chronic anticoagulation  Stable, continue  Seizure disorder  The current medical regimen is effective;  continue present plan and medications.      Counseled on age appropriate medical preventative services, including age appropriate cancer screenings, over all nutritional health, need for a consistent exercise regimen and an over all push towards maintaining a vigorous and active lifestyle.  Counseled on age appropriate vaccines and discussed upcoming health care needs based on age/gender.  Spent time with patient counseling on need for a good patient/doctor relationship moving forward.  Discussed use of common OTC medications and supplements.  Discussed common dietary aids and use of caffeine and the need for good sleep hygiene and stress management.

## 2020-11-27 ENCOUNTER — HOSPITAL ENCOUNTER (OUTPATIENT)
Dept: INTERVENTIONAL RADIOLOGY/VASCULAR | Facility: HOSPITAL | Age: 85
Discharge: HOME | End: 2020-11-27
Attending: SURGERY
Payer: MEDICARE

## 2020-11-27 DIAGNOSIS — R22.0 MASS OF FACE: ICD-10-CM

## 2020-11-27 PROCEDURE — 88307 PR  SURG PATH,LEVEL V: ICD-10-PCS | Mod: 26,,, | Performed by: PATHOLOGY

## 2020-11-27 PROCEDURE — 10005 IR US FINE NEEDLE ASPIRATION BIOPSY, FIRST LESION: ICD-10-PCS | Mod: RT,,, | Performed by: RADIOLOGY

## 2020-11-27 PROCEDURE — 88307 TISSUE EXAM BY PATHOLOGIST: CPT | Mod: 26,,, | Performed by: PATHOLOGY

## 2020-11-27 PROCEDURE — 27200937 IR US FINE NEEDLE ASPIRATION BIOPSY, FIRST LESION

## 2020-11-27 PROCEDURE — 88307 TISSUE EXAM BY PATHOLOGIST: CPT | Performed by: PATHOLOGY

## 2020-11-27 PROCEDURE — 10005 FNA BX W/US GDN 1ST LES: CPT | Mod: RT,,, | Performed by: RADIOLOGY

## 2020-11-27 NOTE — BRIEF OP NOTE
Radiology Post-Procedure Note    Pre Op Diagnosis: Suspicious symptomatic palpable right pterional subcutaneous soft-tissue nodule  Post Op Diagnosis: Same    Procedure: US-guided percutaneous 18-gauge core biopsy of right pterional nodule    Procedure performed by: Mitchell Brooks MD    Written Informed Consent Obtained: Yes  Specimen Removed: YES, 18-gauge cores x 4  Estimated Blood Loss: Minimal    Findings:   Successful US-guided percutaneous 18-gauge core biopsy of right pterional lesion with local anesthetic only. Patient tolerated the procedure well. No immediate post-procedural complications noted.     Thank you for considering IR for the care of your patient.     Mitchell Brooks MD  Interventional Radiology

## 2020-11-27 NOTE — H&P
Radiology History & Physical      SUBJECTIVE:     Chief Complaint: Suspicious symptomatic palpable right pterional soft-tissue nodule    History of Present Illness:  Jaydon Mccarthy is a 88 y.o. male with PMHx of multiple episodes of biopsy-proven squamous cell carcinoma of the skin going back as far as 25 years prior with the most recent biopsy-proven lesion from the right forehead area reportedly last year 2019.     Pt reports recent development of a palpable, symptomatic right pterional subcutaneous soft-tissue mass concerning for residual and/or recurrent disease requiring tissues sampling for diagnosis, staging and treatment planning.     A new outpatient IR consult received for US-guided percutaneous 18-gauge core biopsy of this lesion.     Past Medical History:   Diagnosis Date    Anticoagulant long-term use     Arthritis     Cancer     right  hand    Coronary artery disease     Deep vein thrombosis     Hyperlipidemia     Hypertension     Seizures 1988    none since on tegretol     Past Surgical History:   Procedure Laterality Date    APPENDECTOMY      FRACTURE SURGERY Right     elbow    FRACTURE SURGERY Right     hip    JOSUÉ FILTER PLACEMENT      hx of deep vein thrombosis    OPEN REDUCTION AND INTERNAL FIXATION (ORIF) OF INTERTROCHANTERIC FRACTURE OF FEMUR Left 5/30/2018    Procedure: ORIF, FRACTURE, FEMUR, INTERTROCHANTERIC;  Surgeon: Pop Martin III, MD;  Location: [facility];  Service: Orthopedics;  Laterality: Left;    TOTAL SHOULDER ARTHROPLASTY Right      Home Meds:   Prior to Admission medications    Medication Sig Start Date End Date Taking? Authorizing Provider   atorvastatin (LIPITOR) 20 MG tablet TAKE ONE TABLET BY MOUTH ONCE DAILY 4/22/20   Javier Villanueva MD   bethanechol (URECHOLINE) 50 MG tablet TAKE ONE TABLET BY MOUTH THREE TIMES DAILY ON AN EMPTY STOMACH *DO NOT TAKE WITH MILK OR ANTACIDS, TAKE WITH WATER * 10/7/20   Javier Villanueva MD   carBAMazepine (TEGRETOL) 100 mg  chewable tablet TAKE 2 TABLETS BY MOUTH DAILY 4/7/20   Javier Villanueva MD   clopidogreL (PLAVIX) 75 mg tablet TAKE ONE TABLET BY MOUTH ONCE DAILY 4/22/20   Javier Villanueva MD   ergocalciferol (ERGOCALCIFEROL) 50,000 unit Cap TAKE ONE CAPSULE BY MOUTH TWICE WEEKLY 5/18/18   Birdie Quiroga PA-C   finasteride (PROSCAR) 5 mg tablet TAKE ONE TABLET BY MOUTH DAILY 11/24/20   Javier Villanueva MD   HYDROcodone-acetaminophen (NORCO)  mg per tablet Take 1 tablet by mouth every 4 to 6 hours as needed. 10/14/20   Historical Provider   levETIRAcetam (KEPPRA) 500 MG Tab TAKE ONE TABLET BY MOUTH TWICE DAILY 4/22/20   Javier Villanueva MD   metoprolol tartrate (LOPRESSOR) 25 MG tablet TAKE 1/2 TABLET BY MOUTH TWICE DAILY 10/7/20   Javier Villanueva MD   mometasone 0.1% (ELOCON) 0.1 % cream APPLY to face TWICE DAILY FOR 1 WEEK 11/6/20   Historical Provider   tamsulosin (FLOMAX) 0.4 mg Cap TAKE ONE CAPSULE BY MOUTH ONCE DAILY 4/22/20   Javier Villanueva MD   XARELTO 20 mg Tab TAKE ONE TABLET BY MOUTH DAILY 9/28/20   Jaiver Villanueva MD     Anticoagulants/Antiplatelets: no anticoagulation    Allergies: Review of patient's allergies indicates:  No Known Allergies     Sedation History:  no adverse reactions    Review of Systems:   Hematological: no known coagulopathies  Respiratory: no cough, shortness of breath, or wheezing  Cardiovascular: no chest pain or dyspnea on exertion  Gastrointestinal: no abdominal pain, change in bowel habits, or black or bloody stools  Genito-Urinary: no dysuria, trouble voiding, or hematuria  Musculoskeletal: negative  Neurological: no TIA or stroke symptoms     OBJECTIVE:     Vital Signs (Most Recent)     Physical Exam:  General: no acute distress  Mental Status: alert and oriented to person, place and time  HEENT: normocephalic, atraumatic  Chest: unlabored breathing  Heart: regular heart rate  Abdomen: nondistended  Extremity: moves all extremities    Laboratory  Lab Results   Component Value Date    INR 1.0 05/29/2018        Lab Results   Component Value Date    WBC 6.77 10/02/2019    HGB 12.8 (L) 10/02/2019    HCT 37.5 (L) 10/02/2019    MCV 90 10/02/2019     10/02/2019      Lab Results   Component Value Date     10/02/2019     10/02/2019    K 4.1 10/02/2019     (H) 10/02/2019    CO2 26 10/02/2019    BUN 15 10/02/2019    CREATININE 1.1 11/12/2020    CALCIUM 8.9 10/02/2019    MG 1.9 10/28/2015    ALT 16 10/02/2019    AST 20 10/02/2019    ALBUMIN 4.1 10/02/2019    BILITOT 0.5 10/02/2019     ASSESSMENT/PLAN:     88 y.o. male with PMHx of multiple episodes of biopsy-proven squamous cell carcinoma of the skin with recent development of a palpable, symptomatic right pterional subcutaneous soft-tissue mass concerning for residual and/or recurrent disease requiring tissues sampling for diagnosis, staging and treatment planning.     1. Suspicious symptomatic palpable right pterional subcutaneous soft-tissue nodule - Will attempt US-guided percutaneous 18-gauge core biopsy of this lesion with local anesthetic only.     Risks (including, but not limited to, pain, bleeding, infection, damage to nearby structures, failure to obtain sufficient material for a diagnosis, the need for additional procedures, and death), benefits, and alternatives were discussed with the patient. All questions were answered to the best of my abilities. The patient wishes to proceed with the procedure. Written informed consent was obtained.    Thank you for considering IR for the care of your patient.     Mitchell Brooks MD  Interventional Radiology

## 2020-11-27 NOTE — DISCHARGE SUMMARY
Radiology Discharge Summary      Hospital Course: No complications    Admit Date: 11/27/2020  Discharge Date: 11/27/2020     Instructions Given to Patient: Yes    Diet: Resume prior diet     Activity: activity as tolerated    Description of Condition on Discharge: Stable    Vital Signs (Most Recent):      Discharge Disposition: Home    Discharge Diagnosis:   88 y.o. male with PMHx of multiple episodes of biopsy-proven squamous cell carcinoma of the skin with recent development of a palpable, symptomatic right pterional subcutaneous soft-tissue mass concerning for residual and/or recurrent diseases s/p successful US-guided percutaneous 18-gauge core biopsy of this lesion with local anesthetic only. Patient tolerated the procedure well. No immediate post-procedural complications noted.     Thank you for considering IR for the care of your patient.     Mitchell Brooks MD  Interventional Radiology

## 2020-12-01 LAB
FINAL PATHOLOGIC DIAGNOSIS: NORMAL
GROSS: NORMAL

## 2020-12-03 ENCOUNTER — OFFICE VISIT (OUTPATIENT)
Dept: SURGERY | Facility: CLINIC | Age: 85
End: 2020-12-03
Payer: MEDICARE

## 2020-12-03 VITALS
WEIGHT: 150 LBS | HEIGHT: 67 IN | HEART RATE: 71 BPM | SYSTOLIC BLOOD PRESSURE: 133 MMHG | DIASTOLIC BLOOD PRESSURE: 69 MMHG | BODY MASS INDEX: 23.54 KG/M2

## 2020-12-03 DIAGNOSIS — C44.329 SQUAMOUS CELL CANCER OF SKIN OF EYEBROW: Primary | ICD-10-CM

## 2020-12-03 PROCEDURE — 99214 PR OFFICE/OUTPT VISIT, EST, LEVL IV, 30-39 MIN: ICD-10-PCS | Mod: S$GLB,,, | Performed by: SURGERY

## 2020-12-03 PROCEDURE — 1126F PR PAIN SEVERITY QUANTIFIED, NO PAIN PRESENT: ICD-10-PCS | Mod: S$GLB,,, | Performed by: SURGERY

## 2020-12-03 PROCEDURE — 1126F AMNT PAIN NOTED NONE PRSNT: CPT | Mod: S$GLB,,, | Performed by: SURGERY

## 2020-12-03 PROCEDURE — 1101F PT FALLS ASSESS-DOCD LE1/YR: CPT | Mod: CPTII,S$GLB,, | Performed by: SURGERY

## 2020-12-03 PROCEDURE — 1101F PR PT FALLS ASSESS DOC 0-1 FALLS W/OUT INJ PAST YR: ICD-10-PCS | Mod: CPTII,S$GLB,, | Performed by: SURGERY

## 2020-12-03 PROCEDURE — 1157F ADVNC CARE PLAN IN RCRD: CPT | Mod: S$GLB,,, | Performed by: SURGERY

## 2020-12-03 PROCEDURE — 99214 OFFICE O/P EST MOD 30 MIN: CPT | Mod: S$GLB,,, | Performed by: SURGERY

## 2020-12-03 PROCEDURE — 3288F PR FALLS RISK ASSESSMENT DOCUMENTED: ICD-10-PCS | Mod: CPTII,S$GLB,, | Performed by: SURGERY

## 2020-12-03 PROCEDURE — 3288F FALL RISK ASSESSMENT DOCD: CPT | Mod: CPTII,S$GLB,, | Performed by: SURGERY

## 2020-12-03 PROCEDURE — 1159F MED LIST DOCD IN RCRD: CPT | Mod: S$GLB,,, | Performed by: SURGERY

## 2020-12-03 PROCEDURE — 1159F PR MEDICATION LIST DOCUMENTED IN MEDICAL RECORD: ICD-10-PCS | Mod: S$GLB,,, | Performed by: SURGERY

## 2020-12-03 PROCEDURE — 1157F PR ADVANCE CARE PLAN OR EQUIV PRESENT IN MEDICAL RECORD: ICD-10-PCS | Mod: S$GLB,,, | Performed by: SURGERY

## 2020-12-03 NOTE — PROGRESS NOTES
Subjective:       Patient ID: Jaydon Mccarthy is a 88 y.o. male.    Chief Complaint: Follow-up    HPI 89 yo male with newly diagnosed SCC of his brow after having prior surgery and XRT 2 years ago  Review of Systems   Constitutional: Negative.    HENT: Negative.    Eyes: Negative.    Respiratory: Negative.    Cardiovascular: Negative.    Gastrointestinal: Negative.    Endocrine: Negative.    Musculoskeletal: Negative.    Integumentary:  Negative.   Allergic/Immunologic: Negative.    Neurological: Negative.    Hematological: Negative.    Psychiatric/Behavioral: Negative.    All other systems reviewed and are negative.        Objective:      Physical Exam  Vitals signs reviewed.   Constitutional:       Appearance: He is well-developed.   HENT:      Head: Normocephalic and atraumatic.        Right Ear: External ear normal.      Left Ear: External ear normal.      Nose: Nose normal.   Eyes:      Conjunctiva/sclera: Conjunctivae normal.      Pupils: Pupils are equal, round, and reactive to light.   Neck:      Musculoskeletal: Normal range of motion and neck supple.   Cardiovascular:      Rate and Rhythm: Normal rate and regular rhythm.      Heart sounds: Normal heart sounds.   Pulmonary:      Effort: Pulmonary effort is normal.      Breath sounds: Normal breath sounds.   Abdominal:      General: Bowel sounds are normal.      Palpations: Abdomen is soft.   Musculoskeletal: Normal range of motion.   Skin:     General: Skin is warm and dry.   Neurological:      Mental Status: He is alert and oriented to person, place, and time.      Deep Tendon Reflexes: Reflexes are normal and symmetric.   Psychiatric:         Behavior: Behavior normal.         Thought Content: Thought content normal.         Assessment:       1. 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

## 2020-12-07 ENCOUNTER — LAB VISIT (OUTPATIENT)
Dept: LAB | Facility: HOSPITAL | Age: 85
End: 2020-12-07
Attending: FAMILY MEDICINE
Payer: MEDICARE

## 2020-12-07 ENCOUNTER — LAB VISIT (OUTPATIENT)
Dept: FAMILY MEDICINE | Facility: CLINIC | Age: 85
End: 2020-12-07
Payer: MEDICARE

## 2020-12-07 DIAGNOSIS — C44.329 SQUAMOUS CELL CANCER OF SKIN OF EYEBROW: Primary | ICD-10-CM

## 2020-12-07 DIAGNOSIS — R79.1 ABNORMAL COAGULATION PROFILE: ICD-10-CM

## 2020-12-07 DIAGNOSIS — Z01.810 PREOP CARDIOVASCULAR EXAM: Primary | ICD-10-CM

## 2020-12-07 DIAGNOSIS — Z01.810 PREOP CARDIOVASCULAR EXAM: ICD-10-CM

## 2020-12-07 DIAGNOSIS — Z53.39 OTHER SPECIFIED PROCEDURE CONVERTED TO OPEN PROCEDURE: ICD-10-CM

## 2020-12-07 LAB
ANION GAP SERPL CALC-SCNC: 9 MMOL/L (ref 8–16)
APTT BLDCRRT: 27 SEC (ref 21–32)
BASOPHILS # BLD AUTO: 0.05 K/UL (ref 0–0.2)
BASOPHILS NFR BLD: 0.9 % (ref 0–1.9)
BUN SERPL-MCNC: 10 MG/DL (ref 8–23)
CALCIUM SERPL-MCNC: 8.3 MG/DL (ref 8.7–10.5)
CHLORIDE SERPL-SCNC: 108 MMOL/L (ref 95–110)
CO2 SERPL-SCNC: 25 MMOL/L (ref 23–29)
CREAT SERPL-MCNC: 1.1 MG/DL (ref 0.5–1.4)
DIFFERENTIAL METHOD: ABNORMAL
EOSINOPHIL # BLD AUTO: 0.2 K/UL (ref 0–0.5)
EOSINOPHIL NFR BLD: 3.5 % (ref 0–8)
ERYTHROCYTE [DISTWIDTH] IN BLOOD BY AUTOMATED COUNT: 13.1 % (ref 11.5–14.5)
EST. GFR  (AFRICAN AMERICAN): >60 ML/MIN/1.73 M^2
EST. GFR  (NON AFRICAN AMERICAN): 60 ML/MIN/1.73 M^2
GLUCOSE SERPL-MCNC: 97 MG/DL (ref 70–110)
HCT VFR BLD AUTO: 38.1 % (ref 40–54)
HGB BLD-MCNC: 12.9 G/DL (ref 14–18)
IMM GRANULOCYTES # BLD AUTO: 0.01 K/UL (ref 0–0.04)
IMM GRANULOCYTES NFR BLD AUTO: 0.2 % (ref 0–0.5)
INR PPP: 1 (ref 0.8–1.2)
LYMPHOCYTES # BLD AUTO: 1.6 K/UL (ref 1–4.8)
LYMPHOCYTES NFR BLD: 28.2 % (ref 18–48)
MCH RBC QN AUTO: 31 PG (ref 27–31)
MCHC RBC AUTO-ENTMCNC: 33.9 G/DL (ref 32–36)
MCV RBC AUTO: 92 FL (ref 82–98)
MONOCYTES # BLD AUTO: 0.5 K/UL (ref 0.3–1)
MONOCYTES NFR BLD: 7.8 % (ref 4–15)
NEUTROPHILS # BLD AUTO: 3.5 K/UL (ref 1.8–7.7)
NEUTROPHILS NFR BLD: 59.4 % (ref 38–73)
NRBC BLD-RTO: 0 /100 WBC
PLATELET # BLD AUTO: 164 K/UL (ref 150–350)
PMV BLD AUTO: 10.3 FL (ref 9.2–12.9)
POTASSIUM SERPL-SCNC: 3.7 MMOL/L (ref 3.5–5.1)
PROTHROMBIN TIME: 10.8 SEC (ref 9–12.5)
RBC # BLD AUTO: 4.16 M/UL (ref 4.6–6.2)
SODIUM SERPL-SCNC: 142 MMOL/L (ref 136–145)
WBC # BLD AUTO: 5.79 K/UL (ref 3.9–12.7)

## 2020-12-07 PROCEDURE — 85610 PROTHROMBIN TIME: CPT

## 2020-12-07 PROCEDURE — 80048 BASIC METABOLIC PNL TOTAL CA: CPT

## 2020-12-07 PROCEDURE — U0003 INFECTIOUS AGENT DETECTION BY NUCLEIC ACID (DNA OR RNA); SEVERE ACUTE RESPIRATORY SYNDROME CORONAVIRUS 2 (SARS-COV-2) (CORONAVIRUS DISEASE [COVID-19]), AMPLIFIED PROBE TECHNIQUE, MAKING USE OF HIGH THROUGHPUT TECHNOLOGIES AS DESCRIBED BY CMS-2020-01-R: HCPCS

## 2020-12-07 PROCEDURE — 85730 THROMBOPLASTIN TIME PARTIAL: CPT

## 2020-12-07 PROCEDURE — 85025 COMPLETE CBC W/AUTO DIFF WBC: CPT

## 2020-12-07 PROCEDURE — 36415 COLL VENOUS BLD VENIPUNCTURE: CPT | Mod: PO

## 2020-12-08 ENCOUNTER — HOSPITAL ENCOUNTER (OUTPATIENT)
Dept: PREADMISSION TESTING | Facility: HOSPITAL | Age: 85
Discharge: HOME | End: 2020-12-08
Attending: PLASTIC SURGERY
Payer: MEDICARE

## 2020-12-08 ENCOUNTER — ANESTHESIA EVENT (OUTPATIENT)
Dept: SURGERY | Facility: HOSPITAL | Age: 85
End: 2020-12-08
Payer: MEDICARE

## 2020-12-08 ENCOUNTER — HOSPITAL ENCOUNTER (OUTPATIENT)
Dept: RADIOLOGY | Facility: HOSPITAL | Age: 85
Discharge: HOME | End: 2020-12-08
Attending: SURGERY
Payer: MEDICARE

## 2020-12-08 VITALS
BODY MASS INDEX: 23.54 KG/M2 | OXYGEN SATURATION: 100 % | DIASTOLIC BLOOD PRESSURE: 69 MMHG | HEIGHT: 67 IN | WEIGHT: 150 LBS | SYSTOLIC BLOOD PRESSURE: 139 MMHG | RESPIRATION RATE: 18 BRPM

## 2020-12-08 DIAGNOSIS — C44.90 UNSPECIFIED MALIGNANT NEOPLASM OF SKIN, UNSPECIFIED: ICD-10-CM

## 2020-12-08 DIAGNOSIS — Z01.818 PREOP TESTING: ICD-10-CM

## 2020-12-08 LAB
SARS-COV-2 RDRP RESP QL NAA+PROBE: NEGATIVE
SARS-COV-2 RNA RESP QL NAA+PROBE: NOT DETECTED

## 2020-12-08 PROCEDURE — U0002 COVID-19 LAB TEST NON-CDC: HCPCS

## 2020-12-08 PROCEDURE — 70491 CT SOFT TISSUE NECK W/DYE: CPT | Mod: 26,,, | Performed by: RADIOLOGY

## 2020-12-08 PROCEDURE — 25500020 PHARM REV CODE 255: Performed by: SURGERY

## 2020-12-08 PROCEDURE — 70491 CT SOFT TISSUE NECK W/DYE: CPT | Mod: TC

## 2020-12-08 PROCEDURE — 70491 CT SOFT TISSUE NECK WITH CONTRAST: ICD-10-PCS | Mod: 26,,, | Performed by: RADIOLOGY

## 2020-12-08 RX ADMIN — IOHEXOL 75 ML: 350 INJECTION, SOLUTION INTRAVENOUS at 12:12

## 2020-12-08 NOTE — ANESTHESIA PREPROCEDURE EVALUATION
12/08/2020  Jaydon Mccarthy is a 88 y.o., male scheduled for EXCISION TUMOR RIGHT EYEBROW WITH COMPLEX CLOSURE (Right ) - FROZEN SECTION on 12/9/2020.      Past Medical History:   Diagnosis Date    Anticoagulant long-term use     Arthritis     Cancer     right  hand    Coronary artery disease     Deep vein thrombosis     Hyperlipidemia     Hypertension     Seizures 1988    none since on tegretol       Past Surgical History:   Procedure Laterality Date    APPENDECTOMY      FRACTURE SURGERY Right     elbow    FRACTURE SURGERY Right     hip    JOSUÉ FILTER PLACEMENT      hx of deep vein thrombosis    OPEN REDUCTION AND INTERNAL FIXATION (ORIF) OF INTERTROCHANTERIC FRACTURE OF FEMUR Left 5/30/2018    Procedure: ORIF, FRACTURE, FEMUR, INTERTROCHANTERIC;  Surgeon: Pop Martin III, MD;  Location: [facility];  Service: Orthopedics;  Laterality: Left;    TOTAL SHOULDER ARTHROPLASTY Right          Anesthesia Evaluation    I have reviewed the Patient Summary Reports.    I have reviewed the Nursing Notes. I have reviewed the NPO Status.   I have reviewed the Medications.     Review of Systems  Anesthesia Hx:  No problems with previous Anesthesia  Denies Family Hx of Anesthesia complications.   Denies Personal Hx of Anesthesia complications.   Social:  Former Smoker    Hematology/Oncology:         -- Anemia: Hematology Comments: Unprovoked DVT 8 years ago, [place] filter Current/Recent Cancer. radiation  Oncology Comments: Squamous cell     EENT/Dental:EENT/Dental Normal   Cardiovascular:   Hypertension Past MI CAD      Pulmonary:  Pulmonary Normal    Renal/:  Renal/ Normal     Hepatic/GI:  Hepatic/GI Normal    Musculoskeletal:  Musculoskeletal Normal    Neurological:   Seizures, well controlled Last seizure over 30 years ago   Endocrine:  Endocrine Normal    Dermatological:  Skin Normal     Psych:  Psychiatric Normal           Physical Exam  General:  Well nourished    Airway/Jaw/Neck:  AIRWAY FINDINGS: Normal      Chest/Lungs:  Chest/Lungs Clear    Heart/Vascular:  Heart Findings: Normal       Mental Status:  Mental Status Findings: Normal        Anesthesia Plan  Type of Anesthesia, risks & benefits discussed:  Anesthesia Type:  general  Patient's Preference:   Intra-op Monitoring Plan: standard ASA monitors  Intra-op Monitoring Plan Comments:   Post Op Pain Control Plan:   Post Op Pain Control Plan Comments:   Induction:   IV  Beta Blocker:         Informed Consent: Patient understands risks and agrees with Anesthesia plan.  Questions answered. Anesthesia consent signed with patient.  ASA Score: 3     Day of Surgery Review of History & Physical:  There are no significant changes.  H&P update referred to the provider.     Anesthesia Plan Notes: Hx of HTN, CAD, HLD, DVT, seizures. Has been seen by Cardiology in the past. Daughter states he has not had any complaints. He lives alone, feeds, dresses himself and uses a walker for ambulation. Followed by Dr. Villanueva, last seen 11/24/2020, chronic anticoaugulation stable  According to the ACC/AHA Algorithm for Cardiac Risk Assessment in pts having Non-Cardiac surgery, patient has <1% risk of MACE with a BUENO score of 0.7%, no need for cards assessment regardless of exercise tolerance.        Ready For Surgery From Anesthesia Perspective.

## 2020-12-08 NOTE — DISCHARGE INSTRUCTIONS
Your procedure  is scheduled for _12/9/2020_________.    Call [phone] between 2pm and 5pm on _today______to find out your arrival time for the day of surgery.    If your arrival time is earlier than 7:00 am, enter through the Emergency Department on the day of your surgery.    If your arrival time is after 7:00 am, enter at the front entrance of the hospital.    You will be going to the Same Day Surgery Unit on the 2nd floor of the hospital.    Important instructions:   Do not eat or drink after 12 midnight, including water.  It is okay to brush your teeth.  Do not have gum, candy or mints.      STOP taking Aspirin, Ibuprofen,  Advil, Motrin, Mobic(meloxicam), Aleve (naproxen), Fish oil, and Vitamin E for at least 7 days before your surgery.     You may take tylenol If needed which is not a blood thinner.       Please shower the night before and the morning of your surgery.           Please place clean linens on your bed the night before surgery. Please wear fresh clean clothing after each shower.     No shaving of procedural area at least 4-5 days before surgery due to increased risk of skin irritation and/or possible infection.      Contact lenses and removable denture work may not be worn during your procedure.     You may wear deodorant only.      Do not wear powder, body lotion or perfume/cologne.     Do not wear any jewelry or have any metal on your body.     You will be asked to remove any dentures or partials for the procedure.     Please bring any documents given to you by your doctor.     If you are going home on the same day of surgery, you must arrange for a family member or a friend to drive you home.  Public transportation is prohibited.  You will not be able to drive home if you were given anesthesia or sedation.     Wear loose fitting clothes allowing for bandages.     Please leave money and valuables home.       You may bring your cell phone.     Call the doctor if fever or illness  should occur before your surgery.    Call [phone] to contact us here if needed.

## 2020-12-09 ENCOUNTER — HOSPITAL ENCOUNTER (OUTPATIENT)
Facility: HOSPITAL | Age: 85
Discharge: HOME | End: 2020-12-09
Attending: PLASTIC SURGERY | Admitting: PLASTIC SURGERY
Payer: MEDICARE

## 2020-12-09 ENCOUNTER — ANESTHESIA (OUTPATIENT)
Dept: SURGERY | Facility: HOSPITAL | Age: 85
End: 2020-12-09
Payer: MEDICARE

## 2020-12-09 VITALS
HEIGHT: 67 IN | SYSTOLIC BLOOD PRESSURE: 150 MMHG | WEIGHT: 149 LBS | OXYGEN SATURATION: 10 % | DIASTOLIC BLOOD PRESSURE: 70 MMHG | BODY MASS INDEX: 23.39 KG/M2 | RESPIRATION RATE: 18 BRPM | HEART RATE: 62 BPM | TEMPERATURE: 98 F

## 2020-12-09 DIAGNOSIS — Z01.818 PREOP TESTING: ICD-10-CM

## 2020-12-09 DIAGNOSIS — C44.329 SQUAMOUS CELL CANCER OF SKIN OF EYEBROW: Primary | ICD-10-CM

## 2020-12-09 PROCEDURE — 88331 PR  PATH CONSULT IN SURG,W FRZ SEC: ICD-10-PCS | Mod: 26,,, | Performed by: PATHOLOGY

## 2020-12-09 PROCEDURE — 37000009 HC ANESTHESIA EA ADD 15 MINS: Performed by: PLASTIC SURGERY

## 2020-12-09 PROCEDURE — 36000707: Performed by: PLASTIC SURGERY

## 2020-12-09 PROCEDURE — D9220A PRA ANESTHESIA: Mod: CRNA,,, | Performed by: NURSE ANESTHETIST, CERTIFIED REGISTERED

## 2020-12-09 PROCEDURE — 71000033 HC RECOVERY, INTIAL HOUR: Performed by: PLASTIC SURGERY

## 2020-12-09 PROCEDURE — 88331 PATH CONSLTJ SURG 1 BLK 1SPC: CPT | Mod: 26,,, | Performed by: PATHOLOGY

## 2020-12-09 PROCEDURE — D9220A PRA ANESTHESIA: ICD-10-PCS | Mod: ANES,,, | Performed by: ANESTHESIOLOGY

## 2020-12-09 PROCEDURE — 71000015 HC POSTOP RECOV 1ST HR: Performed by: PLASTIC SURGERY

## 2020-12-09 PROCEDURE — D9220A PRA ANESTHESIA: Mod: ANES,,, | Performed by: ANESTHESIOLOGY

## 2020-12-09 PROCEDURE — 71000039 HC RECOVERY, EACH ADD'L HOUR: Performed by: PLASTIC SURGERY

## 2020-12-09 PROCEDURE — 88305 TISSUE EXAM BY PATHOLOGIST: CPT | Mod: 26,,, | Performed by: PATHOLOGY

## 2020-12-09 PROCEDURE — 88305 TISSUE EXAM BY PATHOLOGIST: ICD-10-PCS | Mod: 26,,, | Performed by: PATHOLOGY

## 2020-12-09 PROCEDURE — 63600175 PHARM REV CODE 636 W HCPCS: Performed by: ANESTHESIOLOGY

## 2020-12-09 PROCEDURE — 63600175 PHARM REV CODE 636 W HCPCS: Performed by: NURSE ANESTHETIST, CERTIFIED REGISTERED

## 2020-12-09 PROCEDURE — 36000706: Performed by: PLASTIC SURGERY

## 2020-12-09 PROCEDURE — 37000008 HC ANESTHESIA 1ST 15 MINUTES: Performed by: PLASTIC SURGERY

## 2020-12-09 PROCEDURE — 25000003 PHARM REV CODE 250: Performed by: NURSE ANESTHETIST, CERTIFIED REGISTERED

## 2020-12-09 PROCEDURE — 25000003 PHARM REV CODE 250: Performed by: PLASTIC SURGERY

## 2020-12-09 PROCEDURE — 71000016 HC POSTOP RECOV ADDL HR: Performed by: PLASTIC SURGERY

## 2020-12-09 PROCEDURE — D9220A PRA ANESTHESIA: ICD-10-PCS | Mod: CRNA,,, | Performed by: NURSE ANESTHETIST, CERTIFIED REGISTERED

## 2020-12-09 PROCEDURE — 88305 TISSUE EXAM BY PATHOLOGIST: CPT | Performed by: PATHOLOGY

## 2020-12-09 PROCEDURE — 88331 PATH CONSLTJ SURG 1 BLK 1SPC: CPT | Mod: 59 | Performed by: PATHOLOGY

## 2020-12-09 RX ORDER — CHOLECALCIFEROL (VITAMIN D3) 25 MCG
1000 TABLET ORAL DAILY
COMMUNITY
End: 2022-08-15 | Stop reason: SDUPTHER

## 2020-12-09 RX ORDER — PROPOFOL 10 MG/ML
VIAL (ML) INTRAVENOUS
Status: DISCONTINUED | OUTPATIENT
Start: 2020-12-09 | End: 2020-12-09

## 2020-12-09 RX ORDER — SODIUM CHLORIDE, SODIUM LACTATE, POTASSIUM CHLORIDE, CALCIUM CHLORIDE 600; 310; 30; 20 MG/100ML; MG/100ML; MG/100ML; MG/100ML
INJECTION, SOLUTION INTRAVENOUS CONTINUOUS PRN
Status: DISCONTINUED | OUTPATIENT
Start: 2020-12-09 | End: 2020-12-09

## 2020-12-09 RX ORDER — LIDOCAINE HYDROCHLORIDE AND EPINEPHRINE 10; 10 MG/ML; UG/ML
INJECTION, SOLUTION INFILTRATION; PERINEURAL
Status: DISCONTINUED | OUTPATIENT
Start: 2020-12-09 | End: 2020-12-09 | Stop reason: HOSPADM

## 2020-12-09 RX ORDER — ROCURONIUM BROMIDE 10 MG/ML
INJECTION, SOLUTION INTRAVENOUS
Status: DISCONTINUED | OUTPATIENT
Start: 2020-12-09 | End: 2020-12-09

## 2020-12-09 RX ORDER — MUPIROCIN 20 MG/G
OINTMENT TOPICAL 2 TIMES DAILY
Status: DISCONTINUED | OUTPATIENT
Start: 2020-12-09 | End: 2020-12-09 | Stop reason: HOSPADM

## 2020-12-09 RX ORDER — ACETAMINOPHEN 10 MG/ML
1000 INJECTION, SOLUTION INTRAVENOUS ONCE
Status: COMPLETED | OUTPATIENT
Start: 2020-12-09 | End: 2020-12-09

## 2020-12-09 RX ORDER — CEFAZOLIN SODIUM 1 G/3ML
INJECTION, POWDER, FOR SOLUTION INTRAMUSCULAR; INTRAVENOUS
Status: DISCONTINUED | OUTPATIENT
Start: 2020-12-09 | End: 2020-12-09

## 2020-12-09 RX ORDER — FENTANYL CITRATE 50 UG/ML
INJECTION, SOLUTION INTRAMUSCULAR; INTRAVENOUS
Status: DISCONTINUED | OUTPATIENT
Start: 2020-12-09 | End: 2020-12-09

## 2020-12-09 RX ORDER — FENTANYL CITRATE 50 UG/ML
15 INJECTION, SOLUTION INTRAMUSCULAR; INTRAVENOUS EVERY 5 MIN PRN
Status: DISCONTINUED | OUTPATIENT
Start: 2020-12-09 | End: 2020-12-09 | Stop reason: HOSPADM

## 2020-12-09 RX ORDER — HYDROMORPHONE HYDROCHLORIDE 2 MG/ML
0.1 INJECTION, SOLUTION INTRAMUSCULAR; INTRAVENOUS; SUBCUTANEOUS EVERY 5 MIN PRN
Status: DISCONTINUED | OUTPATIENT
Start: 2020-12-09 | End: 2020-12-09 | Stop reason: HOSPADM

## 2020-12-09 RX ORDER — SODIUM CHLORIDE 0.9 % (FLUSH) 0.9 %
3 SYRINGE (ML) INJECTION
Status: DISCONTINUED | OUTPATIENT
Start: 2020-12-09 | End: 2020-12-09 | Stop reason: HOSPADM

## 2020-12-09 RX ORDER — EPHEDRINE SULFATE 50 MG/ML
INJECTION, SOLUTION INTRAVENOUS
Status: DISCONTINUED | OUTPATIENT
Start: 2020-12-09 | End: 2020-12-09

## 2020-12-09 RX ORDER — ONDANSETRON 2 MG/ML
INJECTION INTRAMUSCULAR; INTRAVENOUS
Status: DISCONTINUED | OUTPATIENT
Start: 2020-12-09 | End: 2020-12-09

## 2020-12-09 RX ORDER — SUCCINYLCHOLINE CHLORIDE 20 MG/ML
INJECTION INTRAMUSCULAR; INTRAVENOUS
Status: DISCONTINUED | OUTPATIENT
Start: 2020-12-09 | End: 2020-12-09

## 2020-12-09 RX ORDER — HYDROCODONE BITARTRATE AND ACETAMINOPHEN 5; 325 MG/1; MG/1
1 TABLET ORAL EVERY 4 HOURS PRN
Status: DISCONTINUED | OUTPATIENT
Start: 2020-12-09 | End: 2020-12-09 | Stop reason: HOSPADM

## 2020-12-09 RX ORDER — BUPIVACAINE HYDROCHLORIDE 2.5 MG/ML
INJECTION, SOLUTION EPIDURAL; INFILTRATION; INTRACAUDAL
Status: DISCONTINUED | OUTPATIENT
Start: 2020-12-09 | End: 2020-12-09 | Stop reason: HOSPADM

## 2020-12-09 RX ORDER — LIDOCAINE HYDROCHLORIDE 20 MG/ML
INJECTION INTRAVENOUS
Status: DISCONTINUED | OUTPATIENT
Start: 2020-12-09 | End: 2020-12-09

## 2020-12-09 RX ADMIN — SODIUM CHLORIDE, SODIUM LACTATE, POTASSIUM CHLORIDE, AND CALCIUM CHLORIDE: .6; .31; .03; .02 INJECTION, SOLUTION INTRAVENOUS at 07:12

## 2020-12-09 RX ADMIN — CEFAZOLIN SODIUM 2 G: 1 POWDER, FOR SOLUTION INTRAMUSCULAR; INTRAVENOUS at 07:12

## 2020-12-09 RX ADMIN — PROPOFOL 60 MG: 10 INJECTION, EMULSION INTRAVENOUS at 07:12

## 2020-12-09 RX ADMIN — ONDANSETRON 4 MG: 2 INJECTION, SOLUTION INTRAMUSCULAR; INTRAVENOUS at 08:12

## 2020-12-09 RX ADMIN — Medication 100 MG: at 07:12

## 2020-12-09 RX ADMIN — FENTANYL CITRATE 50 MCG: 50 INJECTION INTRAMUSCULAR; INTRAVENOUS at 07:12

## 2020-12-09 RX ADMIN — HYDROMORPHONE HYDROCHLORIDE 0.1 MG: 2 INJECTION INTRAMUSCULAR; INTRAVENOUS; SUBCUTANEOUS at 09:12

## 2020-12-09 RX ADMIN — ACETAMINOPHEN 1000 MG: 10 INJECTION, SOLUTION INTRAVENOUS at 08:12

## 2020-12-09 RX ADMIN — SUCCINYLCHOLINE CHLORIDE 80 MG: 20 INJECTION, SOLUTION INTRAMUSCULAR; INTRAVENOUS at 07:12

## 2020-12-09 RX ADMIN — ROCURONIUM BROMIDE 5 MG: 10 INJECTION, SOLUTION INTRAVENOUS at 07:12

## 2020-12-09 RX ADMIN — HYDROMORPHONE HYDROCHLORIDE 0.1 MG: 2 INJECTION INTRAMUSCULAR; INTRAVENOUS; SUBCUTANEOUS at 10:12

## 2020-12-09 RX ADMIN — EPHEDRINE SULFATE 5 MG: 50 INJECTION INTRAVENOUS at 07:12

## 2020-12-09 RX ADMIN — EPHEDRINE SULFATE 10 MG: 50 INJECTION INTRAVENOUS at 07:12

## 2020-12-09 RX ADMIN — EPHEDRINE SULFATE 10 MG: 50 INJECTION INTRAVENOUS at 08:12

## 2020-12-09 NOTE — TRANSFER OF CARE
"Anesthesia Transfer of Care Note    Patient: Jaydon Mccarthy    Procedure(s) Performed: Procedure(s) (LRB):  EXCISION TUMOR RIGHT EYEBROW WITH COMPLEX CLOSURE (Right)    Patient location: PACU    Anesthesia Type: general    Transport from OR: Transported from OR on room air with adequate spontaneous ventilation    Post pain: adequate analgesia    Post assessment: no apparent anesthetic complications and tolerated procedure well    Post vital signs: stable    Level of consciousness: sedated and responds to stimulation    Nausea/Vomiting: no nausea/vomiting    Complications: none    Transfer of care protocol was followed      Last vitals:   Visit Vitals  BP (!) 157/70 (BP Location: Right arm)   Pulse 76   Temp 36.6 °C (97.9 °F) (Skin)   Resp 17   Ht 5' 6.5" (1.689 m)   Wt 67.6 kg (149 lb)   SpO2 100%   BMI 23.69 kg/m²     "

## 2020-12-09 NOTE — ANESTHESIA PROCEDURE NOTES
Intubation  Performed by: Kenny Long CRNA  Authorized by: Adalberto Wright MD     Intubation:     Induction:  Intravenous    Intubated:  Postinduction    Mask Ventilation:  Easy with oral airway    Attempts:  1    Attempted By:  CRNA    Method of Intubation:  Direct    Blade:  Rosio 3    Laryngeal View Grade: Grade IIA - cords partially seen      Difficult Airway Encountered?: No      Complications:  None    Airway Device:  Oral endotracheal tube    Airway Device Size:  7.5    Style/Cuff Inflation:  Cuffed (inflated to minimal occlusive pressure)    Tube secured:  22    Secured at:  The lips    Placement Verified By:  Capnometry    Complicating Factors:  Anterior larynx    Findings Post-Intubation:  BS equal bilateral and atraumatic/condition of teeth unchanged

## 2020-12-10 NOTE — ANESTHESIA POSTPROCEDURE EVALUATION
Anesthesia Post Evaluation    Patient: Jaydon Mccarthy    Procedure(s) Performed: Procedure(s) (LRB):  EXCISION TUMOR RIGHT EYEBROW WITH COMPLEX CLOSURE (Right)    Final Anesthesia Type: general    Patient location during evaluation: PACU  Patient participation: Yes- Able to Participate  Level of consciousness: awake and alert  Post-procedure vital signs: reviewed and stable  Pain management: adequate  Airway patency: patent    PONV status at discharge: No PONV  Anesthetic complications: no      Cardiovascular status: blood pressure returned to baseline and hemodynamically stable  Respiratory status: unassisted and spontaneous ventilation  Hydration status: euvolemic  Follow-up not needed.          Vitals Value Taken Time   /70 12/09/20 1230   Temp 36.5 °C (97.7 °F) 12/09/20 1230   Pulse 62 12/09/20 1230   Resp 18 12/09/20 1230   SpO2 10 % 12/09/20 1230         Event Time   Out of Recovery 10:28:00         Pain/Giorgi Score: Pain Rating Prior to Med Admin: 4 (12/9/2020 10:16 AM)  Giorgi Score: 10 (12/9/2020 12:45 PM)

## 2020-12-11 LAB
FINAL PATHOLOGIC DIAGNOSIS: NORMAL
FROZEN SECTION DIAGNOSIS: NORMAL
GROSS: NORMAL
Lab: NORMAL
SUPPLEMENTAL DIAGNOSIS: NORMAL

## 2020-12-14 DIAGNOSIS — C44.90 UNSPECIFIED MALIGNANT NEOPLASM OF SKIN, UNSPECIFIED: Primary | ICD-10-CM

## 2020-12-16 ENCOUNTER — TELEPHONE (OUTPATIENT)
Dept: FAMILY MEDICINE | Facility: CLINIC | Age: 85
End: 2020-12-16

## 2020-12-16 DIAGNOSIS — Z01.810 PREOP CARDIOVASCULAR EXAM: Primary | ICD-10-CM

## 2020-12-16 NOTE — TELEPHONE ENCOUNTER
----- Message from Eileen Shaffer LPN sent at 12/14/2020 10:13 AM CST -----  Regarding: FW: EKG ORDER    ----- Message -----  From: Lauren Watkins  Sent: 12/14/2020   8:53 AM CST  To: Rich Herrera Staff  Subject: EKG ORDER                                        Please create a visit from 12/07/20 so that I can release the order for this patient. Thank You. Lauren  [id number]

## 2020-12-29 ENCOUNTER — OFFICE VISIT (OUTPATIENT)
Dept: HEMATOLOGY/ONCOLOGY | Facility: CLINIC | Age: 85
End: 2020-12-29
Payer: MEDICARE

## 2020-12-29 ENCOUNTER — TELEPHONE (OUTPATIENT)
Dept: HEMATOLOGY/ONCOLOGY | Facility: CLINIC | Age: 85
End: 2020-12-29

## 2020-12-29 VITALS
HEIGHT: 66 IN | HEART RATE: 64 BPM | OXYGEN SATURATION: 96 % | WEIGHT: 137.38 LBS | SYSTOLIC BLOOD PRESSURE: 128 MMHG | DIASTOLIC BLOOD PRESSURE: 75 MMHG | TEMPERATURE: 98 F | BODY MASS INDEX: 22.08 KG/M2

## 2020-12-29 DIAGNOSIS — C44.320 SQUAMOUS CELL CARCINOMA OF FACE: Primary | ICD-10-CM

## 2020-12-29 PROCEDURE — 99999 PR PBB SHADOW E&M-EST. PATIENT-LVL IV: ICD-10-PCS | Mod: PBBFAC,,, | Performed by: INTERNAL MEDICINE

## 2020-12-29 PROCEDURE — 99214 PR OFFICE/OUTPT VISIT, EST, LEVL IV, 30-39 MIN: ICD-10-PCS | Mod: S$GLB,,, | Performed by: INTERNAL MEDICINE

## 2020-12-29 PROCEDURE — 1159F PR MEDICATION LIST DOCUMENTED IN MEDICAL RECORD: ICD-10-PCS | Mod: S$GLB,,, | Performed by: INTERNAL MEDICINE

## 2020-12-29 PROCEDURE — 1159F MED LIST DOCD IN RCRD: CPT | Mod: S$GLB,,, | Performed by: INTERNAL MEDICINE

## 2020-12-29 PROCEDURE — 1126F PR PAIN SEVERITY QUANTIFIED, NO PAIN PRESENT: ICD-10-PCS | Mod: S$GLB,,, | Performed by: INTERNAL MEDICINE

## 2020-12-29 PROCEDURE — 99999 PR PBB SHADOW E&M-EST. PATIENT-LVL IV: CPT | Mod: PBBFAC,,, | Performed by: INTERNAL MEDICINE

## 2020-12-29 PROCEDURE — 3288F FALL RISK ASSESSMENT DOCD: CPT | Mod: CPTII,S$GLB,, | Performed by: INTERNAL MEDICINE

## 2020-12-29 PROCEDURE — 1101F PT FALLS ASSESS-DOCD LE1/YR: CPT | Mod: CPTII,S$GLB,, | Performed by: INTERNAL MEDICINE

## 2020-12-29 PROCEDURE — 3288F PR FALLS RISK ASSESSMENT DOCUMENTED: ICD-10-PCS | Mod: CPTII,S$GLB,, | Performed by: INTERNAL MEDICINE

## 2020-12-29 PROCEDURE — 1126F AMNT PAIN NOTED NONE PRSNT: CPT | Mod: S$GLB,,, | Performed by: INTERNAL MEDICINE

## 2020-12-29 PROCEDURE — 1101F PR PT FALLS ASSESS DOC 0-1 FALLS W/OUT INJ PAST YR: ICD-10-PCS | Mod: CPTII,S$GLB,, | Performed by: INTERNAL MEDICINE

## 2020-12-29 PROCEDURE — 1157F ADVNC CARE PLAN IN RCRD: CPT | Mod: S$GLB,,, | Performed by: INTERNAL MEDICINE

## 2020-12-29 PROCEDURE — 99214 OFFICE O/P EST MOD 30 MIN: CPT | Mod: S$GLB,,, | Performed by: INTERNAL MEDICINE

## 2020-12-29 PROCEDURE — 1157F PR ADVANCE CARE PLAN OR EQUIV PRESENT IN MEDICAL RECORD: ICD-10-PCS | Mod: S$GLB,,, | Performed by: INTERNAL MEDICINE

## 2020-12-29 NOTE — TELEPHONE ENCOUNTER
TC to Dr Hwang's office to schedule appt for radiation consult  Appt for Mon jan 4th at 1:30  Message left on VM for daughter Rupal

## 2020-12-29 NOTE — LETTER
December 29, 2020      William Beckham MD  [address] [facility]  [address]  [place] [address]           [facility]  [address]  [facility] [place] [address]  Phone: [phone]          Patient: Jaydon Mccarthy   MR Number: [id number]   YOB: 1932   Date of Visit: 12/29/2020       Dear Dr. William Beckham:    Thank you for referring Jaydon Mccarthy to me for evaluation. Attached you will find relevant portions of my assessment and plan of care.    If you have questions, please do not hesitate to call me. I look forward to following Jaydon Mccarthy along with you.    Sincerely,    Leesa Smart MD    Enclosure  CC:  No Recipients    If you would like to receive this communication electronically, please contact [email or web address] or [phone] to request more information on [facility] access.    For providers and/or their staff who would like to refer a patient to [facility], please contact us through our one-stop-shop provider referral line, [facility], at [phone].    If you feel you have received this communication in error or would no longer like to receive these types of communications, please e-mail [email or web address]

## 2020-12-29 NOTE — PROGRESS NOTES
Chief Complaint :  Squamous cell  Carcinoma Skin    Hx of Present illness :  Patient is a 88 y.o. year old male who presents to the clinic today for  Oncology evaluation.   Skin Cancer excised from forehead about 25 years ago. Has had multiple skin lesions treated with cryotherapy. In 2016,  Had skin cancer excised right hand. 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  Had undergone Radiation therapy  Patient had Bx of Lesion from right forehead. Keratinizing Squamous Cell carcinoma.  undergone excision of Skin cancer from right eyebrow on 12/09/20 .       Allergies :    Review of patient's allergies indicates:  No Known Allergies    Occupation :   at [employer]; retired.     Transfusion :  none    Menstrual & obstetric Hx : N/A      Present Meds :   Medication List with Changes/Refills   Current Medications    ATORVASTATIN (LIPITOR) 20 MG TABLET    TAKE ONE TABLET BY MOUTH ONCE DAILY    BETHANECHOL (URECHOLINE) 50 MG TABLET    TAKE ONE TABLET BY MOUTH THREE TIMES DAILY ON AN EMPTY STOMACH *DO NOT TAKE WITH MILK OR ANTACIDS, TAKE WITH WATER *    CARBAMAZEPINE (TEGRETOL) 100 MG CHEWABLE TABLET    TAKE 2 TABLETS BY MOUTH DAILY    CLOPIDOGREL (PLAVIX) 75 MG TABLET    TAKE ONE TABLET BY MOUTH ONCE DAILY    FINASTERIDE (PROSCAR) 5 MG TABLET    TAKE ONE TABLET BY MOUTH DAILY    HYDROCODONE-ACETAMINOPHEN (NORCO)  MG PER TABLET    Take 1 tablet by mouth every 4 to 6 hours as needed.    LEVETIRACETAM (KEPPRA) 500 MG TAB    TAKE ONE TABLET BY MOUTH TWICE DAILY    METOPROLOL TARTRATE (LOPRESSOR) 25 MG TABLET    TAKE 1/2 TABLET BY MOUTH TWICE DAILY    MOMETASONE 0.1% (ELOCON) 0.1 % CREAM    APPLY to face TWICE DAILY FOR 1 WEEK    TAMSULOSIN (FLOMAX) 0.4 MG CAP    TAKE ONE CAPSULE BY MOUTH ONCE DAILY    VITAMIN D (VITAMIN D3) 1000 UNITS TAB    Take 1,000 Units by mouth once daily.    XARELTO 20 MG TAB    TAKE ONE TABLET BY MOUTH DAILY       Past Medical Hx  :  Reviewed.  hx of DVT . No past Hx of Chemotherapy or radiation therapy. Hx of seizure activity. Hx of BPH    Past Medical Hx :  Past Medical History:   Diagnosis Date    Anticoagulant long-term use     Arthritis     Cancer     right  hand    Coronary artery disease     Deep vein thrombosis     Hyperlipidemia     Hypertension     Seizures 1988    none since on tegretol       Travel Hx :  N/A    Immunization :  Immunization History   Administered Date(s) Administered    Influenza 10/03/2010, 2011    Influenza (FLUAD) - Quadrivalent - Adjuvanted - PF *Preferred* (65+) 2020    Influenza - High Dose - PF (65 years and older) 10/09/2014, 2016, 10/31/2017, 10/25/2018, 2019    Influenza - Quadrivalent - PF *Preferred* (6 months and older) 10/03/2010, 2011    PPD Test 2015, 2015, 2018    Pneumococcal Conjugate - 13 Valent 06/10/2016    Pneumococcal Polysaccharide - 23 Valent 10/31/2017    Zoster 10/31/2017       Family Hx :  No family history on file.    Social Hx :  Social History     Socioeconomic History    Marital status:      Spouse name: Not on file    Number of children: Not on file    Years of education: Not on file    Highest education level: Not on file   Occupational History    Not on file   Social Needs    Financial resource strain: Not on file    Food insecurity     Worry: Not on file     Inability: Not on file    Transportation needs     Medical: Not on file     Non-medical: Not on file   Tobacco Use    Smoking status: Former Smoker     Packs/day: 0.50     Years: 6.00     Pack years: 3.00     Types: Cigarettes     Quit date: 10/27/1975     Years since quittin.2    Smokeless tobacco: Never Used   Substance and Sexual Activity    Alcohol use: No    Drug use: No    Sexual activity: Not Currently     Partners: Female   Lifestyle    Physical activity     Days per week: Not on file     Minutes per session: Not on file     Stress: Not on file   Relationships    Social connections     Talks on phone: Not on file     Gets together: Not on file     Attends Mosque service: Not on file     Active member of club or organization: Not on file     Attends meetings of clubs or organizations: Not on file     Relationship status: Not on file   Other Topics Concern    Not on file   Social History Narrative    Not on file       Surgery :  Skin Bx; Excision of squamous cell carcinoma right hand in 2016. Appendectomy.  Colonoscopy. About ten years ago.  IVC filter. Bilateral hip surgery.  Right shoulder surgery. Coronary angiogram and stent.     Symptoms :    Review of Systems   Constitutional: Negative for chills, fever, malaise/fatigue and weight loss.        Having a lot of pain forehead.   HENT: Negative for congestion, hearing loss, nosebleeds, sore throat and tinnitus.    Eyes: Negative for blurred vision, double vision and photophobia.   Respiratory: Negative for cough, hemoptysis, shortness of breath and wheezing.    Cardiovascular: Negative for chest pain, palpitations, claudication and leg swelling.   Gastrointestinal: Negative for abdominal pain, blood in stool, constipation, diarrhea, heartburn, nausea and vomiting.   Genitourinary: Negative for dysuria, flank pain, frequency, hematuria and urgency.        Nocturia   Musculoskeletal: Negative for back pain, falls, joint pain, myalgias and neck pain.   Skin: Negative for itching and rash.   Neurological: Positive for weakness (of legs). Negative for dizziness, tingling, tremors, sensory change and headaches.   Endo/Heme/Allergies: Negative for environmental allergies. Does not bruise/bleed easily.   Psychiatric/Behavioral: Negative for depression and memory loss. The patient is not nervous/anxious and does not have insomnia.    No New sx    Physical Exam :  Daughter present in the room.  Physical Exam   Constitutional: He is oriented to person, place, and time and well-developed,  well-nourished, and in no distress. Vital signs are normal. No distress.   HENT:   Head: Normocephalic and atraumatic.   Right Ear: External ear normal.   Left Ear: External ear normal.   Nose: Nose normal.   Mouth/Throat: Oropharynx is clear and moist. No oropharyngeal exudate.   Eyes: Pupils are equal, round, and reactive to light. Conjunctivae, EOM and lids are normal. Lids are everted and swept, no foreign bodies found. Right eye exhibits no discharge. Left eye exhibits no discharge. No scleral icterus.   Neck: Trachea normal, normal range of motion, full passive range of motion without pain and phonation normal. Neck supple. Normal carotid pulses, no hepatojugular reflux and no JVD present. No tracheal tenderness present. Carotid bruit is not present. No tracheal deviation present. No thyroid mass and no thyromegaly present.   Cardiovascular: Normal rate, regular rhythm, normal heart sounds, intact distal pulses and normal pulses. PMI is not displaced. Exam reveals no gallop and no friction rub.   No murmur heard.  Pulmonary/Chest: Effort normal and breath sounds normal. No stridor. No apnea. No respiratory distress. He has no wheezes. He has no rales. He exhibits no tenderness.   Abdominal: Soft. Normal appearance, normal aorta and bowel sounds are normal. He exhibits no distension and no mass. There is no hepatosplenomegaly. There is no abdominal tenderness. There is no rebound, no guarding and no CVA tenderness. No hernia.   Musculoskeletal: Normal range of motion.         General: Edema present. No tenderness or deformity.   Lymphadenopathy:        Head (right side): No submental, no submandibular, no tonsillar, no preauricular, no posterior auricular and no occipital adenopathy present.        Head (left side): No submental, no submandibular, no tonsillar, no preauricular, no posterior auricular and no occipital adenopathy present.     He has no cervical adenopathy.     He has no axillary adenopathy.         Right: No supraclavicular and no epitrochlear adenopathy present.        Left: No supraclavicular and no epitrochlear adenopathy present.   Neurological: He is alert and oriented to person, place, and time. He has normal sensation, normal strength, normal reflexes and intact cranial nerves. No cranial nerve deficit. He exhibits normal muscle tone. Coordination normal. GCS score is 15.   Uses walker   Skin: Skin is warm and dry. No rash noted. He is not diaphoretic. No cyanosis or erythema. No pallor. Nails show no clubbing.    Healed scar right temple and right eyebrow.   Psychiatric: Mood, memory, affect and judgment normal.   Nursing note and vitals reviewed.    No new finding    Labs & Imaging : 12/07/20  Hgb 12.9; Hct 38.1; MCV 92; Platelets 164,000 ANC 3,500.      NFBS 97; Cr.1.1; Ca 8.3. eGFR > 60.         10/02/19 : NFBS 107; Cr.1.2. Ca 8.0 Bili 0.5.  . eGFR 54. Hgb 12.5; hct 37.5; MCV 90. Platelets 184,000 ANC 3,800. Chest x ray : no mass adenopathy or edffusion.         Dx : squamous Cell carcinoma Right temple      Assessment & Plan:  Reviewed with patient  And daughter. Refer to  for local radiation.

## 2021-01-05 ENCOUNTER — PATIENT MESSAGE (OUTPATIENT)
Dept: ADMINISTRATIVE | Facility: HOSPITAL | Age: 86
End: 2021-01-05

## 2021-01-06 ENCOUNTER — IMMUNIZATION (OUTPATIENT)
Dept: OBSTETRICS AND GYNECOLOGY | Facility: CLINIC | Age: 86
End: 2021-01-06
Payer: MEDICARE

## 2021-01-06 DIAGNOSIS — Z23 NEED FOR VACCINATION: ICD-10-CM

## 2021-01-06 PROCEDURE — 91300 COVID-19, MRNA, LNP-S, PF, 30 MCG/0.3 ML DOSE VACCINE: CPT | Mod: PBBFAC | Performed by: FAMILY MEDICINE

## 2021-01-27 ENCOUNTER — IMMUNIZATION (OUTPATIENT)
Dept: OBSTETRICS AND GYNECOLOGY | Facility: CLINIC | Age: 86
End: 2021-01-27
Payer: MEDICARE

## 2021-01-27 DIAGNOSIS — Z23 NEED FOR VACCINATION: Primary | ICD-10-CM

## 2021-01-27 PROCEDURE — 0002A COVID-19, MRNA, LNP-S, PF, 30 MCG/0.3 ML DOSE VACCINE: CPT | Mod: PBBFAC | Performed by: FAMILY MEDICINE

## 2021-01-27 PROCEDURE — 91300 COVID-19, MRNA, LNP-S, PF, 30 MCG/0.3 ML DOSE VACCINE: CPT | Mod: PBBFAC | Performed by: FAMILY MEDICINE

## 2021-02-02 NOTE — PT/OT/SLP PROGRESS
Physical Therapy Treatment    Patient Name:  Jaydon Mccarthy   MRN:  [id number]    Recommendations:     Discharge Recommendations:  nursing facility, skilled   Discharge Equipment Recommendations:  (TBD)   Barriers to discharge: Pt with decreased mobility.    Assessment:     Jaydon Mccarthy is a 86 y.o. male admitted with a medical diagnosis of Intertrochanteric fracture of left hip, closed, initial encounter.  He presents with the following impairments/functional limitations:  weakness, impaired endurance, impaired self care skills, impaired functional mobilty, impaired balance, decreased coordination, decreased upper extremity function, decreased lower extremity function, decreased safety awareness, pain, decreased ROM, impaired skin, edema.    Rehab Prognosis:  Fair+; patient would benefit from acute skilled PT services to address these deficits and reach maximum level of function.      Recent Surgery: Procedure(s) (LRB):  ORIF, FRACTURE, FEMUR, INTERTROCHANTERIC (Left) 2 Days Post-Op    Plan:     During this hospitalization, patient to be seen daily to address the above listed problems via gait training, therapeutic activities, therapeutic exercises  · Plan of Care Expires:  06/14/18   Plan of Care Reviewed with: patient, daughter    Subjective     Communicated with nurse Padgett prior to session.  Patient found R sidelying upon PT entry to room, agreeable to treatment.      Chief Complaint: pain with movement  Patient comments/goals: to get well   Pain/Comfort:  Pain Rating 1:  (yes)  Location - Side 1: Left  Location 1: hip  Pain Addressed 1: Pre-medicate for activity      Objective:     Patient found with: peripheral IV, pressure relief boots     General Precautions: Standard, fall, seizure   Orthopedic Precautions:LLE weight bearing as tolerated     Functional Mobility:  Pt in better spirit today, able to assist with functional mobility.  However, pt required extra time to complete tasks 2* LLE pain.    · Bed  Mobility:     · Rolling Right: minimum assistance  · Scooting: minimum assistance  · Supine to Sit: moderate assistance  · Sit to Supine: maximal assistance and of 2 persons  · Transfers:     · Sit to Stand:  minimum assistance with rolling walker x 2 trials with bed slightly elevated  · Gait: Pt ambulated ~2-3 sidesteps with mod A x 2 people (2 trials).  Pt with forward flexed posture, decreased weight shifting, foot clearance, and step length.  Pt required max VC's for sequencing and using BUE on RW for support.  Pt easily fatigued and limited by pain.    · Balance: Pt with fair+ sit balance and fair/fair- stand balance.       AM-PAC 6 CLICK MOBILITY  Turning over in bed (including adjusting bedclothes, sheets and blankets)?: 3  Sitting down on and standing up from a chair with arms (e.g., wheelchair, bedside commode, etc.): 3  Moving from lying on back to sitting on the side of the bed?: 3  Moving to and from a bed to a chair (including a wheelchair)?: 1  Need to walk in hospital room?: 2  Climbing 3-5 steps with a railing?: 1  Total Score: 13       Therapeutic Activities and Exercises:  BLE seated therex: AP, LAQ, hip abd/add    Patient left HOB elevated with all lines intact, call button in reach, bed alarm on, nurse Lorin notified and PCT present.  Pt requested bed pan, nurse and PCT aware.      GOALS:    Physical Therapy Goals        Problem: Physical Therapy Goal    Goal Priority Disciplines Outcome Goal Variances Interventions   Physical Therapy Goal     PT/OT, PT Ongoing (interventions implemented as appropriate)     Description:  Goals to be met by: 18     Patient will increase functional independence with mobility by performin. Supine to sit with Min Assistance  2. Sit to supine with Moderate Assistance  3. Rolling to Left and Right with SBA  4. Sitting at edge of bed x 30 minutes with Stand-by Assistance  5. Lower extremity exercise program x 20 reps per handout, with assistance as  needed  6. Sit to stand with CGA/RW  7. Gait 20-30 ft min A/RW  8. Bed to chair with min A/RW                       Time Tracking:     PT Received On: 06/01/18  PT Start Time: 1010     PT Stop Time: 1041  PT Total Time (min): 31 min     Billable Minutes: Therapeutic Activity 15 min co-tx with OT    Treatment Type: Treatment  PT/PTA: PT     PTA Visit Number: 0     Melly Ardon, PT  06/01/2018   Alert-The patient is alert, awake and responds to voice. The patient is oriented to time, place, and person. The triage nurse is able to obtain subjective information.

## 2021-03-15 ENCOUNTER — OFFICE VISIT (OUTPATIENT)
Dept: SURGERY | Facility: CLINIC | Age: 86
End: 2021-03-15
Payer: MEDICARE

## 2021-03-15 VITALS
WEIGHT: 137 LBS | DIASTOLIC BLOOD PRESSURE: 75 MMHG | BODY MASS INDEX: 22.02 KG/M2 | HEIGHT: 66 IN | SYSTOLIC BLOOD PRESSURE: 136 MMHG | HEART RATE: 67 BPM

## 2021-03-15 DIAGNOSIS — C44.329 SQUAMOUS CELL CANCER OF SKIN OF EYEBROW: Primary | ICD-10-CM

## 2021-03-15 PROCEDURE — 3288F PR FALLS RISK ASSESSMENT DOCUMENTED: ICD-10-PCS | Mod: CPTII,S$GLB,, | Performed by: SURGERY

## 2021-03-15 PROCEDURE — 1126F PR PAIN SEVERITY QUANTIFIED, NO PAIN PRESENT: ICD-10-PCS | Mod: S$GLB,,, | Performed by: SURGERY

## 2021-03-15 PROCEDURE — 1159F MED LIST DOCD IN RCRD: CPT | Mod: S$GLB,,, | Performed by: SURGERY

## 2021-03-15 PROCEDURE — 1159F PR MEDICATION LIST DOCUMENTED IN MEDICAL RECORD: ICD-10-PCS | Mod: S$GLB,,, | Performed by: SURGERY

## 2021-03-15 PROCEDURE — 1157F PR ADVANCE CARE PLAN OR EQUIV PRESENT IN MEDICAL RECORD: ICD-10-PCS | Mod: S$GLB,,, | Performed by: SURGERY

## 2021-03-15 PROCEDURE — 3288F FALL RISK ASSESSMENT DOCD: CPT | Mod: CPTII,S$GLB,, | Performed by: SURGERY

## 2021-03-15 PROCEDURE — 1101F PT FALLS ASSESS-DOCD LE1/YR: CPT | Mod: CPTII,S$GLB,, | Performed by: SURGERY

## 2021-03-15 PROCEDURE — 99214 OFFICE O/P EST MOD 30 MIN: CPT | Mod: S$GLB,,, | Performed by: SURGERY

## 2021-03-15 PROCEDURE — 1126F AMNT PAIN NOTED NONE PRSNT: CPT | Mod: S$GLB,,, | Performed by: SURGERY

## 2021-03-15 PROCEDURE — 99214 PR OFFICE/OUTPT VISIT, EST, LEVL IV, 30-39 MIN: ICD-10-PCS | Mod: S$GLB,,, | Performed by: SURGERY

## 2021-03-15 PROCEDURE — 1157F ADVNC CARE PLAN IN RCRD: CPT | Mod: S$GLB,,, | Performed by: SURGERY

## 2021-03-15 PROCEDURE — 1101F PR PT FALLS ASSESS DOC 0-1 FALLS W/OUT INJ PAST YR: ICD-10-PCS | Mod: CPTII,S$GLB,, | Performed by: SURGERY

## 2021-03-16 ENCOUNTER — PATIENT MESSAGE (OUTPATIENT)
Dept: SURGERY | Facility: CLINIC | Age: 86
End: 2021-03-16

## 2021-04-06 ENCOUNTER — PATIENT MESSAGE (OUTPATIENT)
Dept: ADMINISTRATIVE | Facility: HOSPITAL | Age: 86
End: 2021-04-06

## 2021-07-07 ENCOUNTER — PATIENT MESSAGE (OUTPATIENT)
Dept: ADMINISTRATIVE | Facility: HOSPITAL | Age: 86
End: 2021-07-07

## 2021-10-04 ENCOUNTER — PATIENT MESSAGE (OUTPATIENT)
Dept: ADMINISTRATIVE | Facility: HOSPITAL | Age: 86
End: 2021-10-04

## 2021-10-13 ENCOUNTER — PES CALL (OUTPATIENT)
Dept: ADMINISTRATIVE | Facility: CLINIC | Age: 86
End: 2021-10-13

## 2021-10-15 ENCOUNTER — PES CALL (OUTPATIENT)
Dept: ADMINISTRATIVE | Facility: CLINIC | Age: 86
End: 2021-10-15

## 2021-11-08 ENCOUNTER — TELEPHONE (OUTPATIENT)
Dept: ADMINISTRATIVE | Facility: CLINIC | Age: 86
End: 2021-11-08
Payer: MEDICARE

## 2021-11-09 ENCOUNTER — TELEPHONE (OUTPATIENT)
Dept: ADMINISTRATIVE | Facility: CLINIC | Age: 86
End: 2021-11-09
Payer: MEDICARE

## 2021-11-11 ENCOUNTER — TELEPHONE (OUTPATIENT)
Dept: ADMINISTRATIVE | Facility: CLINIC | Age: 86
End: 2021-11-11
Payer: MEDICARE

## 2021-12-09 ENCOUNTER — IMMUNIZATION (OUTPATIENT)
Dept: OBSTETRICS AND GYNECOLOGY | Facility: CLINIC | Age: 86
End: 2021-12-09
Payer: MEDICARE

## 2021-12-09 DIAGNOSIS — Z23 NEED FOR VACCINATION: Primary | ICD-10-CM

## 2021-12-09 PROCEDURE — 91300 COVID-19, MRNA, LNP-S, PF, 30 MCG/0.3 ML DOSE VACCINE: CPT | Mod: PBBFAC | Performed by: FAMILY MEDICINE

## 2021-12-09 PROCEDURE — 0003A COVID-19, MRNA, LNP-S, PF, 30 MCG/0.3 ML DOSE VACCINE: CPT | Mod: PBBFAC | Performed by: FAMILY MEDICINE

## 2021-12-17 NOTE — ED PROVIDER NOTES
Encounter Date: 5/29/2018    SCRIBE #1 NOTE: I, Bianca Tovar, am scribing for, and in the presence of,  Daksha Welsh MD. I have scribed the following portions of the note - Other sections scribed: HPI, ROS and PE.       History     Chief Complaint   Patient presents with    Fall     Pt reports left hip pain following trip and fall, no LOC.      CC: Fall    HPI: This 86 y.o. Male, with a medical history of hypertension, hyperlipidemia, seizures, deep vein thrombosis, arthritis, coronary artery disease, anticoagulant long term use and cancer (right hand), presents to the ED via EMS transportation accompanied by his daughter s/p a mechanical fall that occurred about 1x hour ago. Pt reports tripping and falling onto his left hip while at home. He presently c/o left hip pain. Symptoms are acute, constant and mild (3/10). Pt notes that he has not ambulated since the fall. Pt denies abdominal pain and numbness or tingling to the lower extremities. No other associated symptoms. No alleviating factors.           The history is provided by the patient and a relative. No  was used.     Review of patient's allergies indicates:  No Known Allergies  Past Medical History:   Diagnosis Date    Anticoagulant long-term use     Arthritis     Cancer     right  hand    Coronary artery disease     Deep vein thrombosis     Hyperlipidemia     Hypertension     Seizures 1988    none since on tegretol     Past Surgical History:   Procedure Laterality Date    APPENDECTOMY      FRACTURE SURGERY Right     elbow    FRACTURE SURGERY Right     hip    JOSUÉ FILTER PLACEMENT      hx of deep vein thrombosis    TOTAL SHOULDER ARTHROPLASTY Right      History reviewed. No pertinent family history.  Social History   Substance Use Topics    Smoking status: Former Smoker     Packs/day: 0.50     Years: 6.00     Types: Cigarettes     Quit date: 10/27/1975    Smokeless tobacco: Never Used    Alcohol use No      Review of Systems   Constitutional: Negative for chills and fever.   HENT: Negative for congestion, ear pain, rhinorrhea and sore throat.    Eyes: Negative for pain and visual disturbance.   Respiratory: Negative for cough and shortness of breath.    Cardiovascular: Negative for chest pain.   Gastrointestinal: Negative for abdominal pain, diarrhea, nausea and vomiting.   Genitourinary: Negative for dysuria.   Musculoskeletal: Positive for arthralgias (left hip). Negative for back pain and neck pain.   Skin: Negative for rash.   Neurological: Negative for numbness and headaches.       Physical Exam     Initial Vitals [05/29/18 1820]   BP Pulse Resp Temp SpO2   125/81 72 18 98.7 °F (37.1 °C) 99 %      MAP       95.67         Physical Exam    Nursing note and vitals reviewed.  Constitutional: Vital signs are normal. He appears well-developed and well-nourished. He is active.  Non-toxic appearance. No distress.   HENT:   Head: Normocephalic and atraumatic.   Eyes: EOM are normal.   Neck: Trachea normal. Neck supple.   Cardiovascular: Normal rate and regular rhythm.   Pulses:       Dorsalis pedis pulses are 2+ on the left side.   Pulmonary/Chest: Breath sounds normal. No respiratory distress.   Abdominal: Soft. Normal appearance and bowel sounds are normal. He exhibits no distension. There is no tenderness.   Musculoskeletal: Normal range of motion. He exhibits no edema.   There is pain present at 45 degrees of passive motion in the left hip. Good perfusion in the lower extremities.    Neurological: He is alert.   Skin: Skin is warm, dry and intact.   Psychiatric: He has a normal mood and affect.         ED Course   Procedures  Labs Reviewed - No data to display  EKG Readings: (Independently Interpreted)   Initial Reading: No STEMI. Rhythm: Normal Sinus Rhythm. Ectopy: No Ectopy. Conduction: Normal.          Medical Decision Making:   Clinical Tests:   Lab Tests: Ordered and Reviewed  Radiological Study: Ordered and  Reviewed  ED Management:  L intertrochanteric hip fx noted.   Pt is reportedly on plavix and xarelto for significant cad. Discussed w ortho.  Discussed w orthopedist on call Dr. Martin. Pt's primary orthopedist is Ebony, cardiologist is Dr. Prado. Discussed w Dr. Aguilar.   Pt will be admitted to Dr. Martin with consult to Dr. Prado and hospitalist for medical clearance, per ortho request.   Discussed w pt ad family. Pt doesn't want strong pain meds, has had problems in past with ams and side effects.             Scribe Attestation:   Scribe #1: I performed the above scribed service and the documentation accurately describes the services I performed. I attest to the accuracy of the note.    Attending Attestation:           Physician Attestation for Scribe:  Physician Attestation Statement for Scribe #1: I, Daksha Welsh MD, reviewed documentation, as scribed by Bianca Tovar in my presence, and it is both accurate and complete.                    Clinical Impression:   The encounter diagnosis was Fall.     2. Intertrochanteric fracture of left hip, closed, initial encounter.                       Daksha Welsh MD  05/29/18 [id number]       Daksha Welsh MD  05/30/18 0015     Loud volume/Pressured rate/Increased productivity Loud volume/Pressured rate/Increased productivity Loud volume/Pressured rate/Increased productivity Loud volume/Pressured rate/Increased productivity Loud volume/Pressured rate/Increased productivity Loud volume/Pressured rate/Increased productivity Loud volume/Pressured rate/Increased productivity Loud volume/Pressured rate/Increased productivity Loud volume/Pressured rate/Increased productivity Loud volume/Pressured rate/Increased productivity

## 2022-01-06 ENCOUNTER — PES CALL (OUTPATIENT)
Dept: ADMINISTRATIVE | Facility: CLINIC | Age: 87
End: 2022-01-06
Payer: MEDICARE

## 2022-02-09 ENCOUNTER — PES CALL (OUTPATIENT)
Dept: ADMINISTRATIVE | Facility: CLINIC | Age: 87
End: 2022-02-09
Payer: MEDICARE

## 2022-03-29 ENCOUNTER — TELEPHONE (OUTPATIENT)
Dept: FAMILY MEDICINE | Facility: CLINIC | Age: 87
End: 2022-03-29
Payer: MEDICARE

## 2022-04-11 ENCOUNTER — HOSPITAL ENCOUNTER (EMERGENCY)
Facility: HOSPITAL | Age: 87
Discharge: HOME | End: 2022-04-11
Attending: EMERGENCY MEDICINE
Payer: MEDICARE

## 2022-04-11 VITALS
DIASTOLIC BLOOD PRESSURE: 63 MMHG | HEIGHT: 67 IN | WEIGHT: 129 LBS | OXYGEN SATURATION: 100 % | RESPIRATION RATE: 14 BRPM | HEART RATE: 71 BPM | TEMPERATURE: 98 F | BODY MASS INDEX: 20.25 KG/M2 | SYSTOLIC BLOOD PRESSURE: 123 MMHG
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
## 2022-04-11 DIAGNOSIS — N39.0 URINARY TRACT INFECTION WITHOUT HEMATURIA, SITE UNSPECIFIED: Primary | ICD-10-CM

## 2022-04-11 DIAGNOSIS — Z91.81 AT HIGH RISK FOR INJURY RELATED TO FALL: ICD-10-CM
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
## 2022-04-11 LAB
ALBUMIN SERPL BCP-MCNC: 3.4 G/DL (ref 3.5–5.2)
ALP SERPL-CCNC: 153 U/L (ref 55–135)
ALT SERPL W/O P-5'-P-CCNC: <5 U/L (ref 10–44)
AMMONIA PLAS-SCNC: 22 UMOL/L (ref 10–50)
ANION GAP SERPL CALC-SCNC: 7 MMOL/L (ref 8–16)
APAP SERPL-MCNC: <3 UG/ML (ref 10–20)
AST SERPL-CCNC: 13 U/L (ref 10–40)
BACTERIA #/AREA URNS HPF: ABNORMAL /HPF
BASOPHILS # BLD AUTO: 0.05 K/UL (ref 0–0.2)
BASOPHILS NFR BLD: 0.6 % (ref 0–1.9)
BILIRUB SERPL-MCNC: 0.4 MG/DL (ref 0.1–1)
BILIRUB UR QL STRIP: NEGATIVE
BUN SERPL-MCNC: 12 MG/DL (ref 8–23)
CALCIUM SERPL-MCNC: 8.7 MG/DL (ref 8.7–10.5)
CHLORIDE SERPL-SCNC: 109 MMOL/L (ref 95–110)
CLARITY UR: ABNORMAL
CO2 SERPL-SCNC: 24 MMOL/L (ref 23–29)
COLOR UR: YELLOW
CREAT SERPL-MCNC: 1.2 MG/DL (ref 0.5–1.4)
DIFFERENTIAL METHOD: ABNORMAL
EOSINOPHIL # BLD AUTO: 0.4 K/UL (ref 0–0.5)
EOSINOPHIL NFR BLD: 4.8 % (ref 0–8)
ERYTHROCYTE [DISTWIDTH] IN BLOOD BY AUTOMATED COUNT: 12.5 % (ref 11.5–14.5)
EST. GFR  (AFRICAN AMERICAN): >60 ML/MIN/1.73 M^2
EST. GFR  (NON AFRICAN AMERICAN): 53 ML/MIN/1.73 M^2
ETHANOL SERPL-MCNC: <10 MG/DL
GLUCOSE SERPL-MCNC: 108 MG/DL (ref 70–110)
GLUCOSE UR QL STRIP: NEGATIVE
HCT VFR BLD AUTO: 33.9 % (ref 40–54)
HGB BLD-MCNC: 11.2 G/DL (ref 14–18)
HGB UR QL STRIP: NEGATIVE
IMM GRANULOCYTES # BLD AUTO: 0.03 K/UL (ref 0–0.04)
IMM GRANULOCYTES NFR BLD AUTO: 0.4 % (ref 0–0.5)
INR PPP: 1.1 (ref 0.8–1.2)
KETONES UR QL STRIP: NEGATIVE
LACTATE SERPL-SCNC: 1.1 MMOL/L (ref 0.5–2.2)
LEUKOCYTE ESTERASE UR QL STRIP: ABNORMAL
LIPASE SERPL-CCNC: 15 U/L (ref 4–60)
LYMPHOCYTES # BLD AUTO: 1 K/UL (ref 1–4.8)
LYMPHOCYTES NFR BLD: 13 % (ref 18–48)
MAGNESIUM SERPL-MCNC: 2 MG/DL (ref 1.6–2.6)
MCH RBC QN AUTO: 30.3 PG (ref 27–31)
MCHC RBC AUTO-ENTMCNC: 33 G/DL (ref 32–36)
MCV RBC AUTO: 92 FL (ref 82–98)
MICROSCOPIC COMMENT: ABNORMAL
MONOCYTES # BLD AUTO: 0.9 K/UL (ref 0.3–1)
MONOCYTES NFR BLD: 10.7 % (ref 4–15)
NEUTROPHILS # BLD AUTO: 5.6 K/UL (ref 1.8–7.7)
NEUTROPHILS NFR BLD: 70.5 % (ref 38–73)
NITRITE UR QL STRIP: NEGATIVE
NRBC BLD-RTO: 0 /100 WBC
PH UR STRIP: 6 [PH] (ref 5–8)
PLATELET # BLD AUTO: 192 K/UL (ref 150–450)
PMV BLD AUTO: 9.6 FL (ref 9.2–12.9)
POTASSIUM SERPL-SCNC: 3.9 MMOL/L (ref 3.5–5.1)
PROT SERPL-MCNC: 6.7 G/DL (ref 6–8.4)
PROT UR QL STRIP: ABNORMAL
PROTHROMBIN TIME: 11.5 SEC (ref 9–12.5)
RBC # BLD AUTO: 3.7 M/UL (ref 4.6–6.2)
RBC #/AREA URNS HPF: 5 /HPF (ref 0–4)
SALICYLATES SERPL-MCNC: <5 MG/DL (ref 15–30)
SODIUM SERPL-SCNC: 140 MMOL/L (ref 136–145)
SP GR UR STRIP: 1.01 (ref 1–1.03)
TROPONIN I SERPL DL<=0.01 NG/ML-MCNC: <0.006 NG/ML (ref 0–0.03)
TSH SERPL DL<=0.005 MIU/L-ACNC: 1.7 UIU/ML (ref 0.4–4)
URN SPEC COLLECT METH UR: ABNORMAL
UROBILINOGEN UR STRIP-ACNC: NEGATIVE EU/DL
WBC # BLD AUTO: 7.94 K/UL (ref 3.9–12.7)
WBC #/AREA URNS HPF: >100 /HPF (ref 0–5)
WBC CLUMPS URNS QL MICRO: ABNORMAL

## 2022-04-11 PROCEDURE — 63600175 PHARM REV CODE 636 W HCPCS: Performed by: EMERGENCY MEDICINE

## 2022-04-11 PROCEDURE — 96374 THER/PROPH/DIAG INJ IV PUSH: CPT

## 2022-04-11 PROCEDURE — 83690 ASSAY OF LIPASE: CPT | Performed by: EMERGENCY MEDICINE

## 2022-04-11 PROCEDURE — 80143 DRUG ASSAY ACETAMINOPHEN: CPT | Performed by: EMERGENCY MEDICINE

## 2022-04-11 PROCEDURE — 84443 ASSAY THYROID STIM HORMONE: CPT | Performed by: EMERGENCY MEDICINE

## 2022-04-11 PROCEDURE — 93010 EKG 12-LEAD: ICD-10-PCS | Mod: ,,, | Performed by: INTERNAL MEDICINE

## 2022-04-11 PROCEDURE — 93005 ELECTROCARDIOGRAM TRACING: CPT

## 2022-04-11 PROCEDURE — 80179 DRUG ASSAY SALICYLATE: CPT | Performed by: EMERGENCY MEDICINE

## 2022-04-11 PROCEDURE — 25000003 PHARM REV CODE 250: Performed by: EMERGENCY MEDICINE

## 2022-04-11 PROCEDURE — 82077 ASSAY SPEC XCP UR&BREATH IA: CPT | Performed by: EMERGENCY MEDICINE

## 2022-04-11 PROCEDURE — 83605 ASSAY OF LACTIC ACID: CPT | Performed by: EMERGENCY MEDICINE

## 2022-04-11 PROCEDURE — 99285 EMERGENCY DEPT VISIT HI MDM: CPT | Mod: 25

## 2022-04-11 PROCEDURE — 90471 IMMUNIZATION ADMIN: CPT | Performed by: EMERGENCY MEDICINE

## 2022-04-11 PROCEDURE — 81000 URINALYSIS NONAUTO W/SCOPE: CPT | Performed by: EMERGENCY MEDICINE

## 2022-04-11 PROCEDURE — 83735 ASSAY OF MAGNESIUM: CPT | Performed by: EMERGENCY MEDICINE

## 2022-04-11 PROCEDURE — 84484 ASSAY OF TROPONIN QUANT: CPT | Performed by: EMERGENCY MEDICINE

## 2022-04-11 PROCEDURE — 87086 URINE CULTURE/COLONY COUNT: CPT | Performed by: EMERGENCY MEDICINE

## 2022-04-11 PROCEDURE — 80053 COMPREHEN METABOLIC PANEL: CPT | Performed by: EMERGENCY MEDICINE

## 2022-04-11 PROCEDURE — 85025 COMPLETE CBC W/AUTO DIFF WBC: CPT | Performed by: EMERGENCY MEDICINE

## 2022-04-11 PROCEDURE — 90715 TDAP VACCINE 7 YRS/> IM: CPT | Performed by: EMERGENCY MEDICINE

## 2022-04-11 PROCEDURE — 82140 ASSAY OF AMMONIA: CPT | Performed by: EMERGENCY MEDICINE

## 2022-04-11 PROCEDURE — 93010 ELECTROCARDIOGRAM REPORT: CPT | Mod: ,,, | Performed by: INTERNAL MEDICINE

## 2022-04-11 PROCEDURE — 96361 HYDRATE IV INFUSION ADD-ON: CPT

## 2022-04-11 PROCEDURE — 85610 PROTHROMBIN TIME: CPT | Performed by: EMERGENCY MEDICINE

## 2022-04-11 RX ORDER — CEPHALEXIN 500 MG/1
500 CAPSULE ORAL 4 TIMES DAILY
Qty: 20 CAPSULE | Refills: 0 | Status: SHIPPED | OUTPATIENT
Start: 2022-04-11 | End: 2022-04-16

## 2022-04-11 RX ADMIN — BACITRACIN ZINC, NEOMYCIN, POLYMYXIN B 1 EACH: 400; 3.5; 5 OINTMENT TOPICAL at 03:04

## 2022-04-11 RX ADMIN — TETANUS TOXOID, REDUCED DIPHTHERIA TOXOID AND ACELLULAR PERTUSSIS VACCINE, ADSORBED 0.5 ML: 5; 2.5; 8; 8; 2.5 SUSPENSION INTRAMUSCULAR at 03:04

## 2022-04-11 RX ADMIN — CEFTRIAXONE 1 G: 1 INJECTION, SOLUTION INTRAVENOUS at 05:04

## 2022-04-11 RX ADMIN — SODIUM CHLORIDE, SODIUM LACTATE, POTASSIUM CHLORIDE, AND CALCIUM CHLORIDE 250 ML: .6; .31; .03; .02 INJECTION, SOLUTION INTRAVENOUS at 02:04

## 2022-04-11 NOTE — ED TRIAGE NOTES
"Pt presents to ED via personal transportation with complaints of a fall. Pts daughter at bedside. Pts daughter explains that pt has been having frequent falls in the past few days. Pts daughter states that pt has had increased confusion and dizziness. She states that on Saturday in particular, she was out of town while the pt had a fall. Pt declined medical attention and daughter was unable to tell them anything over the phone. Daughter states she got bacjk and realized it was time to come to the ER to make "sure he does not have a brain bleed or anything." Pts daughter reports that pt is on 2 blood thinners and did hit his head. Left sided facial swelling along with bruising noted. Pt appears stable currently, placed on cardiac monitor and continuous pulse ox. All vitals WNL, pt AAOX4.   "

## 2022-04-11 NOTE — ED PROVIDER NOTES
Encounter Date: 4/11/2022       History     Chief Complaint   Patient presents with    Fall    Head Injury     Patient presents to the ED with daughter. Patient's daughter reports that patient had an unwitnessed fall 3 days ago hitting his head. She reports that patient is on 2 different blood thinner medications. She states that patient has been increasing more confused over the last 2 weeks.  Left periorbital swelling and bruising noted.        Patient brought in after recent head trauma 2 nights ago while he fell alone at home.  Patient AMA'd EMS at that time and was finally convinced to come in to see seen by a doctor when his daughter came to the patient house today.     Patient is a poor historian and collateral information provided by EMS (the same Ems unit who saw him 2 nights ago happened to be in the ED, as well as his daughter who is at bedside.     According the patient, he states that he simply lost his balance.  He specifically denies any preceding shortness of breath, palpitations, chest pain or dizziness.  He also denies any loss of consciousness.  He states that he feel backwards, however his head traumas to the front of his head.  His daughter states that there was significant amount of blood in the bathroom, although the patient states that he fell outside the bathroom.    Per EMS the patient was alert and oriented when they arrived on the scene the bandages head and strongly recommended that he be transported to the hospital, but the patient refused.  He presents tonight with the same bandage that EMS applied 2 nights ago still in place on his head.    He denies any other complaints.  However his daughter is concerned that something may be going on such as urinary tract infection because he seems to have been more confused in the past 1-2 weeks than usual.  Patient denies any increased confusion, but does state when explicitly asked that he may have a urinary tract infection.        Review of  patient's allergies indicates:  No Known Allergies  Past Medical History:   Diagnosis Date    Anticoagulant long-term use     Arthritis     Cancer     right  hand    Coronary artery disease     Deep vein thrombosis     Hyperlipidemia     Hypertension     Seizures 1988    none since on tegretol     Past Surgical History:   Procedure Laterality Date    APPENDECTOMY      CLOSURE OF WOUND  2020    Procedure: CLOSURE, WOUND;  Surgeon: Yamil Bonilla MD;  Location: [facility] OR;  Service: Plastics;;    EXCISION OF LESION OF EYELID Right 2020    Procedure: EXCISION TUMOR RIGHT EYEBROW WITH COMPLEX CLOSURE;  Surgeon: Yamil Bonilla MD;  Location: [facility] OR;  Service: Plastics;  Laterality: Right;  FROZEN SECTION  RN PREOP 2020----COVID NEGATIVE      FRACTURE SURGERY Right     elbow    FRACTURE SURGERY Right     hip    JOSUÉ FILTER PLACEMENT      hx of deep vein thrombosis    OPEN REDUCTION AND INTERNAL FIXATION (ORIF) OF INTERTROCHANTERIC FRACTURE OF FEMUR Left 2018    Procedure: ORIF, FRACTURE, FEMUR, INTERTROCHANTERIC;  Surgeon: Pop Martin III, MD;  Location: [facility] OR;  Service: Orthopedics;  Laterality: Left;    TOTAL SHOULDER ARTHROPLASTY Right      No family history on file.  Social History     Tobacco Use    Smoking status: Former Smoker     Packs/day: 0.50     Years: 6.00     Pack years: 3.00     Types: Cigarettes     Quit date: 10/27/1975     Years since quittin.4    Smokeless tobacco: Never Used   Substance Use Topics    Alcohol use: No    Drug use: No     Review of Systems   Constitutional: Negative for chills and fever.   HENT: Negative for facial swelling.    Eyes: Negative for redness.   Respiratory: Negative for shortness of breath.    Cardiovascular: Negative for chest pain.   Gastrointestinal: Negative for nausea and vomiting.   Genitourinary: Negative for dysuria (Patient denies although he does state he may have a urinary tract infection).    Musculoskeletal: Negative for gait problem.   Skin: Negative for pallor.   Neurological: Negative for syncope, facial asymmetry and light-headedness.   Psychiatric/Behavioral: Positive for confusion ( daughter reports).   All other systems reviewed and are negative.      Physical Exam     Initial Vitals [04/11/22 1315]   BP Pulse Resp Temp SpO2   (!) 153/71 67 16 97.7 °F (36.5 °C) 100 %      MAP       --         Physical Exam    Nursing note and vitals reviewed.  Constitutional: He appears well-developed and well-nourished. He is not diaphoretic.   Well-appearing elderly white male with a laceration/contusion over his left forehead with spreading ecchymosis in this periorbital region, extending inferiorly   HENT:   Head: Normocephalic. Head is with abrasion, with contusion, with laceration and with left periorbital erythema. Head is without raccoon's eyes and without Mathias's sign.       Right Ear: No hemotympanum.   Left Ear: No hemotympanum.   Eyes: EOM are normal.   Neck: Neck supple. No crepitus.   Normal range of motion.  Cardiovascular: Normal rate.   Pulmonary/Chest: No respiratory distress.   Abdominal: Abdomen is soft. He exhibits no distension. There is no rebound.   Musculoskeletal:         General: No edema. Normal range of motion.      Left elbow: Laceration (Small skin tear over her lateral elbow) present.      Cervical back: Normal range of motion and neck supple. No bony tenderness or crepitus.      Thoracic back: No bony tenderness.      Lumbar back: No bony tenderness.     Neurological: He is alert and oriented to person, place, and time.   Skin: Skin is warm and dry.   Psychiatric: He has a normal mood and affect.         ED Course   Procedures  Labs Reviewed   CBC W/ AUTO DIFFERENTIAL - Abnormal; Notable for the following components:       Result Value    RBC 3.70 (*)     Hemoglobin 11.2 (*)     Hematocrit 33.9 (*)     Lymph % 13.0 (*)     All other components within normal limits    COMPREHENSIVE METABOLIC PANEL - Abnormal; Notable for the following components:    Albumin 3.4 (*)     Alkaline Phosphatase 153 (*)     ALT <5 (*)     Anion Gap 7 (*)     eGFR if non  53 (*)     All other components within normal limits   URINALYSIS, REFLEX TO URINE CULTURE - Abnormal; Notable for the following components:    Appearance, UA Hazy (*)     Protein, UA Trace (*)     Leukocytes, UA 3+ (*)     All other components within normal limits    Narrative:     Specimen Source->Urine   ACETAMINOPHEN LEVEL - Abnormal; Notable for the following components:    Acetaminophen (Tylenol), Serum <3.0 (*)     All other components within normal limits   SALICYLATE LEVEL - Abnormal; Notable for the following components:    Salicylate Lvl <5.0 (*)     All other components within normal limits   URINALYSIS MICROSCOPIC - Abnormal; Notable for the following components:    RBC, UA 5 (*)     WBC, UA >100 (*)     Bacteria Moderate (*)     All other components within normal limits    Narrative:     Specimen Source->Urine   CULTURE, URINE   ALCOHOL,MEDICAL (ETHANOL)   LACTIC ACID, PLASMA   PROTIME-INR   TSH   TROPONIN I   LIPASE   AMMONIA   MAGNESIUM     EKG Readings: (Independently Interpreted)   Initial Reading: No STEMI.   No QTC prolongation, normal sinus rhythm       Imaging Results          X-Ray Chest 1 View (Final result)  Result time 04/11/22 15:53:57   Procedure changed from X-Ray Chest PA And Lateral     Final result by Delvin Flaherty MD (04/11/22 15:53:57)                 Impression:      No detrimental change or radiographic acute intrathoracic process seen on this single view.      Electronically signed by: Delvin Flaherty MD  Date:    04/11/2022  Time:    15:53             Narrative:    EXAMINATION:  XR CHEST 1 VIEW    CLINICAL HISTORY:  Chest Pain;    TECHNIQUE:  Single frontal view of the chest was performed.    COMPARISON:  Chest radiograph 10/02/2019    FINDINGS:  Patient is slightly rotated.  Right  lung apex is partially obscured by overlying chin soft tissue and osseous structures.    Right upper chest Port-A-Cath and partially imaged remote right shoulder arthroplasty noted.  Electronic device overlies the lower lateral right lung base partially obscuring the costophrenic angle.    Cardiomediastinal silhouette is midline and stable without evidence of failure.  Pulmonary vasculature and hilar contours are within normal limits.  Bibasilar few scattered linear opacities consistent with minimal platelike scarring versus atelectasis.  The lungs are otherwise well expanded without large consolidation, pleural effusion or pneumothorax.  No acute osseous process seen.  PA and lateral views can be obtained.                                CT Head Without Contrast (Final result)  Result time 04/11/22 15:21:22    Final result by Delvin Flaherty MD (04/11/22 15:21:22)                 Impression:      1. Lateral left frontal scalp suspected localized soft tissue swelling/contusion with superimposed soft tissue hematoma, without displaced skull fracture or acute intracranial abnormality identified.  2. Involutional change and chronic microvascular ischemic change.  3. No CT evidence of cervical spine acute osseous traumatic injury.  4. Cervical spondylosis most prominent at C3-4 through C6-7 levels, as above.  5. Continued nonspecific soft tissue density lesion now with more plaque-like configuration adherent to the right calvarium near the zygomatic frontal bone junction with slight interval increased mild bone erosion, allowing for lack of intravenous contrast.  Correlation advised.  Further evaluation/follow-up as warranted.  6. Few additional findings as above.  This report was flagged in Epic as abnormal.      Electronically signed by: Delvin Flaherty MD  Date:    04/11/2022  Time:    15:21             Narrative:    EXAMINATION:  CT HEAD WITHOUT CONTRAST; CT CERVICAL SPINE WITHOUT CONTRAST    CLINICAL HISTORY:  Mental  status change, unknown cause;head trauma on AC;; head trauma in 89yo;    TECHNIQUE:  Low dose axial CT images obtained throughout the head and cervical spine without intravenous contrast. Sagittal and coronal reconstructions were performed.    COMPARISON:  Head CT 11/12/2020, MRI brain 09/21/2015 and chest radiographs 10/02/2019; CT soft tissue neck 12/08/2020    FINDINGS:  Patient is rotated and tilted within the scanner.    Head CT:    Intracranial compartment:    Generalized cerebral volume loss.  Ventricles are midline and stable in size and configuration without distortion by mass effect or acute hydrocephalus.  No extra-axial blood or fluid collections.    Grossly stable distribution of patchy and confluent hypoattenuation of the subcortical and periventricular white matter.  No definite new focal areas of abnormal parenchymal attenuation.  Skull base atherosclerotic vascular calcifications noted.  No parenchymal mass, hemorrhage, edema or major vascular distribution infarct.    Skull/extracranial contents (limited evaluation): Suspected new small focus of scalp soft tissue swelling with superimposed full-thickness subcutaneous hematoma overlying the lateral left frontal calvarium.  No fracture. Mastoid air cells and paranasal sinuses are essentially clear.  Previous soft tissue mass adherent to the right calvarium near the zygomatic frontal bone junction is somewhat smaller in transaxial dimension but appearing more plaque-like measuring up to 3.1 x 3.8 x 0.9 cm in maximum SAT dimensions, previously reported at 3 x 2.2 x 1 cm.  Mass extends into the infratemporal fossa region along its more inferior aspect.  There is slight interval progression of associated underlying bone erosion.    Cervical spine CT: There is mild dextrocurvature with straightening of the cervical lordosis.  There is interbody fusion at C3-4 with near-total fusion of the bilateral facets at C3-4 level.  Vertebral body heights appear  maintained without evidence of acute compression fracture.  There is suspected minimal degenerative related grade 1 retrolisthesis of C4 on 5.  No prevertebral soft tissue thickening.  No paraspinal mass or fluid collection.  No occipital condylar fracture.  Mild degenerative change at the atlantodental interval.  Dens and lateral masses are otherwise well aligned and intact.  No displaced fracture, dislocation or destructive osseous process.  No subcutaneous emphysema or radiodense retained foreign body.  Multilevel degenerative disc disease with loss of disc height, endplate changes, subchondral cysts, marginal osteophytes and uncovertebral and facet arthrosis most prominent at C4-5 through C6-7 levels, not significantly progressed from CT soft tissue neck study of  12/08/2020.    C2-3: Moderate right and mild left neural foraminal narrowing.  Posterior disc osteophyte complex resulting in minimal acquired canal stenosis.    C3-4: Posterior disc osteophyte complex resulting in minimal acquired canal stenosis.  Moderate right and moderate to severe left neural foraminal narrowing.    C4-5: Posterior disc osteophyte complex resulting in mild to moderate acquired canal stenosis.  Moderate to severe bilateral neural foraminal narrowing.    C5-6: Posterior disc osteophyte complex resulting in mild acquired canal stenosis.  Moderate to severe bilateral neural foraminal narrowing.    C6-7: Posterior disc osteophyte complex resulting in mild acquired canal stenosis.  Moderate right and moderate to severe left neural foraminal narrowing.    C7-T1: Posterior disc osteophyte complex resulting in mild acquired canal stenosis.  Mild bilateral neural foraminal narrowing.    Included airway is relatively midline and patent.  Scattered atherosclerotic vascular calcifications noted.  Partially imaged right IJ CVC.  Minimal biapical pleuroparenchymal scarring without pneumothorax.  Grossly stable few scattered 1-2 mm solid nodules  within each lung apex.                                CT CERVICAL SPINE WITHOUT CONTRAST (Final result)  Result time 04/11/22 15:21:22    Final result by Delvin Flaherty MD (04/11/22 15:21:22)                 Impression:      1. Lateral left frontal scalp suspected localized soft tissue swelling/contusion with superimposed soft tissue hematoma, without displaced skull fracture or acute intracranial abnormality identified.  2. Involutional change and chronic microvascular ischemic change.  3. No CT evidence of cervical spine acute osseous traumatic injury.  4. Cervical spondylosis most prominent at C3-4 through C6-7 levels, as above.  5. Continued nonspecific soft tissue density lesion now with more plaque-like configuration adherent to the right calvarium near the zygomatic frontal bone junction with slight interval increased mild bone erosion, allowing for lack of intravenous contrast.  Correlation advised.  Further evaluation/follow-up as warranted.  6. Few additional findings as above.  This report was flagged in Epic as abnormal.      Electronically signed by: Delvin Flaherty MD  Date:    04/11/2022  Time:    15:21             Narrative:    EXAMINATION:  CT HEAD WITHOUT CONTRAST; CT CERVICAL SPINE WITHOUT CONTRAST    CLINICAL HISTORY:  Mental status change, unknown cause;head trauma on AC;; head trauma in 89yo;    TECHNIQUE:  Low dose axial CT images obtained throughout the head and cervical spine without intravenous contrast. Sagittal and coronal reconstructions were performed.    COMPARISON:  Head CT 11/12/2020, MRI brain 09/21/2015 and chest radiographs 10/02/2019; CT soft tissue neck 12/08/2020    FINDINGS:  Patient is rotated and tilted within the scanner.    Head CT:    Intracranial compartment:    Generalized cerebral volume loss.  Ventricles are midline and stable in size and configuration without distortion by mass effect or acute hydrocephalus.  No extra-axial blood or fluid collections.    Grossly stable  distribution of patchy and confluent hypoattenuation of the subcortical and periventricular white matter.  No definite new focal areas of abnormal parenchymal attenuation.  Skull base atherosclerotic vascular calcifications noted.  No parenchymal mass, hemorrhage, edema or major vascular distribution infarct.    Skull/extracranial contents (limited evaluation): Suspected new small focus of scalp soft tissue swelling with superimposed full-thickness subcutaneous hematoma overlying the lateral left frontal calvarium.  No fracture. Mastoid air cells and paranasal sinuses are essentially clear.  Previous soft tissue mass adherent to the right calvarium near the zygomatic frontal bone junction is somewhat smaller in transaxial dimension but appearing more plaque-like measuring up to 3.1 x 3.8 x 0.9 cm in maximum SAT dimensions, previously reported at 3 x 2.2 x 1 cm.  Mass extends into the infratemporal fossa region along its more inferior aspect.  There is slight interval progression of associated underlying bone erosion.    Cervical spine CT: There is mild dextrocurvature with straightening of the cervical lordosis.  There is interbody fusion at C3-4 with near-total fusion of the bilateral facets at C3-4 level.  Vertebral body heights appear maintained without evidence of acute compression fracture.  There is suspected minimal degenerative related grade 1 retrolisthesis of C4 on 5.  No prevertebral soft tissue thickening.  No paraspinal mass or fluid collection.  No occipital condylar fracture.  Mild degenerative change at the atlantodental interval.  Dens and lateral masses are otherwise well aligned and intact.  No displaced fracture, dislocation or destructive osseous process.  No subcutaneous emphysema or radiodense retained foreign body.  Multilevel degenerative disc disease with loss of disc height, endplate changes, subchondral cysts, marginal osteophytes and uncovertebral and facet arthrosis most prominent at  C4-5 through C6-7 levels, not significantly progressed from CT soft tissue neck study of  12/08/2020.    C2-3: Moderate right and mild left neural foraminal narrowing.  Posterior disc osteophyte complex resulting in minimal acquired canal stenosis.    C3-4: Posterior disc osteophyte complex resulting in minimal acquired canal stenosis.  Moderate right and moderate to severe left neural foraminal narrowing.    C4-5: Posterior disc osteophyte complex resulting in mild to moderate acquired canal stenosis.  Moderate to severe bilateral neural foraminal narrowing.    C5-6: Posterior disc osteophyte complex resulting in mild acquired canal stenosis.  Moderate to severe bilateral neural foraminal narrowing.    C6-7: Posterior disc osteophyte complex resulting in mild acquired canal stenosis.  Moderate right and moderate to severe left neural foraminal narrowing.    C7-T1: Posterior disc osteophyte complex resulting in mild acquired canal stenosis.  Mild bilateral neural foraminal narrowing.    Included airway is relatively midline and patent.  Scattered atherosclerotic vascular calcifications noted.  Partially imaged right IJ CVC.  Minimal biapical pleuroparenchymal scarring without pneumothorax.  Grossly stable few scattered 1-2 mm solid nodules within each lung apex.                                 Medications   lactated ringers bolus 250 mL (0 mLs Intravenous Stopped 4/11/22 1723)   Tdap (BOOSTRIX) vaccine injection 0.5 mL (0.5 mLs Intramuscular Given 4/11/22 1503)   neomycin-bacitracnZn-polymyxnB packet 1 each (1 each Topical (Top) Given 4/11/22 1502)   cefTRIAXone (ROCEPHIN) 1 g/50 mL D5W IVPB (1 g Intravenous New Bag 4/11/22 1722)     Medical Decision Making:   Initial Assessment:   Patient presenting with head trauma 2 days after a fall verses syncopal event at home.  Differential Diagnosis:   Subdural hematoma, intracranial hemorrhage otherwise specified, concussion, worsening dementia, electrolyte abnormality,  UTI, pneumonia, altered mental status not otherwise specified.  ED Management:    Patient underwent a broad workup including both investigations for possible subacute traumatic injury as well as a medical emergency.  Patient's head CT was negative for an acute intracranial hemorrhage, but was concerning for a known a neoplastic process for which she is followed closely at [facility] and has a repeat MRI in the near future.  I discussed these findings with the patient and his daughter and the family is aware, he is receiving minimal palliative care and the family is very involved and goals of care.  I informed the daughter that the lesions may be getting worse and she will follow-up with their doctor at [facility].    In terms of his syncope workup, this has been largely negative including a negative troponin.  Although the patient is not the most reliable historian, he is adamant that this was not a syncopal event and that he actually lost his balance (this does not totally make sense since he states he fell backwards and is trauma is on the front of his head).  He is requesting to be discharged and follow up with his primary care doctor as an outpatient.    The only positive findings on his workup were bacteria in his urine as well as leuk esterase suggestive of an acute UTI, which the patient endorses despite not having symptoms of dysuria.  He was given 1 g of Rocephin in the emergency department and discharged on Keflex with a urine culture pending.    The patient's daughter was comfortable taking him home and social work was consulted prior to their discharge discussed options for nursing home placement verses increased home care.  Specifically I explained my concern that the patient is a high fall risk, on blood thinners and lives alone and all the patient's daughter stated understanding will discuss this further with the family.    Mitchell Moulton MD,   Emergency Medicine  04/12/2022 12:23 AM    (This note  was written with voice recognition software.  Please excuse any grammatical errors.)              ED Course as of 04/12/22 0024   Mon Apr 11, 2022   1659 Leukocytes, UA(!): 3+ [CH]   1700 Bacteria, UA(!): Moderate [CH]   Tue Apr 12, 2022   0017 Troponin I: <0.006  Not elevated [CH]   0018 CT Head Without Contrast(!)  No intracranial hemorrhage [CH]   0018 CT CERVICAL SPINE WITHOUT CONTRAST(!)  No acute C-spine injury noted [CH]      ED Course User Index  [CH] Mitchell Moulton MD             Clinical Impression:   Final diagnoses:  [W19.XXXA] Fall  [W19.XXXA] Fall, initial encounter  [N39.0] Urinary tract infection without hematuria, site unspecified (Primary)  [Z91.81] At high risk for injury related to fall          ED Disposition Condition    Discharge Stable        ED Prescriptions     Medication Sig Dispense Start Date End Date Auth. Provider    cephALEXin (KEFLEX) 500 MG capsule Take 1 capsule (500 mg total) by mouth 4 (four) times daily. for 5 days 20 capsule 4/11/2022 4/16/2022 Mitchell Moulton MD        Follow-up Information     Follow up With Specialties Details Why Contact Info    Javier Villanueva MD Family Medicine Schedule an appointment as soon as possible for a visit  to follow up ED visit [id number] [id number]  [phone]      [facility] Emergency Medicine Go to  If symptoms worsen or fail to improve within 48 hours. [address] [facility] [address] [phone]    At [facility] for follow-up MRI.  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

## 2022-04-13 ENCOUNTER — PATIENT MESSAGE (OUTPATIENT)
Dept: FAMILY MEDICINE | Facility: CLINIC | Age: 87
End: 2022-04-13
Payer: MEDICARE

## 2022-04-13 DIAGNOSIS — G40.909 SEIZURE DISORDER: ICD-10-CM

## 2022-04-13 DIAGNOSIS — D63.8 ANEMIA OF CHRONIC DISEASE: ICD-10-CM

## 2022-04-13 DIAGNOSIS — I25.10 CORONARY ARTERY DISEASE INVOLVING NATIVE CORONARY ARTERY OF NATIVE HEART WITHOUT ANGINA PECTORIS: Primary | ICD-10-CM

## 2022-04-14 ENCOUNTER — PATIENT MESSAGE (OUTPATIENT)
Dept: FAMILY MEDICINE | Facility: CLINIC | Age: 87
End: 2022-04-14
Payer: MEDICARE

## 2022-04-14 LAB — BACTERIA UR CULT: NORMAL

## 2022-04-16 DIAGNOSIS — E78.2 MIXED HYPERLIPIDEMIA: Chronic | ICD-10-CM

## 2022-04-16 DIAGNOSIS — N40.0 BENIGN NON-NODULAR PROSTATIC HYPERPLASIA WITHOUT LOWER URINARY TRACT SYMPTOMS: ICD-10-CM

## 2022-04-16 NOTE — TELEPHONE ENCOUNTER
Care Due:                  Date            Visit Type   Department     Provider  --------------------------------------------------------------------------------    Last Visit: None Found      None         None Found  Next Visit: None Scheduled  None         None Found                                                            Last  Test          Frequency    Reason                     Performed    Due Date  --------------------------------------------------------------------------------    Office Visit  12 months..  atorvastatin,              Not Found    Overdue                             finasteride, tamsulosin..    Lipid Panel.  12 months..  atorvastatin.............  Not Found    Overdue    Powered by [employer]. Reference number: [id number].   4/16/2022 12:28:59 PM CDT

## 2022-04-16 NOTE — TELEPHONE ENCOUNTER
Refill Routing Note   Medication(s) are not appropriate for processing by [facility] for the following reason(s):      - Patient has not been seen in over 15 months by PCP  - Required laboratory values are outdated  - Patient has been seen in the ED/Hospital since the last PCP visit    [facility] action(s):  Defer          Medication reconciliation completed: No     Appointments  past 12m or future 3m with PCP    Date Provider   Last Visit   4/23/2019 Javier Villanueva MD   Next Visit   Visit date not found Javier Villanueva MD   ED visits in past 90 days: 1        Note composed:1:11 PM 04/16/2022

## 2022-04-18 RX ORDER — ATORVASTATIN CALCIUM 20 MG/1
TABLET, FILM COATED ORAL
Qty: 90 TABLET | Refills: 3 | Status: SHIPPED | OUTPATIENT
Start: 2022-04-18

## 2022-04-18 RX ORDER — TAMSULOSIN HYDROCHLORIDE 0.4 MG/1
CAPSULE ORAL
Qty: 90 CAPSULE | Refills: 3 | Status: SHIPPED | OUTPATIENT
Start: 2022-04-18 | End: 2022-09-16

## 2022-04-20 DIAGNOSIS — G40.909 NONINTRACTABLE EPILEPSY WITHOUT STATUS EPILEPTICUS, UNSPECIFIED EPILEPSY TYPE: Chronic | ICD-10-CM

## 2022-04-20 RX ORDER — CARBAMAZEPINE 100 MG/1
TABLET, CHEWABLE ORAL
Qty: 180 TABLET | Refills: 3 | Status: SHIPPED | OUTPATIENT
Start: 2022-04-20

## 2022-05-06 ENCOUNTER — PES CALL (OUTPATIENT)
Dept: ADMINISTRATIVE | Facility: CLINIC | Age: 87
End: 2022-05-06
Payer: MEDICARE

## 2022-05-17 ENCOUNTER — PES CALL (OUTPATIENT)
Dept: ADMINISTRATIVE | Facility: CLINIC | Age: 87
End: 2022-05-17
Payer: MEDICARE

## 2022-05-17 DIAGNOSIS — G40.909 NONINTRACTABLE EPILEPSY WITHOUT STATUS EPILEPTICUS, UNSPECIFIED EPILEPSY TYPE: Chronic | ICD-10-CM

## 2022-05-17 RX ORDER — LEVETIRACETAM 500 MG/1
TABLET ORAL
Qty: 180 TABLET | Refills: 3 | Status: ON HOLD | OUTPATIENT
Start: 2022-05-17 | End: 2022-10-05 | Stop reason: HOSPADM

## 2022-05-17 NOTE — TELEPHONE ENCOUNTER
Last Office Visit Info:   The patient's last visit with Javier Villanueva MD was on 4/23/2019.    The patient's last visit in current department was on Visit date not found.        Last CBC Results:   Lab Results   Component Value Date    WBC 7.94 04/11/2022    HGB 11.2 (L) 04/11/2022    HCT 33.9 (L) 04/11/2022     04/11/2022       Last CMP Results  Lab Results   Component Value Date     04/11/2022    K 3.9 04/11/2022     04/11/2022    CO2 24 04/11/2022    BUN 12 04/11/2022    CREATININE 1.2 04/11/2022    CALCIUM 8.7 04/11/2022    ALBUMIN 3.4 (L) 04/11/2022    AST 13 04/11/2022    ALT <5 (L) 04/11/2022       Last Lipids  Lab Results   Component Value Date    CHOL 140 09/26/2015    TRIG 100 09/26/2015    HDL 22 (L) 09/26/2015    LDLCALC 98.0 09/26/2015       Last A1C  Lab Results   Component Value Date    HGBA1C 4.9 09/09/2015       Last TSH  Lab Results   Component Value Date    TSH 1.702 04/11/2022             Current Med Refills  Medication List with Changes/Refills   Current Medications    ATORVASTATIN (LIPITOR) 20 MG TABLET    TAKE ONE TABLET BY MOUTH ONCE DAILY       Start Date: 4/18/2022 End Date: --    BETHANECHOL (URECHOLINE) 50 MG TABLET    TAKE ONE TABLET BY MOUTH THREE TIMES DAILY ON AN EMPTY STOMACH *DO NOT TAKE WITH MILK OR ANTACIDS, TAKE WITH WATER *       Start Date: 2/7/2022  End Date: --    CARBAMAZEPINE (TEGRETOL) 100 MG CHEWABLE TABLET    TAKE 2 TABLETS BY MOUTH DAILY       Start Date: 4/20/2022 End Date: --    CLOPIDOGREL (PLAVIX) 75 MG TABLET    TAKE ONE TABLET BY MOUTH ONCE DAILY       Start Date: 5/5/2021  End Date: --    FINASTERIDE (PROSCAR) 5 MG TABLET    TAKE ONE TABLET BY MOUTH DAILY       Start Date: 12/1/2021 End Date: --    HYDROCODONE-ACETAMINOPHEN (NORCO)  MG PER TABLET    Take 1 tablet by mouth every 4 to 6 hours as needed.       Start Date: 10/14/2020End Date: --    LEVETIRACETAM (KEPPRA) 500 MG TAB    TAKE ONE TABLET BY MOUTH TWICE DAILY       Start Date:  5/5/2021  End Date: --    METOPROLOL TARTRATE (LOPRESSOR) 25 MG TABLET    TAKE 1/2 TABLET BY MOUTH TWICE DAILY       Start Date: 9/6/2021  End Date: --    MOMETASONE 0.1% (ELOCON) 0.1 % CREAM    APPLY to face TWICE DAILY FOR 1 WEEK       Start Date: 11/6/2020 End Date: --    TAMSULOSIN (FLOMAX) 0.4 MG CAP    TAKE ONE CAPSULE BY MOUTH ONCE DAILY       Start Date: 4/18/2022 End Date: --    VITAMIN D (VITAMIN D3) 1000 UNITS TAB    Take 1,000 Units by mouth once daily.       Start Date: --        End Date: --    XARELTO 20 MG TAB    TAKE ONE TABLET BY MOUTH DAILY       Start Date: 2/15/2022 End Date: --

## 2022-05-18 ENCOUNTER — PES CALL (OUTPATIENT)
Dept: ADMINISTRATIVE | Facility: CLINIC | Age: 87
End: 2022-05-18
Payer: MEDICARE

## 2022-05-19 DIAGNOSIS — Z79.01 CHRONIC ANTICOAGULATION: Chronic | ICD-10-CM

## 2022-05-19 RX ORDER — CLOPIDOGREL BISULFATE 75 MG/1
TABLET ORAL
Qty: 90 TABLET | Refills: 3 | Status: SHIPPED | OUTPATIENT
Start: 2022-05-19 | End: 2022-07-19 | Stop reason: SDUPTHER

## 2022-05-19 NOTE — TELEPHONE ENCOUNTER
No new care gaps identified.  [facility] Embedded Care Gaps. Reference number: [id number]. 5/19/2022   8:05:19 AM DELANOT

## 2022-05-27 ENCOUNTER — LAB VISIT (OUTPATIENT)
Dept: LAB | Facility: HOSPITAL | Age: 87
End: 2022-05-27
Attending: FAMILY MEDICINE
Payer: MEDICARE

## 2022-05-27 ENCOUNTER — TELEPHONE (OUTPATIENT)
Dept: FAMILY MEDICINE | Facility: CLINIC | Age: 87
End: 2022-05-27
Payer: MEDICARE

## 2022-05-27 ENCOUNTER — PATIENT MESSAGE (OUTPATIENT)
Dept: FAMILY MEDICINE | Facility: CLINIC | Age: 87
End: 2022-05-27
Payer: MEDICARE

## 2022-05-27 DIAGNOSIS — R30.0 DYSURIA: Primary | ICD-10-CM

## 2022-05-27 DIAGNOSIS — N39.0 URINARY TRACT INFECTION WITHOUT HEMATURIA, SITE UNSPECIFIED: Primary | ICD-10-CM

## 2022-05-27 DIAGNOSIS — R30.0 DYSURIA: ICD-10-CM

## 2022-05-27 LAB
BACTERIA #/AREA URNS HPF: ABNORMAL /HPF
BILIRUB UR QL STRIP: NEGATIVE
CLARITY UR: ABNORMAL
COLOR UR: YELLOW
GLUCOSE UR QL STRIP: NEGATIVE
HGB UR QL STRIP: NEGATIVE
KETONES UR QL STRIP: NEGATIVE
LEUKOCYTE ESTERASE UR QL STRIP: ABNORMAL
MICROSCOPIC COMMENT: ABNORMAL
NITRITE UR QL STRIP: NEGATIVE
PH UR STRIP: 6 [PH] (ref 5–8)
PROT UR QL STRIP: ABNORMAL
RBC #/AREA URNS HPF: 12 /HPF (ref 0–4)
SP GR UR STRIP: 1.02 (ref 1–1.03)
URN SPEC COLLECT METH UR: ABNORMAL
UROBILINOGEN UR STRIP-ACNC: NEGATIVE EU/DL
WBC #/AREA URNS HPF: >100 /HPF (ref 0–5)
WBC CLUMPS URNS QL MICRO: ABNORMAL

## 2022-05-27 PROCEDURE — 87086 URINE CULTURE/COLONY COUNT: CPT | Performed by: FAMILY MEDICINE

## 2022-05-27 PROCEDURE — 81000 URINALYSIS NONAUTO W/SCOPE: CPT | Performed by: FAMILY MEDICINE

## 2022-05-27 RX ORDER — NITROFURANTOIN 25; 75 MG/1; MG/1
100 CAPSULE ORAL 2 TIMES DAILY
Qty: 10 CAPSULE | Refills: 0 | Status: SHIPPED | OUTPATIENT
Start: 2022-05-27 | End: 2022-06-02

## 2022-05-29 LAB — BACTERIA UR CULT: NORMAL

## 2022-06-02 ENCOUNTER — OFFICE VISIT (OUTPATIENT)
Dept: FAMILY MEDICINE | Facility: CLINIC | Age: 87
End: 2022-06-02
Payer: MEDICARE

## 2022-06-02 VITALS
HEIGHT: 67 IN | DIASTOLIC BLOOD PRESSURE: 60 MMHG | TEMPERATURE: 98 F | RESPIRATION RATE: 16 BRPM | HEART RATE: 56 BPM | BODY MASS INDEX: 19.51 KG/M2 | WEIGHT: 124.31 LBS | OXYGEN SATURATION: 97 % | SYSTOLIC BLOOD PRESSURE: 106 MMHG

## 2022-06-02 DIAGNOSIS — Z86.718 PERSONAL HISTORY OF DVT (DEEP VEIN THROMBOSIS): Chronic | ICD-10-CM

## 2022-06-02 DIAGNOSIS — I65.23 ATHEROSCLEROSIS OF BOTH CAROTID ARTERIES: ICD-10-CM

## 2022-06-02 DIAGNOSIS — I70.0 AORTIC ARCH ATHEROSCLEROSIS: ICD-10-CM

## 2022-06-02 DIAGNOSIS — R33.8 BENIGN PROSTATIC HYPERPLASIA WITH URINARY RETENTION: ICD-10-CM

## 2022-06-02 DIAGNOSIS — H54.40 BLINDNESS OF RIGHT EYE, UNSPECIFIED LEFT EYE VISUAL IMPAIRMENT CATEGORY: ICD-10-CM

## 2022-06-02 DIAGNOSIS — N18.31 STAGE 3A CHRONIC KIDNEY DISEASE: ICD-10-CM

## 2022-06-02 DIAGNOSIS — G40.909 NONINTRACTABLE EPILEPSY WITHOUT STATUS EPILEPTICUS, UNSPECIFIED EPILEPSY TYPE: Chronic | ICD-10-CM

## 2022-06-02 DIAGNOSIS — I25.10 CORONARY ARTERY DISEASE INVOLVING NATIVE CORONARY ARTERY OF NATIVE HEART WITHOUT ANGINA PECTORIS: Chronic | ICD-10-CM

## 2022-06-02 DIAGNOSIS — E78.2 MIXED HYPERLIPIDEMIA: Chronic | ICD-10-CM

## 2022-06-02 DIAGNOSIS — G47.9 SLEEP DISTURBANCE: ICD-10-CM

## 2022-06-02 DIAGNOSIS — J30.9 CHRONIC ALLERGIC RHINITIS: ICD-10-CM

## 2022-06-02 DIAGNOSIS — C44.329 SQUAMOUS CELL CANCER OF SKIN OF EYEBROW: ICD-10-CM

## 2022-06-02 DIAGNOSIS — N40.1 BENIGN PROSTATIC HYPERPLASIA WITH URINARY RETENTION: ICD-10-CM

## 2022-06-02 DIAGNOSIS — D63.8 ANEMIA OF CHRONIC DISEASE: Chronic | ICD-10-CM

## 2022-06-02 DIAGNOSIS — I10 ESSENTIAL HYPERTENSION: Chronic | ICD-10-CM

## 2022-06-02 DIAGNOSIS — D69.2 OTHER NONTHROMBOCYTOPENIC PURPURA: ICD-10-CM

## 2022-06-02 DIAGNOSIS — H90.3 SENSORINEURAL HEARING LOSS (SNHL) OF BOTH EARS: ICD-10-CM

## 2022-06-02 DIAGNOSIS — Z00.00 ENCOUNTER FOR PREVENTIVE HEALTH EXAMINATION: Primary | ICD-10-CM

## 2022-06-02 PROCEDURE — 99999 PR PBB SHADOW E&M-EST. PATIENT-LVL IV: ICD-10-PCS | Mod: PBBFAC,,, | Performed by: NURSE PRACTITIONER

## 2022-06-02 PROCEDURE — 99499 UNLISTED E&M SERVICE: CPT | Mod: S$GLB,,, | Performed by: NURSE PRACTITIONER

## 2022-06-02 PROCEDURE — 1159F PR MEDICATION LIST DOCUMENTED IN MEDICAL RECORD: ICD-10-PCS | Mod: CPTII,S$GLB,, | Performed by: NURSE PRACTITIONER

## 2022-06-02 PROCEDURE — 1157F PR ADVANCE CARE PLAN OR EQUIV PRESENT IN MEDICAL RECORD: ICD-10-PCS | Mod: CPTII,S$GLB,, | Performed by: NURSE PRACTITIONER

## 2022-06-02 PROCEDURE — G0439 PPPS, SUBSEQ VISIT: HCPCS | Mod: S$GLB,,, | Performed by: NURSE PRACTITIONER

## 2022-06-02 PROCEDURE — 99499 RISK ADDL DX/OHS AUDIT: ICD-10-PCS | Mod: S$GLB,,, | Performed by: NURSE PRACTITIONER

## 2022-06-02 PROCEDURE — 1100F PR PT FALLS ASSESS DOC 2+ FALLS/FALL W/INJURY/YR: ICD-10-PCS | Mod: CPTII,S$GLB,, | Performed by: NURSE PRACTITIONER

## 2022-06-02 PROCEDURE — 99999 PR PBB SHADOW E&M-EST. PATIENT-LVL IV: CPT | Mod: PBBFAC,,, | Performed by: NURSE PRACTITIONER

## 2022-06-02 PROCEDURE — 1159F MED LIST DOCD IN RCRD: CPT | Mod: CPTII,S$GLB,, | Performed by: NURSE PRACTITIONER

## 2022-06-02 PROCEDURE — 1126F PR PAIN SEVERITY QUANTIFIED, NO PAIN PRESENT: ICD-10-PCS | Mod: CPTII,S$GLB,, | Performed by: NURSE PRACTITIONER

## 2022-06-02 PROCEDURE — 1157F ADVNC CARE PLAN IN RCRD: CPT | Mod: CPTII,S$GLB,, | Performed by: NURSE PRACTITIONER

## 2022-06-02 PROCEDURE — 3288F PR FALLS RISK ASSESSMENT DOCUMENTED: ICD-10-PCS | Mod: CPTII,S$GLB,, | Performed by: NURSE PRACTITIONER

## 2022-06-02 PROCEDURE — 1160F RVW MEDS BY RX/DR IN RCRD: CPT | Mod: CPTII,S$GLB,, | Performed by: NURSE PRACTITIONER

## 2022-06-02 PROCEDURE — G0439 PR MEDICARE ANNUAL WELLNESS SUBSEQUENT VISIT: ICD-10-PCS | Mod: S$GLB,,, | Performed by: NURSE PRACTITIONER

## 2022-06-02 PROCEDURE — 1126F AMNT PAIN NOTED NONE PRSNT: CPT | Mod: CPTII,S$GLB,, | Performed by: NURSE PRACTITIONER

## 2022-06-02 PROCEDURE — 1100F PTFALLS ASSESS-DOCD GE2>/YR: CPT | Mod: CPTII,S$GLB,, | Performed by: NURSE PRACTITIONER

## 2022-06-02 PROCEDURE — 1160F PR REVIEW ALL MEDS BY PRESCRIBER/CLIN PHARMACIST DOCUMENTED: ICD-10-PCS | Mod: CPTII,S$GLB,, | Performed by: NURSE PRACTITIONER

## 2022-06-02 PROCEDURE — 3288F FALL RISK ASSESSMENT DOCD: CPT | Mod: CPTII,S$GLB,, | Performed by: NURSE PRACTITIONER

## 2022-06-02 RX ORDER — MELATONIN 5 MG
5 CAPSULE ORAL NIGHTLY
COMMUNITY
End: 2022-07-19 | Stop reason: SDUPTHER

## 2022-06-02 RX ORDER — CETIRIZINE HYDROCHLORIDE 10 MG/1
10 TABLET ORAL DAILY
COMMUNITY
End: 2022-07-08

## 2022-06-02 NOTE — PROGRESS NOTES
"  Jaydon Mccarthy presented for a  Medicare AWV and comprehensive Health Risk Assessment today. The following components were reviewed and updated:    · Medical history  · Family History  · Social history  · Allergies and Current Medications  · Health Risk Assessment  · Health Maintenance  · Care Team         ** See Completed Assessments for Annual Wellness Visit within the encounter summary.**         The following assessments were completed:  · Living Situation  · CAGE  · Depression Screening  · Timed Get Up and Go  · Whisper Test  · Cognitive Function Screening  · Nutrition Screening  · ADL Screening  · PAQ Screening        Vitals:    06/02/22 1010   BP: 106/60   Pulse: (!) 56   Resp: 16   Temp: 97.9 °F (36.6 °C)   TempSrc: Oral   SpO2: 97%   Weight: 56.4 kg (124 lb 5.4 oz)   Height: 5' 7" (1.702 m)     Body mass index is 19.47 kg/m².  Physical Exam  Vitals and nursing note reviewed.   Constitutional:       General: He is not in acute distress.     Appearance: Normal appearance. He is well-developed, well-groomed and underweight. He is not ill-appearing.   HENT:      Head: Normocephalic and atraumatic.      Right Ear: External ear normal. Decreased hearing noted.      Left Ear: External ear normal. Decreased hearing noted.      Nose: Nose normal.      Mouth/Throat:      Lips: Pink.      Mouth: Mucous membranes are moist.   Eyes:      General:         Right eye: No discharge.         Left eye: No discharge.      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   Pulmonary:      Effort: Pulmonary effort is normal. No accessory muscle usage or respiratory distress.   Abdominal:      General: Abdomen is flat. There is no distension.      Palpations: Abdomen is soft.      Tenderness: There is no abdominal tenderness.   Musculoskeletal:      Cervical back: Neck supple.   Skin:     General: Skin is warm and dry.      Findings: No rash.   Neurological:      General: " No focal deficit present.      Mental Status: He is alert and oriented to person, place, and time. Mental status is at baseline.      Motor: No abnormal muscle tone.      Gait: Gait abnormal (wide shuffle gait, ambulatory with rolling walker).   Psychiatric:         Attention and Perception: Attention and perception normal.         Mood and Affect: Mood and affect normal.         Speech: Speech normal.         Behavior: Behavior normal. Behavior is cooperative.         Thought Content: Thought content normal.         Cognition and Memory: Cognition and memory normal.         Judgment: Judgment normal.           Diagnoses and health risks identified today and associated recommendations/orders:    1. Encounter for preventive health examination  Health maintenance reviewed, vaccines up to date, will f/u with PCP for routine preventative care    2. Essential hypertension  Controlled on BB    3. Stage 3a chronic kidney disease  BP controlled, maintain adequate hydration and decrease nephrotoxic agents    4. Mixed hyperlipidemia  Stable on statin     5. Coronary artery disease involving native coronary artery of native heart without angina pectoris  BP controlled, anticoagulated on plavix, on statin daily    6. Aortic arch atherosclerosis  BP controlled, on statin and anticoagulated     7. Atherosclerosis of both carotid arteries  BP controlled, on statin and anticoagulated, no acute recent infarct    8. Nonintractable epilepsy without status epilepticus, unspecified epilepsy type  Controlled on tegratol and keppra    9. Benign prostatic hyperplasia with urinary retention  Improved with flomax, proscar and bethanechol    10. Other nonthrombocytopenic purpura  No acute hemorrhage, anticoagulated     11. 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 Personal history of DVT (deep vein thrombosis)  Hx of IVC filter, on xarelto    13. Anemia of chronic disease  No  acute hemorrhage     14. Chronic allergic rhinitis  Treated with antihistamine daily    15. Sleep disturbance  Improved with melatonin nightly    16. 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 Sensorineural hearing loss (SNHL) of both ears  Not improved with bilateral hearing aids     Provided Jaydon with a 5-10 year written screening schedule and personal prevention plan. Recommendations were developed using the USPSTF age appropriate recommendations. Education, counseling, and referrals were provided as needed. After Visit Summary printed and given to patient which includes a list of additional screenings\tests needed.    Follow up in about 1 year (around 6/2/2023).    Helen Castañeda, [place]VY  I offered to discuss advanced care planning, including how to pick a person who would make decisions for you if you were unable to make them for yourself, called a health care power of , and what kind of decisions you might make such as use of life sustaining treatments such as ventilators and tube feeding when faced with a life limiting illness recorded on a living will that they will need to know. (How you want to be cared for as you near the end of your natural life)     X  Patient has advanced directives on file, which we reviewed, and they do not wish to make changes.

## 2022-06-02 NOTE — PATIENT INSTRUCTIONS
Counseling and Referral of Other Preventative  (Italic type indicates deductible and co-insurance are waived)    Patient Name: Jaydon Mccarthy  Today's Date: 6/2/2022    Health Maintenance       Date Due Completion Date    Shingles Vaccine (1 of 2) 12/26/2017 10/31/2017    COVID-19 Vaccine (4 - Booster for Pfizer series) 03/09/2022 12/9/2021    Influenza Vaccine (Season Ended) 09/01/2022 11/23/2020    TETANUS VACCINE 04/11/2032 4/11/2022        No orders of the defined types were placed in this encounter.    The following information is provided to all patients.  This information is to help you find resources for any of the problems found today that may be affecting your health:                Living healthy guide: www.[facility].[place].gov      Understanding Diabetes: www.diabetes.org      Eating healthy: www.cdc.gov/healthyweight      [facility] home safety checklist: www.cdc.gov/steadi/patient.html      Agency on Aging: www.goea.[place].gov      Alcoholics anonymous (AA): www.aa.org      Physical Activity: www.amaury.nih.gov/oi5hzbt      Tobacco use: www.quitwithusla.org

## 2022-06-03 PROBLEM — D69.2 OTHER NONTHROMBOCYTOPENIC PURPURA: Status: ACTIVE | Noted: 2022-06-03

## 2022-06-03 PROBLEM — J30.9 CHRONIC ALLERGIC RHINITIS: Status: ACTIVE | Noted: 2022-06-03

## 2022-06-03 PROBLEM — G40.909 SEIZURE DISORDER: Status: RESOLVED | Noted: 2018-05-30 | Resolved: 2022-06-03

## 2022-06-03 PROBLEM — G47.9 SLEEP DISTURBANCE: Status: ACTIVE | Noted: 2022-06-03

## 2022-06-03 PROBLEM — Z96.643 HISTORY OF BILATERAL HIP ARTHROPLASTY: Status: ACTIVE | Noted: 2022-06-03

## 2022-06-03 PROBLEM — I65.23 ATHEROSCLEROSIS OF BOTH CAROTID ARTERIES: Status: ACTIVE | Noted: 2022-06-03

## 2022-06-03 PROBLEM — N18.31 STAGE 3A CHRONIC KIDNEY DISEASE: Status: ACTIVE | Noted: 2022-06-03

## 2022-06-03 PROBLEM — H54.40 BLIND RIGHT EYE: Status: ACTIVE | Noted: 2022-06-03

## 2022-06-03 PROBLEM — I70.0 AORTIC ARCH ATHEROSCLEROSIS: Status: ACTIVE | Noted: 2022-06-03

## 2022-06-08 ENCOUNTER — CLINICAL SUPPORT (OUTPATIENT)
Dept: FAMILY MEDICINE | Facility: CLINIC | Age: 87
End: 2022-06-08
Payer: MEDICARE

## 2022-06-08 DIAGNOSIS — Z11.1 SCREENING-PULMONARY TB: Primary | ICD-10-CM

## 2022-06-08 PROCEDURE — 86580 POCT TB SKIN TEST: ICD-10-PCS | Mod: S$GLB,,, | Performed by: FAMILY MEDICINE

## 2022-06-08 PROCEDURE — 86580 TB INTRADERMAL TEST: CPT | Mod: S$GLB,,, | Performed by: FAMILY MEDICINE

## 2022-06-08 NOTE — PROGRESS NOTES
Administered PPD intradermally to right forearm, wheal noted.  Patient tolerated injection well, no adverse reactions noted. Patient and daughter informed of care of area of test.  Patient and daughter informed to return in 48 - 72 hours to have test read.  Patient and daughter verbalized understanding.

## 2022-06-10 ENCOUNTER — HOSPITAL ENCOUNTER (EMERGENCY)
Facility: HOSPITAL | Age: 87
Discharge: HOME | End: 2022-06-11
Attending: EMERGENCY MEDICINE
Payer: MEDICARE

## 2022-06-10 DIAGNOSIS — R40.4 TRANSIENT ALTERATION OF AWARENESS: ICD-10-CM

## 2022-06-10 DIAGNOSIS — N39.0 URINARY TRACT INFECTION WITHOUT HEMATURIA, SITE UNSPECIFIED: Primary | ICD-10-CM

## 2022-06-10 LAB
TB INDURATION - 48 HR READ: 0 MM
TB INDURATION - 72 HR READ: NORMAL
TB SKIN TEST - 48 HR READ: NEGATIVE
TB SKIN TEST - 72 HR READ: NORMAL

## 2022-06-10 PROCEDURE — 85025 COMPLETE CBC W/AUTO DIFF WBC: CPT | Performed by: EMERGENCY MEDICINE

## 2022-06-10 PROCEDURE — 84100 ASSAY OF PHOSPHORUS: CPT | Performed by: EMERGENCY MEDICINE

## 2022-06-10 PROCEDURE — 83880 ASSAY OF NATRIURETIC PEPTIDE: CPT | Performed by: EMERGENCY MEDICINE

## 2022-06-10 PROCEDURE — 84484 ASSAY OF TROPONIN QUANT: CPT | Performed by: EMERGENCY MEDICINE

## 2022-06-10 PROCEDURE — 84145 PROCALCITONIN (PCT): CPT | Performed by: EMERGENCY MEDICINE

## 2022-06-10 PROCEDURE — U0002 COVID-19 LAB TEST NON-CDC: HCPCS | Performed by: EMERGENCY MEDICINE

## 2022-06-10 PROCEDURE — 87040 BLOOD CULTURE FOR BACTERIA: CPT | Performed by: EMERGENCY MEDICINE

## 2022-06-10 PROCEDURE — 96365 THER/PROPH/DIAG IV INF INIT: CPT

## 2022-06-10 PROCEDURE — 80053 COMPREHEN METABOLIC PANEL: CPT | Performed by: EMERGENCY MEDICINE

## 2022-06-10 PROCEDURE — 96361 HYDRATE IV INFUSION ADD-ON: CPT | Mod: 59

## 2022-06-10 PROCEDURE — 83605 ASSAY OF LACTIC ACID: CPT | Performed by: EMERGENCY MEDICINE

## 2022-06-10 PROCEDURE — 83735 ASSAY OF MAGNESIUM: CPT | Performed by: EMERGENCY MEDICINE

## 2022-06-10 PROCEDURE — 99285 EMERGENCY DEPT VISIT HI MDM: CPT | Mod: 25

## 2022-06-10 RX ORDER — LIDOCAINE HYDROCHLORIDE 20 MG/ML
JELLY TOPICAL
Status: COMPLETED | OUTPATIENT
Start: 2022-06-10 | End: 2022-06-11

## 2022-06-11 VITALS
DIASTOLIC BLOOD PRESSURE: 61 MMHG | HEART RATE: 68 BPM | HEIGHT: 67 IN | RESPIRATION RATE: 18 BRPM | SYSTOLIC BLOOD PRESSURE: 130 MMHG | OXYGEN SATURATION: 99 % | TEMPERATURE: 99 F | WEIGHT: 135 LBS | BODY MASS INDEX: 21.19 KG/M2

## 2022-06-11 LAB
ALBUMIN SERPL BCP-MCNC: 3.2 G/DL (ref 3.5–5.2)
ALP SERPL-CCNC: 131 U/L (ref 55–135)
ALT SERPL W/O P-5'-P-CCNC: 8 U/L (ref 10–44)
ANION GAP SERPL CALC-SCNC: 8 MMOL/L (ref 8–16)
AST SERPL-CCNC: 13 U/L (ref 10–40)
BACTERIA #/AREA URNS HPF: ABNORMAL /HPF
BASOPHILS # BLD AUTO: 0.04 K/UL (ref 0–0.2)
BASOPHILS NFR BLD: 0.6 % (ref 0–1.9)
BILIRUB SERPL-MCNC: 0.3 MG/DL (ref 0.1–1)
BILIRUB UR QL STRIP: NEGATIVE
BNP SERPL-MCNC: 129 PG/ML (ref 0–99)
BUN SERPL-MCNC: 18 MG/DL (ref 8–23)
CALCIUM SERPL-MCNC: 8.2 MG/DL (ref 8.7–10.5)
CHLORIDE SERPL-SCNC: 111 MMOL/L (ref 95–110)
CLARITY UR: ABNORMAL
CO2 SERPL-SCNC: 24 MMOL/L (ref 23–29)
COLOR UR: YELLOW
CREAT SERPL-MCNC: 1.2 MG/DL (ref 0.5–1.4)
CTP QC/QA: YES
DIFFERENTIAL METHOD: ABNORMAL
EOSINOPHIL # BLD AUTO: 0.4 K/UL (ref 0–0.5)
EOSINOPHIL NFR BLD: 5 % (ref 0–8)
ERYTHROCYTE [DISTWIDTH] IN BLOOD BY AUTOMATED COUNT: 13.2 % (ref 11.5–14.5)
EST. GFR  (AFRICAN AMERICAN): >60 ML/MIN/1.73 M^2
EST. GFR  (NON AFRICAN AMERICAN): 53 ML/MIN/1.73 M^2
GLUCOSE SERPL-MCNC: 132 MG/DL (ref 70–110)
GLUCOSE UR QL STRIP: NEGATIVE
HCT VFR BLD AUTO: 27.1 % (ref 40–54)
HGB BLD-MCNC: 9 G/DL (ref 14–18)
HGB UR QL STRIP: ABNORMAL
HYALINE CASTS #/AREA URNS LPF: 0 /LPF
IMM GRANULOCYTES # BLD AUTO: 0.03 K/UL (ref 0–0.04)
IMM GRANULOCYTES NFR BLD AUTO: 0.4 % (ref 0–0.5)
KETONES UR QL STRIP: NEGATIVE
LACTATE SERPL-SCNC: 1.3 MMOL/L (ref 0.5–2.2)
LEUKOCYTE ESTERASE UR QL STRIP: ABNORMAL
LYMPHOCYTES # BLD AUTO: 0.8 K/UL (ref 1–4.8)
LYMPHOCYTES NFR BLD: 10.4 % (ref 18–48)
MAGNESIUM SERPL-MCNC: 1.9 MG/DL (ref 1.6–2.6)
MCH RBC QN AUTO: 29.9 PG (ref 27–31)
MCHC RBC AUTO-ENTMCNC: 33.2 G/DL (ref 32–36)
MCV RBC AUTO: 90 FL (ref 82–98)
MICROSCOPIC COMMENT: ABNORMAL
MONOCYTES # BLD AUTO: 0.5 K/UL (ref 0.3–1)
MONOCYTES NFR BLD: 6.6 % (ref 4–15)
NEUTROPHILS # BLD AUTO: 5.6 K/UL (ref 1.8–7.7)
NEUTROPHILS NFR BLD: 77 % (ref 38–73)
NITRITE UR QL STRIP: NEGATIVE
NRBC BLD-RTO: 0 /100 WBC
PH UR STRIP: 6 [PH] (ref 5–8)
PHOSPHATE SERPL-MCNC: 3.5 MG/DL (ref 2.7–4.5)
PLATELET # BLD AUTO: 140 K/UL (ref 150–450)
PMV BLD AUTO: 9.8 FL (ref 9.2–12.9)
POTASSIUM SERPL-SCNC: 3.8 MMOL/L (ref 3.5–5.1)
PROCALCITONIN SERPL IA-MCNC: 0.06 NG/ML
PROT SERPL-MCNC: 6.2 G/DL (ref 6–8.4)
PROT UR QL STRIP: ABNORMAL
RBC # BLD AUTO: 3.01 M/UL (ref 4.6–6.2)
RBC #/AREA URNS HPF: 17 /HPF (ref 0–4)
SARS-COV-2 RDRP RESP QL NAA+PROBE: NEGATIVE
SODIUM SERPL-SCNC: 143 MMOL/L (ref 136–145)
SP GR UR STRIP: 1.02 (ref 1–1.03)
TROPONIN I SERPL DL<=0.01 NG/ML-MCNC: 0.02 NG/ML (ref 0–0.03)
URN SPEC COLLECT METH UR: ABNORMAL
UROBILINOGEN UR STRIP-ACNC: NEGATIVE EU/DL
WBC # BLD AUTO: 7.22 K/UL (ref 3.9–12.7)
WBC #/AREA URNS HPF: >100 /HPF (ref 0–5)
WBC CLUMPS URNS QL MICRO: ABNORMAL

## 2022-06-11 PROCEDURE — 25000003 PHARM REV CODE 250: Performed by: EMERGENCY MEDICINE

## 2022-06-11 PROCEDURE — 81000 URINALYSIS NONAUTO W/SCOPE: CPT | Performed by: EMERGENCY MEDICINE

## 2022-06-11 PROCEDURE — 87088 URINE BACTERIA CULTURE: CPT | Performed by: EMERGENCY MEDICINE

## 2022-06-11 PROCEDURE — 63600175 PHARM REV CODE 636 W HCPCS: Performed by: EMERGENCY MEDICINE

## 2022-06-11 PROCEDURE — 93010 EKG 12-LEAD: ICD-10-PCS | Mod: ,,, | Performed by: INTERNAL MEDICINE

## 2022-06-11 PROCEDURE — 87040 BLOOD CULTURE FOR BACTERIA: CPT | Performed by: EMERGENCY MEDICINE

## 2022-06-11 PROCEDURE — 93010 ELECTROCARDIOGRAM REPORT: CPT | Mod: ,,, | Performed by: INTERNAL MEDICINE

## 2022-06-11 PROCEDURE — 93005 ELECTROCARDIOGRAM TRACING: CPT

## 2022-06-11 PROCEDURE — 87086 URINE CULTURE/COLONY COUNT: CPT | Performed by: EMERGENCY MEDICINE

## 2022-06-11 RX ORDER — AMOXICILLIN AND CLAVULANATE POTASSIUM 875; 125 MG/1; MG/1
1 TABLET, FILM COATED ORAL 2 TIMES DAILY
Qty: 14 TABLET | Refills: 0 | Status: SHIPPED | OUTPATIENT
Start: 2022-06-11 | End: 2022-06-18

## 2022-06-11 RX ADMIN — LIDOCAINE HYDROCHLORIDE 10 ML: 20 JELLY TOPICAL at 12:06

## 2022-06-11 RX ADMIN — CEFTRIAXONE 1 G: 1 INJECTION, SOLUTION INTRAVENOUS at 01:06

## 2022-06-11 RX ADMIN — SODIUM CHLORIDE, SODIUM LACTATE, POTASSIUM CHLORIDE, AND CALCIUM CHLORIDE 1836 ML: .6; .31; .03; .02 INJECTION, SOLUTION INTRAVENOUS at 12:06

## 2022-06-11 NOTE — ED PROVIDER NOTES
"Encounter Date: 6/10/2022    SCRIBE #1 NOTE: I, Celsa Samuel, am scribing for, and in the presence of,  Elliot Huffman MD. I have scribed the following portions of the note - Other sections scribed: HPI, ROS, PE.       History     Chief Complaint   Patient presents with    Altered Mental Status     Pt. Arrived [facility] with c/c per family of confusion, stating the pt. "Has been wandering around the house, fell two days ago and hurt his right shoulder".  PT. Denies complaint at triage.      Jaydon Mccarthy is a 89 y.o male with a past medical history of right eye cancer, CAD, DVT, hyperlipidemia, and hypertension presenting to the emergency department with a chief complaint of altered mental status. Per daughter, the patient was watching TV when he began to shiver. Daughter states the patient's temperature at home was 97.6, with a temperature of 100.1 with EMS. Daughter states the patient then tried to get up to use the bathroom and could not walk normally or move his legs and feet due to weakness, and that the patient appeared confused, "started saying weird stuff", and was not able to respond to family members. Daughter states the patient has been confused for the past 3 weeks, but was more confused this evening. Additionally reports a fall 2 days ago where the patient injured his right elbow and landed on his back, with unknown head trauma. Reports another fall several weeks ago where the patient had a CT scan and was diagnosed with a UTI. Reports past chemotherapy and radiation that ended in June, 2021 and daughter states the patient has been fully vaccinated for COVID-19. Daughter reports giving the patient 1000 mg Tylenol prior to arrival. Denies vomiting, diarrhea, and all other associated symptoms.     The history is provided by a relative.     Review of patient's allergies indicates:  No Known Allergies  Past Medical History:   Diagnosis Date    Anticoagulant long-term use     Arthritis     Cancer     " right  hand    Coronary artery disease     Deep vein thrombosis     Hyperlipidemia     Hypertension     Seizures 1988    none since on tegretol     Past Surgical History:   Procedure Laterality Date    APPENDECTOMY      CLOSURE OF WOUND  2020    Procedure: CLOSURE, WOUND;  Surgeon: Yamil Bonilla MD;  Location: [facility] OR;  Service: Plastics;;    EXCISION OF LESION OF EYELID Right 2020    Procedure: EXCISION TUMOR RIGHT EYEBROW WITH COMPLEX CLOSURE;  Surgeon: Yamil Bonilla MD;  Location: [facility] OR;  Service: Plastics;  Laterality: Right;  FROZEN SECTION  RN PREOP 2020----COVID NEGATIVE      FRACTURE SURGERY Right     elbow    FRACTURE SURGERY Right     hip    JOSUÉ FILTER PLACEMENT      hx of deep vein thrombosis    OPEN REDUCTION AND INTERNAL FIXATION (ORIF) OF INTERTROCHANTERIC FRACTURE OF FEMUR Left 2018    Procedure: ORIF, FRACTURE, FEMUR, INTERTROCHANTERIC;  Surgeon: Pop Martin III, MD;  Location: [facility] OR;  Service: Orthopedics;  Laterality: Left;    TOTAL SHOULDER ARTHROPLASTY Right      History reviewed. No pertinent family history.  Social History     Tobacco Use    Smoking status: Former Smoker     Packs/day: 0.50     Years: 6.00     Pack years: 3.00     Types: Cigarettes     Quit date: 10/27/1975     Years since quittin.6    Smokeless tobacco: Never Used   Substance Use Topics    Alcohol use: No    Drug use: No     Review of Systems   Unable to perform ROS: Mental status change   Constitutional: Positive for chills.   Gastrointestinal: Negative for diarrhea and vomiting.   Neurological: Positive for weakness.   Psychiatric/Behavioral: Positive for confusion.       Physical Exam     Initial Vitals [06/10/22 2238]   BP Pulse Resp Temp SpO2   (!) 155/67 81 20 100.1 °F (37.8 °C) 98 %      MAP       --         Physical Exam    Nursing note and vitals reviewed.  Constitutional: He appears well-developed and well-nourished.   Feels febrile. Patient is hard  of hearing   HENT:   Head: Normocephalic and atraumatic.   Eyes: Left eye exhibits normal extraocular motion. Left pupil is round and reactive.   Right eye surgically removed   Neck: Neck supple. No thyromegaly present. No JVD present.   Cardiovascular: Normal rate and regular rhythm. Exam reveals no gallop and no friction rub.    No murmur heard.  Pulmonary/Chest: Breath sounds normal. No respiratory distress.   Port to the right anterior chest wall with no erythema or swelling   Abdominal: Abdomen is soft. Bowel sounds are normal. There is no abdominal tenderness.   Musculoskeletal:         General: No tenderness or edema. Normal range of motion.      Cervical back: Neck supple.      Comments: Right elbow is bandaged but patient has full ROM of all extremities     Neurological: He is alert. He has normal strength.   GCS 14. Appears confused   Skin: Skin is warm and dry. Capillary refill takes less than 2 seconds.         ED Course   Procedures  Labs Reviewed   CBC W/ AUTO DIFFERENTIAL - Abnormal; Notable for the following components:       Result Value    RBC 3.01 (*)     Hemoglobin 9.0 (*)     Hematocrit 27.1 (*)     Platelets 140 (*)     Lymph # 0.8 (*)     Gran % 77.0 (*)     Lymph % 10.4 (*)     All other components within normal limits   COMPREHENSIVE METABOLIC PANEL - Abnormal; Notable for the following components:    Chloride 111 (*)     Glucose 132 (*)     Calcium 8.2 (*)     Albumin 3.2 (*)     ALT 8 (*)     eGFR if non  53 (*)     All other components within normal limits   URINALYSIS, REFLEX TO URINE CULTURE - Abnormal; Notable for the following components:    Appearance, UA Hazy (*)     Protein, UA 1+ (*)     Occult Blood UA Trace (*)     Leukocytes, UA 3+ (*)     All other components within normal limits    Narrative:     Specimen Source->Urine   B-TYPE NATRIURETIC PEPTIDE - Abnormal; Notable for the following components:     (*)     All other components within normal limits    URINALYSIS MICROSCOPIC - Abnormal; Notable for the following components:    RBC, UA 17 (*)     WBC, UA >100 (*)     WBC Clumps, UA Few (*)     Bacteria Few (*)     All other components within normal limits    Narrative:     Specimen Source->Urine   CULTURE, BLOOD   CULTURE, BLOOD   CULTURE, URINE   LACTIC ACID, PLASMA   MAGNESIUM   PHOSPHORUS   TROPONIN I   PROCALCITONIN   LACTIC ACID, PLASMA   SARS-COV-2 RDRP GENE     EKG Readings: (Independently Interpreted)   Initial Reading: No STEMI. Heart Rate: 69. Ectopy: No Ectopy. Conduction: 1st Degree AV Block. ST Segments: Normal ST Segments. T Waves: Normal.       Imaging Results           CT Head Without Contrast (Final result)  Result time 06/11/22 01:44:13    Final result by Ryder Tillman MD (06/11/22 01:44:13)                 Impression:      Chronic changes are noted, there is no evidence for acute intracranial process.    Extracranial soft tissue abnormality associated with the right zygomatic arch and zygomatical frontal junction again noted with evidence for osseous erosive change again noted, with suggestion of mild progression, clinical and historical correlation is otherwise needed.    This report was flagged in Epic as abnormal.      Electronically signed by: Ryder Tillman  Date:    06/11/2022  Time:    01:44             Narrative:    EXAMINATION:  CT HEAD WITHOUT CONTRAST    CLINICAL HISTORY:  Mental status change, unknown cause;    TECHNIQUE:  Low dose axial images were obtained through the head.  Coronal and sagittal reformations were also performed. Contrast was not administered.    COMPARISON:  CT examination of the brain without contrast April 11, 2022    FINDINGS:  The ventricular system, sulcal pattern and parenchymal attenuation characteristics are consistent with chronic change.  Involutional change and chronic appearing white matter change noted, the appearance is stable when compared to the prior examination without evidence for  superimposed acute process.    There is no evidence for intracranial mass, mass effect or midline shift and no evidence for acute intracranial hemorrhage.  Appropriate CSF spaces are seen at the skull base.    Previously noted extracranial soft tissue abnormality associated with the zygomatic arch and zygomatical frontal junction on the right is again noted, the soft tissue component appears similar to the prior study, there is continued appearance thought to represent osseous erosion, this appears similar perhaps mildly progressed, clinical and historical correlation is otherwise needed.  The osseous structures otherwise demonstrate no evidence for superimposed acute process.    The mastoid air cells demonstrate mild opacity on the right, on the left appear appropriately aerated.  Minimal paranasal sinus mucosal thickening is noted.                               X-Ray Chest AP Portable (Final result)  Result time 06/11/22 00:15:49    Final result by Ryder Tillman MD (06/11/22 00:15:49)                 Impression:      Diminished depth of inspiration and mild atelectatic change otherwise stable without radiographic evidence for superimposed acute intrathoracic process.      Electronically signed by: Ryder Tillman  Date:    06/11/2022  Time:    00:15             Narrative:    EXAMINATION:  XR CHEST AP PORTABLE    CLINICAL HISTORY:  Sepsis;    TECHNIQUE:  Single frontal view of the chest was performed.    COMPARISON:  Chest radiograph April 11, 2022    FINDINGS:  Single portable chest view is submitted.  Right-sided subcutaneous central venous catheter again noted, distal tip at the expected location of the SVC.  The cardiomediastinal silhouette appears stable when accounting for difference in position, technique and depth of inspiration.  Aortic atherosclerotic change noted.    Accentuation of pulmonary bronchovascular markings consistent with diminished depth of inspiration noted.  Mild atelectatic change  noted.  There is no evidence for superimposed confluent infiltrate or consolidation, significant pleural effusion or pneumothorax.  The osseous structures demonstrate chronic change.                                 Medications   LIDOcaine HCl 2% urojet (10 mLs Mucous Membrane Given 6/11/22 0016)   cefTRIAXone (ROCEPHIN) 1 g/50 mL D5W IVPB (1 g Intravenous New Bag 6/11/22 0132)     Medical Decision Making:   History:   Old Medical Records: I decided to obtain old medical records.  Initial Assessment:   This is the emergent evaluation of a 89 y.o male with a past medical history of right eye cancer, CAD, DVT, hyperlipidemia, and hypertension presenting to the emergency department with a chief complaint of altered mental status. The patient was seen and examined. The history and physical exam was obtained. The nursing notes and vital signs were reviewed. Secondary to symptoms and examination findings I ordered X-Ray chest, Lactic Acid, Magnesium, Phosphorus, BNP, Troponin I, Procalcitonin, POCT COVID-19, Blood culture, CBC, CMP, UA, and EKG 12-Lead . Will reassess.  Clinical Tests:   Lab Tests: Ordered and Reviewed  Radiological Study: Ordered and Reviewed  Medical Tests: Ordered and Reviewed    Patient is back to his baseline mental status. Walked easily with walker up and down the hallway in the ED> Patient states he feels well.         Scribe Attestation:   Scribe #1: I performed the above scribed service and the documentation accurately describes the services I performed. I attest to the accuracy of the note.                 Clinical Impression:   Final diagnoses:  [N39.0] Urinary tract infection without hematuria, site unspecified (Primary)  [R40.4] Transient alteration of awareness        I, Elliot Meyer, personally performed the services described in this documentation. All medical record entries made by the scribe were at my direction and in my presence. I have reviewed the chart and agree that the record  reflects my personal performance and is accurate and complete.       ED Disposition Condition    Discharge Stable        ED Prescriptions     Medication Sig Dispense Start Date End Date Auth. Provider    amoxicillin-clavulanate 875-125mg (AUGMENTIN) 875-125 mg per tablet Take 1 tablet by mouth 2 (two) times daily. for 7 days 14 tablet 6/11/2022 6/18/2022 Elliot Huffman MD        Follow-up Information     Follow up With Specialties Details Why Contact Info    COBY Yañez MD Urology Schedule an appointment as soon as possible for a visit  or your urologist for evalaution of recurrent UTI/ prostate evaluation. [address]  [address]  [facility] [address]  [phone]      [facility] - Emergency Dept Emergency Medicine  As needed, If symptoms return, worsen, or new symptoms develop [address] [facility] [id number] [phone]           Elliot Huffman MD  06/11/22 0221       Elliot Huffman MD  06/11/22 0222

## 2022-06-11 NOTE — ED NOTES
Walking trial in hallway with a standard walker. Gait is steady. Pt tolerated ambulation in the hallway

## 2022-06-13 LAB — BACTERIA UR CULT: ABNORMAL

## 2022-06-15 LAB
BACTERIA BLD CULT: NORMAL
BACTERIA BLD CULT: NORMAL

## 2022-07-08 ENCOUNTER — OFFICE VISIT (OUTPATIENT)
Dept: UROLOGY | Facility: CLINIC | Age: 87
End: 2022-07-08
Payer: MEDICARE

## 2022-07-08 VITALS — BODY MASS INDEX: 19.62 KG/M2 | WEIGHT: 125 LBS | HEIGHT: 67 IN

## 2022-07-08 DIAGNOSIS — N40.1 BPH WITH OBSTRUCTION/LOWER URINARY TRACT SYMPTOMS: ICD-10-CM

## 2022-07-08 DIAGNOSIS — N13.8 BPH WITH OBSTRUCTION/LOWER URINARY TRACT SYMPTOMS: ICD-10-CM

## 2022-07-08 DIAGNOSIS — N39.0 RECURRENT UTI: Primary | ICD-10-CM

## 2022-07-08 LAB
BACTERIA #/AREA URNS HPF: NORMAL /HPF
MICROSCOPIC COMMENT: NORMAL
RBC #/AREA URNS HPF: 1 /HPF (ref 0–4)
WBC #/AREA URNS HPF: 1 /HPF (ref 0–5)

## 2022-07-08 PROCEDURE — 1157F ADVNC CARE PLAN IN RCRD: CPT | Mod: CPTII,S$GLB,, | Performed by: STUDENT IN AN ORGANIZED HEALTH CARE EDUCATION/TRAINING PROGRAM

## 2022-07-08 PROCEDURE — 1157F PR ADVANCE CARE PLAN OR EQUIV PRESENT IN MEDICAL RECORD: ICD-10-PCS | Mod: CPTII,S$GLB,, | Performed by: STUDENT IN AN ORGANIZED HEALTH CARE EDUCATION/TRAINING PROGRAM

## 2022-07-08 PROCEDURE — 3288F PR FALLS RISK ASSESSMENT DOCUMENTED: ICD-10-PCS | Mod: CPTII,S$GLB,, | Performed by: STUDENT IN AN ORGANIZED HEALTH CARE EDUCATION/TRAINING PROGRAM

## 2022-07-08 PROCEDURE — 1159F PR MEDICATION LIST DOCUMENTED IN MEDICAL RECORD: ICD-10-PCS | Mod: CPTII,S$GLB,, | Performed by: STUDENT IN AN ORGANIZED HEALTH CARE EDUCATION/TRAINING PROGRAM

## 2022-07-08 PROCEDURE — 99203 OFFICE O/P NEW LOW 30 MIN: CPT | Mod: S$GLB,,, | Performed by: STUDENT IN AN ORGANIZED HEALTH CARE EDUCATION/TRAINING PROGRAM

## 2022-07-08 PROCEDURE — 1125F PR PAIN SEVERITY QUANTIFIED, PAIN PRESENT: ICD-10-PCS | Mod: CPTII,S$GLB,, | Performed by: STUDENT IN AN ORGANIZED HEALTH CARE EDUCATION/TRAINING PROGRAM

## 2022-07-08 PROCEDURE — 1100F PR PT FALLS ASSESS DOC 2+ FALLS/FALL W/INJURY/YR: ICD-10-PCS | Mod: CPTII,S$GLB,, | Performed by: STUDENT IN AN ORGANIZED HEALTH CARE EDUCATION/TRAINING PROGRAM

## 2022-07-08 PROCEDURE — 1125F AMNT PAIN NOTED PAIN PRSNT: CPT | Mod: CPTII,S$GLB,, | Performed by: STUDENT IN AN ORGANIZED HEALTH CARE EDUCATION/TRAINING PROGRAM

## 2022-07-08 PROCEDURE — 99999 PR PBB SHADOW E&M-EST. PATIENT-LVL III: CPT | Mod: PBBFAC,,, | Performed by: STUDENT IN AN ORGANIZED HEALTH CARE EDUCATION/TRAINING PROGRAM

## 2022-07-08 PROCEDURE — 1159F MED LIST DOCD IN RCRD: CPT | Mod: CPTII,S$GLB,, | Performed by: STUDENT IN AN ORGANIZED HEALTH CARE EDUCATION/TRAINING PROGRAM

## 2022-07-08 PROCEDURE — 99203 PR OFFICE/OUTPT VISIT, NEW, LEVL III, 30-44 MIN: ICD-10-PCS | Mod: S$GLB,,, | Performed by: STUDENT IN AN ORGANIZED HEALTH CARE EDUCATION/TRAINING PROGRAM

## 2022-07-08 PROCEDURE — 87086 URINE CULTURE/COLONY COUNT: CPT | Performed by: STUDENT IN AN ORGANIZED HEALTH CARE EDUCATION/TRAINING PROGRAM

## 2022-07-08 PROCEDURE — 1160F PR REVIEW ALL MEDS BY PRESCRIBER/CLIN PHARMACIST DOCUMENTED: ICD-10-PCS | Mod: CPTII,S$GLB,, | Performed by: STUDENT IN AN ORGANIZED HEALTH CARE EDUCATION/TRAINING PROGRAM

## 2022-07-08 PROCEDURE — 3288F FALL RISK ASSESSMENT DOCD: CPT | Mod: CPTII,S$GLB,, | Performed by: STUDENT IN AN ORGANIZED HEALTH CARE EDUCATION/TRAINING PROGRAM

## 2022-07-08 PROCEDURE — 81001 POCT URINALYSIS, DIPSTICK OR TABLET REAGENT, AUTOMATED, WITH MICROSCOP: ICD-10-PCS | Mod: S$GLB,,, | Performed by: STUDENT IN AN ORGANIZED HEALTH CARE EDUCATION/TRAINING PROGRAM

## 2022-07-08 PROCEDURE — 81000 URINALYSIS NONAUTO W/SCOPE: CPT | Performed by: STUDENT IN AN ORGANIZED HEALTH CARE EDUCATION/TRAINING PROGRAM

## 2022-07-08 PROCEDURE — 81001 URINALYSIS AUTO W/SCOPE: CPT | Mod: S$GLB,,, | Performed by: STUDENT IN AN ORGANIZED HEALTH CARE EDUCATION/TRAINING PROGRAM

## 2022-07-08 PROCEDURE — 99999 PR PBB SHADOW E&M-EST. PATIENT-LVL III: ICD-10-PCS | Mod: PBBFAC,,, | Performed by: STUDENT IN AN ORGANIZED HEALTH CARE EDUCATION/TRAINING PROGRAM

## 2022-07-08 PROCEDURE — 1160F RVW MEDS BY RX/DR IN RCRD: CPT | Mod: CPTII,S$GLB,, | Performed by: STUDENT IN AN ORGANIZED HEALTH CARE EDUCATION/TRAINING PROGRAM

## 2022-07-08 PROCEDURE — 51798 POCT BLADDER SCAN: ICD-10-PCS | Mod: S$GLB,,, | Performed by: STUDENT IN AN ORGANIZED HEALTH CARE EDUCATION/TRAINING PROGRAM

## 2022-07-08 PROCEDURE — 1100F PTFALLS ASSESS-DOCD GE2>/YR: CPT | Mod: CPTII,S$GLB,, | Performed by: STUDENT IN AN ORGANIZED HEALTH CARE EDUCATION/TRAINING PROGRAM

## 2022-07-08 PROCEDURE — 51798 US URINE CAPACITY MEASURE: CPT | Mod: S$GLB,,, | Performed by: STUDENT IN AN ORGANIZED HEALTH CARE EDUCATION/TRAINING PROGRAM

## 2022-07-08 NOTE — PROGRESS NOTES
Patient ID: Jaydon Mccarthy is a 89 y.o. male.    Chief Complaint: Urinary Tract Infection (Recurrent)    Referral: Elliot Huffman MD  [address] [place], [place] [address] [address]     [facility]  89 y.o. who presents to the Urology clinic for evaluation of recurrent UTI. Patient has known to have poor water intake. Patient also has a BM twice per week. Patient has been recently transitioned to an assisted living facility. Patient's prior sx of UTI include urinary frequency and discomfort. Patient has long hx of BPH, on flomax and finasteride. No recent imaging of urinary tract. PMH pertinent for CAD  With PCI. On blood thinners.     Medically Necessary ROS documented in HPI    Past Medical History  Active Ambulatory Problems     Diagnosis Date Noted    Personal history of DVT (deep vein thrombosis) 08/20/2013    Benign prostatic hyperplasia with lower urinary tract symptoms 08/20/2013    Tinnitus, bilateral 08/20/2013    Sensorineural hearing loss (SNHL) of both ears 08/20/2013    History of total shoulder replacement, right 09/24/2013    Essential hypertension 09/17/2015    Hyperlipidemia 10/28/2015    CAD s/p PCI  10/28/2015    Chronic anticoagulation 10/28/2015    Anemia of chronic disease 10/28/2015    Epilepsy 10/28/2015    Squamous cell carcinoma of skin 08/26/2019    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 12/09/2020    Other nonthrombocytopenic purpura 06/03/2022    History of bilateral hip arthroplasty 06/03/2022    Aortic arch atherosclerosis 06/03/2022    Atherosclerosis of both carotid arteries 06/03/2022    Stage 3a chronic kidney disease 06/03/2022    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    Chronic allergic rhinitis 06/03/2022    Sleep disturbance 06/03/2022     Resolved Ambulatory Problems     Diagnosis Date Noted    Hypertension 08/20/2013    Adhesive bursitis of right shoulder 08/20/2013    Hypercholesterolemia 08/20/2013    Seizures     Altered mental status 09/17/2015    History of  seizure 09/17/2015    Elbow fracture, right 09/17/2015    Cardiac arrest 09/21/2015    NSTEMI (non-ST elevated myocardial infarction) 09/25/2015    Intertrochanteric fracture of right femur 10/27/2015    Closed fracture of proximal end of ulna 10/30/2015    High risk medication use 09/14/2016    SCC (squamous cell carcinoma), hand 10/06/2016    Intertrochanteric fracture of left hip, closed, initial encounter 05/29/2018    Preop cardiovascular exam 05/30/2018    Seizure disorder 05/30/2018     Past Medical History:   Diagnosis Date    Anticoagulant long-term use     Arthritis     Cancer     Coronary artery disease     Deep vein thrombosis          Past Surgical History  Past Surgical History:   Procedure Laterality Date    APPENDECTOMY      CLOSURE OF WOUND  12/9/2020    Procedure: CLOSURE, WOUND;  Surgeon: Yamil Bonilla MD;  Location: [facility] OR;  Service: Plastics;;    EXCISION OF LESION OF EYELID Right 12/9/2020    Procedure: EXCISION TUMOR RIGHT EYEBROW WITH COMPLEX CLOSURE;  Surgeon: Yamil Bonilla MD;  Location: [facility] OR;  Service: Plastics;  Laterality: Right;  FROZEN SECTION  RN PREOP 12/8/2020----COVID NEGATIVE  12/8    FRACTURE SURGERY Right     elbow    FRACTURE SURGERY Right     hip    JOSUÉ FILTER PLACEMENT      hx of deep vein thrombosis    OPEN REDUCTION AND INTERNAL FIXATION (ORIF) OF INTERTROCHANTERIC FRACTURE OF FEMUR Left 5/30/2018    Procedure: ORIF, FRACTURE, FEMUR, INTERTROCHANTERIC;  Surgeon: Pop Martin III, MD;  Location: [facility] OR;  Service: Orthopedics;  Laterality: Left;    TOTAL SHOULDER ARTHROPLASTY Right        Social History  Social Connections: Socially Isolated    Frequency of Communication with Friends and Family: More than three times a week    Frequency of Social Gatherings with Friends and Family: More than three times a week    Attends Yarsanism Services: Never    Active Member of Clubs or Organizations: No    Attends Club or Organization  Meetings: Never    Marital Status:        Medications    Current Outpatient Medications:     atorvastatin (LIPITOR) 20 MG tablet, TAKE ONE TABLET BY MOUTH ONCE DAILY, Disp: 90 tablet, Rfl: 3    bethanechol (URECHOLINE) 50 MG tablet, TAKE ONE TABLET BY MOUTH THREE TIMES DAILY ON AN EMPTY STOMACH *DO NOT TAKE WITH MILK OR ANTACIDS, TAKE WITH WATER *, Disp: 90 tablet, Rfl: 3    carBAMazepine (TEGRETOL) 100 mg chewable tablet, TAKE 2 TABLETS BY MOUTH DAILY, Disp: 180 tablet, Rfl: 3    cetirizine (ZYRTEC) 10 MG tablet, Take 10 mg by mouth once daily., Disp: , Rfl:     clopidogreL (PLAVIX) 75 mg tablet, TAKE ONE TABLET BY MOUTH ONCE DAILY, Disp: 90 tablet, Rfl: 3    finasteride (PROSCAR) 5 mg tablet, TAKE ONE TABLET BY MOUTH DAILY, Disp: 90 tablet, Rfl: 3    levETIRAcetam (KEPPRA) 500 MG Tab, TAKE ONE TABLET BY MOUTH TWICE DAILY, Disp: 180 tablet, Rfl: 3    melatonin 5 mg Cap, Take 5 mg by mouth nightly., Disp: , Rfl:     metoprolol tartrate (LOPRESSOR) 25 MG tablet, TAKE 1/2 TABLET BY MOUTH TWICE DAILY, Disp: 30 tablet, Rfl: 11    mometasone 0.1% (ELOCON) 0.1 % cream, APPLY to face TWICE DAILY FOR 1 WEEK, Disp: , Rfl:     tamsulosin (FLOMAX) 0.4 mg Cap, TAKE ONE CAPSULE BY MOUTH ONCE DAILY, Disp: 90 capsule, Rfl: 3    vitamin D (VITAMIN D3) 1000 units Tab, Take 1,000 Units by mouth once daily., Disp: , Rfl:     XARELTO 20 mg Tab, TAKE ONE TABLET BY MOUTH DAILY, Disp: 30 tablet, Rfl: 3    Allergies  Review of patient's allergies indicates:  No Known Allergies    Patient's PMH, FH, Social hx, Medications, allergies reviewed and updated as pertinent to today's visit    Objective:      Physical Exam  Constitutional:       General: He is not in acute distress.     Appearance: He is well-developed. He is not ill-appearing, toxic-appearing or diaphoretic.   HENT:      Mouth/Throat:      Mouth: Mucous membranes are moist.   Eyes:      Conjunctiva/sclera: Conjunctivae normal.   Pulmonary:      Effort:  Pulmonary effort is normal. No respiratory distress.   Abdominal:      General: There is no distension.      Palpations: Abdomen is soft. There is no mass.      Tenderness: There is no abdominal tenderness. There is no guarding.   Musculoskeletal:         General: No swelling or deformity.      Cervical back: Neck supple.      Comments: In wheelchair   Skin:     General: Skin is warm.      Capillary Refill: Capillary refill takes less than 2 seconds.      Findings: No rash.   Neurological:      Mental Status: He is alert and oriented to person, place, and time.      Gait: Gait normal.   Psychiatric:         Mood and Affect: Mood normal.         Thought Content: Thought content normal.         Judgment: Judgment normal.             Assessment:       1. Recurrent UTI    2. BPH with obstruction/lower urinary tract symptoms        Plan:       Risk factors for UTIs include poor water intake, constipation, Incomplete bladder emptying    Voided 100mL, retained 124mL  POCT UA with possible blood, with recent UTI this is not unusual  Urine culture    Recommend   -Miralax titrated to 1 BM 1-2 days  -8-10 glasses of water daily  - Stop zyrtec as it can cause urinary retention  - Stop bethanechol given lack of evidence regarding efficacy to support usage  - Renal bladder US to evaluate urinary tract/measure prostate  Continue flomax, finasteride    RTC  4 weeks

## 2022-07-10 LAB — BACTERIA UR CULT: NO GROWTH

## 2022-07-11 ENCOUNTER — TELEPHONE (OUTPATIENT)
Dept: UROLOGY | Facility: CLINIC | Age: 87
End: 2022-07-11
Payer: MEDICARE

## 2022-07-11 NOTE — TELEPHONE ENCOUNTER
I spoke with the pt daughter and informed her. She verbalized understanding.    ----- Message from Larissa Aguilar MD sent at 7/11/2022  6:15 AM CDT -----  please notify patients daughter no evidence of UTI

## 2022-07-12 ENCOUNTER — PATIENT MESSAGE (OUTPATIENT)
Dept: UROLOGY | Facility: CLINIC | Age: 87
End: 2022-07-12
Payer: MEDICARE

## 2022-07-12 LAB
BILIRUB SERPL-MCNC: NEGATIVE MG/DL
BLOOD URINE, POC: NORMAL
COLOR, POC UA: YELLOW
GLUCOSE UR QL STRIP: NORMAL
KETONES UR QL STRIP: NEGATIVE
LEUKOCYTE ESTERASE URINE, POC: NEGATIVE
NITRITE, POC UA: NEGATIVE
PH, POC UA: 5
POC RESIDUAL URINE VOLUME: 112 ML (ref 0–100)
PROTEIN, POC: NORMAL
SPECIFIC GRAVITY, POC UA: 1025
UROBILINOGEN, POC UA: NORMAL

## 2022-07-13 ENCOUNTER — PATIENT MESSAGE (OUTPATIENT)
Dept: FAMILY MEDICINE | Facility: CLINIC | Age: 87
End: 2022-07-13
Payer: MEDICARE

## 2022-07-13 DIAGNOSIS — K59.00 CONSTIPATION, UNSPECIFIED CONSTIPATION TYPE: ICD-10-CM

## 2022-07-13 DIAGNOSIS — K59.00 CONSTIPATION, UNSPECIFIED CONSTIPATION TYPE: Primary | ICD-10-CM

## 2022-07-13 RX ORDER — POLYETHYLENE GLYCOL 3350 17 G/17G
17 POWDER, FOR SOLUTION ORAL DAILY PRN
Refills: 0
Start: 2022-07-13 | End: 2022-07-13 | Stop reason: SDUPTHER

## 2022-07-13 RX ORDER — POLYETHYLENE GLYCOL 3350 17 G/17G
17 POWDER, FOR SOLUTION ORAL DAILY PRN
Qty: 30 EACH | Refills: 11 | Status: SHIPPED | OUTPATIENT
Start: 2022-07-13 | End: 2022-10-11

## 2022-07-19 DIAGNOSIS — Z79.01 CHRONIC ANTICOAGULATION: Chronic | ICD-10-CM

## 2022-07-19 DIAGNOSIS — I25.10 CORONARY ARTERY DISEASE INVOLVING NATIVE CORONARY ARTERY OF NATIVE HEART WITHOUT ANGINA PECTORIS: Chronic | ICD-10-CM

## 2022-07-19 DIAGNOSIS — G40.909 NONINTRACTABLE EPILEPSY WITHOUT STATUS EPILEPTICUS, UNSPECIFIED EPILEPSY TYPE: Chronic | ICD-10-CM

## 2022-07-19 RX ORDER — MELATONIN 5 MG
5 CAPSULE ORAL NIGHTLY
Qty: 90 CAPSULE | Refills: 3 | Status: SHIPPED | OUTPATIENT
Start: 2022-07-19 | End: 2022-09-16

## 2022-07-19 RX ORDER — CLOPIDOGREL BISULFATE 75 MG/1
75 TABLET ORAL DAILY
Qty: 90 TABLET | Refills: 3 | Status: ON HOLD | OUTPATIENT
Start: 2022-07-19 | End: 2023-09-05 | Stop reason: HOSPADM

## 2022-07-19 RX ORDER — METOPROLOL TARTRATE 25 MG/1
12.5 TABLET, FILM COATED ORAL 2 TIMES DAILY
Qty: 30 TABLET | Refills: 11 | Status: SHIPPED | OUTPATIENT
Start: 2022-07-19

## 2022-07-19 NOTE — TELEPHONE ENCOUNTER
Care Due:                  Date            Visit Type   Department     Provider  --------------------------------------------------------------------------------    Last Visit: None Found      None         None Found  Next Visit: None Scheduled  None         None Found                                                            Last  Test          Frequency    Reason                     Performed    Due Date  --------------------------------------------------------------------------------    Office Visit  12 months..  atorvastatin,              Not Found    Overdue                             finasteride, tamsulosin..    Lipid Panel.  12 months..  atorvastatin.............  Not Found    Overdue    [employer] Embedded Care Gaps. Reference number: [id number]. 7/19/2022   4:51:04 PM CDT

## 2022-07-19 NOTE — TELEPHONE ENCOUNTER
----- Message from Ana Blossom sent at 7/19/2022  4:32 PM CDT -----  Regarding: pharm  .Type: RX Refill Request    Who Called:  Davon    Have you contacted your pharmacy: is pharm call     Refill or New Rx: refill     RX Name and Strength:  clopidogreL (PLAVIX) 75 mg tablet  melatonin 5 mg Cap  metoprolol tartrate (LOPRESSOR) 25 MG tablet        Preferred Pharmacy with phone number: .      [facility] [place] [address] [place] [address] [address] [place] [id number]  Phone: [phone] Fax: [phone]        Local or Mail Order: local     Ordering Provider:     Would the patient rather a call back or a response via My [facility]?  Call     Best Call Back Number: .[phone]

## 2022-07-30 ENCOUNTER — HOSPITAL ENCOUNTER (EMERGENCY)
Facility: HOSPITAL | Age: 87
Discharge: HOME | End: 2022-07-30
Attending: EMERGENCY MEDICINE
Payer: MEDICARE

## 2022-07-30 VITALS
HEART RATE: 79 BPM | RESPIRATION RATE: 16 BRPM | WEIGHT: 125 LBS | BODY MASS INDEX: 19.62 KG/M2 | HEIGHT: 67 IN | OXYGEN SATURATION: 100 % | TEMPERATURE: 100 F | SYSTOLIC BLOOD PRESSURE: 137 MMHG | DIASTOLIC BLOOD PRESSURE: 80 MMHG

## 2022-07-30 DIAGNOSIS — R60.0 LOWER EXTREMITY EDEMA: ICD-10-CM

## 2022-07-30 DIAGNOSIS — M25.521 RIGHT ELBOW PAIN: ICD-10-CM

## 2022-07-30 DIAGNOSIS — S01.01XA LACERATION OF SCALP, INITIAL ENCOUNTER: Primary | ICD-10-CM
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
## 2022-07-30 LAB
ALBUMIN SERPL BCP-MCNC: 3.4 G/DL (ref 3.5–5.2)
ALP SERPL-CCNC: 162 U/L (ref 55–135)
ALT SERPL W/O P-5'-P-CCNC: 16 U/L (ref 10–44)
ANION GAP SERPL CALC-SCNC: 10 MMOL/L (ref 8–16)
AST SERPL-CCNC: 20 U/L (ref 10–40)
BASOPHILS # BLD AUTO: 0.06 K/UL (ref 0–0.2)
BASOPHILS NFR BLD: 0.7 % (ref 0–1.9)
BILIRUB SERPL-MCNC: 0.3 MG/DL (ref 0.1–1)
BILIRUB UR QL STRIP: NEGATIVE
BNP SERPL-MCNC: 79 PG/ML (ref 0–99)
BUN SERPL-MCNC: 17 MG/DL (ref 8–23)
CALCIUM SERPL-MCNC: 8.6 MG/DL (ref 8.7–10.5)
CHLORIDE SERPL-SCNC: 109 MMOL/L (ref 95–110)
CLARITY UR: CLEAR
CO2 SERPL-SCNC: 23 MMOL/L (ref 23–29)
COLOR UR: YELLOW
CREAT SERPL-MCNC: 1 MG/DL (ref 0.5–1.4)
DIFFERENTIAL METHOD: ABNORMAL
EOSINOPHIL # BLD AUTO: 0.2 K/UL (ref 0–0.5)
EOSINOPHIL NFR BLD: 1.8 % (ref 0–8)
ERYTHROCYTE [DISTWIDTH] IN BLOOD BY AUTOMATED COUNT: 12.9 % (ref 11.5–14.5)
EST. GFR  (AFRICAN AMERICAN): >60 ML/MIN/1.73 M^2
EST. GFR  (NON AFRICAN AMERICAN): >60 ML/MIN/1.73 M^2
GLUCOSE SERPL-MCNC: 116 MG/DL (ref 70–110)
GLUCOSE UR QL STRIP: NEGATIVE
HCT VFR BLD AUTO: 27.9 % (ref 40–54)
HGB BLD-MCNC: 9.3 G/DL (ref 14–18)
HGB UR QL STRIP: NEGATIVE
IMM GRANULOCYTES # BLD AUTO: 0.03 K/UL (ref 0–0.04)
IMM GRANULOCYTES NFR BLD AUTO: 0.3 % (ref 0–0.5)
KETONES UR QL STRIP: NEGATIVE
LEUKOCYTE ESTERASE UR QL STRIP: NEGATIVE
LYMPHOCYTES # BLD AUTO: 1.5 K/UL (ref 1–4.8)
LYMPHOCYTES NFR BLD: 17 % (ref 18–48)
MCH RBC QN AUTO: 28.7 PG (ref 27–31)
MCHC RBC AUTO-ENTMCNC: 33.3 G/DL (ref 32–36)
MCV RBC AUTO: 86 FL (ref 82–98)
MONOCYTES # BLD AUTO: 0.9 K/UL (ref 0.3–1)
MONOCYTES NFR BLD: 10.3 % (ref 4–15)
NEUTROPHILS # BLD AUTO: 6.2 K/UL (ref 1.8–7.7)
NEUTROPHILS NFR BLD: 69.9 % (ref 38–73)
NITRITE UR QL STRIP: NEGATIVE
NRBC BLD-RTO: 0 /100 WBC
PH UR STRIP: 6 [PH] (ref 5–8)
PLATELET # BLD AUTO: 146 K/UL (ref 150–450)
PMV BLD AUTO: 10.1 FL (ref 9.2–12.9)
POTASSIUM SERPL-SCNC: 3.8 MMOL/L (ref 3.5–5.1)
PROT SERPL-MCNC: 6.4 G/DL (ref 6–8.4)
PROT UR QL STRIP: ABNORMAL
RBC # BLD AUTO: 3.24 M/UL (ref 4.6–6.2)
SODIUM SERPL-SCNC: 142 MMOL/L (ref 136–145)
SP GR UR STRIP: 1.02 (ref 1–1.03)
TROPONIN I SERPL DL<=0.01 NG/ML-MCNC: 0.01 NG/ML (ref 0–0.03)
TROPONIN I SERPL DL<=0.01 NG/ML-MCNC: 0.01 NG/ML (ref 0–0.03)
URN SPEC COLLECT METH UR: ABNORMAL
UROBILINOGEN UR STRIP-ACNC: NEGATIVE EU/DL
WBC # BLD AUTO: 8.83 K/UL (ref 3.9–12.7)

## 2022-07-30 PROCEDURE — 84484 ASSAY OF TROPONIN QUANT: CPT | Performed by: EMERGENCY MEDICINE

## 2022-07-30 PROCEDURE — 99285 EMERGENCY DEPT VISIT HI MDM: CPT | Mod: 25

## 2022-07-30 PROCEDURE — 25000003 PHARM REV CODE 250: Performed by: EMERGENCY MEDICINE

## 2022-07-30 PROCEDURE — 83880 ASSAY OF NATRIURETIC PEPTIDE: CPT | Performed by: EMERGENCY MEDICINE

## 2022-07-30 PROCEDURE — 81003 URINALYSIS AUTO W/O SCOPE: CPT | Performed by: EMERGENCY MEDICINE

## 2022-07-30 PROCEDURE — 85025 COMPLETE CBC W/AUTO DIFF WBC: CPT | Performed by: EMERGENCY MEDICINE

## 2022-07-30 PROCEDURE — 80053 COMPREHEN METABOLIC PANEL: CPT | Performed by: EMERGENCY MEDICINE

## 2022-07-30 PROCEDURE — 93005 ELECTROCARDIOGRAM TRACING: CPT

## 2022-07-30 PROCEDURE — 93010 EKG 12-LEAD: ICD-10-PCS | Mod: ,,, | Performed by: INTERNAL MEDICINE

## 2022-07-30 PROCEDURE — 93010 ELECTROCARDIOGRAM REPORT: CPT | Mod: ,,, | Performed by: INTERNAL MEDICINE

## 2022-07-30 RX ORDER — LIDOCAINE HYDROCHLORIDE AND EPINEPHRINE 10; 10 MG/ML; UG/ML
10 INJECTION, SOLUTION INFILTRATION; PERINEURAL ONCE
Status: COMPLETED | OUTPATIENT
Start: 2022-07-30 | End: 2022-07-30

## 2022-07-30 RX ADMIN — LIDOCAINE HYDROCHLORIDE,EPINEPHRINE BITARTRATE 10 ML: 10; .01 INJECTION, SOLUTION INFILTRATION; PERINEURAL at 06:07

## 2022-07-30 NOTE — ED PROVIDER NOTES
Encounter Date: 7/30/2022       History     Chief Complaint   Patient presents with    Head Laceration     Pt BIB EMS for a fall. EMS reports pt fell and has a laceration to the back of head and pain to the right elbow. Pt denied LOC. Pt stated he takes blood thinners and is actively bleeding at this time. Pt denied dizziness, or blurry vision.     80-year-old male with a history of cancer, coronary artery disease, hyperlipidemia, seizures presenting after fall at nursing home.  Patient unable to give accurate history.  He reports he lost his balance and fell.  Reports hitting the back of his head as well as elbow.  Denies prodrome, dizziness, numbness, weakness.  Denies severe headache, nausea, vomiting.  Denies chest pain, shortness of breath.  Previously in usual state of health.        Review of patient's allergies indicates:  No Known Allergies  Past Medical History:   Diagnosis Date    Anticoagulant long-term use     Arthritis     Cancer     right  hand    Coronary artery disease     Deep vein thrombosis     Hyperlipidemia     Hypertension     Seizures 1988    none since on tegretol     Past Surgical History:   Procedure Laterality Date    APPENDECTOMY      CLOSURE OF WOUND  12/9/2020    Procedure: CLOSURE, WOUND;  Surgeon: Yamil Bonilla MD;  Location: [facility] OR;  Service: Plastics;;    EXCISION OF LESION OF EYELID Right 12/9/2020    Procedure: EXCISION TUMOR RIGHT EYEBROW WITH COMPLEX CLOSURE;  Surgeon: Yamil Bonilla MD;  Location: [facility] OR;  Service: Plastics;  Laterality: Right;  FROZEN SECTION  RN PREOP 12/8/2020----COVID NEGATIVE  12/8    FRACTURE SURGERY Right     elbow    FRACTURE SURGERY Right     hip    JOSUÉ FILTER PLACEMENT      hx of deep vein thrombosis    OPEN REDUCTION AND INTERNAL FIXATION (ORIF) OF INTERTROCHANTERIC FRACTURE OF FEMUR Left 5/30/2018    Procedure: ORIF, FRACTURE, FEMUR, INTERTROCHANTERIC;  Surgeon: Pop Martin III, MD;  Location: [facility] OR;  Service:  Orthopedics;  Laterality: Left;    TOTAL SHOULDER ARTHROPLASTY Right      History reviewed. No pertinent family history.  Social History     Tobacco Use    Smoking status: Former Smoker     Packs/day: 0.50     Years: 6.00     Pack years: 3.00     Types: Cigarettes     Quit date: 10/27/1975     Years since quittin.7    Smokeless tobacco: Never Used   Substance Use Topics    Alcohol use: No    Drug use: No     Review of Systems   Unable to perform ROS: Age       Physical Exam     Initial Vitals [22 1702]   BP Pulse Resp Temp SpO2   (!) 177/92 75 20 99.5 °F (37.5 °C) 100 %      MAP       --         Physical Exam    Nursing note and vitals reviewed.  Constitutional: He appears well-developed and well-nourished. He is not diaphoretic. No distress.   Thin, frail appearing   HENT:   Head: Normocephalic and atraumatic.   Eyes: Conjunctivae and EOM are normal. Pupils are equal, round, and reactive to light. No scleral icterus.   Neck: Neck supple.   Normal range of motion.  Cardiovascular: Normal rate, regular rhythm, normal heart sounds and intact distal pulses. Exam reveals no gallop and no friction rub.    No murmur heard.  Pulmonary/Chest: Breath sounds normal. No stridor. No respiratory distress. He has no wheezes. He has no rhonchi. He has no rales.   Abdominal: Abdomen is soft. Bowel sounds are normal. He exhibits no distension. There is no abdominal tenderness. There is no rebound and no guarding.   Musculoskeletal:         General: Edema present. No tenderness. Normal range of motion.      Cervical back: Normal range of motion and neck supple.      Comments: Right lower extremity 1+ pitting edema greater than left     Neurological: He is alert and oriented to person, place, and time. He has normal strength. No cranial nerve deficit. GCS score is 15. GCS eye subscore is 4. GCS verbal subscore is 5. GCS motor subscore is 6.   Skin: Skin is warm and dry. No rash noted.   Skin tear to right elbow, good  range of motion of elbow   Psychiatric: He has a normal mood and affect. His behavior is normal.         ED Course   Procedures  Labs Reviewed   CBC W/ AUTO DIFFERENTIAL - Abnormal; Notable for the following components:       Result Value    RBC 3.24 (*)     Hemoglobin 9.3 (*)     Hematocrit 27.9 (*)     Platelets 146 (*)     Lymph % 17.0 (*)     All other components within normal limits   COMPREHENSIVE METABOLIC PANEL - Abnormal; Notable for the following components:    Glucose 116 (*)     Calcium 8.6 (*)     Albumin 3.4 (*)     Alkaline Phosphatase 162 (*)     All other components within normal limits   URINALYSIS, REFLEX TO URINE CULTURE - Abnormal; Notable for the following components:    Protein, UA Trace (*)     All other components within normal limits    Narrative:     Specimen Source->Urine   TROPONIN I   TROPONIN I   B-TYPE NATRIURETIC PEPTIDE          Imaging Results          US Lower Extremity Veins Bilateral (Final result)  Result time 07/30/22 21:47:49    Final result by Leeann Beck MD (07/30/22 21:47:49)                 Impression:      No evidence of lower extremity deep venous thrombosis.      Electronically signed by: Leeann Beck MD  Date:    07/30/2022  Time:    21:47             Narrative:    EXAMINATION:  US LOWER EXTREMITY VEINS BILATERAL    CLINICAL HISTORY:  Localized edema    TECHNIQUE:  Duplex and color flow Doppler evaluation of the bilateral lower extremity veins was performed.    COMPARISON:  May 2018.    FINDINGS:  No evidence of clot involving the bilateral common femoral, greater saphenous, femoral, popliteal, peroneal, anterior and posterior tibial veins.  All venous structures demonstrate normal respiratory phasicity and augment adequately.  No evidence of soft tissue mass or Baker's cyst.                               X-Ray Elbow Complete Right (Final result)  Result time 07/30/22 20:43:56    Final result by Leeann Beck MD (07/30/22 20:43:56)                  Impression:      See above.      Electronically signed by: Leeann Beck MD  Date:    07/30/2022  Time:    20:43             Narrative:    EXAMINATION:  XR ELBOW COMPLETE 3 VIEW RIGHT    CLINICAL HISTORY:  . Pain in right elbow    TECHNIQUE:  AP, lateral views of the right elbow were performed.    COMPARISON:  October 2015.    FINDINGS:  Postsurgical ORIF changes and pinning are seen involving the proximal ulna and olecranon process for previous olecranon fracture repair.  No hardware complication is seen.  No acute displaced fracture is identified, allowing for suboptimal positioning.  There is no significant elbow joint effusion seen.                               X-Ray Chest AP Portable (Final result)  Result time 07/30/22 20:41:52    Final result by Leeann Beck MD (07/30/22 20:41:52)                 Impression:      No acute cardiopulmonary process identified.      Electronically signed by: Leeann Beck MD  Date:    07/30/2022  Time:    20:41             Narrative:    EXAMINATION:  XR CHEST AP PORTABLE    CLINICAL HISTORY:  Chest Pain;    TECHNIQUE:  Single frontal view of the chest was performed.    COMPARISON:  06/11/2022.    FINDINGS:  Cardiac silhouette is normal in size.  Right-sided chest port is visualized in unchanged position.  Lungs are hypoinflated.  No evidence of new focal consolidative process, pneumothorax, or significant pleural effusion.  No acute osseous abnormality identified.  Right shoulder arthroplasty changes are noted.                               CT Cervical Spine Without Contrast (Final result)  Result time 07/30/22 17:59:29    Final result by Leeann Beck MD (07/30/22 17:59:29)                 Impression:      No evidence of acute cervical spine fracture or dislocation.      Electronically signed by: Leeann Beck MD  Date:    07/30/2022  Time:    17:59             Narrative:    EXAMINATION:  CT CERVICAL SPINE WITHOUT CONTRAST    CLINICAL HISTORY:  Neck trauma (Age >=  65y);    TECHNIQUE:  Low dose axial images, sagittal and coronal reformations were performed though the cervical spine.  Contrast was not administered.    COMPARISON:  CT cervical spine from 04/11/2022.    FINDINGS:  No evidence of acute cervical spine fracture or dislocation.  Odontoid process is intact.  Craniocervical junction is unremarkable.  Cervical spine alignment is within normal limits.  There is fusion of the C3 and C4 vertebral bodies.  Extensive degenerative changes and severe intervertebral disc space narrowing are seen throughout the cervical levels.    Surrounding soft tissues show no significant abnormalities.  Airway is patent.  Partially visualized lung apices are clear.                               CT Head Without Contrast (Final result)  Result time 07/30/22 18:02:45    Final result by Remington Trujillo DO (07/30/22 18:02:45)                 Impression:      1. No acute intracranial abnormality.  2. Large posterior scalp hematoma/laceration.  3. Redemonstration of a nonspecific soft tissue density adherent to the right calvarium near the zygomatico-frontal junction and zygomatic arch with adjacent osseous erosive change, overall unchanged in appearance in comparison with CT head from 06/11/2022.  Continued follow-up is recommended.      Electronically signed by: Remington Trujillo  Date:    07/30/2022  Time:    18:02             Narrative:    EXAMINATION:  CT HEAD WITHOUT CONTRAST    CLINICAL HISTORY:  Head trauma, minor (Age >= 65y);Head trauma, moderate-severe;    TECHNIQUE:  Low dose axial CT images obtained throughout the head without intravenous contrast. Sagittal and coronal reconstructions were performed.    COMPARISON:  CT head from 06/11/2022 and multiple additional priors.    FINDINGS:  There is age-related brain parenchymal volume loss and prominence of the CSF spaces.  Ventricular size is unchanged from prior.    There is confluent hypoattenuation in the supratentorial white matter  compatible with advanced chronic microvascular ischemic changes.    No extra-axial blood or fluid collections.    Parenchymal mass, hemorrhage, edema or major vascular distribution infarct.    Again seen is a partially visualized soft tissue abnormality associated with the right zygomatic arch and zygomatico-frontal junction with evidence of osseous erosive changes extending to the superolateral aspect of the right orbital wall.  Appearance is unchanged in comparison with prior CT head from 06/11/2022 but is somewhat progressed in comparison with CT head from 11/12/2020.    There is a large scalp hematoma/laceration at the vertex and the posterior scalp.  No calvarial fracture.  Bilateral paranasal sinuses and mastoid air cells are clear.  There are operative changes of the bilateral globes.                                 Medications   LIDOcaine-EPINEPHrine 1%-1:100,000 injection 10 mL (10 mLs Intradermal Given by Provider 7/30/22 1825)     Medical Decision Making:   Initial Assessment:   89-year-old male presenting status post fall with head laceration.  Patient reports losing balance and or mechanical fall.  Denies prodrome.  Moving all extremities, good strength upper and lower bilaterally.  Denies neck pain or headache.  Mental status seems normal, likely baseline.  Will get CT head neck, workup to include labs and troponin, EKG, reassess.  ED Management:  Workup largely unremarkable.  Wound washed thoroughly to reveal mostly congealed blood overlying a small skin tear.  No significant defect amenable to repair.  Right elbow x-rays negative.  No significant acute injury noted.  Chest x-ray negative.  Believe he is safe for discharge home.  Discussed return precautions, need to follow with social work as well as primary care regarding slow chronic decline.  Discussed return precautions.                      Clinical Impression:   Final diagnoses:  [M25.521] Right elbow pain  [R60.0] Lower extremity  edema  [S01.01XA] Laceration of scalp, initial encounter (Primary)  [W19.XXXA] Fall, initial encounter          ED Disposition Condition    Discharge Stable        ED Prescriptions     None        Follow-up Information     Follow up With Specialties Details Why Contact Info    Javier Villanueva MD Family Medicine Schedule an appointment as soon as possible for a visit   [address] [place] [address]  [phone]      [facility] Emergency Dept Emergency Medicine  As needed, If symptoms worsen [address]  [facility] [address] [phone]           Jose Price MD  07/31/22 0037

## 2022-07-31 NOTE — ED TRIAGE NOTES
Pt presents to ED from nursing home via EMS post fall. Per nursing home staff, pt fell and hit his head on a hard surface. No LOC per EMS. Pt is AO to person, place, situation. Pt is on blood thinners.

## 2022-07-31 NOTE — ED NOTES
Received report from BOONE Vargas. Introduced self at bedside, Pt is alert and oriented to name, person, place, not year. Daughter at bedside. Currently working to get blood out of pts hair.

## 2022-07-31 NOTE — DISCHARGE INSTRUCTIONS
You were seen in the emergency department after a fall.  Your exam, labs and x-rays are reassuring.  We do not believe you have any serious injury at this time.  You may have a bruise or mild contusion.  You may feel slightly worse tomorrow.  Take anti-inflammatories or over-the-counter medication as needed.  Please follow-up for any new or worsening pain, swelling, fevers, chills, chest pain, difficulty breathing, nausea, vomiting, abdominal pain, or you become concerned in any other way.

## 2022-08-01 ENCOUNTER — HOSPITAL ENCOUNTER (EMERGENCY)
Facility: HOSPITAL | Age: 87
Discharge: HOME | End: 2022-08-01
Attending: EMERGENCY MEDICINE
Payer: MEDICARE

## 2022-08-01 VITALS
BODY MASS INDEX: 20.4 KG/M2 | HEIGHT: 67 IN | WEIGHT: 130 LBS | OXYGEN SATURATION: 98 % | RESPIRATION RATE: 18 BRPM | SYSTOLIC BLOOD PRESSURE: 186 MMHG | HEART RATE: 74 BPM | DIASTOLIC BLOOD PRESSURE: 81 MMHG | TEMPERATURE: 98 F

## 2022-08-01 DIAGNOSIS — S52.592A OTHER CLOSED FRACTURE OF DISTAL END OF LEFT RADIUS, INITIAL ENCOUNTER: Primary | ICD-10-CM

## 2022-08-01 PROCEDURE — 29125 APPL SHORT ARM SPLINT STATIC: CPT | Mod: LT

## 2022-08-01 PROCEDURE — 99283 EMERGENCY DEPT VISIT LOW MDM: CPT | Mod: 25

## 2022-08-01 NOTE — DISCHARGE INSTRUCTIONS
For pain:    Tylenol (acetaminophen) - 650 mg by mouth every 4-6 hours as needed.    Motrin (ibuprofen) - 400 mg by mouth every 6 hours as needed.          Jamarcus Brothers III, M.D.

## 2022-08-01 NOTE — ED TRIAGE NOTES
Pt. Complains of left arm pain secondary to a fall from a standing. Pt. States that he fell back. Pt. Denies hitting his head. Pt. Also fell on Saturday.

## 2022-08-01 NOTE — ED PROVIDER NOTES
Encounter Date: 8/1/2022       History     Chief Complaint   Patient presents with    Fall     EMS called to 89yo male that tripped and fell and caught himself weith his hands on concrete. No LOC and did not hit head.does take plavix. C/o pain and swelling to his left hand and wrist. Arm immobilized by EMS       The patient is a 89-year-old who presents by ambulance.  His daughter is at the bedside.  He complains of left hand wrist pain as a result of a fall that occurred just prior to arrival.  His daughter reports multiple falls over the last few days.  He is unsure of the mechanism of today's fall.  He states that he was feeling cold and attempting to grab a thicker jacket when he fell.  He is supposed to use a walker to assist with ambulation but his daughter suspects that he does not.  She moved him into an assisted living facility several weeks ago due to safety concerns.    He has a past medical history of Anticoagulant long-term use, Arthritis, Cancer, Coronary artery disease, Deep vein thrombosis, Hyperlipidemia, Hypertension, and Seizures (1988).    The history is provided by the patient. No  was used.     Review of patient's allergies indicates:  No Known Allergies  Past Medical History:   Diagnosis Date    Anticoagulant long-term use     Arthritis     Cancer     right  hand    Coronary artery disease     Deep vein thrombosis     Hyperlipidemia     Hypertension     Seizures 1988    none since on tegretol     Past Surgical History:   Procedure Laterality Date    APPENDECTOMY      CLOSURE OF WOUND  12/9/2020    Procedure: CLOSURE, WOUND;  Surgeon: Yamil Bonilla MD;  Location: [facility] OR;  Service: Plastics;;    EXCISION OF LESION OF EYELID Right 12/9/2020    Procedure: EXCISION TUMOR RIGHT EYEBROW WITH COMPLEX CLOSURE;  Surgeon: Yamil Bonilla MD;  Location: [facility] OR;  Service: Plastics;  Laterality: Right;  FROZEN SECTION  RN PREOP 12/8/2020----COVID NEGATIVE  12/8     FRACTURE SURGERY Right     elbow    FRACTURE SURGERY Right     hip    JOSUÉ FILTER PLACEMENT      hx of deep vein thrombosis    OPEN REDUCTION AND INTERNAL FIXATION (ORIF) OF INTERTROCHANTERIC FRACTURE OF FEMUR Left 2018    Procedure: ORIF, FRACTURE, FEMUR, INTERTROCHANTERIC;  Surgeon: Pop Martin III, MD;  Location: [facility] OR;  Service: Orthopedics;  Laterality: Left;    TOTAL SHOULDER ARTHROPLASTY Right      No family history on file.  Social History     Tobacco Use    Smoking status: Former Smoker     Packs/day: 0.50     Years: 6.00     Pack years: 3.00     Types: Cigarettes     Quit date: 10/27/1975     Years since quittin.8    Smokeless tobacco: Never Used   Substance Use Topics    Alcohol use: No    Drug use: No     Review of Systems   Respiratory: Negative for shortness of breath.    Cardiovascular: Negative for chest pain.   Gastrointestinal: Negative for abdominal pain and nausea.   Musculoskeletal:        + left hand and wrist pain   Neurological: Negative for dizziness, syncope and headaches.     Review of systems was difficult due to the patient's age and his hearing difficulty.  Physical Exam     Initial Vitals [22 1316]   BP Pulse Resp Temp SpO2   (!) 140/66 76 16 98 °F (36.7 °C) 100 %      MAP       --         Physical Exam    Nursing note and vitals reviewed.  Constitutional: He is not diaphoretic. No distress.   HENT:   Head: Normocephalic and atraumatic.   Cardiovascular:   Pulses:       Radial pulses are 2+ on the left side.   Pulmonary/Chest: No respiratory distress.   Abdominal: Abdomen is soft. There is no abdominal tenderness.   Musculoskeletal:      Comments: Mild swelling and tenderness to the left wrist and proximal hand.     Neurological: He is alert.   Oriented to person and place.   Skin: Skin is warm and dry. No pallor.         ED Course   Procedures  Labs Reviewed - No data to display       Imaging Results          X-Ray Wrist Complete Left (Final  result)  Result time 08/01/22 14:38:39    Final result by Delvin Flaherty MD (08/01/22 14:38:39)                 Impression:      Distal radial suspected acute nondisplaced fracture.      Electronically signed by: Delvin Flaherty MD  Date:    08/01/2022  Time:    14:38             Narrative:    EXAMINATION:  XR WRIST COMPLETE 3 VIEWS LEFT    CLINICAL HISTORY:  Pain in left hand    TECHNIQUE:  PA, lateral, and oblique views of the left wrist were performed.    COMPARISON:  None    FINDINGS:  Generalized osteopenia.  There is suspected acute nondisplaced fracture involving the distal metaphysis and epiphysis of the radius.  No definite fracture plane extending to the articular surface.  No dislocation or destructive osseous process.  Baseline minimal to mild DJD at the wrist and thumb.  Scattered atherosclerotic vascular calcifications noted.  No subcutaneous emphysema or radiodense retained foreign body.                                 Medications - No data to display  Medical Decision Making:     Volar short-arm splint placed by ED staff. I evaluated the patient post-splinting. The splint was properly placed and the patient was not having excessive pain. He had normal sensation at the fingertips of the left hand post-splinting.                      Clinical Impression:   Final diagnoses:  [S52.592A] Other closed fracture of distal end of left radius, initial encounter (Primary)          ED Disposition Condition    Discharge Stable        ED Prescriptions     None        Follow-up Information     Follow up With Specialties Details Why Contact Info    ER   Return to this ER or visit any other ER should you have any concerns that you feel need immediate attention.            Jamarcus Brothers III, MD  08/03/22 1944

## 2022-08-01 NOTE — ED NOTES
Volar short arm splint applied to left forearm good pulse and neuro pre and post application dressing had been applied to skin tear to elbow forearm area

## 2022-08-03 ENCOUNTER — HOSPITAL ENCOUNTER (OUTPATIENT)
Dept: RADIOLOGY | Facility: HOSPITAL | Age: 87
Discharge: HOME | End: 2022-08-03
Attending: STUDENT IN AN ORGANIZED HEALTH CARE EDUCATION/TRAINING PROGRAM
Payer: MEDICARE

## 2022-08-03 ENCOUNTER — PATIENT OUTREACH (OUTPATIENT)
Dept: ADMINISTRATIVE | Facility: OTHER | Age: 87
End: 2022-08-03
Payer: MEDICARE

## 2022-08-03 DIAGNOSIS — N39.0 RECURRENT UTI: ICD-10-CM

## 2022-08-03 PROCEDURE — 76770 US EXAM ABDO BACK WALL COMP: CPT | Mod: 26,,, | Performed by: RADIOLOGY

## 2022-08-03 PROCEDURE — 76770 US EXAM ABDO BACK WALL COMP: CPT | Mod: TC

## 2022-08-03 PROCEDURE — 76770 US RETROPERITONEAL COMPLETE: ICD-10-PCS | Mod: 26,,, | Performed by: RADIOLOGY

## 2022-08-03 NOTE — PROGRESS NOTES
CHW - Case Closure    This Community Health Worker spoke to caregiver via telephone today.   Pt/Caregiver reported: pt daughter stated that he doesn't need any resources at the moment  Pt/Caregiver denied any additional needs at this time and agrees with episode closure at this time.  Provided caregiver with Community Health Worker's contact information and encouraged him/her to contact this Community Health Worker if additional needs arise.

## 2022-08-15 RX ORDER — CHOLECALCIFEROL (VITAMIN D3) 25 MCG
1000 TABLET ORAL DAILY
Qty: 90 TABLET | Refills: 3 | Status: SHIPPED | OUTPATIENT
Start: 2022-08-15

## 2022-08-15 NOTE — TELEPHONE ENCOUNTER
----- Message from Urmila Bravo sent at 8/15/2022  3:50 PM CDT -----    Type: RX Refill Request    Who Called: [facility]    Have you contacted your pharmacy:yes    Refill or New Rx:refill    RX Name and Strength:vitamin D (VITAMIN D3) 1000 units Tab 25mcg.    Preferred Pharmacy with phone number:  [facility] - [facility] [address]  [address]  [place] [address]  Phone: [phone] Fax: [phone]    Local or Mail Order:local    Would the patient rather a call back or a response via My [facility]? call    Best Call Back Number:[phone]

## 2022-09-16 ENCOUNTER — OFFICE VISIT (OUTPATIENT)
Dept: UROLOGY | Facility: CLINIC | Age: 87
End: 2022-09-16
Payer: MEDICARE

## 2022-09-16 DIAGNOSIS — N40.1 BPH WITH OBSTRUCTION/LOWER URINARY TRACT SYMPTOMS: ICD-10-CM

## 2022-09-16 DIAGNOSIS — N13.8 BPH WITH OBSTRUCTION/LOWER URINARY TRACT SYMPTOMS: ICD-10-CM

## 2022-09-16 DIAGNOSIS — N39.0 RECURRENT UTI: Primary | ICD-10-CM

## 2022-09-16 PROCEDURE — 1157F PR ADVANCE CARE PLAN OR EQUIV PRESENT IN MEDICAL RECORD: ICD-10-PCS | Mod: CPTII,S$GLB,, | Performed by: STUDENT IN AN ORGANIZED HEALTH CARE EDUCATION/TRAINING PROGRAM

## 2022-09-16 PROCEDURE — 99214 OFFICE O/P EST MOD 30 MIN: CPT | Mod: S$GLB,,, | Performed by: STUDENT IN AN ORGANIZED HEALTH CARE EDUCATION/TRAINING PROGRAM

## 2022-09-16 PROCEDURE — 3288F FALL RISK ASSESSMENT DOCD: CPT | Mod: CPTII,S$GLB,, | Performed by: STUDENT IN AN ORGANIZED HEALTH CARE EDUCATION/TRAINING PROGRAM

## 2022-09-16 PROCEDURE — 99999 PR PBB SHADOW E&M-EST. PATIENT-LVL III: CPT | Mod: PBBFAC,,, | Performed by: STUDENT IN AN ORGANIZED HEALTH CARE EDUCATION/TRAINING PROGRAM

## 2022-09-16 PROCEDURE — 1159F MED LIST DOCD IN RCRD: CPT | Mod: CPTII,S$GLB,, | Performed by: STUDENT IN AN ORGANIZED HEALTH CARE EDUCATION/TRAINING PROGRAM

## 2022-09-16 PROCEDURE — 1159F PR MEDICATION LIST DOCUMENTED IN MEDICAL RECORD: ICD-10-PCS | Mod: CPTII,S$GLB,, | Performed by: STUDENT IN AN ORGANIZED HEALTH CARE EDUCATION/TRAINING PROGRAM

## 2022-09-16 PROCEDURE — 99999 PR PBB SHADOW E&M-EST. PATIENT-LVL III: ICD-10-PCS | Mod: PBBFAC,,, | Performed by: STUDENT IN AN ORGANIZED HEALTH CARE EDUCATION/TRAINING PROGRAM

## 2022-09-16 PROCEDURE — 1160F RVW MEDS BY RX/DR IN RCRD: CPT | Mod: CPTII,S$GLB,, | Performed by: STUDENT IN AN ORGANIZED HEALTH CARE EDUCATION/TRAINING PROGRAM

## 2022-09-16 PROCEDURE — 3288F PR FALLS RISK ASSESSMENT DOCUMENTED: ICD-10-PCS | Mod: CPTII,S$GLB,, | Performed by: STUDENT IN AN ORGANIZED HEALTH CARE EDUCATION/TRAINING PROGRAM

## 2022-09-16 PROCEDURE — 1160F PR REVIEW ALL MEDS BY PRESCRIBER/CLIN PHARMACIST DOCUMENTED: ICD-10-PCS | Mod: CPTII,S$GLB,, | Performed by: STUDENT IN AN ORGANIZED HEALTH CARE EDUCATION/TRAINING PROGRAM

## 2022-09-16 PROCEDURE — 1100F PTFALLS ASSESS-DOCD GE2>/YR: CPT | Mod: CPTII,S$GLB,, | Performed by: STUDENT IN AN ORGANIZED HEALTH CARE EDUCATION/TRAINING PROGRAM

## 2022-09-16 PROCEDURE — 1126F AMNT PAIN NOTED NONE PRSNT: CPT | Mod: CPTII,S$GLB,, | Performed by: STUDENT IN AN ORGANIZED HEALTH CARE EDUCATION/TRAINING PROGRAM

## 2022-09-16 PROCEDURE — 1100F PR PT FALLS ASSESS DOC 2+ FALLS/FALL W/INJURY/YR: ICD-10-PCS | Mod: CPTII,S$GLB,, | Performed by: STUDENT IN AN ORGANIZED HEALTH CARE EDUCATION/TRAINING PROGRAM

## 2022-09-16 PROCEDURE — 1157F ADVNC CARE PLAN IN RCRD: CPT | Mod: CPTII,S$GLB,, | Performed by: STUDENT IN AN ORGANIZED HEALTH CARE EDUCATION/TRAINING PROGRAM

## 2022-09-16 PROCEDURE — 1126F PR PAIN SEVERITY QUANTIFIED, NO PAIN PRESENT: ICD-10-PCS | Mod: CPTII,S$GLB,, | Performed by: STUDENT IN AN ORGANIZED HEALTH CARE EDUCATION/TRAINING PROGRAM

## 2022-09-16 PROCEDURE — 99214 PR OFFICE/OUTPT VISIT, EST, LEVL IV, 30-39 MIN: ICD-10-PCS | Mod: S$GLB,,, | Performed by: STUDENT IN AN ORGANIZED HEALTH CARE EDUCATION/TRAINING PROGRAM

## 2022-09-16 NOTE — PROGRESS NOTES
Patient ID: Jaydon Mccarthy is a 89 y.o. male.    Chief Complaint: Follow-up      HPI  89 y.o. who presents to the Urology clinic for evaluation of recurrent UTIs, BPH/LUTS. Patient with persistent LUTS, frequency while on flomax/ finasteride. ANTONETTE revealed 10 cc prostate gland. Patient has falled 4 times since last appt. No urine culture has been obtained since last appt with urology. Patient has not been hospitalized for UTI. Patient notes improvement of constipation with miralax. Patient has not seen PCP after falls for complete work up. Denies dysuria, hematuria, flank pain.   Daughter notes drinking fluids has been a consistent problem      Medically Necessary ROS documented in HPI    Past Medical History  Active Ambulatory Problems     Diagnosis Date Noted    Personal history of DVT (deep vein thrombosis) 08/20/2013    Benign prostatic hyperplasia with lower urinary tract symptoms 08/20/2013    Tinnitus, bilateral 08/20/2013    Sensorineural hearing loss (SNHL) of both ears 08/20/2013    History of total shoulder replacement, right 09/24/2013    Essential hypertension 09/17/2015    Hyperlipidemia 10/28/2015    CAD s/p PCI  10/28/2015    Chronic anticoagulation 10/28/2015    Anemia of chronic disease 10/28/2015    Epilepsy 10/28/2015    Squamous cell carcinoma of skin 08/26/2019    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 12/09/2020    Other nonthrombocytopenic purpura 06/03/2022    History of bilateral hip arthroplasty 06/03/2022    Aortic arch atherosclerosis 06/03/2022    Atherosclerosis of both carotid arteries 06/03/2022    Stage 3a chronic kidney disease 06/03/2022    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    Chronic allergic rhinitis 06/03/2022    Sleep disturbance 06/03/2022     Resolved Ambulatory Problems     Diagnosis Date Noted    Hypertension 08/20/2013    Adhesive bursitis of right shoulder 08/20/2013    Hypercholesterolemia 08/20/2013    Seizures     Altered mental status 09/17/2015    History of seizure  09/17/2015    Elbow fracture, right 09/17/2015    Cardiac arrest 09/21/2015    NSTEMI (non-ST elevated myocardial infarction) 09/25/2015    Intertrochanteric fracture of right femur 10/27/2015    Closed fracture of proximal end of ulna 10/30/2015    High risk medication use 09/14/2016    SCC (squamous cell carcinoma), hand 10/06/2016    Intertrochanteric fracture of left hip, closed, initial encounter 05/29/2018    Preop cardiovascular exam 05/30/2018    Seizure disorder 05/30/2018     Past Medical History:   Diagnosis Date    Anticoagulant long-term use     Arthritis     Cancer     Coronary artery disease     Deep vein thrombosis          Past Surgical History  Past Surgical History:   Procedure Laterality Date    APPENDECTOMY      CLOSURE OF WOUND  12/9/2020    Procedure: CLOSURE, WOUND;  Surgeon: Yamil Bonilla MD;  Location: [facility] OR;  Service: Plastics;;    EXCISION OF LESION OF EYELID Right 12/9/2020    Procedure: EXCISION TUMOR RIGHT EYEBROW WITH COMPLEX CLOSURE;  Surgeon: Yamil Bonilla MD;  Location: [facility] OR;  Service: Plastics;  Laterality: Right;  FROZEN SECTION  RN PREOP 12/8/2020----COVID NEGATIVE  12/8    FRACTURE SURGERY Right     elbow    FRACTURE SURGERY Right     hip    JOSUÉ FILTER PLACEMENT      hx of deep vein thrombosis    OPEN REDUCTION AND INTERNAL FIXATION (ORIF) OF INTERTROCHANTERIC FRACTURE OF FEMUR Left 5/30/2018    Procedure: ORIF, FRACTURE, FEMUR, INTERTROCHANTERIC;  Surgeon: Pop Martin III, MD;  Location: [facility] OR;  Service: Orthopedics;  Laterality: Left;    TOTAL SHOULDER ARTHROPLASTY Right        Social History  Social Connections: Socially Isolated    Frequency of Communication with Friends and Family: More than three times a week    Frequency of Social Gatherings with Friends and Family: More than three times a week    Attends [employer] Services: Never    Active Member of Clubs or Organizations: No    Attends Club or Organization Meetings: Never    Marital Status:         Medications    Current Outpatient Medications:     atorvastatin (LIPITOR) 20 MG tablet, TAKE ONE TABLET BY MOUTH ONCE DAILY, Disp: 90 tablet, Rfl: 3    carBAMazepine (TEGRETOL) 100 mg chewable tablet, TAKE 2 TABLETS BY MOUTH DAILY, Disp: 180 tablet, Rfl: 3    clopidogreL (PLAVIX) 75 mg tablet, Take 1 tablet (75 mg total) by mouth once daily., Disp: 90 tablet, Rfl: 3    finasteride (PROSCAR) 5 mg tablet, TAKE ONE TABLET BY MOUTH DAILY, Disp: 90 tablet, Rfl: 3    levETIRAcetam (KEPPRA) 500 MG Tab, TAKE ONE TABLET BY MOUTH TWICE DAILY, Disp: 180 tablet, Rfl: 3    metoprolol tartrate (LOPRESSOR) 25 MG tablet, Take 0.5 tablets (12.5 mg total) by mouth 2 (two) times daily., Disp: 30 tablet, Rfl: 11    polyethylene glycol (GLYCOLAX) 17 gram PwPk, Take 17 g by mouth daily as needed (constipation, titrate to patient comfort)., Disp: 30 each, Rfl: 11    vitamin D (VITAMIN D3) 1000 units Tab, Take 1 tablet (1,000 Units total) by mouth once daily., Disp: 90 tablet, Rfl: 3    XARELTO 20 mg Tab, TAKE ONE TABLET BY MOUTH DAILY, Disp: 30 tablet, Rfl: 3    Allergies  Review of patient's allergies indicates:  No Known Allergies    Patient's PMH, FH, Social hx, Medications, allergies reviewed and updated as pertinent to today's visit    Objective:      Physical Exam  Nursing note reviewed.   Constitutional:       General: He is not in acute distress.     Appearance: He is well-developed. He is not ill-appearing, toxic-appearing or diaphoretic.   HENT:      Head: Normocephalic and atraumatic.      Mouth/Throat:      Mouth: Mucous membranes are moist.   Eyes:      Conjunctiva/sclera: Conjunctivae normal.   Pulmonary:      Effort: Pulmonary effort is normal. No respiratory distress.   Abdominal:      General: Abdomen is flat. There is no distension.      Palpations: Abdomen is soft. There is no mass.      Tenderness: There is no abdominal tenderness.   Musculoskeletal:         General: No swelling or deformity.       Cervical back: Neck supple.      Comments: In wheelchair   Skin:     General: Skin is warm.      Findings: No rash.   Neurological:      Mental Status: He is oriented to person, place, and time.      Gait: Gait abnormal.   Psychiatric:         Mood and Affect: Mood normal.         Thought Content: Thought content normal.         Judgment: Judgment normal.           Assessment:       1. Recurrent UTI    2. BPH with obstruction/lower urinary tract symptoms        Plan:         Stop flomax due to falls    Continue to attempt to push fluids    Continue miralax to prevent constipation    Recommend condom catheter to assist with urinary drainage. If facility unable to provide condom catheter we can use PCG, daughter to let staff know if needed.     Follow up with PCP due to recent falls

## 2022-09-19 DIAGNOSIS — L30.9 DERMATITIS: Primary | ICD-10-CM

## 2022-09-19 RX ORDER — TRIAMCINOLONE ACETONIDE 0.25 MG/G
CREAM TOPICAL 2 TIMES DAILY
Qty: 80 G | Refills: 2 | Status: ON HOLD | OUTPATIENT
Start: 2022-09-19 | End: 2023-09-05

## 2022-10-03 ENCOUNTER — HOSPITAL ENCOUNTER (EMERGENCY)
Facility: HOSPITAL | Age: 87
Discharge: HOME | End: 2022-10-03
Attending: STUDENT IN AN ORGANIZED HEALTH CARE EDUCATION/TRAINING PROGRAM
Payer: MEDICARE

## 2022-10-03 VITALS
TEMPERATURE: 98 F | SYSTOLIC BLOOD PRESSURE: 178 MMHG | RESPIRATION RATE: 20 BRPM | OXYGEN SATURATION: 100 % | HEART RATE: 71 BPM | DIASTOLIC BLOOD PRESSURE: 79 MMHG
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
## 2022-10-03 DIAGNOSIS — N39.0 URINARY TRACT INFECTION WITHOUT HEMATURIA, SITE UNSPECIFIED: ICD-10-CM

## 2022-10-03 DIAGNOSIS — R51.9 FACIAL PAIN: Primary | ICD-10-CM

## 2022-10-03 DIAGNOSIS — D64.9 CHRONIC ANEMIA: ICD-10-CM

## 2022-10-03 LAB
ALBUMIN SERPL BCP-MCNC: 3.1 G/DL (ref 3.5–5.2)
ALP SERPL-CCNC: 147 U/L (ref 55–135)
ALT SERPL W/O P-5'-P-CCNC: 8 U/L (ref 10–44)
ANION GAP SERPL CALC-SCNC: 7 MMOL/L (ref 8–16)
AST SERPL-CCNC: 11 U/L (ref 10–40)
BASOPHILS # BLD AUTO: 0.05 K/UL (ref 0–0.2)
BASOPHILS NFR BLD: 0.7 % (ref 0–1.9)
BILIRUB SERPL-MCNC: 0.2 MG/DL (ref 0.1–1)
BILIRUB UR QL STRIP: NEGATIVE
BUN SERPL-MCNC: 18 MG/DL (ref 8–23)
CALCIUM SERPL-MCNC: 8 MG/DL (ref 8.7–10.5)
CHLORIDE SERPL-SCNC: 112 MMOL/L (ref 95–110)
CLARITY UR: CLEAR
CO2 SERPL-SCNC: 23 MMOL/L (ref 23–29)
COLOR UR: YELLOW
CREAT SERPL-MCNC: 1 MG/DL (ref 0.5–1.4)
DIFFERENTIAL METHOD: ABNORMAL
EOSINOPHIL # BLD AUTO: 0.5 K/UL (ref 0–0.5)
EOSINOPHIL NFR BLD: 6.9 % (ref 0–8)
ERYTHROCYTE [DISTWIDTH] IN BLOOD BY AUTOMATED COUNT: 14.5 % (ref 11.5–14.5)
EST. GFR  (NO RACE VARIABLE): >60 ML/MIN/1.73 M^2
GLUCOSE SERPL-MCNC: 110 MG/DL (ref 70–110)
GLUCOSE UR QL STRIP: NEGATIVE
HCT VFR BLD AUTO: 22.9 % (ref 40–54)
HGB BLD-MCNC: 7.1 G/DL (ref 14–18)
HGB UR QL STRIP: ABNORMAL
IMM GRANULOCYTES # BLD AUTO: 0.02 K/UL (ref 0–0.04)
IMM GRANULOCYTES NFR BLD AUTO: 0.3 % (ref 0–0.5)
KETONES UR QL STRIP: NEGATIVE
LEUKOCYTE ESTERASE UR QL STRIP: ABNORMAL
LYMPHOCYTES # BLD AUTO: 1.2 K/UL (ref 1–4.8)
LYMPHOCYTES NFR BLD: 16.3 % (ref 18–48)
MCH RBC QN AUTO: 24.2 PG (ref 27–31)
MCHC RBC AUTO-ENTMCNC: 31 G/DL (ref 32–36)
MCV RBC AUTO: 78 FL (ref 82–98)
MICROSCOPIC COMMENT: ABNORMAL
MONOCYTES # BLD AUTO: 0.8 K/UL (ref 0.3–1)
MONOCYTES NFR BLD: 10.7 % (ref 4–15)
NEUTROPHILS # BLD AUTO: 4.6 K/UL (ref 1.8–7.7)
NEUTROPHILS NFR BLD: 65.1 % (ref 38–73)
NITRITE UR QL STRIP: NEGATIVE
NRBC BLD-RTO: 0 /100 WBC
PH UR STRIP: 6 [PH] (ref 5–8)
PLATELET # BLD AUTO: 192 K/UL (ref 150–450)
PMV BLD AUTO: 9.8 FL (ref 9.2–12.9)
POTASSIUM SERPL-SCNC: 4.1 MMOL/L (ref 3.5–5.1)
PROT SERPL-MCNC: 6.4 G/DL (ref 6–8.4)
PROT UR QL STRIP: ABNORMAL
RBC # BLD AUTO: 2.93 M/UL (ref 4.6–6.2)
RBC #/AREA URNS HPF: 9 /HPF (ref 0–4)
SODIUM SERPL-SCNC: 142 MMOL/L (ref 136–145)
SP GR UR STRIP: 1.02 (ref 1–1.03)
URN SPEC COLLECT METH UR: ABNORMAL
UROBILINOGEN UR STRIP-ACNC: NEGATIVE EU/DL
WBC # BLD AUTO: 7.11 K/UL (ref 3.9–12.7)
WBC #/AREA URNS HPF: >100 /HPF (ref 0–5)
WBC CLUMPS URNS QL MICRO: ABNORMAL
YEAST URNS QL MICRO: ABNORMAL

## 2022-10-03 PROCEDURE — 93005 ELECTROCARDIOGRAM TRACING: CPT

## 2022-10-03 PROCEDURE — 87106 FUNGI IDENTIFICATION YEAST: CPT | Performed by: STUDENT IN AN ORGANIZED HEALTH CARE EDUCATION/TRAINING PROGRAM

## 2022-10-03 PROCEDURE — 93010 ELECTROCARDIOGRAM REPORT: CPT | Mod: ,,, | Performed by: INTERNAL MEDICINE

## 2022-10-03 PROCEDURE — 99285 EMERGENCY DEPT VISIT HI MDM: CPT | Mod: 25

## 2022-10-03 PROCEDURE — 51702 INSERT TEMP BLADDER CATH: CPT

## 2022-10-03 PROCEDURE — 87088 URINE BACTERIA CULTURE: CPT | Performed by: STUDENT IN AN ORGANIZED HEALTH CARE EDUCATION/TRAINING PROGRAM

## 2022-10-03 PROCEDURE — 85025 COMPLETE CBC W/AUTO DIFF WBC: CPT | Performed by: STUDENT IN AN ORGANIZED HEALTH CARE EDUCATION/TRAINING PROGRAM

## 2022-10-03 PROCEDURE — 87086 URINE CULTURE/COLONY COUNT: CPT | Performed by: STUDENT IN AN ORGANIZED HEALTH CARE EDUCATION/TRAINING PROGRAM

## 2022-10-03 PROCEDURE — 80053 COMPREHEN METABOLIC PANEL: CPT | Performed by: STUDENT IN AN ORGANIZED HEALTH CARE EDUCATION/TRAINING PROGRAM

## 2022-10-03 PROCEDURE — 93010 EKG 12-LEAD: ICD-10-PCS | Mod: ,,, | Performed by: INTERNAL MEDICINE

## 2022-10-03 PROCEDURE — 81000 URINALYSIS NONAUTO W/SCOPE: CPT | Performed by: STUDENT IN AN ORGANIZED HEALTH CARE EDUCATION/TRAINING PROGRAM

## 2022-10-03 RX ORDER — CIPROFLOXACIN 500 MG/1
500 TABLET ORAL 2 TIMES DAILY
Qty: 28 TABLET | Refills: 0 | Status: ON HOLD | OUTPATIENT
Start: 2022-10-03 | End: 2022-10-05 | Stop reason: HOSPADM

## 2022-10-03 RX ORDER — CIPROFLOXACIN 500 MG/1
500 TABLET ORAL
Status: DISCONTINUED | OUTPATIENT
Start: 2022-10-03 | End: 2022-10-03 | Stop reason: HOSPADM

## 2022-10-03 NOTE — DISCHARGE INSTRUCTIONS

## 2022-10-03 NOTE — ED PROVIDER NOTES
"Encounter Date: 10/3/2022    SCRIBE #1 NOTE: I, BILL PRAKASH, am scribing for, and in the presence of,  Colt Waller MD. I have scribed the following portions of the note - Other sections scribed: HPI, ROS, PE.     History     Chief Complaint   Patient presents with    Fall     BIB EMS from [place] point after trip/fall from standing height. Pt denies any LOC or any pain. Pt alert and orientated at this time. Pt on Plavix. Noted superficial abrasion to right side forehead.     89-year-old male patient with a past medical history of Anticoagulant long-term use, Arthritis, Cancer, CAD, Cataract, DVT, HLD, HTN, and Seizures, presents to the ED via EMS accompanied by his daughter with a chief complaint of fall onset today. The patient states that he was trying to use the restroom when he lost his balance and fell face forward onto the floor. He reports having pain on the right side of his face where he fell, and denies having pain elsewhere. His daughter reports that he currently resides in an assisted living facility and usually has a caretaker to aid him when using the restroom. She reports that she was not present at the time of the fall, and further reports an abrasion to the right side of his head that was not present when she saw him this morning. She states that he uses a wheelchair for mobility, and his legs are not "very strong". She notes that he has a Hx of falls and "often gets confused" at baseline. 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 No other exacerbating or alleviating factors. Denies LOC, myalgias, arthralgias, or other associated symptoms. Further denies SOB, CP, dizziness, weakness, or other associated symptoms.     The history is provided by the patient and a relative. No  was used.   Review of patient's allergies indicates:  No Known Allergies  Past Medical History:   Diagnosis " Date    Anticoagulant long-term use     Arthritis     Cancer     right  hand    Coronary artery disease     Deep vein thrombosis     Hyperlipidemia     Hypertension     Seizures 1988    none since on tegretol     Past Surgical History:   Procedure Laterality Date    APPENDECTOMY      CLOSURE OF WOUND  2020    Procedure: CLOSURE, WOUND;  Surgeon: Yamil Bonilla MD;  Location: [facility] OR;  Service: Plastics;;    EXCISION OF LESION OF EYELID Right 2020    Procedure: EXCISION TUMOR RIGHT EYEBROW WITH COMPLEX CLOSURE;  Surgeon: Yamil Bonilla MD;  Location: [facility] OR;  Service: Plastics;  Laterality: Right;  FROZEN SECTION  RN PREOP 2020----COVID NEGATIVE      FRACTURE SURGERY Right     elbow    FRACTURE SURGERY Right     hip    JOSUÉ FILTER PLACEMENT      hx of deep vein thrombosis    OPEN REDUCTION AND INTERNAL FIXATION (ORIF) OF INTERTROCHANTERIC FRACTURE OF FEMUR Left 2018    Procedure: ORIF, FRACTURE, FEMUR, INTERTROCHANTERIC;  Surgeon: Pop Martin III, MD;  Location: [facility] OR;  Service: Orthopedics;  Laterality: Left;    TOTAL SHOULDER ARTHROPLASTY Right      History reviewed. No pertinent family history.  Social History     Tobacco Use    Smoking status: Former     Packs/day: 0.50     Years: 6.00     Pack years: 3.00     Types: Cigarettes     Quit date: 10/27/1975     Years since quittin.9    Smokeless tobacco: Never   Substance Use Topics    Alcohol use: No    Drug use: No     Review of Systems   Constitutional:  Negative for activity change and appetite change.   HENT:  Negative for congestion and drooling.    Eyes:  Negative for discharge and itching.   Respiratory:  Negative for cough, chest tightness and shortness of breath.    Cardiovascular:  Negative for chest pain and leg swelling.   Gastrointestinal:  Negative for abdominal distention and abdominal pain.   Genitourinary:  Negative for difficulty urinating and dysuria.   Musculoskeletal:  Negative for arthralgias and  myalgias.   Skin:  Positive for wound. Negative for color change and pallor.   Neurological:  Positive for headaches. Negative for dizziness, syncope, facial asymmetry and weakness.   Psychiatric/Behavioral:  Negative for agitation and behavioral problems.      Physical Exam     Initial Vitals [10/03/22 1638]   BP Pulse Resp Temp SpO2   (!) 159/75 72 18 98.4 °F (36.9 °C) 100 %      MAP       --         Physical Exam    Nursing note and vitals reviewed.  Constitutional: He appears well-developed and well-nourished.   HENT:   Head: Normocephalic and atraumatic.   Mouth/Throat: Oropharynx is clear and moist.   Eyes: Conjunctivae and EOM are normal. Pupils are equal, round, and reactive to light.   Neck: No thyromegaly present.   Normal range of motion.  Cardiovascular:  Normal rate, regular rhythm and intact distal pulses.           Pulmonary/Chest: Breath sounds normal. No respiratory distress. He has no wheezes.   Abdominal: Abdomen is soft. Bowel sounds are normal. He exhibits no distension. There is no abdominal tenderness.   Musculoskeletal:         General: No tenderness or edema. Normal range of motion.      Cervical back: Normal range of motion.     Neurological: He is alert and oriented to person, place, and time. He has normal strength. No cranial nerve deficit. GCS eye subscore is 4. GCS verbal subscore is 5. GCS motor subscore is 6.   Skin: Skin is warm and dry. No rash noted.   Abrasion present to the right side of head.    Psychiatric: He has a normal mood and affect. His behavior is normal. Thought content normal.       ED Course   Procedures  Labs Reviewed   CBC W/ AUTO DIFFERENTIAL - Abnormal; Notable for the following components:       Result Value    RBC 2.93 (*)     Hemoglobin 7.1 (*)     Hematocrit 22.9 (*)     MCV 78 (*)     MCH 24.2 (*)     MCHC 31.0 (*)     Lymph % 16.3 (*)     All other components within normal limits   COMPREHENSIVE METABOLIC PANEL - Abnormal; Notable for the following  components:    Chloride 112 (*)     Calcium 8.0 (*)     Albumin 3.1 (*)     Alkaline Phosphatase 147 (*)     ALT 8 (*)     Anion Gap 7 (*)     All other components within normal limits   URINALYSIS, REFLEX TO URINE CULTURE - Abnormal; Notable for the following components:    Protein, UA Trace (*)     Occult Blood UA Trace (*)     Leukocytes, UA 3+ (*)     All other components within normal limits    Narrative:     Specimen Source->Urine   URINALYSIS MICROSCOPIC - Abnormal; Notable for the following components:    RBC, UA 9 (*)     WBC, UA >100 (*)     Yeast, UA Few (*)     All other components within normal limits    Narrative:     Specimen Source->Urine   CULTURE, URINE     EKG Readings: (Independently Interpreted)   Initial Reading: No STEMI. Previous EKG: Compared with most recent EKG Rhythm: Normal Sinus Rhythm. Ectopy: No Ectopy. Conduction: LBBB. ST Segments: Normal ST Segments. Axis: Normal.     Imaging Results              CT Head Without Contrast (Final result)  Result time 10/03/22 18:15:31      Final result by Remington Trujillo DO (10/03/22 18:15:31)                   Impression:      1. No acute intracranial abnormality.  2. Stable chronic findings as above.      Electronically signed by: Remington Trujillo  Date:    10/03/2022  Time:    18:15               Narrative:    EXAMINATION:  CT HEAD WITHOUT CONTRAST    CLINICAL HISTORY:  Facial trauma, blunt;    TECHNIQUE:  Low dose axial CT images obtained throughout the head without intravenous contrast. Sagittal and coronal reconstructions were performed.    COMPARISON:  07/30/2022.    FINDINGS:  There is hypoattenuation in the supratentorial white matter compatible with chronic microvascular ischemic changes.  There is severe brain parenchymal volume loss, likely age related, with prominence of the CSF spaces.  No evidence of hydrocephalus.  Appearance is similar to prior.    No extra-axial blood or fluid collections. No parenchymal mass, hemorrhage, edema or  major vascular distribution infarct.    No calvarial fracture.  Again seen is a partially imaged soft tissue abnormality associated with the right zygomatic arch with associated osseous erosive changes, stable in appearance from prior.  The scalp is unremarkable.  Bilateral paranasal sinuses and mastoid air cells are clear.                                       CT Maxillofacial Without Contrast (Final result)  Result time 10/03/22 18:23:38      Final result by Remington Trujillo DO (10/03/22 18:23:38)                   Impression:      No acute maxillofacial fracture.    Continued soft tissue thickening and underlying osseous erosive changes of the zygomatic arch and lateral orbital wall, similar to prior.      Electronically signed by: Remington Trujillo  Date:    10/03/2022  Time:    18:23               Narrative:    EXAMINATION:  CT MAXILLOFACIAL WITHOUT CONTRAST    CLINICAL HISTORY:  Facial trauma, blunt;    TECHNIQUE:  Low dose axial images, sagittal and coronal reformations were obtained through the face without intravenous contrast.    COMPARISON:  CT head from 07/30/2022.  CT soft tissue neck from 12/08/2020.    FINDINGS:  There is no acute fracture or dislocation of the maxillofacial structures. The nasal bones are intact. The nasal septum approximates midline.    The mandible is intact and not dislocated. The temporomandibular joints are unremarkable. There is scattered dental amalgam and multiple dental caries.    The orbits are unremarkable.  There is a left-sided prosthetic lens.  There are calcifications of the right lens.  The bilateral globes are symmetric and intact. There is no preseptal or post-septal fluid or hemorrhage.    The paranasal sinuses and mastoid air cells are clear.    The soft tissues of the face are unremarkable.  There are calcifications of the carotid bifurcations.    Again seen is a soft tissue density along the right sacrum attic arch with underlying osseous erosive changes of the  zygomatic arch and the lateral aspect of the right orbital wall, stable in appearance from prior.    Partially imaged portions of the cervical spine are intact.                                       Medications - No data to display  Medical Decision Making:   History:   Old Medical Records: I decided to obtain old medical records.  Independently Interpreted Test(s):   I have ordered and independently interpreted EKG Reading(s) - see prior notes  Clinical Tests:   Lab Tests: Ordered and Reviewed  Radiological Study: Ordered and Reviewed  Medical Tests: Ordered and Reviewed        Scribe Attestation:   Scribe #1: I performed the above scribed service and the documentation accurately describes the services I performed. I attest to the accuracy of the note.      ED Course as of 10/04/22 0421   Mon Oct 03, 2022   1750 CBC auto differential(!) [BS]   1803 Hemoglobin(!): 7.1  Hgb steadily decreasing from 12 to 9 to 7.1 over weeks.  [BS]   1819 CT head notable for Impression:  1. No acute intracranial abnormality.  2. Stable chronic findings as above. [BS]   1819 Calcium(!): 8.0  Calcium corrects to normal when accounting for albumin of 3.1 [BS]   1820 Comprehensive metabolic panel(!) [BS]   1928 Urinalysis, Reflex to Urine Culture Urine, Clean Catch(!) [BS]   1928 Urinalysis Microscopic(!) [BS]   1949 Will treat with cipro 500 mg BID x 2 weeks to cover  [BS]      ED Course User Index  [BS] Colt Waller MD                   Clinical Impression:   Final diagnoses:  [W19.XXXA] Fall  [R51.9] Facial pain (Primary)  [N39.0] Urinary tract infection without hematuria, site unspecified  [D64.9] Chronic anemia     Scribe attestation: I, Colt Waller, personally performed the services described in this documentation. All medical record entries made by the scribe were at my direction and in my presence.  I have reviewed the chart and agree that the record reflects my personal performance and is accurate and complete.    ED  Disposition Condition    Discharge Stable          ED Prescriptions       Medication Sig Dispense Start Date End Date Auth. Provider    ciprofloxacin HCl (CIPRO) 500 MG tablet Take 1 tablet (500 mg total) by mouth 2 (two) times daily. for 14 days 28 tablet 10/3/2022 10/17/2022 Colt Waller MD          Follow-up Information       Follow up With Specialties Details Why Contact Info    Javier Villanueva MD Family Medicine Call in 2 days To recheck today's symptoms, recheck urine for UTI, schedule follow-up for chronic anemia [id number] [facility]  [place] [address]  [phone]               Colt Waller MD  10/04/22 0421

## 2022-10-03 NOTE — ED TRIAGE NOTES
Pt arrives via ems from nursing home with c/o fall. States was trying to use the bathroom and loss his balance and fell. Denies loc. Per ems pt has abrasion to head that was present before fall. EMS reports pt currently on plavix. Pt aaox3 at this time. Denies any pain. NAD.

## 2022-10-04 ENCOUNTER — HOSPITAL ENCOUNTER (OUTPATIENT)
Facility: HOSPITAL | Age: 87
Discharge: HOME | End: 2022-10-05
Attending: STUDENT IN AN ORGANIZED HEALTH CARE EDUCATION/TRAINING PROGRAM | Admitting: STUDENT IN AN ORGANIZED HEALTH CARE EDUCATION/TRAINING PROGRAM
Payer: MEDICARE
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
## 2022-10-04 DIAGNOSIS — R07.9 CHEST PAIN: ICD-10-CM

## 2022-10-04 DIAGNOSIS — R29.6 FALLS FREQUENTLY: Primary | ICD-10-CM

## 2022-10-04 DIAGNOSIS — D64.9 ANEMIA, UNSPECIFIED TYPE: ICD-10-CM

## 2022-10-04 DIAGNOSIS — N39.0 URINARY TRACT INFECTION WITHOUT HEMATURIA, SITE UNSPECIFIED: ICD-10-CM

## 2022-10-04 DIAGNOSIS — M25.521 ELBOW PAIN, RIGHT: ICD-10-CM

## 2022-10-04 LAB
ALBUMIN SERPL BCP-MCNC: 2.9 G/DL (ref 3.5–5.2)
ALP SERPL-CCNC: 136 U/L (ref 55–135)
ALT SERPL W/O P-5'-P-CCNC: 9 U/L (ref 10–44)
ANION GAP SERPL CALC-SCNC: 7 MMOL/L (ref 8–16)
AST SERPL-CCNC: 12 U/L (ref 10–40)
BASOPHILS # BLD AUTO: 0.03 K/UL (ref 0–0.2)
BASOPHILS NFR BLD: 0.4 % (ref 0–1.9)
BILIRUB SERPL-MCNC: 0.2 MG/DL (ref 0.1–1)
BUN SERPL-MCNC: 15 MG/DL (ref 8–23)
CALCIUM SERPL-MCNC: 8 MG/DL (ref 8.7–10.5)
CHLORIDE SERPL-SCNC: 112 MMOL/L (ref 95–110)
CO2 SERPL-SCNC: 21 MMOL/L (ref 23–29)
CREAT SERPL-MCNC: 1 MG/DL (ref 0.5–1.4)
DIFFERENTIAL METHOD: ABNORMAL
EOSINOPHIL # BLD AUTO: 0.2 K/UL (ref 0–0.5)
EOSINOPHIL NFR BLD: 2.6 % (ref 0–8)
ERYTHROCYTE [DISTWIDTH] IN BLOOD BY AUTOMATED COUNT: 14.5 % (ref 11.5–14.5)
EST. GFR  (NO RACE VARIABLE): >60 ML/MIN/1.73 M^2
FOLATE SERPL-MCNC: 7.2 NG/ML (ref 4–24)
GLUCOSE SERPL-MCNC: 123 MG/DL (ref 70–110)
HCT VFR BLD AUTO: 23.1 % (ref 40–54)
HGB BLD-MCNC: 7.2 G/DL (ref 14–18)
IMM GRANULOCYTES # BLD AUTO: 0.03 K/UL (ref 0–0.04)
IMM GRANULOCYTES NFR BLD AUTO: 0.4 % (ref 0–0.5)
LYMPHOCYTES # BLD AUTO: 0.8 K/UL (ref 1–4.8)
LYMPHOCYTES NFR BLD: 10.9 % (ref 18–48)
MAGNESIUM SERPL-MCNC: 1.8 MG/DL (ref 1.6–2.6)
MCH RBC QN AUTO: 24.1 PG (ref 27–31)
MCHC RBC AUTO-ENTMCNC: 31.2 G/DL (ref 32–36)
MCV RBC AUTO: 77 FL (ref 82–98)
MONOCYTES # BLD AUTO: 0.7 K/UL (ref 0.3–1)
MONOCYTES NFR BLD: 9.7 % (ref 4–15)
NEUTROPHILS # BLD AUTO: 5.8 K/UL (ref 1.8–7.7)
NEUTROPHILS NFR BLD: 76 % (ref 38–73)
NRBC BLD-RTO: 0 /100 WBC
PHOSPHATE SERPL-MCNC: 3.6 MG/DL (ref 2.7–4.5)
PLATELET # BLD AUTO: 176 K/UL (ref 150–450)
PMV BLD AUTO: 10 FL (ref 9.2–12.9)
POTASSIUM SERPL-SCNC: 3.7 MMOL/L (ref 3.5–5.1)
PROT SERPL-MCNC: 6.1 G/DL (ref 6–8.4)
RBC # BLD AUTO: 2.99 M/UL (ref 4.6–6.2)
SODIUM SERPL-SCNC: 140 MMOL/L (ref 136–145)
VIT B12 SERPL-MCNC: 297 PG/ML (ref 210–950)
WBC # BLD AUTO: 7.63 K/UL (ref 3.9–12.7)

## 2022-10-04 PROCEDURE — 25000003 PHARM REV CODE 250: Performed by: NURSE PRACTITIONER

## 2022-10-04 PROCEDURE — 25000003 PHARM REV CODE 250: Performed by: STUDENT IN AN ORGANIZED HEALTH CARE EDUCATION/TRAINING PROGRAM

## 2022-10-04 PROCEDURE — 99285 EMERGENCY DEPT VISIT HI MDM: CPT | Mod: 25

## 2022-10-04 PROCEDURE — 97167 OT EVAL HIGH COMPLEX 60 MIN: CPT

## 2022-10-04 PROCEDURE — 83735 ASSAY OF MAGNESIUM: CPT | Performed by: NURSE PRACTITIONER

## 2022-10-04 PROCEDURE — 97161 PT EVAL LOW COMPLEX 20 MIN: CPT

## 2022-10-04 PROCEDURE — 85025 COMPLETE CBC W/AUTO DIFF WBC: CPT | Performed by: STUDENT IN AN ORGANIZED HEALTH CARE EDUCATION/TRAINING PROGRAM

## 2022-10-04 PROCEDURE — G0378 HOSPITAL OBSERVATION PER HR: HCPCS

## 2022-10-04 PROCEDURE — 80053 COMPREHEN METABOLIC PANEL: CPT | Performed by: STUDENT IN AN ORGANIZED HEALTH CARE EDUCATION/TRAINING PROGRAM

## 2022-10-04 PROCEDURE — 82746 ASSAY OF FOLIC ACID SERUM: CPT | Performed by: NURSE PRACTITIONER

## 2022-10-04 PROCEDURE — 63600175 PHARM REV CODE 636 W HCPCS: Performed by: NURSE PRACTITIONER

## 2022-10-04 PROCEDURE — 82607 VITAMIN B-12: CPT | Performed by: NURSE PRACTITIONER

## 2022-10-04 PROCEDURE — 84100 ASSAY OF PHOSPHORUS: CPT | Performed by: NURSE PRACTITIONER

## 2022-10-04 PROCEDURE — 96365 THER/PROPH/DIAG IV INF INIT: CPT

## 2022-10-04 RX ORDER — ONDANSETRON 2 MG/ML
4 INJECTION INTRAMUSCULAR; INTRAVENOUS EVERY 6 HOURS PRN
Status: DISCONTINUED | OUTPATIENT
Start: 2022-10-04 | End: 2022-10-05 | Stop reason: HOSPADM

## 2022-10-04 RX ORDER — POLYETHYLENE GLYCOL 3350 17 G/17G
17 POWDER, FOR SOLUTION ORAL 2 TIMES DAILY PRN
Status: DISCONTINUED | OUTPATIENT
Start: 2022-10-04 | End: 2022-10-05 | Stop reason: HOSPADM

## 2022-10-04 RX ORDER — LEVOFLOXACIN 5 MG/ML
500 INJECTION, SOLUTION INTRAVENOUS
Status: DISCONTINUED | OUTPATIENT
Start: 2022-10-04 | End: 2022-10-04 | Stop reason: DRUGHIGH

## 2022-10-04 RX ORDER — TAMSULOSIN HYDROCHLORIDE 0.4 MG/1
0.4 CAPSULE ORAL DAILY
Status: DISCONTINUED | OUTPATIENT
Start: 2022-10-04 | End: 2022-10-05 | Stop reason: HOSPADM

## 2022-10-04 RX ORDER — METOPROLOL TARTRATE 25 MG/1
12.5 TABLET ORAL 2 TIMES DAILY
Status: DISCONTINUED | OUTPATIENT
Start: 2022-10-04 | End: 2022-10-05 | Stop reason: HOSPADM

## 2022-10-04 RX ORDER — CARBAMAZEPINE 100 MG/1
200 TABLET, CHEWABLE ORAL DAILY
Status: DISCONTINUED | OUTPATIENT
Start: 2022-10-04 | End: 2022-10-05 | Stop reason: HOSPADM

## 2022-10-04 RX ORDER — ACETAMINOPHEN 325 MG/1
650 TABLET ORAL EVERY 6 HOURS PRN
Status: DISCONTINUED | OUTPATIENT
Start: 2022-10-04 | End: 2022-10-05 | Stop reason: HOSPADM

## 2022-10-04 RX ORDER — FOLIC ACID 1 MG/1
1 TABLET ORAL DAILY
Status: DISCONTINUED | OUTPATIENT
Start: 2022-10-04 | End: 2022-10-04

## 2022-10-04 RX ORDER — HYDRALAZINE HYDROCHLORIDE 20 MG/ML
10 INJECTION INTRAMUSCULAR; INTRAVENOUS EVERY 8 HOURS PRN
Status: DISCONTINUED | OUTPATIENT
Start: 2022-10-04 | End: 2022-10-05 | Stop reason: HOSPADM

## 2022-10-04 RX ORDER — PREDNISOLONE ACETATE 10 MG/ML
SUSPENSION/ DROPS OPHTHALMIC
Status: ON HOLD | COMMUNITY
Start: 2022-10-03 | End: 2023-09-05

## 2022-10-04 RX ORDER — LEVOFLOXACIN 5 MG/ML
750 INJECTION, SOLUTION INTRAVENOUS
Status: DISCONTINUED | OUTPATIENT
Start: 2022-10-04 | End: 2022-10-04

## 2022-10-04 RX ORDER — ACETAMINOPHEN 325 MG/1
650 TABLET ORAL EVERY 6 HOURS PRN
Status: ON HOLD | COMMUNITY
End: 2024-01-09

## 2022-10-04 RX ORDER — MAG HYDROX/ALUMINUM HYD/SIMETH 200-200-20
30 SUSPENSION, ORAL (FINAL DOSE FORM) ORAL EVERY 6 HOURS PRN
Status: DISCONTINUED | OUTPATIENT
Start: 2022-10-04 | End: 2022-10-05 | Stop reason: HOSPADM

## 2022-10-04 RX ORDER — ACETAMINOPHEN 500 MG
1 TABLET ORAL NIGHTLY
COMMUNITY

## 2022-10-04 RX ORDER — OFLOXACIN 3 MG/ML
SOLUTION/ DROPS OPHTHALMIC
Status: ON HOLD | COMMUNITY
Start: 2022-09-13 | End: 2023-09-05

## 2022-10-04 RX ORDER — IBUPROFEN 400 MG/1
400 TABLET ORAL EVERY 6 HOURS PRN
Status: ON HOLD | COMMUNITY
End: 2022-10-04

## 2022-10-04 RX ORDER — TAMSULOSIN HYDROCHLORIDE 0.4 MG/1
CAPSULE ORAL DAILY
Status: ON HOLD | COMMUNITY
End: 2024-01-09

## 2022-10-04 RX ORDER — ACETAMINOPHEN 500 MG
1000 TABLET ORAL
Status: COMPLETED | OUTPATIENT
Start: 2022-10-04 | End: 2022-10-04

## 2022-10-04 RX ORDER — HYDROCODONE BITARTRATE AND ACETAMINOPHEN 5; 325 MG/1; MG/1
1 TABLET ORAL
Status: COMPLETED | OUTPATIENT
Start: 2022-10-04 | End: 2022-10-04

## 2022-10-04 RX ORDER — CLOPIDOGREL BISULFATE 75 MG/1
75 TABLET ORAL DAILY
Status: DISCONTINUED | OUTPATIENT
Start: 2022-10-05 | End: 2022-10-05 | Stop reason: HOSPADM

## 2022-10-04 RX ORDER — NALOXONE HCL 0.4 MG/ML
0.02 VIAL (ML) INJECTION
Status: DISCONTINUED | OUTPATIENT
Start: 2022-10-04 | End: 2022-10-05 | Stop reason: HOSPADM

## 2022-10-04 RX ORDER — SODIUM CHLORIDE 0.9 % (FLUSH) 0.9 %
10 SYRINGE (ML) INJECTION EVERY 12 HOURS PRN
Status: DISCONTINUED | OUTPATIENT
Start: 2022-10-04 | End: 2022-10-05 | Stop reason: HOSPADM

## 2022-10-04 RX ORDER — CLOPIDOGREL BISULFATE 75 MG/1
75 TABLET ORAL DAILY
Status: DISCONTINUED | OUTPATIENT
Start: 2022-10-04 | End: 2022-10-04

## 2022-10-04 RX ADMIN — ACETAMINOPHEN 1000 MG: 500 TABLET ORAL at 02:10

## 2022-10-04 RX ADMIN — METOPROLOL TARTRATE 12.5 MG: 25 TABLET, FILM COATED ORAL at 10:10

## 2022-10-04 RX ADMIN — ACETAMINOPHEN 650 MG: 325 TABLET ORAL at 06:10

## 2022-10-04 RX ADMIN — TAMSULOSIN HYDROCHLORIDE 0.4 MG: 0.4 CAPSULE ORAL at 10:10

## 2022-10-04 RX ADMIN — HYDROCODONE BITARTRATE AND ACETAMINOPHEN 1 TABLET: 5; 325 TABLET ORAL at 03:10

## 2022-10-04 RX ADMIN — CARBAMAZEPINE 200 MG: 100 TABLET, CHEWABLE ORAL at 12:10

## 2022-10-04 RX ADMIN — CEFTRIAXONE 1 G: 1 INJECTION, SOLUTION INTRAVENOUS at 07:10

## 2022-10-04 RX ADMIN — METOPROLOL TARTRATE 12.5 MG: 25 TABLET, FILM COATED ORAL at 12:10

## 2022-10-04 NOTE — PLAN OF CARE
Problem: Physical Therapy  Goal: Physical Therapy Goal  Description: Goals to be met by: 10/11/22     Patient will increase functional independence with mobility by performin. Supine to sit with MInimal Assistance  2. Rolling to Left and Right with Minimal Assistance.  3. Bed to chair transfer with Minimal Assistance   4. Wheelchair propulsion x50 feet with Contact Guard Assistance    5. Lower extremity exercise program x10 reps per handout, with assistance as needed    Outcome: Ongoing, Progressing   Initial PT evaluation performed today.  Pt may benefit from skilled PT services 3-5x/wk, but may be limited 2/2 decreased cognition and fear of falling.  24hr sup/assist PRN and HHPT recommended.

## 2022-10-04 NOTE — PROGRESS NOTES
Pharmacist Renal Dose Adjustment Note    Jaydon Mccarthy is a 89 y.o. male being treated with the medication Levaquin 500mg q24h.    Patient Data:    Vital Signs (Most Recent):  Temp: 98.5 °F (36.9 °C) (10/04/22 0103)  Pulse: 77 (10/04/22 0402)  Resp: 16 (10/04/22 0307)  BP: (!) 156/72 (10/04/22 0402)  SpO2: 98 % (10/04/22 0402)   Vital Signs (72h Range):  Temp:  [98.4 °F (36.9 °C)-98.5 °F (36.9 °C)]   Pulse:  [70-78]   Resp:  [16-20]   BP: (156-178)/(71-89)   SpO2:  [98 %-100 %]      Recent Labs   Lab 10/03/22  1731   CREATININE 1.0     Serum creatinine: 1 mg/dL 10/03/22 1731  Estimated creatinine clearance: 41 mL/min    Medication:Levaquin 500mg IV ordered q24h.  CrCl is 41.  As per Renal Dose Adjustment per Pharmacy Protocol, Levaquin dose will be adjusted to 750mg IV q48h as per protocol.    Pharmacist's Name: Chon Miller  Pharmacist's Extension: [phone]

## 2022-10-04 NOTE — NURSING
Bandage to right elbow changed, cleaned with normal saline, dried and applied mepilex dressing, patient tolerated well.

## 2022-10-04 NOTE — ED PROVIDER NOTES
Encounter Date: 10/4/2022       History     Chief Complaint   Patient presents with    Fall     Pt arrived via ems, pt chief complaint is a Fall. Pt had an unwitnessed fall about 11pm according to staff. Pt was seen and discharged at 2000 for similar complaint.      HPI    89 y/o male with history of frequent falls due to mechanical instability, now mostly wheelchair bound, was seen earlier in the night for a fall, evaluated and then discharged from the ED. On arrival back to assisted living, he suffered another fall and was found by staff no more than 20 minutes later. It occurred around 11pm. He's complaining on acute onset, constant right elbow pain, worse with movement and relieved by rest. He has no other associated symptoms or complaints.     Review of patient's allergies indicates:  No Known Allergies  Past Medical History:   Diagnosis Date    Anticoagulant long-term use     Arthritis     Cancer     right  hand    Coronary artery disease     Deep vein thrombosis     Hyperlipidemia     Hypertension     Seizures 1988    none since on tegretol     Past Surgical History:   Procedure Laterality Date    APPENDECTOMY      CLOSURE OF WOUND  12/9/2020    Procedure: CLOSURE, WOUND;  Surgeon: Yamil Bonilla MD;  Location: [facility] OR;  Service: Plastics;;    EXCISION OF LESION OF EYELID Right 12/9/2020    Procedure: EXCISION TUMOR RIGHT EYEBROW WITH COMPLEX CLOSURE;  Surgeon: Yamil Bonilla MD;  Location: [facility] OR;  Service: Plastics;  Laterality: Right;  FROZEN SECTION  RN PREOP 12/8/2020----COVID NEGATIVE  12/8    FRACTURE SURGERY Right     elbow    FRACTURE SURGERY Right     hip    JOSUÉ FILTER PLACEMENT      hx of deep vein thrombosis    OPEN REDUCTION AND INTERNAL FIXATION (ORIF) OF INTERTROCHANTERIC FRACTURE OF FEMUR Left 5/30/2018    Procedure: ORIF, FRACTURE, FEMUR, INTERTROCHANTERIC;  Surgeon: Pop Martin III, MD;  Location: [facility] OR;  Service: Orthopedics;  Laterality: Left;    TOTAL SHOULDER  ARTHROPLASTY Right      No family history on file.  Social History     Tobacco Use    Smoking status: Former     Packs/day: 0.50     Years: 6.00     Pack years: 3.00     Types: Cigarettes     Quit date: 10/27/1975     Years since quittin.9    Smokeless tobacco: Never   Substance Use Topics    Alcohol use: No    Drug use: No     Review of Systems    General: No fever.  No chills.  Eyes: No visual changes.  Head: No headache.    Chest: No shortness of breath.  Cardiovascular: No chest pain.  Abdomen: No abdominal pain.  No nausea or vomiting.  Urinary: No abnormal urination.  Neurologic: No focal weakness.  No numbness.  Hematologic: No easy bruising.  Endocrine: No excessive thirst or urination.  Integument: No rashes or lesions.  MSK: Right elbow pain      Physical Exam         Initial Vitals [10/04/22 0103]   BP Pulse Resp Temp SpO2   (!) 171/89 77 18 98.5 °F (36.9 °C) 99 %      MAP       --         Physical Exam    Appearance: No acute distress. Alert. Interactive. Non-toxic appearing  Eyes: No conjunctival injection. Extra-ocular motion in tact. Pupils equal round and reactive  ENT: Oropharynx clear. Tolerating secretions. Speaking clearly.  Lungs: Clear to auscultation bilaterally.  Good air movement.  No wheezes.  No rhonchi.  Cardiovascular: Regular rate and rhythm.  No murmurs. No gallops. No rubs.  Abdomen: Soft.  Not distended.  Nontender.  No guarding.  No rebound.  Musculoskeletal: Right elbow tender to palpation. No overlying edema. No pain with passive range of motion.  No deformities.  Neck supple.  No meningismus.  Neurologic: Motor grossly in tact.  Sensation normal to light touch. Cranial nerves intact.  Mental Status:  Alert and oriented x 3.  Appropriate, conversant.  Skin: No rashes seen.  Good turgor.  Right forehead abrasion, present on earlier examination.  No ecchymoses.     ED Course   Procedures  89 y/o male presents for frequent falls, most likely due to generalized deconditioning &  instability. Vitals notable for hypertension, otherwise normal. GCS 15, no signs of trauma to the head or face, aside from abrasion to right forehead, evaluated earlier. CT head from earlier in the evening without acute intracranial abnormality, given GCS 15 & no complaints of headache or trauma, no indication to re-image at this time. CBC from earlier notable for Hgb of 7.1, down from patient's baseline of 9.1. Patient not having dark or tarry stools. Unclear etiology. Repeat Hgb 7.2 4 hours later, doubt symptomatic anemia. Right elbow pain and tender to palpation, XR pending. Patient is being treated for UTI, which increases his risk for falls. Dispo pending imaging and likely observation for increased fall frequency, PT/OT eval, and safety assessment at home. Care assumed by oncoming physician pending workup.    Labs Reviewed   CBC W/ AUTO DIFFERENTIAL - Abnormal; Notable for the following components:       Result Value    RBC 2.99 (*)     Hemoglobin 7.2 (*)     Hematocrit 23.1 (*)     MCV 77 (*)     MCH 24.1 (*)     MCHC 31.2 (*)     Lymph # 0.8 (*)     Gran % 76.0 (*)     Lymph % 10.9 (*)     All other components within normal limits   COMPREHENSIVE METABOLIC PANEL - Abnormal; Notable for the following components:    Chloride 112 (*)     CO2 21 (*)     Glucose 123 (*)     Calcium 8.0 (*)     Albumin 2.9 (*)     Alkaline Phosphatase 136 (*)     ALT 9 (*)     Anion Gap 7 (*)     All other components within normal limits   MAGNESIUM   PHOSPHORUS   VITAMIN B12   FOLATE          Imaging Results              X-Ray Elbow Complete Right (Final result)  Result time 10/04/22 08:05:26      Final result by RADIOLOGIST, VIRTUAL (10/04/22 08:05:26)                   Impression:      No definite acute findings      Electronically signed by: Gladys Radiologist  Date:    10/04/2022  Time:    08:05               Narrative:    EXAMINATION:  XR Right Elbow    CLINICAL HISTORY:  Pain; Elbow; Right    TECHNIQUE:  Imaging protocol:  Radiologic exam of the Right elbow. Views: 1 or 2 views.    COMPARISON:  DX XR ELBOW COMPLETE 3 VIEW RIGHT 7/30/2022 8:03 PM    FINDINGS:  Bones/joints: No definite acute fracture. Fixation in the proximal ulna. Partially shown right proximal humeral implant. Soft tissues: Normal.                                       Medications   acetaminophen tablet 650 mg (has no administration in time range)   ondansetron injection 4 mg (has no administration in time range)   aluminum-magnesium hydroxide-simethicone 200-200-20 mg/5 mL suspension 30 mL (has no administration in time range)   sodium chloride 0.9% flush 10 mL (has no administration in time range)   naloxone 0.4 mg/mL injection 0.02 mg (has no administration in time range)   tamsulosin 24 hr capsule 0.4 mg (0.4 mg Oral Given 10/4/22 1010)   polyethylene glycol packet 17 g (has no administration in time range)   cefTRIAXone (ROCEPHIN) 1 g/50 mL D5W IVPB (0 g Intravenous Stopped 10/4/22 0742)   carBAMazepine chewable tablet 200 mg (has no administration in time range)   metoprolol tartrate (LOPRESSOR) split tablet 12.5 mg (has no administration in time range)   rivaroxaban tablet 20 mg (has no administration in time range)   clopidogreL tablet 75 mg (has no administration in time range)   acetaminophen tablet 1,000 mg (1,000 mg Oral Given 10/4/22 0231)   HYDROcodone-acetaminophen 5-325 mg per tablet 1 tablet (1 tablet Oral Given 10/4/22 0307)                 ED Course as of 10/04/22 1151   Tue Oct 04, 2022   0420 On my initial evaluation of x-ray I do not notice acute fracture, dislocation.  I do not see any signs associated with acute fracture in the area.  Will plan to treat pain in place patient in observation for frequent falls.  Anemia noted but hemoglobin is stable from earlier tonight.  Vitals are stable.  I have consulted Hospital Medicine for placement in observation. [CC]      ED Course User Index  [CC] Geoffrey Dean MD                 Clinical  Impression:   Final diagnoses:  [M25.521] Elbow pain, right  [R29.6] Falls frequently (Primary)  [D64.9] Anemia, unspecified type  [N39.0] Urinary tract infection without hematuria, site unspecified        ED Disposition Condition    Observation                 Colt Waller MD  10/04/22 6879

## 2022-10-04 NOTE — ASSESSMENT & PLAN NOTE
Presents to the hospital for the 2nd time in 24 hours for what sounds like a mechanical fall.  Baseline use mainly wheelchair since August fx left radial fracture but able to transfer self. See HPI above  Obtain orthostatic blood pressure  PT OT consult

## 2022-10-04 NOTE — ASSESSMENT & PLAN NOTE
Per chart review history of DVT with Green filter placement continue Xarelto will discuss with family risks benefits concerning Xarelto with recurrent falls

## 2022-10-04 NOTE — PT/OT/SLP EVAL
"Occupational Therapy   Evaluation    Name: Jaydon Mccarthy  MRN: [id number]  Admitting Diagnosis: Fall  Recent Surgery: * No surgery found *      Recommendations:     Discharge Recommendations: other (see comments) (24/7 supervision and assistance with HHOT)  Discharge Equipment Recommendations: lift device  Barriers to Discharge: Other (Comment) (Patient has had a significant functional decline and will need 24/7 care.  He is at high risk of falls and further injuries.)    Assessment:     Jaydon Mccarthy is a 89 y.o. male with a medical diagnosis of fall.  He presents with severe anxiety related to his fear of falling.  Performance deficits affecting function are weakness, visual deficits, impaired endurance, impaired cognition, decreased coordination, impaired coordination, impaired self-care skills, impaired functional mobility, decreased lower extremity function, impaired skin, decreased safety awareness, impaired balance, pain, and edema.      Rehab Prognosis: Poor; patient may benefit from acute skilled OT services to address these deficits and reach maximum level of function.       Plan:     Patient to be seen 3 x/week to address the above listed problems via self-care/home management, therapeutic activities, and therapeutic exercises.  Plan of Care Expires: 10/18/22  Plan of Care Reviewed with: patient    Subjective     Chief Complaint: Fear of falling, wanting to get back in bed under the covers, and pain at R elbow with movement   Patient/Family Comments/Goals: "You want me to go off the bed??"     Occupational Profile:  Living Environment: [place]  Previous Level of Function: Patient was able to transfer to/from bed, W/C, and toilet with assistance.    Roles and Routines: Patient was checked on periodically by staff members.  Patient with an increase in falls/unsafe behaviors recently.   Equipment Used at Home: Rolling walker and wheelchair  Assistance upon Discharge: Patient will require 24/7 supervision and " assistance from staff members upon his D/C.     Pain/Comfort:  Pain Rating 1: (Pain at R elbow during movement (not rated))  Location - Side 1: Right  Location - Orientation 1: Generalized  Location 1: Elbow  Pain Addressed 1: Reposition, Distraction, Cessation of activity, Nurse notified  Pain Rating Post-Intervention 1: 0/10    Patients cultural, spiritual, Yarsanism conflicts discussed given the current situation: No; patient is very [place], was very anxious, and was not cognitively able to discuss this.     Objective:     Communicated with: Nurse, Gege, prior to session.  Patient found in bed in R side-lying with bed alarm and pressure-relief boots upon OT entry to room.      General Precautions: Standard, fall, and skin precautions apply.    Orthopedic Precautions: N/A   Braces: N/A  Respiratory Status: Room air    Occupational Performance:    Bed Mobility:    Patient required Total A of 1-2 persons for all bed mob's, including rolling to R side, scooting at EOB, supine to sitting at EOB, and sitting at EOB to supine.  He also required max verbal and tactile and visual cues to use bed rails as needed.       Functional Mobility/Transfers:  Transfers: Total A x 1 person for scoot transfer along EOB   Functional Mobility: No further functional mob's were performed.  While patient was seated at EOB, he demonstrated poor sitting balance and repeatedly exhibited extensive posturing.     Activities of Daily Living:  UE Dressing: Total A to caitlin/tie gown     Cognitive/Visual Perceptual:  Cognitive/Psychosocial Skills:     -       Oriented to: Person only  -       Follows Commands/Attention: Inattentive and unable to follow verbal commands  -       Communication: Receptive aphasia present; patient also with bilateral hearing aides   -       Safety Awareness/Insight to Disability: Impaired   -       Mood/Affect/Coping Skills/Emotional Control: Anxious, Guarded, and Fearful  Visual/Perceptual:      -Impaired: Patient  with low vision at R eye due to cataracts     Physical Exam:  Balance:    -       Sitting balance is impaired.  Standing balance was unable to be tested.  Postural Examination:     -       Rounded shoulders  -       Forward head  -       Extensive posturing of trunk  Skin Integrity: Abrasion at R elbow (bandage intact)  Edema: Mild edema at R elbow  Upper Extremity Range of Motion:     -       Right Upper Extremity: Deficits: Max deficits due to patient with R elbow injury and patient guarding RUE  -       Left Upper Extremity: WFL  Upper Extremity Strength:    -       Right Upper Extremity: Unable to test  -       Left Upper Extremity: Unable to test   Strength:    -       Right Upper Extremity: WFL  -       Left Upper Extremity: WFL    AMPAC 6 Click ADL:  AMPAC Total Score: 11    Treatment & Education:  Patient co-evaluated today by PT and OT.  Patient with poor participation and extreme anxiety/fear of falling.  Patient was educated on the role of OT/therapy and the importance of getting OOB.  Patient was unable to stand at EOB and repeatedly requested to get back under the covers/return to bed.  Patient required Total A for all bed mob's with 1-2 persons assisting.  Patient required Total A for dressing tasks.  Please see per above for further info.      Patient is to be treated 3 x's a week.  He will require 24/7 supervision and assistance upon D/C; he is no longer appropriate/safe for intermittent visits/assistance.  Patient would also benefit from home health OT services upon D/C.      Patient left in R side-lying with lines intact as previously stated, call button within his reach, bed alarm activated, nurse notified, pillow placed between patient's knees, wedge cushion placed under patient's L hip, and pressure-relief boots applied.     GOALS:   Multidisciplinary Problems       Occupational Therapy Goals          Problem: Occupational Therapy    Goal Priority Disciplines Outcome Interventions    Occupational Therapy Goal     OT, PT/OT Ongoing, Progressing    Description: Goals to be met by: 10/18/2022    Patient will increase functional independence with ADL's by performing:    Feeding with Set-up Assistance.  UE Dressing with Minimal Assistance.  LE Dressing with Minimal Assistance.  Grooming while seated with Minimal Assistance.  Toileting from bedside commode with Minimal Assistance for hygiene and clothing management.   Bathing from shower chair/bench with Moderate Assistance.  Sitting at edge of bed x 10 minutes with Supervision.  Stand-pivot transfers with Minimal Assistance.  Toilet transfer to bedside commode with Minimal Assistance.                         History:     Past Medical History:   Diagnosis Date    Anticoagulant long-term use     Arthritis     Cancer     right  hand    Coronary artery disease     Deep vein thrombosis     Hyperlipidemia     Hypertension     Seizures 1988    none since on tegretol         Past Surgical History:   Procedure Laterality Date    APPENDECTOMY      CLOSURE OF WOUND  12/9/2020    Procedure: CLOSURE, WOUND;  Surgeon: Yamil Bonilla MD;  Location: [facility] OR;  Service: Plastics;;    EXCISION OF LESION OF EYELID Right 12/9/2020    Procedure: EXCISION TUMOR RIGHT EYEBROW WITH COMPLEX CLOSURE;  Surgeon: Yamil Bonilla MD;  Location: [facility] OR;  Service: Plastics;  Laterality: Right;  FROZEN SECTION  RN PREOP 12/8/2020----COVID NEGATIVE  12/8    FRACTURE SURGERY Right     elbow    FRACTURE SURGERY Right     hip    JOSUÉ FILTER PLACEMENT      hx of deep vein thrombosis    OPEN REDUCTION AND INTERNAL FIXATION (ORIF) OF INTERTROCHANTERIC FRACTURE OF FEMUR Left 5/30/2018    Procedure: ORIF, FRACTURE, FEMUR, INTERTROCHANTERIC;  Surgeon: Pop Martin III, MD;  Location: [facility] OR;  Service: Orthopedics;  Laterality: Left;    TOTAL SHOULDER ARTHROPLASTY Right        Time Tracking:     OT Date of Treatment: 10/04/22  OT Start Time: 1426  OT Stop Time: 1440  OT Total  Time (min): 14 min    Billable Minutes:Evaluation 14 mins co-evaluation with PT    10/4/2022

## 2022-10-04 NOTE — Clinical Note
Diagnosis: Elbow pain, right [286604]   Future Attending Provider: DONOVAN CHINO [[id number]]   Admitting Provider:: DONOVAN CHINO [[id number]]

## 2022-10-04 NOTE — NURSING
Patient arrived to floor via stretcher via transporter from ED. Patient transferred to bed via x3 person assist. AAOx4. Patient was oriented to room, information on whiteboard, and medication regimen. Bed low, adequate lighting provided, side rails x2 up, call bell within reach. Admission assessment completed.  VSS. Patient denied having any acute distress at this time. None observed. Will continue to monitor and follow treatment plan. Patient noted to have hearing aids bilateral, dressing noted to right elbow with moderate drainage noted, bilateral bruising to left and right hip, BLE skin abrasions, sacral dressing placed for prevention, heel boots in place.

## 2022-10-04 NOTE — ED NOTES
Pt from [facility] with c/o injured right elbow from fall today. Staff reported that pt has frequent falls but had 2 in one day (which is unusual). Pt reports hitting the back of his head on one of the falls but the pain is in his right elbow/arm. Staff reported to EMS that pt does not usually complain about pain. Pt Aax3.

## 2022-10-04 NOTE — ED NOTES
Report received. Pt lying down, respirations even/unlabored. NAD Noted. Updated pt on poc. Pt denies any needs. Side rails up x2, call bell within reach. Will continue to monitor.

## 2022-10-04 NOTE — PLAN OF CARE
Problem: Occupational Therapy  Goal: Occupational Therapy Goal  Description: Goals to be met by: 10/18/2022    Patient will increase functional independence with ADL's by performing:    Feeding with Set-up Assistance.  UE Dressing with Minimal Assistance.  LE Dressing with Minimal Assistance.  Grooming while seated with Minimal Assistance.  Toileting from bedside commode with Minimal Assistance for hygiene and clothing management.   Bathing from shower chair/bench with Moderate Assistance.  Sitting at edge of bed x 10 minutes with Supervision.  Stand-pivot transfers with Minimal Assistance.  Toilet transfer to bedside commode with Minimal Assistance.    Outcome: Ongoing, Progressing     Patient co-evaluated today by PT and OT.  Patient with poor participation and extreme anxiety/fear of falling.  Patient was educated on the role of OT/therapy and the importance of getting OOB.  Patient was unable to stand at EOB and repeatedly requested to get back under the covers/return to bed.  Patient required Total A for all bed mob's with 1-2 persons assisting.  Patient required Total A for dressing tasks.      Patient is to be treated 3 x's a week.  He will require 24/7 supervision and assistance upon D/C; he is no longer appropriate/safe for intermittent visits/assistance.  Patient would also benefit from home health OT services upon D/C.     Denies Smoking and drug use  Occasionally drinks wine

## 2022-10-04 NOTE — SUBJECTIVE & OBJECTIVE
Past Medical History:   Diagnosis Date    Anticoagulant long-term use     Arthritis     Cancer     right  hand    Coronary artery disease     Deep vein thrombosis     Hyperlipidemia     Hypertension     Seizures 1988    none since on tegretol       Past Surgical History:   Procedure Laterality Date    APPENDECTOMY      CLOSURE OF WOUND  12/9/2020    Procedure: CLOSURE, WOUND;  Surgeon: Yamil Bonilla MD;  Location: [facility] OR;  Service: Plastics;;    EXCISION OF LESION OF EYELID Right 12/9/2020    Procedure: EXCISION TUMOR RIGHT EYEBROW WITH COMPLEX CLOSURE;  Surgeon: Yamil Bonilla MD;  Location: [facility] OR;  Service: Plastics;  Laterality: Right;  FROZEN SECTION  RN PREOP 12/8/2020----COVID NEGATIVE  12/8    FRACTURE SURGERY Right     elbow    FRACTURE SURGERY Right     hip    JOSUÉ FILTER PLACEMENT      hx of deep vein thrombosis    OPEN REDUCTION AND INTERNAL FIXATION (ORIF) OF INTERTROCHANTERIC FRACTURE OF FEMUR Left 5/30/2018    Procedure: ORIF, FRACTURE, FEMUR, INTERTROCHANTERIC;  Surgeon: Pop Martin III, MD;  Location: [facility] OR;  Service: Orthopedics;  Laterality: Left;    TOTAL SHOULDER ARTHROPLASTY Right        Review of patient's allergies indicates:  No Known Allergies    Current Facility-Administered Medications on File Prior to Encounter   Medication    [DISCONTINUED] ciprofloxacin HCl tablet 500 mg     Current Outpatient Medications on File Prior to Encounter   Medication Sig    acetaminophen (TYLENOL) 325 MG tablet Take 650 mg by mouth every 6 (six) hours as needed for Pain.    atorvastatin (LIPITOR) 20 MG tablet TAKE ONE TABLET BY MOUTH ONCE DAILY    carBAMazepine (TEGRETOL) 100 mg chewable tablet TAKE 2 TABLETS BY MOUTH DAILY    ciprofloxacin HCl (CIPRO) 500 MG tablet Take 1 tablet (500 mg total) by mouth 2 (two) times daily. for 14 days    clopidogreL (PLAVIX) 75 mg tablet Take 1 tablet (75 mg total) by mouth once daily.    finasteride (PROSCAR) 5 mg tablet TAKE ONE TABLET BY MOUTH DAILY     ibuprofen (ADVIL,MOTRIN) 400 MG tablet Take 400 mg by mouth every 6 (six) hours as needed for Other.    levETIRAcetam (KEPPRA) 500 MG Tab TAKE ONE TABLET BY MOUTH TWICE DAILY    melatonin (MELATIN) 5 mg Take 1 tablet by mouth every evening.    metoprolol tartrate (LOPRESSOR) 25 MG tablet Take 0.5 tablets (12.5 mg total) by mouth 2 (two) times daily.    ofloxacin (OCUFLOX) 0.3 % ophthalmic solution     prednisoLONE acetate (PRED FORTE) 1 % DrpS Place into both eyes.    tamsulosin (FLOMAX) 0.4 mg Cap Take by mouth once daily.    triamcinolone acetonide 0.025% (KENALOG) 0.025 % cream Apply topically 2 (two) times daily.    vitamin D (VITAMIN D3) 1000 units Tab Take 1 tablet (1,000 Units total) by mouth once daily.    XARELTO 20 mg Tab TAKE 1 TABLET BY MOUTH ONCE DAILY.    polyethylene glycol (GLYCOLAX) 17 gram PwPk Take 17 g by mouth daily as needed (constipation, titrate to patient comfort).     Family History    None       Tobacco Use    Smoking status: Former     Packs/day: 0.50     Years: 6.00     Pack years: 3.00     Types: Cigarettes     Quit date: 10/27/1975     Years since quittin.9    Smokeless tobacco: Never   Substance and Sexual Activity    Alcohol use: No    Drug use: No    Sexual activity: Not Currently     Partners: Female     Review of Systems   Constitutional:  Positive for activity change. Negative for fatigue.   HENT: Negative.     Respiratory: Negative.     Gastrointestinal: Negative.    Endocrine: Negative.    Genitourinary: Negative.    Musculoskeletal:  Positive for arthralgias.   Skin:  Positive for color change and wound.   Neurological:  Positive for weakness.   Hematological:  Bruises/bleeds easily.   Psychiatric/Behavioral: Negative.     Objective:     Vital Signs (Most Recent):  Temp: 99.4 °F (37.4 °C) (10/04/22 0900)  Pulse: 68 (10/04/22 0900)  Resp: 20 (10/04/22 0900)  BP: (!) 178/82 (10/04/22 0900)  SpO2: 98 % (10/04/22 0900)   Vital Signs (24h Range):  Temp:  [98.4 °F (36.9  °C)-99.4 °F (37.4 °C)] 99.4 °F (37.4 °C)  Pulse:  [67-78] 68  Resp:  [16-21] 20  SpO2:  [97 %-100 %] 98 %  BP: (131-178)/(65-89) 178/82     Weight: 59 kg (130 lb)  Body mass index is 20.36 kg/m².    Physical Exam  Constitutional:       General: He is not in acute distress.     Appearance: Normal appearance. He is ill-appearing.   HENT:      Head: Normocephalic.      Nose: Nose normal.      Mouth/Throat:      Mouth: Mucous membranes are dry.   Eyes:      Extraocular Movements: Extraocular movements intact.      Comments: Right eye blind    Cardiovascular:      Rate and Rhythm: Normal rate.   Pulmonary:      Effort: Pulmonary effort is normal.      Breath sounds: Normal breath sounds.   Abdominal:      Palpations: Abdomen is soft.   Musculoskeletal:         General: No swelling. Normal range of motion.      Cervical back: Normal range of motion and neck supple.   Skin:     General: Skin is warm and dry.      Comments: Right arm skin tear dressing soak with blood-will redress   Right leg arm abrasion scabbing over    Neurological:      Mental Status: He is alert and oriented to person, place, and time. Mental status is at baseline.         Significant Labs: All pertinent labs within the past 24 hours have been reviewed.    Significant Imaging: I have reviewed all pertinent imaging results/findings within the past 24 hours.

## 2022-10-04 NOTE — HPI
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  Patient was in the ED earlier yesterday after sustaining a fall at 430 am when he lost his balance trying to use restroom then discharge with antibiotics Cipro UTI.  At midnight fall, patient had another fall in which he sustained right elbow skin tear. Denies chest pain or SOB.   History of recurrent falls and in August sustained acute nondisplaced radial fracture. Patient have been seeing [facility] where left wrist was cast then brace and now clear for therapy. Since August patient have been using wheelchair mostly however able transfer self to toilet. At [facility], a nurse comes every 2 hrs and care taker comes in between those 2 hrs so pretty much 24 hr care. Daughter Rupal HILL.     X ray right elbow showed no acute fractures but does show right proximal humeral implant and fixation proximal ulna. Hgb 7.2 stable from yesterday but down from compared to previous labs.     10/3/2022 First visit to ED, UA showed evidence of UTI. Urine culture no growth to date. CT head no acute intracranial abnormality. CT maxillofacial similar to prior.

## 2022-10-04 NOTE — PLAN OF CARE
10/04/22 1041   Discharge Planning   Assessment Type Discharge Planning Brief Assessment   Resource/Environmental Concerns none   Support Systems Family members;Other (Comment)   Equipment Currently Used at Home wheelchair;walker, rolling   Current Living Arrangements independent/assisted living facility   Patient/Family Anticipates Transition to other (see comments)  (assisted living)   Patient/Family Anticipated Services at Transition none   DME Needed Upon Discharge  none   Discharge Plan A Assisted Living     [facility] - [place] - [address]  [address]  [place] [address]  Phone: [phone] Fax: [phone]    [facility] - [place] - [address]   [address]  [place] [address]  Phone: [phone] Fax: [phone]

## 2022-10-04 NOTE — ASSESSMENT & PLAN NOTE
Noted drop in hemoglobin from 4 months ago 9.0 currently 7.2 add vitamin B12 folic acid and iron studies.

## 2022-10-04 NOTE — ED NOTES
[facility]   DISCHARGE SUMMARY      Name:  Millicent Mckeon   Age:  61 y.o.  Sex:  female  :  1958  MRN:  [id number]   Visit Number:  [id number]    Admission Date:  2019  Date of Discharge:  2019  Primary Care Physician:  Marianela Albright APRN    Important issues to note:    1. New Medications started during this admission: Amiodarone, Bumex and Aldactone.  2. Eliquis dosage has been decreased to 2.5 mg twice daily due to renal failure.  3. Medication discontinued: Diltiazem CD.  4. Repeat renal panel in 3-4 days.  5. Follow up with Dr. Leone in 2-3 weeks.    Discharge Diagnoses:     1.  Atrial fibrillation with rapid ventricular rate, status post cardioversion on 2019.  2.  Acute on chronic systolic heart failure with ejection fraction of 35%, improving.  3.  Acute renal failure with underlying chronic kidney disease stage III, improving.  4.  Chronic cor pulmonale.  5.  Chronic hypoxic respiratory failure on home oxygen.  6.  Acute bronchitis, POA, completed antibiotics.  7.  Morbid obesity with a BMI of 50.  8.  Diabetes mellitus type 2 with nephropathy.  9.  Chronic venous insufficiency of the lower extremities.  10.  Anemia of chronic kidney disease.  11.  Functional quadriplegia.  12.  Acquired hypothyroidism.      Paroxysmal atrial fibrillation with rapid ventricular response (CMS/Formerly [facility])    Acquired hypothyroidism    Type 2 diabetes mellitus with nephropathy (CMS/Formerly [facility])    Anemia due to stage 4 chronic kidney disease (CMS/Formerly [facility])    Chronic diastolic congestive heart failure (CMS/[facility]ly [facility])    Pulmonary hypertension (CMS/[facility]ly [facility])    Cor pulmonale, chronic (CMS/[facility]ly [facility])    Lymphedema of both lower extremities    Morbid obesity with BMI of 45.0-49.9, adult (CMS/Formerly [facility])    A-fib (CMS/[facility]ly [facility])    CKD (chronic kidney disease) stage 3, GFR 30-59 ml/min (CMS/[facility]ly [facility])    Acute bronchitis due to other specified organisms    Presenting Problem:    A-fib (CMS/[facility]) [I48.91]     Consults:     Consults   Bed: 09main  Expected date:   Expected time:   Means of arrival:   Comments:  Ems       Date and Time Order Name Status Description    8/14/2019 1703 Inpatient Nephrology Consult Completed     8/14/2019 1703 Inpatient Cardiology Consult Completed         Consulting Physician(s)     Provider Relationship Specialty    Milad Leone MD, GILA Consulting Physician Nephrology    Yola, Kevin SAXENA MD Consulting Physician Cardiology        Procedures Performed:    1. DC cardioversion on 8/16/2019.    History of presenting illness/Hospital Course:    This is a 61-year-old female with history of atrial fibrillation, hypertension, morbid obesity, diabetes, chronic kidney disease, poor functional status and hypothyroidism was transferred from [facility] with atrial fibrillation with rapid ventricular rate resistant to multiple medical therapy.  She was seen by Dr. Aceves and subsequently underwent DC cardioversion on 8/16/2019.  Following the cardioversion she was noted to have sinus bradycardia and her amiodarone therapy was decreased to 100 mg daily.  Dr. Aceves did make adjustment to her medications with decreasing the dose of Bystolic to 10 mg daily.  Transesophageal echocardiogram showed reduction in her left ventricular ejection fraction from 55% to 35% which is attributed likely to the tachycardia mediated transient cardiomyopathy.  Patient also has history of chronic cough and shortness of breath which was thought to be related to her diastolic heart failure.  She was started on low-dose spironolactone as well as torsemide by Dr. cAeves.  Her Cardizem CD was discontinued due to her reduced ejection fraction.     Patient does have chronic kidney disease and was seen by Dr. Leone.  Her GFR is around 24 and he recommended 24-hour urine collection for proteinuria.  She was noted to have around 2 g of proteinuria.  It seems to be that her new baseline creatinine level is settling around 2.2.  Dr. Cutler recommended changing her Bumex to 1 mg daily with Aldactone 25 mg 3 times  weekly on Monday, Wednesday and Friday.  She would need follow-up renal panel in 3 to 4 days with outpatient nephrology follow-up in 2 to 3 weeks.    Patient states that she is currently at the short-term rehabilitation facility at [facility] and rehab but prior to that she was staying with her sister [place].  Apparently patient has been bedbound for the last several months and uses a wheelchair for ambulation.  Her sister is currently on hemodialysis 3 times weekly.  During the hospital stay, she received Rocephin and subsequently Ceftin for acute bronchitis and she has completed the course.  During the hospital stay, her Eliquis dose has been decreased to 2.5 mg twice daily due to her decreased GFR.  Her blood sugars have been fairly stable and she was continued on diabetic diet.  She is currently hemodynamically stable and will be discharged back to the nursing home for short-term rehabilitation.  I have discussed the patient's condition and discharge plan with Dr. Cutler.  She will also need follow-up with Dr. Aceves in 4 to 6 weeks and may need a repeat echocardiogram in 6 months to see if her ejection fraction has recovered.    Vital Signs:    Temp:  [97.4 °F (36.3 °C)-98.4 °F (36.9 °C)] 97.4 °F (36.3 °C)  Heart Rate:  [62-72] 62  Resp:  [16-20] 18  BP: (143-147)/(66-76) 145/74    Physical Exam:    General Appearance:  Alert and cooperative, not in any acute distress.   Head:  Atraumatic and normocephalic, without obvious abnormality.   Eyes:          PERRLA, conjunctivae and sclerae normal, no Icterus. No pallor. Extraocular movements are within normal limits.   Ears:  Ears appear intact with no abnormalities noted.   Throat: No oral lesions, no thrush, oral mucosa moist.   Neck: Supple, trachea midline, no thyromegaly, no carotid bruit.   Back:   No kyphoscoliosis present. No tenderness to palpation,   range of motion normal.   Lungs:   Chest shape is obese. Breath sounds heard bilaterally equally.  No  wheezing.  Occasional basal crackles heard.  No Pleural rub or bronchial breathing.   Heart:  Normal S1 and S2, no murmur, no gallop, no rub. No JVD.   Abdomen:   Normal bowel sounds, no masses, no organomegaly. Soft, non-tender, obese with a large pannus, no guarding, no rebound tenderness.   Extremities: Moves all extremities well, 3+ edema, no cyanosis, no clubbing.  Skin thickening with large areas of keratosis noted on the legs.   Pulses: Pulses palpable and equal bilaterally.   Skin: No bleeding.   Neurologic: Alert and oriented x 3. Moves all four limbs equally. No tremors. No facial asymetry.     Pertinent Lab Results:     Results from last 7 days   Lab Units 08/19/19  0522 08/18/19  0637 08/17/19  0623  08/15/19  0454   SODIUM mmol/L 146* 141 142   < > 141   POTASSIUM mmol/L 4.1 4.4 4.1   < > 4.5   CHLORIDE mmol/L 106 102 104   < > 106   CO2 mmol/L 25.9 27.1 25.5   < > 23.3   BUN mg/dL 41* 38* 37*   < > 30*   CREATININE mg/dL 2.27* 2.34* 2.21*   < > 1.85*   CALCIUM mg/dL 8.5* 8.8 8.8   < > 9.0   BILIRUBIN mg/dL  --   --   --   --  0.3   ALK PHOS U/L  --   --   --   --  152*   ALT (SGPT) U/L  --   --   --   --  11   AST (SGOT) U/L  --   --   --   --  15   GLUCOSE mg/dL 148* 194* 199*   < > 238*    < > = values in this interval not displayed.     Results from last 7 days   Lab Units 08/19/19  0522 08/16/19  0606 08/15/19  0454   WBC 10*3/mm3 5.45 6.28 4.60   HEMOGLOBIN g/dL 9.2* 10.1* 9.7*   HEMATOCRIT % 30.8* 34.0 32.2*   PLATELETS 10*3/mm3 142 145 134*         Results from last 7 days   Lab Units 08/15/19  0454 08/14/19  2317 08/14/19  1743 08/13/19  1745   TROPONIN I ng/mL  --   --   --  <0.30   TROPONIN T ng/mL <0.010 <0.010 <0.010  --        Results from last 7 days   Lab Units 08/13/19  1745   LIPASE U/L 23.0         Results from last 7 days   Lab Units 08/13/19  1800   COLOR UA  Yellow   GLUCOSE UR mg/dL Negative   LEUKOCYTES UA  Negative   EXTERNAL BILIRUBIN, URINE  Negative   UROBILINOGEN UA E.U./dL  0.2     Pain Management Panel     Pain Management Panel Latest Ref Rng & Units 8/16/2019 8/16/2019    CREATININE UR mg/dL 31.4 29.5        Results from last 7 days   Lab Units 08/14/19  1636   MRSA SCREEN CX  No Methicillin Resistant Staphylococcus aureus isolated     Pertinent Radiology Results:    Imaging Results (all)     Procedure Component Value Units Date/Time    XR Chest 1 View [[id number]] Collected:  08/15/19 0906     Updated:  08/15/19 0909    Narrative:       PROCEDURE: XR CHEST 1 VW-     HISTORY: DYSPNEA CHRONIC, NO XRAY     COMPARISON: 01/14/2019.     FINDINGS: The heart is enlarged. The mediastinum is unremarkable. There  are linear opacities in the right midlung field and right lung base  likely atelectasis. There is no pneumothorax.  There are no acute  osseous abnormalities.       Impression:       Linear opacities in the right midlung field and right lung  base likely atelectasis.     Continued followup is recommended.     This report was finalized on 8/15/2019 9:06 AM by Lety Valiente M.D..        Condition on Discharge:      Stable.    Code status during the hospital stay:    Code Status and Medical Interventions:   Ordered at: 08/14/19 1702     Level Of Support Discussed With:    Patient     Code Status:    CPR     Medical Interventions (Level of Support Prior to Arrest):    Full     Discharge Disposition:    Rehab Facility or Unit (DC - External)    Discharge Medications:       Discharge Medications      New Medications      Instructions Start Date   amiodarone 100 MG tablet  Commonly known as:  PACERONE   100 mg, Oral, Every 24 Hours Scheduled   Start Date:  8/20/2019     bumetanide 1 MG tablet  Commonly known as:  BUMEX   1 mg, Oral, Daily      spironolactone 25 MG tablet  Commonly known as:  ALDACTONE   25 mg, Oral, 3 Times Weekly, Monday, Wednesday and Friday.         Changes to Medications      Instructions Start Date   apixaban 2.5 MG tablet tablet  Commonly known as:  ELIQUIS  What  changed:    · medication strength  · how much to take  · when to take this   2.5 mg, Oral, Every 12 Hours Scheduled      bisoprolol 10 MG tablet  Commonly known as:  ZEBeta  What changed:    · how to take this  · when to take this   5 mg, Oral, Daily         Continue These Medications      Instructions Start Date   acetaminophen 325 MG tablet  Commonly known as:  TYLENOL   650 mg, Oral, Every 4 Hours PRN      ammonium lactate 12 % cream  Commonly known as:  AMLACTIN   1 application, Topical, 2 Times Daily      arginine 500 MG tablet   500 mg, Oral, Daily      aspirin 81 MG chewable tablet   81 mg, Oral, Daily      docusate sodium 100 MG capsule  Commonly known as:  COLACE   100 mg, Oral, 2 Times Daily      ferrous sulfate 324 (65 Fe) MG tablet delayed-release EC tablet   324 mg, Oral, 2 Times Daily With Meals      insulin aspart 100 UNIT/ML injection  Commonly known as:  novoLOG   Subcutaneous, 2 Times Daily, Sliding scale, low dose      isosorbide mononitrate 10 MG tablet  Commonly known as:  ISMO,MONOKET   15 mg, Oral, Daily      JOBST OPAQUE KNEE 20-30MMHG XL misc   1 application, Does not apply, Daily      levothyroxine 150 MCG tablet  Commonly known as:  SYNTHROID, LEVOTHROID   150 mcg, Oral, Daily      lidocaine 5 %  Commonly known as:  LIDODERM   1 patch, Transdermal, Every 24 Hours, Apply patch to left knee daily, on at 0700 am off at 2000       multivitamin with minerals tablet tablet   1 tablet, Oral, Daily      nitroglycerin 0.4 MG SL tablet  Commonly known as:  NITROSTAT   0.4 mg, Sublingual, Every 5 Minutes PRN, Take no more than 3 doses in 15 minutes.       pantoprazole 40 MG EC tablet  Commonly known as:  PROTONIX   40 mg, Oral, Daily      polyethylene glycol packet  Commonly known as:  MIRALAX   17 g, Oral, 2 Times Daily      PROTEIN SUPPLEMENT 80% PO   30 mL, Oral, 2 Times Daily      RA VITAMIN C 500 MG tablet  Generic drug:  ascorbic acid   500 mg, Oral, Daily      saccharomyces boulardii 250 MG  capsule  Commonly known as:  FLORASTOR   250 mg, Oral, 2 Times Daily      simvastatin 20 MG tablet  Commonly known as:  ZOCOR   20 mg, Oral, Nightly      Zinc Sulfate 220 (50 Zn) MG tablet   1 tablet, Oral, 2 Times Daily         Stop These Medications    diltiaZEM 120 MG tablet  Commonly known as:  CARDIZEM          Discharge Diet:     Diet Instructions     Diet: Renal, Consistent Carbohydrate; Thin      Discharge Diet:   Renal  Consistent Carbohydrate       Fluid Consistency:  Thin    Fluid Restriction per day:  1500 mL Fluid        Activity at Discharge:     Activity Instructions     Activity as Tolerated          Follow-up Appointments:     Contact information for follow-up providers     Milad Leone MD, FASN Follow up in 2 week(s).    Specialties:  Nephrology, Hospitalist  Contact information:  [address] [place] [address] [phone]             Marianela Albright APRN .    Specialty:  Family Medicine  Contact information:  [address] [place] [address]  [phone]                   Contact information for after-discharge care     Destination     [facility] Follow up.    Service:  Skilled Nursing  Contact information:  [address] [place] [address] [phone]                           Future Appointments   Date Time Provider Department Center   8/23/2019 10:30 AM Kenneth Joseph MD [facility] None     Test Results Pending at Discharge:    None.       Samson Reyes MD  08/19/19  11:49 AM    Time spent: 25 min.    Dictated utilizing Dragon dictation.

## 2022-10-04 NOTE — PT/OT/SLP EVAL
Physical Therapy Evaluation    Patient Name:  Jaydon Mccarthy   MRN:  [id number]    Recommendations:     Discharge Recommendations:   (24hr sup/assist PRN with HHPT)   Discharge Equipment Recommendations: lift device   Barriers to discharge:  Pt will need 24hr sup/assist PRN    Assessment:     Jaydon Mccarthy is a 89 y.o. male admitted with a medical diagnosis of Fall.  He presents with the following impairments/functional limitations:  weakness, visual deficits, impaired endurance, impaired cognition, decreased ROM, decreased coordination, impaired coordination, impaired self care skills, decreased upper extremity function, impaired functional mobility, decreased lower extremity function, impaired skin, decreased safety awareness, impaired balance, pain, edema .    Rehab Prognosis: Fair; patient would benefit from acute skilled PT services to address these deficits and reach maximum level of function.    Recent Surgery: * No surgery found *      Plan:     During this hospitalization, patient to be seen  (3-5x/wk) to address the identified rehab impairments via therapeutic activities, therapeutic exercises, wheelchair management/training, neuromuscular re-education and progress toward the following goals:    Plan of Care Expires:  10/11/22    Subjective     Chief Complaint: pain, cold  Patient/Family Comments/goals: fear of falling, needs to lay back down  Pain/Comfort:  Pain Rating 1:  (Not rated)  Location - Orientation 1:  (L elbow)  Pain Addressed 1: Reposition, Distraction, Cessation of Activity, Nurse notified  Pain Rating Post-Intervention 1:  (No c/o pain at rest)    Patients cultural, spiritual, Jehovah's witness conflicts given the current situation: no    Living Environment:  Pt lives at an assisted living facility  Prior to admission, patients level of function was able to perform W/C and commode transfers.  Equipment used at home: walker, rolling, wheelchair.  DME owned (not currently used): none.  Upon discharge,  patient will have assistance from Staff.    Objective:     Communicated with nsg prior to session.  Patient found right sidelying with bed alarm, telemetry  upon PT entry to room.    General Precautions: Standard, fall   Orthopedic Precautions:N/A   Braces: N/A  Respiratory Status: Room air    Exams:  Cognitive Exam:  Patient is oriented to Person only, fear of falling, resisting mobility  Gross Motor Coordination:  impaired 2/2 pain, weakness, and deconditioning  Postural Exam:  Patient presented with the following abnormalities:    -       Rounded shoulders  -       Forward head  Skin Integrity/Edema:      -       Skin integrity:  bruising R elbow abrasion L elbow with bandage intact  -       Edema: Mild R elbow  RLE ROM: WFL  RLE Strength: Unable to  formally test  LLE ROM: WFL  LLE Strength: Unable to formally test    Functional Mobility:  Bed Mobility:     Rolling Right: total assistance, of 2 persons, and with VC/TC for logrolling and reaching for BR  Scooting: total assistance and of 2 persons  Supine to Sit: total assistance and of 2 persons  Sit to Supine: total assistance and of 2 persons  Transfers: Scoot transfer along EOB with Total A  Balance: poor, pt pushing posteriorly    Therapeutic Activities and Exercises:   Limited eval only, repositioning and skin breakdown protection    AM-PAC 6 CLICK MOBILITY  Total Score:8     Patient left right sidelying with all lines intact, call button in reach, bed alarm on, nsg notified, and Pillow between knees with wedge on L and pressure relief boots applied .    GOALS:   Multidisciplinary Problems       Physical Therapy Goals          Problem: Physical Therapy    Goal Priority Disciplines Outcome Goal Variances Interventions   Physical Therapy Goal     PT, PT/OT Ongoing, Progressing     Description: Goals to be met by: 10/11/22     Patient will increase functional independence with mobility by performin. Supine to sit with MInimal Assistance  2. Rolling to  Left and Right with Minimal Assistance.  3. Bed to chair transfer with Minimal Assistance   4. Wheelchair propulsion x50 feet with Contact Guard Assistance    5. Lower extremity exercise program x10 reps per handout, with assistance as needed                         History:     Past Medical History:   Diagnosis Date    Anticoagulant long-term use     Arthritis     Cancer     right  hand    Coronary artery disease     Deep vein thrombosis     Hyperlipidemia     Hypertension     Seizures 1988    none since on tegretol       Past Surgical History:   Procedure Laterality Date    APPENDECTOMY      CLOSURE OF WOUND  12/9/2020    Procedure: CLOSURE, WOUND;  Surgeon: Yamil Bonilla MD;  Location: [facility] OR;  Service: Plastics;;    EXCISION OF LESION OF EYELID Right 12/9/2020    Procedure: EXCISION TUMOR RIGHT EYEBROW WITH COMPLEX CLOSURE;  Surgeon: Yamil Bonilla MD;  Location: [facility] OR;  Service: Plastics;  Laterality: Right;  FROZEN SECTION  RN PREOP 12/8/2020----COVID NEGATIVE  12/8    FRACTURE SURGERY Right     elbow    FRACTURE SURGERY Right     hip    JOSUÉ FILTER PLACEMENT      hx of deep vein thrombosis    OPEN REDUCTION AND INTERNAL FIXATION (ORIF) OF INTERTROCHANTERIC FRACTURE OF FEMUR Left 5/30/2018    Procedure: ORIF, FRACTURE, FEMUR, INTERTROCHANTERIC;  Surgeon: Pop Martin III, MD;  Location: [facility] OR;  Service: Orthopedics;  Laterality: Left;    TOTAL SHOULDER ARTHROPLASTY Right        Time Tracking:     PT Received On: 10/04/22  PT Start Time: 1426     PT Stop Time: 1439  PT Total Time (min): 13 min     Billable Minutes: Evaluation 13 Co-evaluation with OT      10/04/2022

## 2022-10-04 NOTE — H&P
[facility] Medicine  History & Physical    Patient Name: Jaydon Mccarthy  MRN: [id number]  Patient Class: OP- Observation  Admission Date: 10/4/2022  Attending Physician: Juan Ramon Samaniego MD   Primary Care Provider: Javier Villanueva MD         Patient information was obtained from patient and ER records.     Subjective:     Principal Problem:Fall    Chief Complaint:   Chief Complaint   Patient presents with    Fall     Pt arrived via ems, pt chief complaint is a Fall. Pt had an unwitnessed fall about 11pm according to staff. Pt was seen and discharged at 2000 for similar complaint.         HPI: Jaydon Shepard is a 89 yo male with significant history [place], right eye tumor sp radiation, right eye blindness, seizures 35 yrs ago (x 2 episode), hypertension, DVT with deidra filter?, CAD sp MI 2015/stent, and recurrent UTI who presents from [facility]  for the 2nd time in 24 hours for a fall.  Patient was in the ED earlier yesterday after sustaining a fall at 430 am when he lost his balance trying to use restroom then discharge with antibiotics Cipro UTI.  At midnight fall, patient had another fall in which he sustained right elbow skin tear. Denies chest pain or SOB.   History of recurrent falls and in August sustained acute nondisplaced radial fracture. Patient have been seeing [facility] where left wrist was cast then brace and now clear for therapy. Since August patient have been using wheelchair mostly however able transfer self to toilet. At [facility], a nurse comes every 2 hrs and care taker comes in between those 2 hrs so pretty much 24 hr care. Daughter Rupal HILL.     X ray right elbow showed no acute fractures but does show right proximal humeral implant and fixation proximal ulna. Hgb 7.2 stable from yesterday but down from compared to previous labs.     10/3/2022 First visit to ED, UA showed evidence of UTI. Urine culture no growth to date. CT head no  acute intracranial abnormality. CT maxillofacial similar to prior.       Past Medical History:   Diagnosis Date    Anticoagulant long-term use     Arthritis     Cancer     right  hand    Coronary artery disease     Deep vein thrombosis     Hyperlipidemia     Hypertension     Seizures 1988    none since on tegretol       Past Surgical History:   Procedure Laterality Date    APPENDECTOMY      CLOSURE OF WOUND  12/9/2020    Procedure: CLOSURE, WOUND;  Surgeon: Yamil Bonilla MD;  Location: [facility] OR;  Service: Plastics;;    EXCISION OF LESION OF EYELID Right 12/9/2020    Procedure: EXCISION TUMOR RIGHT EYEBROW WITH COMPLEX CLOSURE;  Surgeon: Yamil Bonilla MD;  Location: [facility] OR;  Service: Plastics;  Laterality: Right;  FROZEN SECTION  RN PREOP 12/8/2020----COVID NEGATIVE  12/8    FRACTURE SURGERY Right     elbow    FRACTURE SURGERY Right     hip    JOSUÉ FILTER PLACEMENT      hx of deep vein thrombosis    OPEN REDUCTION AND INTERNAL FIXATION (ORIF) OF INTERTROCHANTERIC FRACTURE OF FEMUR Left 5/30/2018    Procedure: ORIF, FRACTURE, FEMUR, INTERTROCHANTERIC;  Surgeon: Pop Martin III, MD;  Location: [facility] OR;  Service: Orthopedics;  Laterality: Left;    TOTAL SHOULDER ARTHROPLASTY Right        Review of patient's allergies indicates:  No Known Allergies    Current Facility-Administered Medications on File Prior to Encounter   Medication    [DISCONTINUED] ciprofloxacin HCl tablet 500 mg     Current Outpatient Medications on File Prior to Encounter   Medication Sig    acetaminophen (TYLENOL) 325 MG tablet Take 650 mg by mouth every 6 (six) hours as needed for Pain.    atorvastatin (LIPITOR) 20 MG tablet TAKE ONE TABLET BY MOUTH ONCE DAILY    carBAMazepine (TEGRETOL) 100 mg chewable tablet TAKE 2 TABLETS BY MOUTH DAILY    ciprofloxacin HCl (CIPRO) 500 MG tablet Take 1 tablet (500 mg total) by mouth 2 (two) times daily. for 14 days    clopidogreL (PLAVIX) 75 mg tablet Take 1 tablet (75 mg  total) by mouth once daily.    finasteride (PROSCAR) 5 mg tablet TAKE ONE TABLET BY MOUTH DAILY    ibuprofen (ADVIL,MOTRIN) 400 MG tablet Take 400 mg by mouth every 6 (six) hours as needed for Other.    levETIRAcetam (KEPPRA) 500 MG Tab TAKE ONE TABLET BY MOUTH TWICE DAILY    melatonin (MELATIN) 5 mg Take 1 tablet by mouth every evening.    metoprolol tartrate (LOPRESSOR) 25 MG tablet Take 0.5 tablets (12.5 mg total) by mouth 2 (two) times daily.    ofloxacin (OCUFLOX) 0.3 % ophthalmic solution     prednisoLONE acetate (PRED FORTE) 1 % DrpS Place into both eyes.    tamsulosin (FLOMAX) 0.4 mg Cap Take by mouth once daily.    triamcinolone acetonide 0.025% (KENALOG) 0.025 % cream Apply topically 2 (two) times daily.    vitamin D (VITAMIN D3) 1000 units Tab Take 1 tablet (1,000 Units total) by mouth once daily.    XARELTO 20 mg Tab TAKE 1 TABLET BY MOUTH ONCE DAILY.    polyethylene glycol (GLYCOLAX) 17 gram PwPk Take 17 g by mouth daily as needed (constipation, titrate to patient comfort).     Family History    None       Tobacco Use    Smoking status: Former     Packs/day: 0.50     Years: 6.00     Pack years: 3.00     Types: Cigarettes     Quit date: 10/27/1975     Years since quittin.9    Smokeless tobacco: Never   Substance and Sexual Activity    Alcohol use: No    Drug use: No    Sexual activity: Not Currently     Partners: Female     Review of Systems   Constitutional:  Positive for activity change. Negative for fatigue.   HENT: Negative.     Respiratory: Negative.     Gastrointestinal: Negative.    Endocrine: Negative.    Genitourinary: Negative.    Musculoskeletal:  Positive for arthralgias.   Skin:  Positive for color change and wound.   Neurological:  Positive for weakness.   Hematological:  Bruises/bleeds easily.   Psychiatric/Behavioral: Negative.     Objective:     Vital Signs (Most Recent):  Temp: 99.4 °F (37.4 °C) (10/04/22 0900)  Pulse: 68 (10/04/22 0900)  Resp: 20 (10/04/22  0900)  BP: (!) 178/82 (10/04/22 0900)  SpO2: 98 % (10/04/22 0900)   Vital Signs (24h Range):  Temp:  [98.4 °F (36.9 °C)-99.4 °F (37.4 °C)] 99.4 °F (37.4 °C)  Pulse:  [67-78] 68  Resp:  [16-21] 20  SpO2:  [97 %-100 %] 98 %  BP: (131-178)/(65-89) 178/82     Weight: 59 kg (130 lb)  Body mass index is 20.36 kg/m².    Physical Exam  Constitutional:       General: He is not in acute distress.     Appearance: Normal appearance. He is ill-appearing.   HENT:      Head: Normocephalic.      Nose: Nose normal.      Mouth/Throat:      Mouth: Mucous membranes are dry.   Eyes:      Extraocular Movements: Extraocular movements intact.      Comments: Right eye blind    Cardiovascular:      Rate and Rhythm: Normal rate.   Pulmonary:      Effort: Pulmonary effort is normal.      Breath sounds: Normal breath sounds.   Abdominal:      Palpations: Abdomen is soft.   Musculoskeletal:         General: No swelling. Normal range of motion.      Cervical back: Normal range of motion and neck supple.   Skin:     General: Skin is warm and dry.      Comments: Right arm skin tear dressing soak with blood-will redress   Right leg arm abrasion scabbing over    Neurological:      Mental Status: He is alert and oriented to person, place, and time. Mental status is at baseline.         Significant Labs: All pertinent labs within the past 24 hours have been reviewed.    Significant Imaging: I have reviewed all pertinent imaging results/findings within the past 24 hours.    Assessment/Plan:     * Fall  Presents to the hospital for the 2nd time in 24 hours for what sounds like a mechanical fall.  Baseline use mainly wheelchair since August fx left radial fracture but able to transfer self. See HPI above  Obtain orthostatic blood pressure  PT OT consult        UTI (urinary tract infection)  Urine from initial visit yesterday morning no growth so far  Continue Rocephin      Stage 3a chronic kidney disease  Baseline      Epilepsy  No seizures in over 30  years.  Daughter unclear if still taking Keppra.  Continue Tegretol      Anemia of chronic disease  Noted drop in hemoglobin from 4 months ago 9.0 currently 7.2 add vitamin B12 folic acid and iron studies.      CAD s/p PCI   History of MI RCA stent 2015 continue Plavix and statin      Hyperlipidemia  Lab Results   Component Value Date    LDLCALC 98.0 09/26/2015   Continue home statin        Essential hypertension  Uncontrolled continue home metoprolol      Personal history of DVT (deep vein thrombosis)  Per chart review history of DVT with Green filter placement continue Xarelto will discuss with family risks benefits concerning Xarelto with recurrent falls      VTE Risk Mitigation (From admission, onward)         Ordered     rivaroxaban tablet 20 mg  Daily         10/04/22 1027     IP VTE HIGH RISK PATIENT  Once         10/04/22 0424     Place sequential compression device  Until discontinued         10/04/22 0424     Place sequential compression device  Until discontinued         10/04/22 0421               As clarification on 10/4/2022, patient placed in observation under my care in collaboration with Dr. Juan Ramon Samaniego.     Cherry Cortez NP  Department of [facility] Medicine   [facility] [place] [facility]

## 2022-10-05 VITALS
RESPIRATION RATE: 19 BRPM | SYSTOLIC BLOOD PRESSURE: 146 MMHG | BODY MASS INDEX: 20.4 KG/M2 | TEMPERATURE: 98 F | WEIGHT: 130 LBS | DIASTOLIC BLOOD PRESSURE: 65 MMHG | OXYGEN SATURATION: 98 % | HEIGHT: 67 IN | HEART RATE: 73 BPM

## 2022-10-05 LAB
ALBUMIN SERPL BCP-MCNC: 3 G/DL (ref 3.5–5.2)
ALP SERPL-CCNC: 145 U/L (ref 55–135)
ALT SERPL W/O P-5'-P-CCNC: 9 U/L (ref 10–44)
ANION GAP SERPL CALC-SCNC: 9 MMOL/L (ref 8–16)
AST SERPL-CCNC: 13 U/L (ref 10–40)
BASOPHILS # BLD AUTO: 0.05 K/UL (ref 0–0.2)
BASOPHILS NFR BLD: 0.6 % (ref 0–1.9)
BILIRUB SERPL-MCNC: 0.3 MG/DL (ref 0.1–1)
BUN SERPL-MCNC: 15 MG/DL (ref 8–23)
CALCIUM SERPL-MCNC: 8.3 MG/DL (ref 8.7–10.5)
CHLORIDE SERPL-SCNC: 110 MMOL/L (ref 95–110)
CO2 SERPL-SCNC: 21 MMOL/L (ref 23–29)
CREAT SERPL-MCNC: 1 MG/DL (ref 0.5–1.4)
DIFFERENTIAL METHOD: ABNORMAL
EOSINOPHIL # BLD AUTO: 0.6 K/UL (ref 0–0.5)
EOSINOPHIL NFR BLD: 7.5 % (ref 0–8)
ERYTHROCYTE [DISTWIDTH] IN BLOOD BY AUTOMATED COUNT: 14.6 % (ref 11.5–14.5)
EST. GFR  (NO RACE VARIABLE): >60 ML/MIN/1.73 M^2
FERRITIN SERPL-MCNC: 55 NG/ML (ref 20–300)
GLUCOSE SERPL-MCNC: 107 MG/DL (ref 70–110)
HCT VFR BLD AUTO: 25.7 % (ref 40–54)
HGB BLD-MCNC: 7.8 G/DL (ref 14–18)
IMM GRANULOCYTES # BLD AUTO: 0.05 K/UL (ref 0–0.04)
IMM GRANULOCYTES NFR BLD AUTO: 0.6 % (ref 0–0.5)
IRON SERPL-MCNC: 18 UG/DL (ref 45–160)
LYMPHOCYTES # BLD AUTO: 1 K/UL (ref 1–4.8)
LYMPHOCYTES NFR BLD: 12 % (ref 18–48)
MCH RBC QN AUTO: 23.9 PG (ref 27–31)
MCHC RBC AUTO-ENTMCNC: 30.4 G/DL (ref 32–36)
MCV RBC AUTO: 79 FL (ref 82–98)
MONOCYTES # BLD AUTO: 0.9 K/UL (ref 0.3–1)
MONOCYTES NFR BLD: 10.1 % (ref 4–15)
NEUTROPHILS # BLD AUTO: 5.9 K/UL (ref 1.8–7.7)
NEUTROPHILS NFR BLD: 69.2 % (ref 38–73)
NRBC BLD-RTO: 0 /100 WBC
PLATELET # BLD AUTO: 194 K/UL (ref 150–450)
PMV BLD AUTO: 9.7 FL (ref 9.2–12.9)
POTASSIUM SERPL-SCNC: 3.9 MMOL/L (ref 3.5–5.1)
PROT SERPL-MCNC: 6.5 G/DL (ref 6–8.4)
RBC # BLD AUTO: 3.27 M/UL (ref 4.6–6.2)
SATURATED IRON: 6 % (ref 20–50)
SODIUM SERPL-SCNC: 140 MMOL/L (ref 136–145)
TOTAL IRON BINDING CAPACITY: 323 UG/DL (ref 250–450)
TRANSFERRIN SERPL-MCNC: 218 MG/DL (ref 200–375)
WBC # BLD AUTO: 8.45 K/UL (ref 3.9–12.7)

## 2022-10-05 PROCEDURE — 36415 COLL VENOUS BLD VENIPUNCTURE: CPT | Performed by: NURSE PRACTITIONER

## 2022-10-05 PROCEDURE — 96366 THER/PROPH/DIAG IV INF ADDON: CPT

## 2022-10-05 PROCEDURE — G0378 HOSPITAL OBSERVATION PER HR: HCPCS

## 2022-10-05 PROCEDURE — 82728 ASSAY OF FERRITIN: CPT | Performed by: NURSE PRACTITIONER

## 2022-10-05 PROCEDURE — 84466 ASSAY OF TRANSFERRIN: CPT | Performed by: NURSE PRACTITIONER

## 2022-10-05 PROCEDURE — 63600175 PHARM REV CODE 636 W HCPCS: Performed by: NURSE PRACTITIONER

## 2022-10-05 PROCEDURE — 80053 COMPREHEN METABOLIC PANEL: CPT | Performed by: NURSE PRACTITIONER

## 2022-10-05 PROCEDURE — 85025 COMPLETE CBC W/AUTO DIFF WBC: CPT | Performed by: NURSE PRACTITIONER

## 2022-10-05 PROCEDURE — 25000003 PHARM REV CODE 250: Performed by: NURSE PRACTITIONER

## 2022-10-05 RX ORDER — MAGNESIUM 200 MG
1000 TABLET ORAL DAILY
Qty: 30 TABLET | Refills: 3 | Status: SHIPPED | OUTPATIENT
Start: 2022-10-05

## 2022-10-05 RX ORDER — CEPHALEXIN 500 MG/1
500 CAPSULE ORAL 2 TIMES DAILY
Qty: 10 CAPSULE | Refills: 0 | Status: SHIPPED | OUTPATIENT
Start: 2022-10-05 | End: 2022-10-10

## 2022-10-05 RX ADMIN — TAMSULOSIN HYDROCHLORIDE 0.4 MG: 0.4 CAPSULE ORAL at 08:10

## 2022-10-05 RX ADMIN — CARBAMAZEPINE 200 MG: 100 TABLET, CHEWABLE ORAL at 08:10

## 2022-10-05 RX ADMIN — METOPROLOL TARTRATE 12.5 MG: 25 TABLET, FILM COATED ORAL at 08:10

## 2022-10-05 RX ADMIN — RIVAROXABAN 20 MG: 20 TABLET, FILM COATED ORAL at 08:10

## 2022-10-05 RX ADMIN — CLOPIDOGREL 75 MG: 75 TABLET, FILM COATED ORAL at 08:10

## 2022-10-05 RX ADMIN — CEFTRIAXONE 1 G: 1 INJECTION, SOLUTION INTRAVENOUS at 09:10

## 2022-10-05 NOTE — PLAN OF CARE
[place] - Telemetry  Discharge Final Note    Primary Care Provider: Javier Villanueva MD    Expected Discharge Date: 10/5/2022    All needs met. Daughter to schedule PCP appointment. SW notified nurse Carlos that patient is ready for discharge from case management standpoint.     Final Discharge Note (most recent)       Final Note - 10/05/22 0947          Final Note    Assessment Type Final Discharge Note     Anticipated Discharge Disposition [facility]-Health Care [facility]     What phone number can be called within the next 1-3 days to see how you are doing after discharge? [phone]     Hospital Resources/Appts/Education Provided --   Daughter to schedule PCP appointment.       Post-Acute Status    Post-Acute Authorization Home Health     Home Health Status Set-up Complete/Auth obtained     Discharge Delays None known at this time                     Important Message from Medicare             Contact Info       Javier Villanueva MD   Specialty: Family Medicine   Relationship: PCP - General    [address] [place] [id number]   Phone: [phone]       Next Steps: Follow up

## 2022-10-05 NOTE — DISCHARGE SUMMARY
[facility] Medicine  Discharge Summary      Patient Name: Jaydon Mccarthy  MRN: [id number]  Patient Class: OP- Observation  Admission Date: 10/4/2022  Hospital Length of Stay: 0 days  Discharge Date and Time:  10/05/2022 2:59 PM  Attending Physician: Juan Ramon Samaniego MD   Discharging Provider: Cherry Cortez NP  Primary Care Provider: Javier Villanueva MD      HPI:   Jaydon Shepard is a 89 yo male with significant history [place], right eye tumor sp radiation, right eye blindness, seizures 35 yrs ago (x 2 episode), hypertension, DVT with deidra filter?, CAD sp MI 2015/stent, and recurrent UTI who presents from [facility]  for the 2nd time in 24 hours for a fall.  Patient was in the ED earlier yesterday after sustaining a fall at 430 am when he lost his balance trying to use restroom then discharge with antibiotics Cipro UTI.  At midnight fall, patient had another fall in which he sustained right elbow skin tear. Denies chest pain or SOB.   History of recurrent falls and in August sustained acute nondisplaced radial fracture. Patient have been [facility] where left wrist was cast then brace and now clear for therapy. Since August patient have been using wheelchair mostly however able transfer self to toilet. At [facility], a nurse comes every 2 hrs and care taker comes in between those 2 hrs so pretty much 24 hr care. Daughter Rupal HILL.     X ray right elbow showed no acute fractures but does show right proximal humeral implant and fixation proximal ulna. Hgb 7.2 stable from yesterday but down from compared to previous labs.     10/3/2022 First visit to ED, UA showed evidence of UTI. Urine culture no growth to date. CT head no acute intracranial abnormality. CT maxillofacial similar to prior.       * No surgery found *      Hospital Course:   Admitted for mechanical fall which resulted in right elbow skin tear.  Hemoglobin noted to trend down in the last  couple months.  Repeated hemoglobin x3 over the last 72 hours stable.  UA evidence of UTI and Rocephin started.  Urine culture no growth so far will discharge home with Keflex.  PT OT recommended home health to [facility] with 24 hrs care (nurse check every 2 hrs and seen by care taker in between the every 4 hrs nurse check).  Patient stable for discharge home.        Goals of Care Treatment Preferences:  Code Status: Full Code    Living Will: Yes              Consults:     No new Assessment & Plan notes have been filed under this hospital service since the last note was generated.  Service: Hospital Medicine    Final Active Diagnoses:    Diagnosis Date Noted POA    PRINCIPAL PROBLEM:  Fall [W19.XXXA] 10/04/2022 Yes    UTI (urinary tract infection) [N39.0] 10/04/2022 Yes    Stage 3a chronic kidney disease [N18.31] 06/03/2022 Yes    Epilepsy [G40.909] 10/28/2015 Yes     Chronic    CAD s/p PCI  [I25.10] 10/28/2015 Yes     Chronic    Hyperlipidemia [E78.5] 10/28/2015 Yes     Chronic    Anemia of chronic disease [D63.8] 10/28/2015 Yes     Chronic    Essential hypertension [I10] 09/17/2015 Yes     Chronic    Personal history of DVT (deep vein thrombosis) [Z86.718] 08/20/2013 Not Applicable     Chronic      Problems Resolved During this Admission:       Discharged Condition: stable    Disposition: Home or Self Care    Follow Up:   Follow-up Information     Javier Villanueva MD Follow up.    Specialty: Family Medicine  Contact information:  [address] [facility]  [address] [place] [id number] [phone]                       Patient Instructions:      Diet Adult Regular     Notify your health care provider if you experience any of the following:  difficulty breathing or increased cough     Notify your health care provider if you experience any of the following:  temperature >100.4     Notify your health care provider if you experience any of the following:  increased confusion or weakness      Activity as tolerated       Significant Diagnostic Studies: Labs: All labs within the past 24 hours have been reviewed    Pending Diagnostic Studies:     None         Medications:  Reconciled Home Medications:      Medication List      START taking these medications    cephALEXin 500 MG capsule  Commonly known as: KEFLEX  Take 1 capsule (500 mg total) by mouth 2 (two) times a day. for 5 days     cyanocobalamin (vitamin B-12) 1,000 mcg Subl  Place 1,000 mcg under the tongue once daily.        CONTINUE taking these medications    acetaminophen 325 MG tablet  Commonly known as: TYLENOL  Take 650 mg by mouth every 6 (six) hours as needed for Pain.     atorvastatin 20 MG tablet  Commonly known as: LIPITOR  TAKE ONE TABLET BY MOUTH ONCE DAILY     carBAMazepine 100 mg chewable tablet  Commonly known as: TEGRETOL  TAKE 2 TABLETS BY MOUTH DAILY     clopidogreL 75 mg tablet  Commonly known as: PLAVIX  Take 1 tablet (75 mg total) by mouth once daily.     finasteride 5 mg tablet  Commonly known as: PROSCAR  TAKE ONE TABLET BY MOUTH DAILY     melatonin 5 mg  Commonly known as: MELATIN  Take 1 tablet by mouth every evening.     metoprolol tartrate 25 MG tablet  Commonly known as: LOPRESSOR  Take 0.5 tablets (12.5 mg total) by mouth 2 (two) times daily.     ofloxacin 0.3 % ophthalmic solution  Commonly known as: OCUFLOX     polyethylene glycol 17 gram Pwpk  Commonly known as: GLYCOLAX  Take 17 g by mouth daily as needed (constipation, titrate to patient comfort).     prednisoLONE acetate 1 % Drps  Commonly known as: PRED FORTE  Place into both eyes.     tamsulosin 0.4 mg Cap  Commonly known as: FLOMAX  Take by mouth once daily.     triamcinolone acetonide 0.025% 0.025 % cream  Commonly known as: KENALOG  Apply topically 2 (two) times daily.     vitamin D 1000 units Tab  Commonly known as: VITAMIN D3  Take 1 tablet (1,000 Units total) by mouth once daily.     XARELTO 20 mg Tab  Generic drug: rivaroxaban  TAKE 1 TABLET BY MOUTH  ONCE DAILY.        STOP taking these medications    ciprofloxacin HCl 500 MG tablet  Commonly known as: CIPRO     levETIRAcetam 500 MG Tab  Commonly known as: KEPPRA            Indwelling Lines/Drains at time of discharge:   Lines/Drains/Airways     None                 Time spent on the discharge of patient: 30 minutes         Cherry Cortez NP  Department of Hospital Medicine  [facility]

## 2022-10-05 NOTE — NURSING
Pt refused to allow O2 saturation level to be taken, pt became combative when O2 saturation level was attempted.

## 2022-10-05 NOTE — PLAN OF CARE
Problem: Adult Inpatient Plan of Care  Goal: Plan of Care Review  Outcome: Ongoing, Progressing  Goal: Patient-Specific Goal (Individualized)  Outcome: Ongoing, Progressing  Goal: Absence of Hospital-Acquired Illness or Injury  Outcome: Ongoing, Progressing  Goal: Optimal Comfort and Wellbeing  Outcome: Ongoing, Progressing  Goal: Readiness for Transition of Care  Outcome: Ongoing, Progressing     Problem: Fall Injury Risk  Goal: Absence of Fall and Fall-Related Injury  Outcome: Ongoing, Progressing     Problem: Skin Injury Risk Increased  Goal: Skin Health and Integrity  Outcome: Ongoing, Progressing     Problem: Impaired Wound Healing  Goal: Optimal Wound Healing  Outcome: Ongoing, Progressing

## 2022-10-05 NOTE — PLAN OF CARE
[facility]      HOME HEALTH ORDERS  FACE TO FACE ENCOUNTER    Patient Name: Jaydon Mccarthy  YOB: 1932    PCP: Javier Villanueva MD   PCP Address: [address] / [place] [address]  PCP Phone Number: [phone]  PCP Fax: [phone]    Encounter Date: 10/4/22    Admit to Home Health    Diagnoses:  Active Hospital Problems    Diagnosis  POA    *Fall [W19.XXXA]  Yes     Priority: 1 - High    UTI (urinary tract infection) [N39.0]  Yes     Priority: 2     Stage 3a chronic kidney disease [N18.31]  Yes    Epilepsy [G40.909]  Yes     Chronic     No reported seizures since 1980s on carbamazepine and levetiracetam       CAD s/p PCI  [I25.10]  Yes     Chronic     Hx MI, Stent to RCA Sept 25, 2015 follow by cardiology        Hyperlipidemia [E78.5]  Yes     Chronic     Last lipid 9/25/2015 controlled on atorvastatin      Anemia of chronic disease [D63.8]  Yes     Chronic    Essential hypertension [I10]  Yes     Chronic     Controlled on metoprolol 12.5 twice daily      Personal history of DVT (deep vein thrombosis) [Z86.718]  Not Applicable     Chronic     Hx dvt with deidra filter placement, On xarelto        Resolved Hospital Problems   No resolved problems to display.       Follow Up Appointments:  Future Appointments   Date Time Provider Department Center   10/17/2022 11:15 AM Larissa Aguilar MD [facility]       Allergies:Review of patient's allergies indicates:  No Known Allergies    Medications: Review discharge medications with patient and family and provide education.    Current Facility-Administered Medications   Medication Dose Route Frequency Provider Last Rate Last Admin    acetaminophen tablet 650 mg  650 mg Oral Q6H PRN Tati Eagle NP   650 mg at 10/04/22 1846    aluminum-magnesium hydroxide-simethicone 200-200-20 mg/5 mL suspension 30 mL  30 mL Oral Q6H PRN Tati Eagle NP        carBAMazepine chewable tablet 200 mg  200 mg Oral Daily Cherry Cortez,  NP   200 mg at 10/05/22 0812    cefTRIAXone (ROCEPHIN) 1 g/50 mL D5W IVPB  1 g Intravenous Q24H Cherry Reyna CortezCHEVY   Stopped at 10/04/22 0742    clopidogreL tablet 75 mg  75 mg Oral Daily Cherry Cortez, NP   75 mg at 10/05/22 0812    hydrALAZINE injection 10 mg  10 mg Intravenous Q8H PRN Cherry Reyna CHEVY Cortez        metoprolol tartrate (LOPRESSOR) split tablet 12.5 mg  12.5 mg Oral BID Cherry Reyna Dana, NP   12.5 mg at 10/05/22 0812    naloxone 0.4 mg/mL injection 0.02 mg  0.02 mg Intravenous PRN Tati Eagle NP        ondansetron injection 4 mg  4 mg Intravenous Q6H PRN Tati Eagle NP        polyethylene glycol packet 17 g  17 g Oral BID PRN Tati Eagle NP        rivaroxaban tablet 20 mg  20 mg Oral Daily Cherry Cortez, CHEVY   20 mg at 10/05/22 0812    sodium chloride 0.9% flush 10 mL  10 mL Intravenous Q12H PRN Tati Eagle NP        tamsulosin 24 hr capsule 0.4 mg  0.4 mg Oral Daily Tati Eagle NP   0.4 mg at 10/05/22 0812     Current Discharge Medication List        START taking these medications    Details   cephALEXin (KEFLEX) 500 MG capsule Take 1 capsule (500 mg total) by mouth 2 (two) times a day. for 5 days  Qty: 10 capsule, Refills: 0      cyanocobalamin, vitamin B-12, 1,000 mcg Subl Place 1,000 mcg under the tongue once daily.  Qty: 30 tablet, Refills: 3           CONTINUE these medications which have NOT CHANGED    Details   acetaminophen (TYLENOL) 325 MG tablet Take 650 mg by mouth every 6 (six) hours as needed for Pain.      atorvastatin (LIPITOR) 20 MG tablet TAKE ONE TABLET BY MOUTH ONCE DAILY  Qty: 90 tablet, Refills: 3    Associated Diagnoses: Mixed hyperlipidemia      carBAMazepine (TEGRETOL) 100 mg chewable tablet TAKE 2 TABLETS BY MOUTH DAILY  Qty: 180 tablet, Refills: 3    Comments: This prescription was filled on 1/25/2022. Any refills authorized will be placed on file.  Associated Diagnoses: Nonintractable epilepsy without status epilepticus, unspecified epilepsy  type      clopidogreL (PLAVIX) 75 mg tablet Take 1 tablet (75 mg total) by mouth once daily.  Qty: 90 tablet, Refills: 3    Comments: This prescription was filled on 2/23/2022. Any refills authorized will be placed on file.  Associated Diagnoses: Chronic anticoagulation      finasteride (PROSCAR) 5 mg tablet TAKE ONE TABLET BY MOUTH DAILY  Qty: 90 tablet, Refills: 3    Associated Diagnoses: Benign non-nodular prostatic hyperplasia without lower urinary tract symptoms      melatonin (MELATIN) 5 mg Take 1 tablet by mouth every evening.      metoprolol tartrate (LOPRESSOR) 25 MG tablet Take 0.5 tablets (12.5 mg total) by mouth 2 (two) times daily.  Qty: 30 tablet, Refills: 11    Comments: This prescription was filled on 7/13/2021. Any refills authorized will be placed on file.  Associated Diagnoses: Nonintractable epilepsy without status epilepticus, unspecified epilepsy type; Coronary artery disease involving native coronary artery of native heart without angina pectoris      ofloxacin (OCUFLOX) 0.3 % ophthalmic solution       prednisoLONE acetate (PRED FORTE) 1 % DrpS Place into both eyes.      tamsulosin (FLOMAX) 0.4 mg Cap Take by mouth once daily.      triamcinolone acetonide 0.025% (KENALOG) 0.025 % cream Apply topically 2 (two) times daily.  Qty: 80 g, Refills: 2    Associated Diagnoses: Dermatitis      vitamin D (VITAMIN D3) 1000 units Tab Take 1 tablet (1,000 Units total) by mouth once daily.  Qty: 90 tablet, Refills: 3      XARELTO 20 mg Tab TAKE 1 TABLET BY MOUTH ONCE DAILY.  Qty: 30 tablet, Refills: 0    Associated Diagnoses: Chronic anticoagulation; Personal history of DVT (deep vein thrombosis)      polyethylene glycol (GLYCOLAX) 17 gram PwPk Take 17 g by mouth daily as needed (constipation, titrate to patient comfort).  Qty: 30 each, Refills: 11    Associated Diagnoses: Constipation, unspecified constipation type           STOP taking these medications       ciprofloxacin HCl (CIPRO) 500 MG tablet  Comments:   Reason for Stopping:         levETIRAcetam (KEPPRA) 500 MG Tab Comments:   Reason for Stopping:                 I have seen and examined this patient within the last 30 days. My clinical findings that support the need for the home health skilled services and home bound status are the following:no   Weakness/numbness causing balance and gait disturbance due to Weakness/Debility making it taxing to leave home.  Requiring assistive device to leave home due to unsteady gait caused by  Weakness/Debility.     Diet:   regular diet    Labs:      Referrals/ Consults  Physical Therapy to evaluate and treat. Evaluate for home safety and equipment needs; Establish/upgrade home exercise program. Perform / instruct on therapeutic exercises, gait training, transfer training, and Range of Motion.  Occupational Therapy to evaluate and treat. Evaluate home environment for safety and equipment needs. Perform/Instruct on transfers, ADL training, ROM, and therapeutic exercises.   to evaluate for community resources/long-range planning.  Aide to provide assistance with personal care, ADLs, and vital signs.    Activities:   activity as tolerated prior to admission     Nursing:   Agency to admit patient within  24  of hospital discharge unless specified on physician order or at patient request    SN to complete comprehensive assessment including routine vital signs. Instruct on disease process and s/s of complications to report to MD. Review/verify medication list sent home with the patient at time of discharge  and instruct patient/caregiver as needed. Frequency may be adjusted depending on start of care date.     Skilled nurse to perform up to 3 visits PRN for symptoms related to diagnosis    Notify MD if SBP > 160 or < 90; DBP > 90 or < 50; HR > 120 or < 50; Temp > 101; O2 < 88%; Other:       Ok to schedule additional visits based on staff availability and patient request on consecutive days within the home  health episode.    When multiple disciplines ordered:    Start of Care occurs on Sunday - Wednesday schedule remaining discipline evaluations as ordered on separate consecutive days following the start of care.    Thursday SOC -schedule subsequent evaluations Friday and Monday the following week.     Friday - Saturday SOC - schedule subsequent discipline evaluations on consecutive days starting Monday of the following week.    For all post-discharge communication and subsequent orders please contact patient's primary care physician.       Home Health Aide:  Nursing Other: evaluate and treat , Physical Therapy Other: evaluate and treat , Occupational Therapy Other: evaluate and treat , Medical Social Work Other: evaluate and treat , and Home Health Aide Other: evaluate and treat     Wound Care Orders  yes:  1 Right elbow skin tear. Clean with soap and water and keep dry and apply mepilex twice a week and prn   I certify that this patient is confined to his home and needs intermittent skilled nursing care, physical therapy, and occupational therapy.

## 2022-10-05 NOTE — HOSPITAL COURSE
Admitted for mechanical fall which resulted in right elbow skin tear.  Hemoglobin noted to trend down in the last couple months.  Repeated hemoglobin x3 over the last 72 hours stable.  UA evidence of UTI and Rocephin started.  Urine culture no growth so far will discharge home with Keflex.  PT OT recommended home health to [facility] with 24 hrs care (nurse check every 2 hrs and seen by care taker in between the every 4 hrs nurse check).  Patient stable for discharge home.

## 2022-10-05 NOTE — NURSING
Discharge instructions reviewed with the patient's daughter Rupal at the bedside.  Reviewed medications and follow up appts.  No concerns voiced.  Removed IV, cath tip intact, gauze dressing applied.  No concerns voiced.  Awaiting escort for patient transport to private vehicle.

## 2022-10-05 NOTE — PLAN OF CARE
[facility] discussed discharge planning with daughter, Rupal. [facility] informed that PT/OT recommended HH. Rupal agreed to home health. [facility] informed Rupal that [facility] will coordinate home health services. Home Health agency will call her to schedule inital visit. If you haven't heard from Home Health agency by end of business today, please call [facility] in the morning to find out which agency will be providing home health services.      Referral faxed to [facility].        10/05/22 [id number]   Post-Acute Status   Post-Acute Authorization Home Health   Home Health Status Set-up Complete/Auth obtained   Hospital Resources/Appts/Education Provided   (Daughter to schedule appointment.)   Discharge Delays None known at this time   Discharge Plan   Discharge Plan A Assisted Living   Discharge Plan B Assisted Living

## 2022-10-05 NOTE — PLAN OF CARE
Problem: Adult Inpatient Plan of Care  Goal: Plan of Care Review  Outcome: Met  Goal: Patient-Specific Goal (Individualized)  Outcome: Met  Goal: Absence of Hospital-Acquired Illness or Injury  Outcome: Met  Goal: Optimal Comfort and Wellbeing  Outcome: Met  Goal: Readiness for Transition of Care  Outcome: Met     Problem: Fall Injury Risk  Goal: Absence of Fall and Fall-Related Injury  Outcome: Met     Problem: Skin Injury Risk Increased  Goal: Skin Health and Integrity  Outcome: Met     Problem: Impaired Wound Healing  Goal: Optimal Wound Healing  Outcome: Met     Problem: Physical Therapy  Goal: Physical Therapy Goal  Description: Goals to be met by: 10/11/22     Patient will increase functional independence with mobility by performin. Supine to sit with MInimal Assistance  2. Rolling to Left and Right with Minimal Assistance.  3. Bed to chair transfer with Minimal Assistance   4. Wheelchair propulsion x50 feet with Contact Guard Assistance    5. Lower extremity exercise program x10 reps per handout, with assistance as needed    Outcome: Met     Problem: Occupational Therapy  Goal: Occupational Therapy Goal  Description: Goals to be met by: 10/18/2022    Patient will increase functional independence with ADL's by performing:    Feeding with Set-up Assistance.  UE Dressing with Minimal Assistance.  LE Dressing with Minimal Assistance.  Grooming while seated with Minimal Assistance.  Toileting from bedside commode with Minimal Assistance for hygiene and clothing management.   Bathing from shower chair/bench with Moderate Assistance.  Sitting at edge of bed x 10 minutes with Supervision.  Stand-pivot transfers with Minimal Assistance.  Toilet transfer to bedside commode with Minimal Assistance.    Outcome: Met

## 2022-10-06 LAB — BACTERIA UR CULT: ABNORMAL

## 2022-10-08 RX ORDER — FLUCONAZOLE 200 MG/1
200 TABLET ORAL DAILY
Qty: 7 TABLET | Refills: 0 | Status: SHIPPED | OUTPATIENT
Start: 2022-10-08 | End: 2022-10-22

## 2022-10-13 ENCOUNTER — OFFICE VISIT (OUTPATIENT)
Dept: FAMILY MEDICINE | Facility: CLINIC | Age: 87
End: 2022-10-13
Payer: MEDICARE

## 2022-10-13 VITALS
TEMPERATURE: 99 F | SYSTOLIC BLOOD PRESSURE: 108 MMHG | HEIGHT: 70 IN | OXYGEN SATURATION: 96 % | BODY MASS INDEX: 18.65 KG/M2 | DIASTOLIC BLOOD PRESSURE: 60 MMHG | HEART RATE: 68 BPM

## 2022-10-13 DIAGNOSIS — Z09 HOSPITAL DISCHARGE FOLLOW-UP: Primary | ICD-10-CM

## 2022-10-13 DIAGNOSIS — D64.9 ANEMIA, UNSPECIFIED TYPE: ICD-10-CM

## 2022-10-13 PROCEDURE — 99214 PR OFFICE/OUTPT VISIT, EST, LEVL IV, 30-39 MIN: ICD-10-PCS | Mod: S$GLB,,, | Performed by: FAMILY MEDICINE

## 2022-10-13 PROCEDURE — 99214 OFFICE O/P EST MOD 30 MIN: CPT | Mod: S$GLB,,, | Performed by: FAMILY MEDICINE

## 2022-10-13 PROCEDURE — 1100F PR PT FALLS ASSESS DOC 2+ FALLS/FALL W/INJURY/YR: ICD-10-PCS | Mod: CPTII,S$GLB,, | Performed by: FAMILY MEDICINE

## 2022-10-13 PROCEDURE — 1157F ADVNC CARE PLAN IN RCRD: CPT | Mod: CPTII,S$GLB,, | Performed by: FAMILY MEDICINE

## 2022-10-13 PROCEDURE — 1100F PTFALLS ASSESS-DOCD GE2>/YR: CPT | Mod: CPTII,S$GLB,, | Performed by: FAMILY MEDICINE

## 2022-10-13 PROCEDURE — 3288F FALL RISK ASSESSMENT DOCD: CPT | Mod: CPTII,S$GLB,, | Performed by: FAMILY MEDICINE

## 2022-10-13 PROCEDURE — 1126F AMNT PAIN NOTED NONE PRSNT: CPT | Mod: CPTII,S$GLB,, | Performed by: FAMILY MEDICINE

## 2022-10-13 PROCEDURE — 99999 PR PBB SHADOW E&M-EST. PATIENT-LVL III: CPT | Mod: PBBFAC,,, | Performed by: FAMILY MEDICINE

## 2022-10-13 PROCEDURE — 1157F PR ADVANCE CARE PLAN OR EQUIV PRESENT IN MEDICAL RECORD: ICD-10-PCS | Mod: CPTII,S$GLB,, | Performed by: FAMILY MEDICINE

## 2022-10-13 PROCEDURE — 3288F PR FALLS RISK ASSESSMENT DOCUMENTED: ICD-10-PCS | Mod: CPTII,S$GLB,, | Performed by: FAMILY MEDICINE

## 2022-10-13 PROCEDURE — 99999 PR PBB SHADOW E&M-EST. PATIENT-LVL III: ICD-10-PCS | Mod: PBBFAC,,, | Performed by: FAMILY MEDICINE

## 2022-10-13 PROCEDURE — 1126F PR PAIN SEVERITY QUANTIFIED, NO PAIN PRESENT: ICD-10-PCS | Mod: CPTII,S$GLB,, | Performed by: FAMILY MEDICINE

## 2022-10-13 RX ORDER — HYDROXYZINE HYDROCHLORIDE 10 MG/1
10 TABLET, FILM COATED ORAL 2 TIMES DAILY
Qty: 60 TABLET | Refills: 1 | Status: SHIPPED | OUTPATIENT
Start: 2022-10-13 | End: 2022-11-21

## 2022-10-13 NOTE — PROGRESS NOTES
"HISTORY OF PRESENT ILLNESS:  Jaydon Mccarthy is a 89 y.o. male who presents to the clinic today for Transitional Care  .     Hospital f/u  Overall he is doing worse.  His cognition is worse and so is his strength.  These two have led to numerous repeated falls.  He was in hospital with one and watching the heart.  Now with anemia as well and overall fatigue.        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    Other nonthrombocytopenic purpura    History of bilateral hip arthroplasty    Aortic arch atherosclerosis    Atherosclerosis of both carotid arteries    Stage 3a chronic kidney disease    Blind right eye    Chronic allergic rhinitis    Sleep disturbance    UTI (urinary tract infection)    Fall           CARE TEAM:  Patient Care Team:  Javier Villanueva MD as PCP - General (Family Medicine)  Andreas Beck MD as Consulting Physician (Orthopedic Surgery)  Mario Zabala MD (Inactive) as Consulting Physician (Hematology and Oncology)  Charles Leal MD (Inactive) as Consulting Physician (General Surgery)  Juan Gregory MD as Consulting Physician (Plastic Surgery)  Efren Prado MD (Inactive) as Consulting Physician (Interventional Cardiology)  Jonathan Hoffmann MD as Consulting Physician (Neurology)  Herminio Ayoub MD as Consulting Physician (Dermatology)         ROS        PHYSICAL EXAM:  /60   Pulse 68   Temp 99 °F (37.2 °C) (Oral)   Ht 5' 10" (1.778 m)   SpO2 96%   BMI 18.65 kg/m²   Wt Readings from Last 5 Encounters:   10/05/22 59 kg (130 lb)   08/01/22 " 59 kg (130 lb)   07/30/22 56.7 kg (125 lb)   07/08/22 56.7 kg (125 lb)   06/11/22 61.2 kg (135 lb)     BP Readings from Last 5 Encounters:   10/13/22 108/60   10/05/22 (!) 146/65   10/03/22 (!) 178/79   08/01/22 (!) 186/81   07/30/22 137/80           He appears chronically ill and weak  Has a systolic murmur  No obviouse palpitations noted.  Lungs are clear  In the wheelchair.    [facility]      Medication List with Changes/Refills   New Medications    HYDROXYZINE HCL (ATARAX) 10 MG TAB    Take 1 tablet (10 mg total) by mouth 2 (two) times a day.   Current Medications    ACETAMINOPHEN (TYLENOL) 325 MG TABLET    Take 650 mg by mouth every 6 (six) hours as needed for Pain.    ATORVASTATIN (LIPITOR) 20 MG TABLET    TAKE ONE TABLET BY MOUTH ONCE DAILY    CARBAMAZEPINE (TEGRETOL) 100 MG CHEWABLE TABLET    TAKE 2 TABLETS BY MOUTH DAILY    CLOPIDOGREL (PLAVIX) 75 MG TABLET    Take 1 tablet (75 mg total) by mouth once daily.    CYANOCOBALAMIN, VITAMIN B-12, 1,000 MCG SUBL    Place 1,000 mcg under the tongue once daily.    MELATONIN (MELATIN) 5 MG    Take 1 tablet by mouth every evening.    METOPROLOL TARTRATE (LOPRESSOR) 25 MG TABLET    Take 0.5 tablets (12.5 mg total) by mouth 2 (two) times daily.    OFLOXACIN (OCUFLOX) 0.3 % OPHTHALMIC SOLUTION        PREDNISOLONE ACETATE (PRED FORTE) 1 % DRPS    Place into both eyes.    TAMSULOSIN (FLOMAX) 0.4 MG CAP    Take by mouth once daily.    TRIAMCINOLONE ACETONIDE 0.025% (KENALOG) 0.025 % CREAM    Apply topically 2 (two) times daily.    VITAMIN D (VITAMIN D3) 1000 UNITS TAB    Take 1 tablet (1,000 Units total) by mouth once daily.   Changed and/or Refilled Medications    Modified Medication Previous Medication    FINASTERIDE (PROSCAR) 5 MG TABLET finasteride (PROSCAR) 5 mg tablet       TAKE 1 TABLET BY MOUTH ONCE DAILY.    TAKE ONE TABLET BY MOUTH DAILY    XARELTO 20 MG TAB XARELTO 20 mg Tab       TAKE 1 TABLET BY MOUTH ONCE DAILY.    TAKE 1 TABLET BY  MOUTH ONCE DAILY.       ASSESSMENT AND PLAN:    Problem List Items Addressed This Visit    None  Visit Diagnoses       Hospital discharge follow-up    -  Primary    Anemia, unspecified type                Future Appointments   Date Time Provider Department Center   11/11/2022 11:00 AM Larissa Aguilar MD [facility]       Follow up in about 6 months (around 4/13/2023). or sooner as needed.

## 2022-10-17 DIAGNOSIS — D64.9 ANEMIA, UNSPECIFIED TYPE: Primary | ICD-10-CM

## 2022-11-02 ENCOUNTER — TELEPHONE (OUTPATIENT)
Dept: FAMILY MEDICINE | Facility: CLINIC | Age: 87
End: 2022-11-02
Payer: MEDICARE

## 2022-11-02 NOTE — TELEPHONE ENCOUNTER
----- Message from RAHUL Dolan-LUCIE sent at 10/17/2022  4:31 PM CDT -----  Hi team,     Please call patient and let him know Dr. Villanueva would like him to repeat his blood count in 1 week to monitor his anemia. Lab orders have been placed. Please schedule pt for 10/24/2022.

## 2022-11-15 ENCOUNTER — HOSPITAL ENCOUNTER (EMERGENCY)
Facility: HOSPITAL | Age: 87
Discharge: HOME | End: 2022-11-16
Attending: EMERGENCY MEDICINE
Payer: MEDICARE

## 2022-11-15 DIAGNOSIS — M54.50 LOW BACK PAIN, UNSPECIFIED BACK PAIN LATERALITY, UNSPECIFIED CHRONICITY, UNSPECIFIED WHETHER SCIATICA PRESENT: ICD-10-CM
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
## 2022-11-15 PROCEDURE — 99284 EMERGENCY DEPT VISIT MOD MDM: CPT | Mod: 25

## 2022-11-15 PROCEDURE — 25000003 PHARM REV CODE 250: Performed by: EMERGENCY MEDICINE

## 2022-11-15 RX ORDER — HYDROCODONE BITARTRATE AND ACETAMINOPHEN 5; 325 MG/1; MG/1
1 TABLET ORAL
Status: COMPLETED | OUTPATIENT
Start: 2022-11-15 | End: 2022-11-15

## 2022-11-15 RX ADMIN — HYDROCODONE BITARTRATE AND ACETAMINOPHEN 1 TABLET: 5; 325 TABLET ORAL at 11:11

## 2022-11-16 VITALS
BODY MASS INDEX: 18.9 KG/M2 | SYSTOLIC BLOOD PRESSURE: 165 MMHG | HEIGHT: 70 IN | HEART RATE: 85 BPM | DIASTOLIC BLOOD PRESSURE: 93 MMHG | RESPIRATION RATE: 17 BRPM | OXYGEN SATURATION: 98 % | TEMPERATURE: 98 F | WEIGHT: 132 LBS

## 2022-11-16 NOTE — ED NOTES
"Pt reports that he has a "urine" problem and can't hold his urine well. Told him that we will take care of him if he has accidents  "

## 2022-11-16 NOTE — ED PROVIDER NOTES
"Encounter Date: 11/15/2022    SCRIBE #1 NOTE: I, Arnoldo Saleh, am scribing for, and in the presence of,  Markell Hale MD. I have scribed the following portions of the note - Other sections scribed: HPI, ROS, PE.     History     Chief Complaint   Patient presents with    Back Pain     Patient was brought in by EMS after an unwitnessed fall at nursing home. Complaint of lower back pain; history of Alz and frequent UTI     This is a 89 y.o. male, with a PMHx of Alzheimer's, right eye blindness and frequent falls, who presents to the ED via EMS for evaluation of low back pain s/p unwitnessed fall PTA. Per EMS personnel, patient was sent from nursing facility. In ED, patient states "I just lost my balance and fell". Unsure of whether he hit his head or lost consciousness. Patient endorsing low back pain but otherwise denying any injury. Per ED nurse, patient is seen in ED frequently secondary to falls. This is the extent of the patient's Hx in the ED today.    The history is provided by the patient, the EMS personnel and the nursing home. The history is limited by the condition of the patient (Alzheimer's Disease). No  was used.   Review of patient's allergies indicates:  No Known Allergies  Past Medical History:   Diagnosis Date    Anticoagulant long-term use     Arthritis     Cancer     right  hand    Coronary artery disease     Deep vein thrombosis     Hyperlipidemia     Hypertension     Seizures 1988    none since on tegretol     Past Surgical History:   Procedure Laterality Date    APPENDECTOMY      CLOSURE OF WOUND  12/9/2020    Procedure: CLOSURE, WOUND;  Surgeon: Yamil Bonilla MD;  Location: [facility] OR;  Service: Plastics;;    EXCISION OF LESION OF EYELID Right 12/9/2020    Procedure: EXCISION TUMOR RIGHT EYEBROW WITH COMPLEX CLOSURE;  Surgeon: Yamil Bonilla MD;  Location: [facility] OR;  Service: Plastics;  Laterality: Right;  FROZEN SECTION  RN PREOP 12/8/2020----COVID NEGATIVE  12/8    " FRACTURE SURGERY Right     elbow    FRACTURE SURGERY Right     hip    JOSUÉ FILTER PLACEMENT      hx of deep vein thrombosis    OPEN REDUCTION AND INTERNAL FIXATION (ORIF) OF INTERTROCHANTERIC FRACTURE OF FEMUR Left 2018    Procedure: ORIF, FRACTURE, FEMUR, INTERTROCHANTERIC;  Surgeon: Pop Martin III, MD;  Location: [facility] OR;  Service: Orthopedics;  Laterality: Left;    TOTAL SHOULDER ARTHROPLASTY Right      No family history on file.  Social History     Tobacco Use    Smoking status: Former     Packs/day: 0.50     Years: 6.00     Pack years: 3.00     Types: Cigarettes     Quit date: 10/27/1975     Years since quittin.0    Smokeless tobacco: Never   Substance Use Topics    Alcohol use: No    Drug use: No     ROS limited secondary to Alzheimer's Disease    Review of Systems   Constitutional:  Negative for diaphoresis and fever.   HENT:  Negative for sore throat.    Eyes:  Negative for pain.   Respiratory:  Negative for shortness of breath.    Cardiovascular:  Negative for chest pain.   Gastrointestinal:  Negative for abdominal pain and nausea.   Genitourinary:  Negative for dysuria.   Musculoskeletal:  Positive for back pain (low).   Skin:  Negative for rash.   Neurological:  Negative for weakness.     Physical Exam     Initial Vitals [11/15/22 2315]   BP Pulse Resp Temp SpO2   (!) 171/80 79 16 98.1 °F (36.7 °C) 99 %      MAP       --         Physical Exam    Nursing note and vitals reviewed.  Constitutional: He is not diaphoretic. No distress.   HENT:   Head: Normocephalic.   Mouth/Throat: Oropharynx is clear and moist.   No sign of head trauma   Eyes: Right eye exhibits no discharge. Left eye exhibits no discharge. No scleral icterus.   Neck: Neck supple. No tracheal deviation present.   Normal range of motion.  Cardiovascular:  Normal rate, regular rhythm and intact distal pulses.           Pulmonary/Chest: No stridor. No respiratory distress. He has no wheezes.   Abdominal: Abdomen is soft. He  exhibits no distension. There is no abdominal tenderness.   Musculoskeletal:         General: No edema.      Cervical back: Normal range of motion and neck supple.      Comments: Diffuse lumbar tenderness   No step-off     Neurological: He is alert. He has normal strength. No cranial nerve deficit or sensory deficit. GCS score is 15. GCS eye subscore is 4. GCS verbal subscore is 5. GCS motor subscore is 6.   Skin: Skin is warm and dry.   Psychiatric: He has a normal mood and affect.       ED Course   Procedures  Labs Reviewed - No data to display       Imaging Results              CT Head Without Contrast (Final result)  Result time 11/16/22 00:27:03      Final result by Leeann Beck MD (11/16/22 00:27:03)                   Impression:      No acute intracranial abnormalities identified.  No significant detrimental change compared to previous CT head from 10/03/2022.      Electronically signed by: Leeann Beck MD  Date:    11/16/2022  Time:    00:27               Narrative:    EXAMINATION:  CT HEAD WITHOUT CONTRAST    CLINICAL HISTORY:  Head trauma, minor (Age >= 65y);    TECHNIQUE:  Low dose axial images were obtained through the head.  Coronal and sagittal reformations were also performed. Contrast was not administered.    COMPARISON:  CT head from 10/03/2022.    FINDINGS:  There is generalized cerebral volume loss with moderate chronic microvascular ischemic disease.  Stable diffuse prominence is seen of the ventricular system.  No evidence of acute/recent major vascular distribution cerebral infarction, intraparenchymal hemorrhage, or intra-axial space occupying lesion.  No effacement of the skull-base cisterns. No abnormal extra-axial fluid collections or blood products. Visualized paranasal sinuses and mastoid air cells are clear.  Unchanged irregular lucent osseous appearance seen at the right zygomatical frontal junction and right orbital rim.  The calvarium otherwise shows no acute abnormalities.                                        CT Cervical Spine Without Contrast (Final result)  Result time 11/16/22 00:30:32      Final result by Leeann Beck MD (11/16/22 00:30:32)                   Impression:      No evidence of acute cervical spine fracture or dislocation.      Electronically signed by: Leeann Beck MD  Date:    11/16/2022  Time:    00:30               Narrative:    EXAMINATION:  CT CERVICAL SPINE WITHOUT CONTRAST    CLINICAL HISTORY:  Neck trauma (Age >= 65y);    TECHNIQUE:  Low dose axial images, sagittal and coronal reformations were performed though the cervical spine.  Contrast was not administered.    COMPARISON:  CT cervical spine from 07/30/2022.    FINDINGS:  No evidence of acute cervical spine fracture or dislocation.  Odontoid process is intact.  Craniocervical junction is unremarkable.  Cervical spine alignment is within normal limits. There is fusion of the C3 and C4 vertebral bodies.  Grossly stable extensive degenerative changes and severe intervertebral disc space narrowing are seen throughout the cervical levels.    Surrounding soft tissues show no significant abnormalities.  Airway is patent.  Partially visualized lung apices are clear.                                       CT Lumbar Spine Without Contrast (Final result)  Result time 11/16/22 00:33:44      Final result by Leeann Beck MD (11/16/22 00:33:44)                   Impression:      No acute lumbar spine abnormalities identified.  Extensive lumbar DJD.      Electronically signed by: Leeann Beck MD  Date:    11/16/2022  Time:    00:33               Narrative:    EXAMINATION:  CT LUMBAR SPINE WITHOUT CONTRAST    CLINICAL HISTORY:  Low back pain, trauma;    TECHNIQUE:  Low-dose axial, sagittal and coronal reformations are obtained through the lumbar spine.  Contrast was not administered.    COMPARISON:  None.    FINDINGS:  No evidence of acute lumbar spine fracture or subluxation.  There is straightening of the  normal lumbar lordosis.  Extensive degenerative changes and intervertebral disc space narrowing are seen throughout the lumbar levels.  Degenerative endplate changes and sclerosis are seen most pronounced at L1-2 and L3-4.    Surrounding soft tissues show no acute abnormalities.  There is aortic atherosclerosis.  IVC filter is seen.  Small nonobstructing right renal stones are visualized.                                       Medications   HYDROcodone-acetaminophen 5-325 mg per tablet 1 tablet (1 tablet Oral Given 11/15/22 0842)     Medical Decision Making:   History:   Old Medical Records: I decided to obtain old medical records.  Old Records Summarized: other records.       <> Summary of Records: PMHx of Alzheimer's, right eye blindness, frequent falls, CAD, HTN. Hx of several surgeries secondary to falls.  Differential Diagnosis:   Differential diagnosis include but are not limited to: contusion, fracture, dislocation, intracranial injury.  Clinical Tests:   Radiological Study: Ordered and Reviewed  ED Management:  Patient is afebrile and in no acute distress.  He has a GCS of 15.  He is moving all extremities.  CT brain without acute intracranial abnormality.  CT cervical spine and lumbar spine with multilevel degenerative changes however no acute fracture or spondylolisthesis.  Patient given analgesia in the emergency department.  On reassessment he is resting comfortably in stretcher.  He is stable for discharge on trial of management with supportive care to follow-up with primary physician. counseled on supportive care, appropriate medication usage, concerning symptoms for which to return to ER and the importance of follow up. Understanding and agreement with treatment plan was expressed.   This chart was completed using dictation software, as a result there may be some transcription errors.          Scribe Attestation:   Scribe #1: I performed the above scribed service and the documentation accurately  describes the services I performed. I attest to the accuracy of the note.            I, Markell Hale , personally performed the services described in this documentation. All medical record entries made by the scribe were at my direction and in my presence. I have reviewed the chart and agree that the record reflects my personal performance and is accurate and complete.       Clinical Impression:   Final diagnoses:  [W19.XXXA] Fall, initial encounter (Primary)  [M54.50] Low back pain, unspecified back pain laterality, unspecified chronicity, unspecified whether sciatica present      ED Disposition Condition    Discharge Stable          ED Prescriptions    None       Follow-up Information       Follow up With Specialties Details Why Contact Info    Javier Villanueva MD Family Medicine Schedule an appointment as soon as possible for a visit   [address] [facility]  [place] [id number] [phone]               Markell Hale MD  11/17/22 2663

## 2022-11-16 NOTE — DISCHARGE INSTRUCTIONS
CT scans do not show serious injury.  Continue medications as prescribed.  Schedule close follow-up with patient's primary physician.    Thank you for coming to our Emergency Department today. It is important to remember that some problems are difficult to diagnose and may not be found during your first visit. Be sure to follow up with your primary care doctor and review any labs/imaging that was performed with them. If you do not have a primary care doctor, you may contact the one listed on your discharge paperwork or you may also call the [facility] Appointment Desk at [phone] to schedule an appointment with one.     All medications may potentially have side effects and it is impossible to predict which medications may give you side effects. If you feel that you are having a negative effect of any medication you should immediately stop taking them and seek medical attention.    Return to the ER with any questions/concerns, new/concerning symptoms, worsening or failure to improve. Do not drive or make any important decisions for 24 hours if you have received any pain medications, sedatives or mood altering drugs during your ER visit.

## 2022-12-06 ENCOUNTER — TELEPHONE (OUTPATIENT)
Dept: UROLOGY | Facility: CLINIC | Age: 87
End: 2022-12-06
Payer: MEDICARE

## 2022-12-15 ENCOUNTER — TELEPHONE (OUTPATIENT)
Dept: FAMILY MEDICINE | Facility: CLINIC | Age: 87
End: 2022-12-15
Payer: MEDICARE

## 2022-12-15 ENCOUNTER — PES CALL (OUTPATIENT)
Dept: ADMINISTRATIVE | Facility: CLINIC | Age: 87
End: 2022-12-15
Payer: MEDICARE

## 2022-12-15 NOTE — TELEPHONE ENCOUNTER
Return call to [facility], and informed that she can send the description of the wound to the office with her recommendations of wound care. The provider will review it and sign off or make changes. Office fax number given.

## 2022-12-15 NOTE — TELEPHONE ENCOUNTER
----- Message from Ledy Ayala sent at 12/14/2022  2:56 PM CST -----  Regarding: [facility] home health  Type: Patient Call Back    Who called: [facility]    What is the request in detail:needs wound care order for patient left elbow.  Fax [phone]    Can the clinic reply by [facility]? no    Would the patient rather a call back or a response via [facility]? Call back     Best call back number: [phone]

## 2022-12-21 ENCOUNTER — TELEPHONE (OUTPATIENT)
Dept: UROLOGY | Facility: CLINIC | Age: 87
End: 2022-12-21
Payer: MEDICARE

## 2022-12-21 NOTE — TELEPHONE ENCOUNTER
Lvm to have pt return call to assist with rescheduling appointment with Dr. Aguilar on 12/30 due to her no longer being in the office.

## 2022-12-28 ENCOUNTER — PES CALL (OUTPATIENT)
Dept: ADMINISTRATIVE | Facility: CLINIC | Age: 87
End: 2022-12-28
Payer: MEDICARE

## 2023-01-06 ENCOUNTER — PES CALL (OUTPATIENT)
Dept: ADMINISTRATIVE | Facility: CLINIC | Age: 88
End: 2023-01-06
Payer: MEDICARE

## 2023-01-09 PROBLEM — N39.0 UTI (URINARY TRACT INFECTION): Status: RESOLVED | Noted: 2022-10-04 | Resolved: 2023-01-09

## 2023-01-26 ENCOUNTER — PES CALL (OUTPATIENT)
Dept: ADMINISTRATIVE | Facility: CLINIC | Age: 88
End: 2023-01-26
Payer: MEDICARE

## 2023-01-27 ENCOUNTER — PES CALL (OUTPATIENT)
Dept: ADMINISTRATIVE | Facility: CLINIC | Age: 88
End: 2023-01-27
Payer: MEDICARE

## 2023-02-06 ENCOUNTER — PES CALL (OUTPATIENT)
Dept: ADMINISTRATIVE | Facility: CLINIC | Age: 88
End: 2023-02-06
Payer: MEDICARE

## 2023-02-24 ENCOUNTER — PES CALL (OUTPATIENT)
Dept: ADMINISTRATIVE | Facility: CLINIC | Age: 88
End: 2023-02-24
Payer: MEDICARE

## 2023-03-01 ENCOUNTER — TELEPHONE (OUTPATIENT)
Dept: FAMILY MEDICINE | Facility: CLINIC | Age: 88
End: 2023-03-01
Payer: MEDICARE

## 2023-03-01 ENCOUNTER — PATIENT MESSAGE (OUTPATIENT)
Dept: FAMILY MEDICINE | Facility: CLINIC | Age: 88
End: 2023-03-01
Payer: MEDICARE

## 2023-03-01 NOTE — TELEPHONE ENCOUNTER
----- Message from Meghan Tucker sent at 3/1/2023  2:03 PM CST -----  Regarding: patient call back  Type: Patient Call Back    Who called: [facility]     What is the request in detail: Thinks he scratched his right eye and r lower leg keeps getting blisters    Can the clinic reply by [facility]? No     Would the patient rather a call back or a response via [facility]? Call     Best call back number: [phone] or..[phone]

## 2023-04-09 PROCEDURE — G0179 MD RECERTIFICATION HHA PT: HCPCS | Mod: ,,, | Performed by: FAMILY MEDICINE

## 2023-04-09 PROCEDURE — G0179 PR HOME HEALTH MD RECERTIFICATION: ICD-10-PCS | Mod: ,,, | Performed by: FAMILY MEDICINE

## 2023-04-14 ENCOUNTER — PES CALL (OUTPATIENT)
Dept: ADMINISTRATIVE | Facility: CLINIC | Age: 88
End: 2023-04-14
Payer: MEDICARE

## 2023-04-20 ENCOUNTER — HOSPITAL ENCOUNTER (EMERGENCY)
Facility: HOSPITAL | Age: 88
Discharge: NURSING, REHAB OR LONG-TERM CARE | End: 2023-04-20
Attending: EMERGENCY MEDICINE
Payer: MEDICARE

## 2023-04-20 VITALS
RESPIRATION RATE: 18 BRPM | OXYGEN SATURATION: 100 % | TEMPERATURE: 98 F | HEIGHT: 70 IN | HEART RATE: 70 BPM | DIASTOLIC BLOOD PRESSURE: 81 MMHG | WEIGHT: 132 LBS | BODY MASS INDEX: 18.9 KG/M2 | SYSTOLIC BLOOD PRESSURE: 185 MMHG

## 2023-04-20 DIAGNOSIS — R23.1 PALE SKIN: ICD-10-CM

## 2023-04-20 LAB
ANION GAP SERPL CALC-SCNC: 19 MMOL/L (ref 8–16)
BUN SERPL-MCNC: 19 MG/DL (ref 6–30)
CHLORIDE SERPL-SCNC: 107 MMOL/L (ref 95–110)
CREAT SERPL-MCNC: 1.3 MG/DL (ref 0.5–1.4)
GLUCOSE SERPL-MCNC: 85 MG/DL (ref 70–110)
HCT VFR BLD CALC: 27 %PCV (ref 36–54)
POC IONIZED CALCIUM: 1.24 MMOL/L (ref 1.06–1.42)
POC TCO2 (MEASURED): 23 MMOL/L (ref 23–29)
POTASSIUM BLD-SCNC: 3.8 MMOL/L (ref 3.5–5.1)
SAMPLE: ABNORMAL
SODIUM BLD-SCNC: 144 MMOL/L (ref 136–145)

## 2023-04-20 PROCEDURE — 93010 ELECTROCARDIOGRAM REPORT: CPT | Mod: ,,, | Performed by: INTERNAL MEDICINE

## 2023-04-20 PROCEDURE — 85014 HEMATOCRIT: CPT

## 2023-04-20 PROCEDURE — 84295 ASSAY OF SERUM SODIUM: CPT | Mod: 91

## 2023-04-20 PROCEDURE — 93005 ELECTROCARDIOGRAM TRACING: CPT

## 2023-04-20 PROCEDURE — 82565 ASSAY OF CREATININE: CPT | Mod: 91

## 2023-04-20 PROCEDURE — 99900035 HC TECH TIME PER 15 MIN (STAT)

## 2023-04-20 PROCEDURE — 82330 ASSAY OF CALCIUM: CPT

## 2023-04-20 PROCEDURE — 80047 BASIC METABLC PNL IONIZED CA: CPT

## 2023-04-20 PROCEDURE — 93010 EKG 12-LEAD: ICD-10-PCS | Mod: ,,, | Performed by: INTERNAL MEDICINE

## 2023-04-20 PROCEDURE — 84132 ASSAY OF SERUM POTASSIUM: CPT

## 2023-04-20 PROCEDURE — 99283 EMERGENCY DEPT VISIT LOW MDM: CPT

## 2023-04-20 NOTE — ED PROVIDER NOTES
"Encounter Date: 4/20/2023       History     Chief Complaint   Patient presents with    wellness check     Pt BIB EMS from home for a wellness exam. EMS reported home health called stating pt looked pale. Pt has no complaints.     89 yo male w/ hx of CAD, DVT, HTN presenting to ER from nursing home for "wellness check".  Patient denies any significant complaints.  Denies fevers, chills, nausea, vomiting, chest pain, shortness of breath.  Per daughter, she was told that the patient appeared pale and was told to come to the emergency department to be checked out.  Patient previously in usual state of health.    Review of patient's allergies indicates:  No Known Allergies  Past Medical History:   Diagnosis Date    Anticoagulant long-term use     Arthritis     Cancer     right  hand    Coronary artery disease     Deep vein thrombosis     Hyperlipidemia     Hypertension     Seizures 1988    none since on tegretol     Past Surgical History:   Procedure Laterality Date    APPENDECTOMY      CLOSURE OF WOUND  12/9/2020    Procedure: CLOSURE, WOUND;  Surgeon: Yamil Bonilla MD;  Location: [facility] OR;  Service: Plastics;;    EXCISION OF LESION OF EYELID Right 12/9/2020    Procedure: EXCISION TUMOR RIGHT EYEBROW WITH COMPLEX CLOSURE;  Surgeon: Yamil Bonilla MD;  Location: [facility] OR;  Service: Plastics;  Laterality: Right;  FROZEN SECTION  RN PREOP 12/8/2020----COVID NEGATIVE  12/8    FRACTURE SURGERY Right     elbow    FRACTURE SURGERY Right     hip    JOSUÉ FILTER PLACEMENT      hx of deep vein thrombosis    OPEN REDUCTION AND INTERNAL FIXATION (ORIF) OF INTERTROCHANTERIC FRACTURE OF FEMUR Left 5/30/2018    Procedure: ORIF, FRACTURE, FEMUR, INTERTROCHANTERIC;  Surgeon: Pop Martin III, MD;  Location: [facility] OR;  Service: Orthopedics;  Laterality: Left;    TOTAL SHOULDER ARTHROPLASTY Right      No family history on file.  Social History     Tobacco Use    Smoking status: Former     Packs/day: 0.50     Years: 6.00     " Pack years: 3.00     Types: Cigarettes     Quit date: 10/27/1975     Years since quittin.5    Smokeless tobacco: Never   Substance Use Topics    Alcohol use: No    Drug use: No     Review of Systems   Constitutional:  Negative for fever.   HENT:  Negative for sore throat.    Respiratory:  Negative for shortness of breath.    Cardiovascular:  Negative for chest pain.   Gastrointestinal:  Negative for nausea.   Genitourinary:  Negative for dysuria.   Musculoskeletal:  Negative for back pain.   Skin:  Negative for rash.   Neurological:  Negative for weakness.     Physical Exam     Initial Vitals [23 1324]   BP Pulse Resp Temp SpO2   (!) 148/67 67 16 98.2 °F (36.8 °C) 100 %      MAP       --         Physical Exam    Nursing note and vitals reviewed.  Constitutional: He appears well-developed and well-nourished. He is not diaphoretic. No distress.   HENT:   Head: Normocephalic and atraumatic.   Eyes: Conjunctivae and EOM are normal. Pupils are equal, round, and reactive to light. No scleral icterus.   Neck: Neck supple.   Normal range of motion.  Cardiovascular:  Normal rate, regular rhythm, normal heart sounds and intact distal pulses.     Exam reveals no gallop and no friction rub.       No murmur heard.  Pulmonary/Chest: Breath sounds normal. No stridor. No respiratory distress. He has no wheezes. He has no rhonchi. He has no rales.   Abdominal: Abdomen is soft. Bowel sounds are normal. He exhibits no distension. There is no abdominal tenderness. There is no rebound and no guarding.   Musculoskeletal:         General: No tenderness or edema. Normal range of motion.      Cervical back: Normal range of motion and neck supple.     Neurological: He is alert and oriented to person, place, and time. He has normal strength. No cranial nerve deficit.   Skin: Skin is warm and dry. No rash noted.   Psychiatric: He has a normal mood and affect. His behavior is normal.       ED Course   Procedures  Labs Reviewed    ISTAT PROCEDURE - Abnormal; Notable for the following components:       Result Value    POC Anion Gap 19 (*)     POC Hematocrit 27 (*)     All other components within normal limits     EKG Readings: (Independently Interpreted)   Initial Reading: No STEMI. Rhythm: Normal Sinus Rhythm. Heart Rate: 66.   No ST segment elevation or depression concerning for acute ischemia.      ECG Results              EKG 12-lead (Final result)  Result time 04/20/23 15:26:10      Final result by Interface, Lab [facility] (04/20/23 15:26:10)                   Narrative:    Test Reason : R23.1,    Vent. Rate : 066 BPM     Atrial Rate : 066 BPM     P-R Int : 170 ms          QRS Dur : 086 ms      QT Int : 402 ms       P-R-T Axes : 020 064 077 degrees     QTc Int : 421 ms    Normal sinus rhythm  Nonspecific ST and T wave abnormality  Abnormal ECG  When compared with ECG of 03-OCT-2022 17:29,  Significant changes have occurred  Confirmed by Kyle Cowart MD ([id number]) on 4/20/2023 3:26:03 PM    Referred By: [email or web address]   SELF           Confirmed By:Kyle Cowart MD                                  Imaging Results    None          Medications - No data to display  Medical Decision Making:   Initial Assessment:   89-year-old male with 0 complaints.  On exam, patient well-appearing and in no acute distress.  Vitals with mild hypertension.  Patient's daughter states he does not appear more pale than usual.  No pale conjunctiva.  Hematocrit at baseline.  Patient denies black or bloody stool.  EKG without evidence of acute ischemia or other concerns.  Exam otherwise reassuring.  Believe he is safe for discharge home.  Doubt acute anemia, metabolic disorder, obvious cardiac dysfunction.                        Clinical Impression:   Final diagnoses:  [R23.1] Pale skin        ED Disposition Condition    Discharge Stable          ED Prescriptions    None       Follow-up Information       Follow up With Specialties Details Why Contact Info    Javier Vilalnueva,  MD Family Medicine Schedule an appointment as soon as possible for a visit  As needed [address]  [place] [address]  [phone]      [facility] Emergency Dept Emergency Medicine  As needed, If symptoms worsen [address] [place] [id number] [phone]             Jose Price MD  04/20/23 9039

## 2023-04-20 NOTE — DISCHARGE INSTRUCTIONS
You were seen in the emergency department for pale skin.  Your labs are reassuring.  We think your safe for discharge home.  Please follow-up with your primary care provider as needed.

## 2023-04-25 ENCOUNTER — PES CALL (OUTPATIENT)
Dept: ADMINISTRATIVE | Facility: CLINIC | Age: 88
End: 2023-04-25
Payer: MEDICARE

## 2023-05-08 ENCOUNTER — PES CALL (OUTPATIENT)
Dept: ADMINISTRATIVE | Facility: CLINIC | Age: 88
End: 2023-05-08
Payer: MEDICARE

## 2023-05-22 ENCOUNTER — PES CALL (OUTPATIENT)
Dept: ADMINISTRATIVE | Facility: CLINIC | Age: 88
End: 2023-05-22
Payer: MEDICARE

## 2023-05-25 ENCOUNTER — HOSPITAL ENCOUNTER (EMERGENCY)
Facility: HOSPITAL | Age: 88
Discharge: HOME | End: 2023-05-25
Attending: EMERGENCY MEDICINE
Payer: MEDICARE

## 2023-05-25 VITALS
TEMPERATURE: 98 F | HEART RATE: 67 BPM | BODY MASS INDEX: 19.33 KG/M2 | OXYGEN SATURATION: 99 % | HEIGHT: 70 IN | SYSTOLIC BLOOD PRESSURE: 164 MMHG | WEIGHT: 135 LBS | RESPIRATION RATE: 19 BRPM | DIASTOLIC BLOOD PRESSURE: 79 MMHG

## 2023-05-25 DIAGNOSIS — T14.8XXA ABRASION: ICD-10-CM
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
## 2023-05-25 PROCEDURE — 99284 EMERGENCY DEPT VISIT MOD MDM: CPT | Mod: 25

## 2023-05-25 PROCEDURE — 25000003 PHARM REV CODE 250: Performed by: EMERGENCY MEDICINE

## 2023-05-25 RX ORDER — MUPIROCIN 20 MG/G
1 OINTMENT TOPICAL
Status: COMPLETED | OUTPATIENT
Start: 2023-05-25 | End: 2023-05-25

## 2023-05-25 RX ADMIN — MUPIROCIN 22 G: 20 OINTMENT TOPICAL at 01:05

## 2023-05-25 NOTE — DISCHARGE INSTRUCTIONS
Take medications as directed. Follow-up with your primary care doctor. Return for any new or worsening complaints. Wound care to the abrasions on your right face. Ambulate with wheelchair.

## 2023-05-25 NOTE — ED NOTES
Patient c/o skin tear to ear, and skin tear and abrasion to right side of face d/t fall. Patient fell into wheel chair while transferring self from w/c to commode. States that right side of head hit the cushion of the wheel chair. -LOC. States that he is on blood thinners. Patient denies pain.

## 2023-05-25 NOTE — ED PROVIDER NOTES
Encounter Date: 5/25/2023    SCRIBE #1 NOTE: I, Elida Weber, am scribing for, and in the presence of,  Marysol Kyle MD. I have scribed the following portions of the note - Other sections scribed: HPI, ROS, PE.     History     Chief Complaint   Patient presents with    Fall     EMS called out to the [facility] at [facility] for a male that fell from toilet into his wheel chair hitting his face on arms. No LOC + blood thinner     Jaydon Mccarthy is a 89 y.o. male, with a PMHx of HTN, HLD, and CAD, who presents to the ED for further evaluation of several skin tears to the right cheek and right earlobe following a fall this AM. Patient's daughter reports notified by staff of the [facility] at [facility] that the patient fell this AM. Patient reports trying to get up when his wheelchair moved. Patient endorses falling onto the wheelchair. Patient's daughter reports the patient is currently taking 2 blood thinner medications. She states the patient frequently experiences episodes of falling. She also reports the patient had a tetanus vaccination within 5 years. Denies abdominal pain, chest pain, arm pain, leg pain, or other associated symptoms.  Patient denies LOC or complaints. He has forgotten his hearing aides at home.    Additional history is provided by independent historian: patient's daughter.     The history is provided by the patient and a relative (daughter). No  was used.   Review of patient's allergies indicates:  No Known Allergies  Past Medical History:   Diagnosis Date    Anticoagulant long-term use     Arthritis     Cancer     right  hand    Coronary artery disease     Deep vein thrombosis     Hyperlipidemia     Hypertension     Seizures 1988    none since on tegretol     Past Surgical History:   Procedure Laterality Date    APPENDECTOMY      CLOSURE OF WOUND  12/9/2020    Procedure: CLOSURE, WOUND;  Surgeon: Yamil Bonilla MD;  Location: [facility] OR;  Service: Plastics;;    EXCISION OF  LESION OF EYELID Right 2020    Procedure: EXCISION TUMOR RIGHT EYEBROW WITH COMPLEX CLOSURE;  Surgeon: Yamil Bonilla MD;  Location: [facility] OR;  Service: Plastics;  Laterality: Right;  FROZEN SECTION  RN PREOP 2020----COVID NEGATIVE      FRACTURE SURGERY Right     elbow    FRACTURE SURGERY Right     hip    JOSUÉ FILTER PLACEMENT      hx of deep vein thrombosis    OPEN REDUCTION AND INTERNAL FIXATION (ORIF) OF INTERTROCHANTERIC FRACTURE OF FEMUR Left 2018    Procedure: ORIF, FRACTURE, FEMUR, INTERTROCHANTERIC;  Surgeon: Pop Martin III, MD;  Location: [facility] OR;  Service: Orthopedics;  Laterality: Left;    TOTAL SHOULDER ARTHROPLASTY Right      History reviewed. No pertinent family history.  Social History     Tobacco Use    Smoking status: Former     Packs/day: 0.50     Years: 6.00     Pack years: 3.00     Types: Cigarettes     Quit date: 10/27/1975     Years since quittin.6    Smokeless tobacco: Never   Substance Use Topics    Alcohol use: No    Drug use: No     Review of Systems   Constitutional:  Negative for activity change, chills, diaphoresis and fever.   HENT:  Negative for congestion, ear pain and sore throat.    Eyes:  Negative for pain.   Respiratory:  Negative for cough, choking and shortness of breath.    Cardiovascular:  Negative for chest pain and palpitations.   Gastrointestinal:  Negative for abdominal pain, diarrhea, nausea and vomiting.   Genitourinary:  Negative for dysuria, flank pain, frequency and urgency.   Musculoskeletal:  Negative for arthralgias, back pain and myalgias (Arm and leg).   Skin:  Positive for wound (Right cheek and right ear lbe). Negative for rash.   Neurological:  Negative for tremors, seizures, syncope, weakness, numbness and headaches.   Hematological:  Negative for adenopathy.   Psychiatric/Behavioral:  Negative for agitation and confusion.      Physical Exam     Initial Vitals [23 1240]   BP Pulse Resp Temp SpO2   (!) 149/63 62 18 98  °F (36.7 °C) 99 %      MAP       --         Physical Exam    Nursing note and vitals reviewed.  Constitutional: He appears well-developed and well-nourished. No distress.   HENT:   Head: Normocephalic and atraumatic.   Right Ear: External ear normal.   Left Ear: External ear normal.   Nose: Nose normal.   Mouth/Throat: Oropharynx is clear and moist. No oropharyngeal exudate.   Eyes: Conjunctivae and EOM are normal. Pupils are equal, round, and reactive to light. Right eye exhibits discharge (Yellow). Left eye exhibits no discharge.   Neck: Neck supple. No thyromegaly present. No tracheal deviation present. No JVD present.   Normal range of motion.  Cardiovascular:  Normal rate, regular rhythm, normal heart sounds and intact distal pulses.     Exam reveals no gallop and no friction rub.       No murmur heard.  Pulmonary/Chest: Breath sounds normal. No respiratory distress. He has no wheezes. He has no rales. He exhibits no tenderness.   Abdominal: Abdomen is soft. Bowel sounds are normal. He exhibits no distension and no mass. There is no abdominal tenderness.   Musculoskeletal:         General: Normal range of motion.      Cervical back: Normal range of motion and neck supple.     Lymphadenopathy:     He has no cervical adenopathy.   Neurological: He is alert and oriented to person, place, and time. He has normal strength. No cranial nerve deficit or sensory deficit. GCS score is 15. GCS eye subscore is 4. GCS verbal subscore is 5. GCS motor subscore is 6.   Skin: Skin is warm and dry. Capillary refill takes less than 2 seconds. No rash noted.   3 superficial linear skin tears to the right cheek.  Superficial skin tear to the right earlobe connecting to the head.   Psychiatric: He has a normal mood and affect. Thought content normal.       ED Course   Procedures  Labs Reviewed - No data to display       Imaging Results              CT Head Without Contrast (Final result)  Result time 05/25/23 14:23:29      Final  result by Henry Hummel MD (05/25/23 14:23:29)                   Impression:      No acute intracranial abnormalities identified.  No evidence for intracranial hemorrhage.    Atrophy and periventricular white matter ischemic changes commensurate with the patient's chronological age.    Ventricular enlargement does appear disproportionate to the sulcal prominence raising the possibility of normal pressure hydrocephalus.      Electronically signed by: Henry Hummel MD  Date:    05/25/2023  Time:    14:23               Narrative:    EXAMINATION:  CT HEAD WITHOUT CONTRAST    CLINICAL HISTORY:  Head trauma, GCS=15, no focal neuro findings (low risk) (Ped 0-18y);on xarelto;    TECHNIQUE:  Low dose axial images were obtained through the head.  Coronal and sagittal reformations were also performed. Contrast was not administered.    COMPARISON:  11/15/2022.    FINDINGS:  The ventricles are enlarged and the sulci are prominent consistent with generalized atrophy commensurate with the patient's chronological age.  Note that the degree of ventricular enlargement does appear disproportionate to the sulcal prominence raising the possibility of normal pressure hydrocephalus.  There is decreased density within the periventricular white matter likely reflecting ischemia/infarction secondary to microvascular disease.  There is no evidence for abnormal masses, mass effect, or midline shift.  No abnormal intra or extra-axial fluid collections are identified, specifically there is no evidence for intracranial hemorrhage.  There is a small mastoid effusion on the right.  There is mild mucosal thickening within the ethmoid and maxillary sinuses.  Bony calvarium is intact.                                       Medications   mupirocin 2 % ointment 22 g (22 g Topical (Top) Given 5/25/23 1355)     Medical Decision Making:   History:   Old Medical Records: I decided to obtain old medical records.  Clinical Tests:   Radiological Study:  Ordered and Reviewed  89-year-old male on anticoagulation therapy presents emergency department after an accidental fall while transferring out of his wheelchair prior to arrival.  He reports no complaints at this time.  He does have several superficial skin tears to his right cheek as well as right earlobe with no active bleeding.  There are no foreign bodies noted.  Wounds were irrigated with normal saline and dressed with nonadhesive dressing.  Bactroban was placed on the wounds.  CT of the head is negative for intracranial hemorrhage.  I have considered but do not suspect underlying sepsis, meningitis, acute coronary syndrome or cardiac arrhythmia causing the patient's fall.  There is no indication for further emergent evaluation or workup.  He was counseled on wound care.  His daughter at bedside agrees with the plan.  They will follow up with primary care.  Return precautions were given.        Scribe Attestation:   Scribe #1: I performed the above scribed service and the documentation accurately describes the services I performed. I attest to the accuracy of the note.                 I personally performed the services described in this documentation. All medical record entries made by the scribe were at my direction and in my presence. I have reviewed the chart and agree that the record reflects my personal performance and is accurate and complete.   Clinical Impression:   Final diagnoses:  [W19.XXXA] Fall, initial encounter (Primary)  [T14.8XXA] Abrasion        ED Disposition Condition    Discharge Stable          ED Prescriptions    None       Follow-up Information       Follow up With Specialties Details Why Contact Info    Javier Villanueva MD Family Medicine Schedule an appointment as soon as possible for a visit in 1 week  [address]  [place] [address]  [phone]               Marysol Kyle MD  05/25/23 0909

## 2023-06-19 ENCOUNTER — EXTERNAL HOME HEALTH (OUTPATIENT)
Dept: HOME HEALTH SERVICES | Facility: HOSPITAL | Age: 88
End: 2023-06-19
Payer: MEDICARE

## 2023-06-26 ENCOUNTER — HOSPITAL ENCOUNTER (EMERGENCY)
Facility: HOSPITAL | Age: 88
Discharge: HOME | End: 2023-06-26
Attending: EMERGENCY MEDICINE
Payer: MEDICARE

## 2023-06-26 VITALS
OXYGEN SATURATION: 99 % | WEIGHT: 135 LBS | DIASTOLIC BLOOD PRESSURE: 84 MMHG | TEMPERATURE: 98 F | SYSTOLIC BLOOD PRESSURE: 179 MMHG | BODY MASS INDEX: 19.37 KG/M2 | HEART RATE: 91 BPM | RESPIRATION RATE: 16 BRPM

## 2023-06-26 DIAGNOSIS — M79.645 PAIN IN LEFT FINGER(S): ICD-10-CM

## 2023-06-26 DIAGNOSIS — S50.01XA CONTUSION OF RIGHT ELBOW, INITIAL ENCOUNTER: Primary | ICD-10-CM
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
## 2023-06-26 LAB
ALBUMIN SERPL BCP-MCNC: 3 G/DL (ref 3.5–5.2)
ALP SERPL-CCNC: 181 U/L (ref 55–135)
ALT SERPL W/O P-5'-P-CCNC: 8 U/L (ref 10–44)
ANION GAP SERPL CALC-SCNC: 9 MMOL/L (ref 8–16)
AST SERPL-CCNC: 17 U/L (ref 10–40)
BASOPHILS # BLD AUTO: 0.05 K/UL (ref 0–0.2)
BASOPHILS NFR BLD: 0.9 % (ref 0–1.9)
BILIRUB SERPL-MCNC: 0.2 MG/DL (ref 0.1–1)
BNP SERPL-MCNC: 122 PG/ML (ref 0–99)
BUN SERPL-MCNC: 18 MG/DL (ref 10–30)
CALCIUM SERPL-MCNC: 8.1 MG/DL (ref 8.7–10.5)
CHLORIDE SERPL-SCNC: 114 MMOL/L (ref 95–110)
CO2 SERPL-SCNC: 21 MMOL/L (ref 23–29)
CREAT SERPL-MCNC: 1.1 MG/DL (ref 0.5–1.4)
DIFFERENTIAL METHOD: ABNORMAL
EOSINOPHIL # BLD AUTO: 0.3 K/UL (ref 0–0.5)
EOSINOPHIL NFR BLD: 5 % (ref 0–8)
ERYTHROCYTE [DISTWIDTH] IN BLOOD BY AUTOMATED COUNT: 16.3 % (ref 11.5–14.5)
EST. GFR  (NO RACE VARIABLE): >60 ML/MIN/1.73 M^2
GLUCOSE SERPL-MCNC: 94 MG/DL (ref 70–110)
HCT VFR BLD AUTO: 24.6 % (ref 40–54)
HGB BLD-MCNC: 7.7 G/DL (ref 14–18)
IMM GRANULOCYTES # BLD AUTO: 0.02 K/UL (ref 0–0.04)
IMM GRANULOCYTES NFR BLD AUTO: 0.3 % (ref 0–0.5)
LYMPHOCYTES # BLD AUTO: 1.3 K/UL (ref 1–4.8)
LYMPHOCYTES NFR BLD: 22.8 % (ref 18–48)
MCH RBC QN AUTO: 25.5 PG (ref 27–31)
MCHC RBC AUTO-ENTMCNC: 31.3 G/DL (ref 32–36)
MCV RBC AUTO: 82 FL (ref 82–98)
MONOCYTES # BLD AUTO: 0.6 K/UL (ref 0.3–1)
MONOCYTES NFR BLD: 9.7 % (ref 4–15)
NEUTROPHILS # BLD AUTO: 3.6 K/UL (ref 1.8–7.7)
NEUTROPHILS NFR BLD: 61.3 % (ref 38–73)
NRBC BLD-RTO: 0 /100 WBC
PLATELET # BLD AUTO: 163 K/UL (ref 150–450)
PMV BLD AUTO: 9.9 FL (ref 9.2–12.9)
POTASSIUM SERPL-SCNC: 3.9 MMOL/L (ref 3.5–5.1)
PROT SERPL-MCNC: 6 G/DL (ref 6–8.4)
RBC # BLD AUTO: 3.02 M/UL (ref 4.6–6.2)
SODIUM SERPL-SCNC: 144 MMOL/L (ref 136–145)
TROPONIN I SERPL DL<=0.01 NG/ML-MCNC: 0.01 NG/ML (ref 0–0.03)
WBC # BLD AUTO: 5.8 K/UL (ref 3.9–12.7)

## 2023-06-26 PROCEDURE — 99285 EMERGENCY DEPT VISIT HI MDM: CPT | Mod: 25

## 2023-06-26 PROCEDURE — 83880 ASSAY OF NATRIURETIC PEPTIDE: CPT | Performed by: EMERGENCY MEDICINE

## 2023-06-26 PROCEDURE — 93010 ELECTROCARDIOGRAM REPORT: CPT | Mod: ,,, | Performed by: INTERNAL MEDICINE

## 2023-06-26 PROCEDURE — 93010 EKG 12-LEAD: ICD-10-PCS | Mod: ,,, | Performed by: INTERNAL MEDICINE

## 2023-06-26 PROCEDURE — 80053 COMPREHEN METABOLIC PANEL: CPT | Performed by: EMERGENCY MEDICINE

## 2023-06-26 PROCEDURE — 85025 COMPLETE CBC W/AUTO DIFF WBC: CPT | Performed by: EMERGENCY MEDICINE

## 2023-06-26 PROCEDURE — 84484 ASSAY OF TROPONIN QUANT: CPT | Performed by: EMERGENCY MEDICINE

## 2023-06-26 PROCEDURE — 93005 ELECTROCARDIOGRAM TRACING: CPT

## 2023-06-26 NOTE — ED PROVIDER NOTES
Encounter Date: 6/26/2023       History     Chief Complaint   Patient presents with    Fall     Pt BIB EMS after fall in bathroom. Pt hit head. Small lac with controlled bleeding to left side of head. No LOC. Pt is on blood thinners. Denies pain or dizziness. Denies vomiting. Pupils PEERL.     89 yo M presents after fall. Patient reports he may have tripped, denies LOC. denies chest pain, shortness of breath, abdominal pain.  Very [place]. Complains of a laceration to left scalp and R elbow pain. Chart review shows use of Plavix and Xarelto.  Denies nausea, vomiting.  Previously in usual state of health.      Review of patient's allergies indicates:  No Known Allergies  Past Medical History:   Diagnosis Date    Anticoagulant long-term use     Arthritis     Cancer     right  hand    Coronary artery disease     Deep vein thrombosis     Hyperlipidemia     Hypertension     Seizures 1988    none since on tegretol     Past Surgical History:   Procedure Laterality Date    APPENDECTOMY      CLOSURE OF WOUND  12/9/2020    Procedure: CLOSURE, WOUND;  Surgeon: Yamil Bonilla MD;  Location: [facility] OR;  Service: Plastics;;    EXCISION OF LESION OF EYELID Right 12/9/2020    Procedure: EXCISION TUMOR RIGHT EYEBROW WITH COMPLEX CLOSURE;  Surgeon: Yamil Bonilla MD;  Location: [facility] OR;  Service: Plastics;  Laterality: Right;  FROZEN SECTION  RN PREOP 12/8/2020----COVID NEGATIVE  12/8    FRACTURE SURGERY Right     elbow    FRACTURE SURGERY Right     hip    JOSUÉ FILTER PLACEMENT      hx of deep vein thrombosis    OPEN REDUCTION AND INTERNAL FIXATION (ORIF) OF INTERTROCHANTERIC FRACTURE OF FEMUR Left 5/30/2018    Procedure: ORIF, FRACTURE, FEMUR, INTERTROCHANTERIC;  Surgeon: Pop Martin III, MD;  Location: [facility] OR;  Service: Orthopedics;  Laterality: Left;    TOTAL SHOULDER ARTHROPLASTY Right      No family history on file.  Social History     Tobacco Use    Smoking status: Former     Packs/day: 0.50     Years: 6.00     Pack  years: 3.00     Types: Cigarettes     Quit date: 10/27/1975     Years since quittin.6    Smokeless tobacco: Never   Substance Use Topics    Alcohol use: No    Drug use: No     Review of Systems   Constitutional:  Negative for chills and fever.   HENT:  Negative for sore throat.    Respiratory:  Negative for shortness of breath.    Cardiovascular:  Negative for chest pain.   Gastrointestinal:  Negative for abdominal pain, nausea and vomiting.   Genitourinary:  Negative for dysuria.   Musculoskeletal:  Negative for back pain.   Skin:  Positive for wound. Negative for rash.   Neurological:  Negative for weakness.     Physical Exam     Initial Vitals [23 1533]   BP Pulse Resp Temp SpO2   (!) 186/71 71 18 98.4 °F (36.9 °C) 98 %      MAP       --         Physical Exam    Nursing note and vitals reviewed.  Constitutional: He appears well-developed and well-nourished. He is not diaphoretic. No distress.   HENT:   Head: Normocephalic and atraumatic.   Eyes: Conjunctivae and EOM are normal. Pupils are equal, round, and reactive to light. No scleral icterus.   Neck: Neck supple.   Normal range of motion.  Cardiovascular:  Normal rate, regular rhythm, normal heart sounds and intact distal pulses.     Exam reveals no gallop and no friction rub.       No murmur heard.  Pulmonary/Chest: Breath sounds normal. No stridor. No respiratory distress. He has no wheezes. He has no rhonchi. He has no rales.   Abdominal: Abdomen is soft. Bowel sounds are normal. He exhibits no distension. There is no abdominal tenderness. There is no rebound and no guarding.   Musculoskeletal:         General: No tenderness or edema. Normal range of motion.      Cervical back: Normal range of motion and neck supple.      Comments: Mild tenderness to left 3rd and 4th fingers.  Unclear deformity given chronic deformity of fingers due to age.  Mild tenderness to right elbow     Neurological: He is alert and oriented to person, place, and time. He  has normal strength. No cranial nerve deficit. GCS score is 15. GCS eye subscore is 4. GCS verbal subscore is 5. GCS motor subscore is 6.   Skin: Skin is warm and dry. No rash noted.   Small laceration to left parietal region, scab over top without loose margins.   Psychiatric: He has a normal mood and affect. His behavior is normal.       ED Course   Procedures  Labs Reviewed   CBC W/ AUTO DIFFERENTIAL - Abnormal; Notable for the following components:       Result Value    RBC 3.02 (*)     Hemoglobin 7.7 (*)     Hematocrit 24.6 (*)     MCH 25.5 (*)     MCHC 31.3 (*)     RDW 16.3 (*)     All other components within normal limits   COMPREHENSIVE METABOLIC PANEL - Abnormal; Notable for the following components:    Chloride 114 (*)     CO2 21 (*)     Calcium 8.1 (*)     Albumin 3.0 (*)     Alkaline Phosphatase 181 (*)     ALT 8 (*)     All other components within normal limits   B-TYPE NATRIURETIC PEPTIDE - Abnormal; Notable for the following components:     (*)     All other components within normal limits   TROPONIN I          Imaging Results              X-Ray Elbow Complete Right (Final result)  Result time 06/26/23 19:51:58      Final result by Arthur Walters MD (06/26/23 19:51:58)                   Impression:      1. No acute displaced fracture or dislocation of the elbow noting surgical changes of the olecranon.      Electronically signed by: Arthur Walters MD  Date:    06/26/2023  Time:    19:51               Narrative:    EXAMINATION:  XR ELBOW COMPLETE 3 VIEW RIGHT    CLINICAL HISTORY:  . Unspecified fall, initial encounter    TECHNIQUE:  AP, lateral, and oblique views of the right elbow were performed.    COMPARISON:  10/04/2022    FINDINGS:  Two views right elbow.    There is osteopenia.  No significant displacement of the anterior or posterior elbow fat pads.  The anterior humeral line and radiocapitellar line are in appropriate orientation.  There are stable surgical changes of the olecranon.   There is partially visualized right humeral prosthesis proximally.                                       X-Ray Hand 2 View Left (Final result)  Result time 06/26/23 19:50:54      Final result by Arthur Walters MD (06/26/23 19:50:54)                   Impression:      1. No convincing acute displaced fracture or dislocation of the left hand.  Questionable radiopaque foreign bodies as described.      Electronically signed by: Arthur Walters MD  Date:    06/26/2023  Time:    19:50               Narrative:    EXAMINATION:  XR HAND 2 VIEW LEFT    CLINICAL HISTORY:  Pain in left finger(s)    COMPARISON:  None    FINDINGS:  Two views left hand.    There is osteopenia.  There are degenerative changes of the hand.  There is limited evaluation of the digits as the digits are flexed.  There are degenerative changes of the 1st carpal metacarpal joint.  The wrist appears intact.  There are punctate radiopaque structures along the dorsal aspect of either the 2nd or 3rd digit distally.  Correlation is advised.                                       CT Head Without Contrast (Final result)  Result time 06/26/23 16:51:04      Final result by Kenny Flores Jr., MD (06/26/23 16:51:04)                   Impression:      No acute intracranial abnormality      Electronically signed by: Kenny Wall Jr  Date:    06/26/2023  Time:    16:51               Narrative:    EXAMINATION:  CT HEAD WITHOUT CONTRAST    CLINICAL HISTORY:  Head trauma, minor (Age >= 65y);    TECHNIQUE:  Low dose axial images were obtained through the head.  Coronal and sagittal reformations were also performed. Contrast was not administered.    COMPARISON:  05/25/2023    FINDINGS:  There is severe cerebral and cerebellar atrophy.  There is punctate and confluent periventricular low density of chronic microvascular ischemic changes and or benign white matter changes of aging.There is no evidence of hemorrhage, mass, or acute infarct.    The visualized  paranasal sinuses are clear.                                       CT Cervical Spine Without Contrast (Final result)  Result time 06/26/23 17:31:36      Final result by Teressa Parnell MD (06/26/23 17:31:36)                   Impression:      No acute cervical fracture.  Severe spondylitic changes.    Mild partial opacification the right mastoid air cells.      Electronically signed by: Teressa Parnell  Date:    06/26/2023  Time:    17:31               Narrative:    EXAMINATION:  CT OF THE CERVICAL  SPINE WITHOUT    CLINICAL HISTORY:  Status post fall in bathroom.  Hit head.  Laceration with control bleeding to the left side of the head.    TECHNIQUE:  2.5 mm axial images were obtained through the cervical  spine. Coronal and sagittal reformatted images were provided.    COMPARISON:  11/16/2022    FINDINGS:  There is mild dextroscoliosis of the cervical  spine. There is no prevertebral soft tissue swelling.  There is no vertebral body fracture or subluxation.  There is bony fusion of C3 and C4 vertebral bodies.  There is significant intervertebral disc space narrowing, endplate sclerosis, marginal osteophytes, and subchondral cysts present.  There is mild partial opacification of the right mastoid air cells.  The bones are osteopenic.  There is incompletely visualized tubular structure seen in the right neck and right chest.                                       Medications - No data to display  Medical Decision Making:   Initial Assessment:   89-year-old male presenting with small scalp abrasion versus laceration, mild tenderness to fingers and right elbow.  On exam he is well-appearing and in no acute distress.  Mild hypertension.  Patient is on blood thinners.  Appears to be acting normal for age.  History somewhat limited due to patient being extremely hard of hearing.  Patient suspect fall was mechanical.  However given age and risk factors, will get further workup to further rule out metabolic or  cardiogenic cause.  Laceration does not require repair.  Will get CT head neck, labs, xrays, reassess.  ED Management:  CT head neck negative.  Pending results of x-ray hand and elbow.  If negative, patient will be safe for discharge home.  Discussed patient with Dr. Miner who will ultimately discharge the patient pending x-rays.  Discussed patient with patient's daughter and expected discharge disposition.                        Clinical Impression:   Final diagnoses:  [W19.XXXA] Fall  [M79.645] Pain in left finger(s)  [S50.01XA] Contusion of right elbow, initial encounter (Primary)        ED Disposition Condition    Discharge Stable          ED Prescriptions    None       Follow-up Information       Follow up With Specialties Details Why Contact Info    Javier Villanueva MD Family Medicine Schedule an appointment as soon as possible for a visit   [address] [place] [address]  [phone]      [facility] Emergency Medicine  As needed, If symptoms worsen [address]  [facility] [address] [phone]             Jose Price MD  06/27/23 0662

## 2023-06-26 NOTE — FIRST PROVIDER EVALUATION
Medical screening examination initiated.  I have conducted a focused provider triage encounter, findings are as follows:    Brief history of present illness:  89 yo M presents after fall. Patient reports he may have tripped, denies LOC. Very [place]. Complains of a laceration to left scalp and R elbow pain. Chart review shows use of Plavix and Xarelto.     Vitals:    06/26/23 1533   BP: (!) 186/71   Pulse: 71   Resp: 18   Temp: 98.4 °F (36.9 °C)   TempSrc: Oral   SpO2: 98%   Weight: 61.2 kg (135 lb)       Pertinent physical exam:  Blood noted to left scalp, no active bleeding. Very [place]. No posterior neck tenderness. R elbow with skin tear.     Brief workup plan:  CT head and C spine, XR R elbow, wound care.     Preliminary workup initiated; this workup will be continued and followed by the physician or advanced practice provider that is assigned to the patient when roomed.    Mimi Shore MD   4:10 PM

## 2023-06-27 NOTE — ED NOTES
Patient's daughter states he is due for his BP medications and will get his dose when he arrives back to his care facility.

## 2023-06-29 ENCOUNTER — PES CALL (OUTPATIENT)
Dept: ADMINISTRATIVE | Facility: CLINIC | Age: 88
End: 2023-06-29
Payer: MEDICARE

## 2023-09-04 ENCOUNTER — HOSPITAL ENCOUNTER (OUTPATIENT)
Facility: HOSPITAL | Age: 88
End: 2023-09-05
Attending: EMERGENCY MEDICINE | Admitting: HOSPITALIST
Payer: MEDICARE

## 2023-09-04 DIAGNOSIS — R07.9 CHEST PAIN: ICD-10-CM

## 2023-09-04 DIAGNOSIS — N30.01 ACUTE CYSTITIS WITH HEMATURIA: Primary | ICD-10-CM

## 2023-09-04 DIAGNOSIS — Z79.01 CHRONIC ANTICOAGULATION: Chronic | ICD-10-CM

## 2023-09-04 DIAGNOSIS — R41.82 ALTERED MENTAL STATUS: ICD-10-CM

## 2023-09-04 LAB
ALBUMIN SERPL BCP-MCNC: 3.3 G/DL (ref 3.5–5.2)
ALP SERPL-CCNC: 206 U/L (ref 55–135)
ALT SERPL W/O P-5'-P-CCNC: 9 U/L (ref 10–44)
ANION GAP SERPL CALC-SCNC: 9 MMOL/L (ref 8–16)
AST SERPL-CCNC: 14 U/L (ref 10–40)
BACTERIA #/AREA URNS HPF: ABNORMAL /HPF
BASOPHILS # BLD AUTO: 0.07 K/UL (ref 0–0.2)
BASOPHILS NFR BLD: 1 % (ref 0–1.9)
BILIRUB SERPL-MCNC: 0.4 MG/DL (ref 0.1–1)
BILIRUB UR QL STRIP: NEGATIVE
BUN SERPL-MCNC: 18 MG/DL (ref 10–30)
CALCIUM SERPL-MCNC: 8.5 MG/DL (ref 8.7–10.5)
CARBAMAZEPINE SERPL-MCNC: 5.5 UG/ML (ref 4–12)
CHLORIDE SERPL-SCNC: 110 MMOL/L (ref 95–110)
CLARITY UR: ABNORMAL
CO2 SERPL-SCNC: 21 MMOL/L (ref 23–29)
COLOR UR: ABNORMAL
CREAT SERPL-MCNC: 1.3 MG/DL (ref 0.5–1.4)
DIFFERENTIAL METHOD: ABNORMAL
EOSINOPHIL # BLD AUTO: 0.2 K/UL (ref 0–0.5)
EOSINOPHIL NFR BLD: 3.2 % (ref 0–8)
ERYTHROCYTE [DISTWIDTH] IN BLOOD BY AUTOMATED COUNT: 15.2 % (ref 11.5–14.5)
EST. GFR  (NO RACE VARIABLE): 52 ML/MIN/1.73 M^2
GLUCOSE SERPL-MCNC: 115 MG/DL (ref 70–110)
GLUCOSE UR QL STRIP: NEGATIVE
HCT VFR BLD AUTO: 28.3 % (ref 40–54)
HGB BLD-MCNC: 9.3 G/DL (ref 14–18)
HGB UR QL STRIP: ABNORMAL
HYALINE CASTS #/AREA URNS LPF: 0 /LPF
IMM GRANULOCYTES # BLD AUTO: 0.03 K/UL (ref 0–0.04)
IMM GRANULOCYTES NFR BLD AUTO: 0.4 % (ref 0–0.5)
KETONES UR QL STRIP: NEGATIVE
LEUKOCYTE ESTERASE UR QL STRIP: ABNORMAL
LYMPHOCYTES # BLD AUTO: 1.5 K/UL (ref 1–4.8)
LYMPHOCYTES NFR BLD: 21.6 % (ref 18–48)
MCH RBC QN AUTO: 28 PG (ref 27–31)
MCHC RBC AUTO-ENTMCNC: 32.9 G/DL (ref 32–36)
MCV RBC AUTO: 85 FL (ref 82–98)
MICROSCOPIC COMMENT: ABNORMAL
MONOCYTES # BLD AUTO: 0.8 K/UL (ref 0.3–1)
MONOCYTES NFR BLD: 11.8 % (ref 4–15)
NEUTROPHILS # BLD AUTO: 4.4 K/UL (ref 1.8–7.7)
NEUTROPHILS NFR BLD: 62 % (ref 38–73)
NITRITE UR QL STRIP: NEGATIVE
NRBC BLD-RTO: 0 /100 WBC
PH UR STRIP: 6 [PH] (ref 5–8)
PLATELET # BLD AUTO: 158 K/UL (ref 150–450)
PMV BLD AUTO: 9.4 FL (ref 9.2–12.9)
POTASSIUM SERPL-SCNC: 3.8 MMOL/L (ref 3.5–5.1)
PROT SERPL-MCNC: 6.7 G/DL (ref 6–8.4)
PROT UR QL STRIP: ABNORMAL
RBC # BLD AUTO: 3.32 M/UL (ref 4.6–6.2)
RBC #/AREA URNS HPF: 62 /HPF (ref 0–4)
SODIUM SERPL-SCNC: 140 MMOL/L (ref 136–145)
SP GR UR STRIP: 1.01 (ref 1–1.03)
URN SPEC COLLECT METH UR: ABNORMAL
UROBILINOGEN UR STRIP-ACNC: NEGATIVE EU/DL
WBC # BLD AUTO: 7.13 K/UL (ref 3.9–12.7)
WBC #/AREA URNS HPF: >100 /HPF (ref 0–5)
WBC CLUMPS URNS QL MICRO: ABNORMAL

## 2023-09-04 PROCEDURE — 81000 URINALYSIS NONAUTO W/SCOPE: CPT | Performed by: EMERGENCY MEDICINE

## 2023-09-04 PROCEDURE — 85025 COMPLETE CBC W/AUTO DIFF WBC: CPT | Performed by: EMERGENCY MEDICINE

## 2023-09-04 PROCEDURE — 80156 ASSAY CARBAMAZEPINE TOTAL: CPT | Performed by: EMERGENCY MEDICINE

## 2023-09-04 PROCEDURE — 99285 EMERGENCY DEPT VISIT HI MDM: CPT | Mod: 25,27

## 2023-09-04 PROCEDURE — 87086 URINE CULTURE/COLONY COUNT: CPT | Performed by: EMERGENCY MEDICINE

## 2023-09-04 PROCEDURE — 80053 COMPREHEN METABOLIC PANEL: CPT | Performed by: EMERGENCY MEDICINE

## 2023-09-05 VITALS
OXYGEN SATURATION: 99 % | DIASTOLIC BLOOD PRESSURE: 69 MMHG | HEART RATE: 62 BPM | BODY MASS INDEX: 19.33 KG/M2 | HEIGHT: 70 IN | TEMPERATURE: 98 F | RESPIRATION RATE: 17 BRPM | WEIGHT: 135 LBS | SYSTOLIC BLOOD PRESSURE: 154 MMHG

## 2023-09-05 PROCEDURE — G0378 HOSPITAL OBSERVATION PER HR: HCPCS

## 2023-09-05 PROCEDURE — 96365 THER/PROPH/DIAG IV INF INIT: CPT

## 2023-09-05 PROCEDURE — 25000003 PHARM REV CODE 250: Performed by: STUDENT IN AN ORGANIZED HEALTH CARE EDUCATION/TRAINING PROGRAM

## 2023-09-05 PROCEDURE — 63600175 PHARM REV CODE 636 W HCPCS: Performed by: STUDENT IN AN ORGANIZED HEALTH CARE EDUCATION/TRAINING PROGRAM

## 2023-09-05 PROCEDURE — 96365 THER/PROPH/DIAG IV INF INIT: CPT | Mod: 59

## 2023-09-05 PROCEDURE — 25000003 PHARM REV CODE 250: Performed by: REGISTERED NURSE

## 2023-09-05 PROCEDURE — 25000003 PHARM REV CODE 250: Performed by: EMERGENCY MEDICINE

## 2023-09-05 PROCEDURE — 63600175 PHARM REV CODE 636 W HCPCS: Performed by: EMERGENCY MEDICINE

## 2023-09-05 RX ORDER — TAMSULOSIN HYDROCHLORIDE 0.4 MG/1
0.4 CAPSULE ORAL DAILY
Status: DISCONTINUED | OUTPATIENT
Start: 2023-09-05 | End: 2023-09-05 | Stop reason: HOSPADM

## 2023-09-05 RX ORDER — IBUPROFEN 200 MG
16 TABLET ORAL
Status: DISCONTINUED | OUTPATIENT
Start: 2023-09-05 | End: 2023-09-05 | Stop reason: HOSPADM

## 2023-09-05 RX ORDER — TALC
6 POWDER (GRAM) TOPICAL NIGHTLY PRN
Status: DISCONTINUED | OUTPATIENT
Start: 2023-09-05 | End: 2023-09-05 | Stop reason: HOSPADM

## 2023-09-05 RX ORDER — SODIUM CHLORIDE 0.9 % (FLUSH) 0.9 %
10 SYRINGE (ML) INJECTION EVERY 12 HOURS PRN
Status: DISCONTINUED | OUTPATIENT
Start: 2023-09-05 | End: 2023-09-05 | Stop reason: HOSPADM

## 2023-09-05 RX ORDER — METOPROLOL TARTRATE 25 MG/1
12.5 TABLET ORAL 2 TIMES DAILY
Status: DISCONTINUED | OUTPATIENT
Start: 2023-09-05 | End: 2023-09-05 | Stop reason: HOSPADM

## 2023-09-05 RX ORDER — ASPIRIN 81 MG/1
81 TABLET ORAL DAILY
Qty: 90 TABLET | Refills: 3 | Status: SHIPPED | OUTPATIENT
Start: 2023-09-08 | End: 2024-09-07

## 2023-09-05 RX ORDER — ACETAMINOPHEN 325 MG/1
650 TABLET ORAL EVERY 4 HOURS PRN
Status: DISCONTINUED | OUTPATIENT
Start: 2023-09-05 | End: 2023-09-05 | Stop reason: HOSPADM

## 2023-09-05 RX ORDER — IBUPROFEN 200 MG
24 TABLET ORAL
Status: DISCONTINUED | OUTPATIENT
Start: 2023-09-05 | End: 2023-09-05 | Stop reason: HOSPADM

## 2023-09-05 RX ORDER — CLOPIDOGREL BISULFATE 75 MG/1
75 TABLET ORAL DAILY
Status: DISCONTINUED | OUTPATIENT
Start: 2023-09-05 | End: 2023-09-05

## 2023-09-05 RX ORDER — ONDANSETRON 2 MG/ML
4 INJECTION INTRAMUSCULAR; INTRAVENOUS EVERY 8 HOURS PRN
Status: DISCONTINUED | OUTPATIENT
Start: 2023-09-05 | End: 2023-09-05 | Stop reason: HOSPADM

## 2023-09-05 RX ORDER — NALOXONE HCL 0.4 MG/ML
0.02 VIAL (ML) INJECTION
Status: DISCONTINUED | OUTPATIENT
Start: 2023-09-05 | End: 2023-09-05 | Stop reason: HOSPADM

## 2023-09-05 RX ORDER — FINASTERIDE 5 MG/1
5 TABLET, FILM COATED ORAL DAILY
Status: DISCONTINUED | OUTPATIENT
Start: 2023-09-05 | End: 2023-09-05 | Stop reason: HOSPADM

## 2023-09-05 RX ORDER — ATORVASTATIN CALCIUM 10 MG/1
20 TABLET, FILM COATED ORAL DAILY
Status: DISCONTINUED | OUTPATIENT
Start: 2023-09-05 | End: 2023-09-05 | Stop reason: HOSPADM

## 2023-09-05 RX ORDER — CEFDINIR 300 MG/1
300 CAPSULE ORAL 2 TIMES DAILY
Qty: 20 CAPSULE | Refills: 0 | Status: SHIPPED | OUTPATIENT
Start: 2023-09-06 | End: 2023-09-16

## 2023-09-05 RX ORDER — GLUCAGON 1 MG
1 KIT INJECTION
Status: DISCONTINUED | OUTPATIENT
Start: 2023-09-05 | End: 2023-09-05 | Stop reason: HOSPADM

## 2023-09-05 RX ORDER — HYDROXYZINE HYDROCHLORIDE 10 MG/1
10 TABLET, FILM COATED ORAL 2 TIMES DAILY
Status: DISCONTINUED | OUTPATIENT
Start: 2023-09-05 | End: 2023-09-05 | Stop reason: HOSPADM

## 2023-09-05 RX ORDER — CARBAMAZEPINE 100 MG/1
200 TABLET, CHEWABLE ORAL DAILY
Status: DISCONTINUED | OUTPATIENT
Start: 2023-09-05 | End: 2023-09-05 | Stop reason: HOSPADM

## 2023-09-05 RX ORDER — ASPIRIN 81 MG/1
81 TABLET ORAL DAILY
Qty: 90 TABLET | Refills: 3 | Status: SHIPPED | OUTPATIENT
Start: 2023-09-08 | End: 2023-09-05 | Stop reason: SDUPTHER

## 2023-09-05 RX ORDER — CHOLECALCIFEROL (VITAMIN D3) 25 MCG
1000 TABLET ORAL DAILY
Status: DISCONTINUED | OUTPATIENT
Start: 2023-09-05 | End: 2023-09-05 | Stop reason: HOSPADM

## 2023-09-05 RX ORDER — CEFDINIR 300 MG/1
300 CAPSULE ORAL 2 TIMES DAILY
Qty: 20 CAPSULE | Refills: 0 | Status: SHIPPED | OUTPATIENT
Start: 2023-09-06 | End: 2023-09-05 | Stop reason: SDUPTHER

## 2023-09-05 RX ADMIN — FINASTERIDE 5 MG: 5 TABLET, FILM COATED ORAL at 08:09

## 2023-09-05 RX ADMIN — TAMSULOSIN HYDROCHLORIDE 0.4 MG: 0.4 CAPSULE ORAL at 08:09

## 2023-09-05 RX ADMIN — CEFTRIAXONE 1 G: 1 INJECTION, POWDER, FOR SOLUTION INTRAMUSCULAR; INTRAVENOUS at 12:09

## 2023-09-05 RX ADMIN — CARBAMAZEPINE 200 MG: 100 TABLET, CHEWABLE ORAL at 08:09

## 2023-09-05 RX ADMIN — METOPROLOL TARTRATE 12.5 MG: 25 TABLET, FILM COATED ORAL at 08:09

## 2023-09-05 RX ADMIN — CLOPIDOGREL BISULFATE 75 MG: 75 TABLET ORAL at 08:09

## 2023-09-05 RX ADMIN — HYDROXYZINE HYDROCHLORIDE 10 MG: 10 TABLET ORAL at 08:09

## 2023-09-05 RX ADMIN — CHOLECALCIFEROL TAB 25 MCG (1000 UNIT) 1000 UNITS: 25 TAB at 08:09

## 2023-09-05 RX ADMIN — ATORVASTATIN CALCIUM 20 MG: 10 TABLET, FILM COATED ORAL at 08:09

## 2023-09-05 RX ADMIN — RIVAROXABAN 20 MG: 20 TABLET, FILM COATED ORAL at 08:09

## 2023-09-05 NOTE — NURSING
Patient transported to [facility] via stretcher by ambulance. Patient's daughter by his side.

## 2023-09-05 NOTE — ASSESSMENT & PLAN NOTE
UA infected with with 3+ leuks and pyuria  Started on Rocephin  Last UA positive for Candida  Await culture and sensitivity, change antibiotic if needed

## 2023-09-05 NOTE — SUBJECTIVE & OBJECTIVE
Past Medical History:   Diagnosis Date    Anticoagulant long-term use     Arthritis     Cancer     right  hand    Coronary artery disease     Deep vein thrombosis     Hyperlipidemia     Hypertension     Seizures 1988    none since on tegretol       Past Surgical History:   Procedure Laterality Date    APPENDECTOMY      CLOSURE OF WOUND  12/9/2020    Procedure: CLOSURE, WOUND;  Surgeon: Yamil Bonilla MD;  Location: [facility] OR;  Service: Plastics;;    EXCISION OF LESION OF EYELID Right 12/9/2020    Procedure: EXCISION TUMOR RIGHT EYEBROW WITH COMPLEX CLOSURE;  Surgeon: Yamil Bonilla MD;  Location: [facility] OR;  Service: Plastics;  Laterality: Right;  FROZEN SECTION  RN PREOP 12/8/2020----COVID NEGATIVE  12/8    FRACTURE SURGERY Right     elbow    FRACTURE SURGERY Right     hip    JOSUÉ FILTER PLACEMENT      hx of deep vein thrombosis    OPEN REDUCTION AND INTERNAL FIXATION (ORIF) OF INTERTROCHANTERIC FRACTURE OF FEMUR Left 5/30/2018    Procedure: ORIF, FRACTURE, FEMUR, INTERTROCHANTERIC;  Surgeon: Pop Martin III, MD;  Location: [facility] OR;  Service: Orthopedics;  Laterality: Left;    TOTAL SHOULDER ARTHROPLASTY Right        Review of patient's allergies indicates:  No Known Allergies    Current Facility-Administered Medications on File Prior to Encounter   Medication    [DISCONTINUED] LIDOcaine 2%/EPINEPHrine 1:100,000 injection 10 mL    [DISCONTINUED] LIDOcaine-EPINEPHrine 1%-1:100,000 injection 10 mL     Current Outpatient Medications on File Prior to Encounter   Medication Sig    acetaminophen (TYLENOL) 325 MG tablet Take 650 mg by mouth every 6 (six) hours as needed for Pain.    atorvastatin (LIPITOR) 20 MG tablet TAKE ONE TABLET BY MOUTH ONCE DAILY    carBAMazepine (TEGRETOL) 100 mg chewable tablet TAKE 2 TABLETS BY MOUTH DAILY    clopidogreL (PLAVIX) 75 mg tablet Take 1 tablet (75 mg total) by mouth once daily.    cyanocobalamin, vitamin B-12, 1,000 mcg Subl Place 1,000 mcg under the tongue once daily.     finasteride (PROSCAR) 5 mg tablet TAKE 1 TABLET BY MOUTH ONCE DAILY.    hydrOXYzine HCL (ATARAX) 10 MG Tab TAKE 1 TABLET BY MOUTH TWICE A DAY.    melatonin (MELATIN) 5 mg Take 1 tablet by mouth every evening.    metoprolol tartrate (LOPRESSOR) 25 MG tablet Take 0.5 tablets (12.5 mg total) by mouth 2 (two) times daily.    tamsulosin (FLOMAX) 0.4 mg Cap Take by mouth once daily.    vitamin D (VITAMIN D3) 1000 units Tab Take 1 tablet (1,000 Units total) by mouth once daily.    XARELTO 20 mg Tab TAKE 1 TABLET BY MOUTH ONCE DAILY.    [DISCONTINUED] ofloxacin (OCUFLOX) 0.3 % ophthalmic solution     [DISCONTINUED] prednisoLONE acetate (PRED FORTE) 1 % DrpS Place into both eyes.    [DISCONTINUED] triamcinolone acetonide 0.025% (KENALOG) 0.025 % cream Apply topically 2 (two) times daily.     Family History    None       Tobacco Use    Smoking status: Former     Current packs/day: 0.00     Average packs/day: 0.5 packs/day for 6.0 years (3.0 ttl pk-yrs)     Types: Cigarettes     Start date: 10/27/1969     Quit date: 10/27/1975     Years since quittin.8    Smokeless tobacco: Never   Substance and Sexual Activity    Alcohol use: No    Drug use: No    Sexual activity: Not Currently     Partners: Female     Review of Systems   All other systems reviewed and are negative.    Objective:     Vital Signs (Most Recent):  Temp: 97.6 °F (36.4 °C) (23)  Pulse: 101 (23)  Resp: 17 (23)  BP: (!) 130/99 (23)  SpO2: (!) 92 % (23) Vital Signs (24h Range):  Temp:  [97.6 °F (36.4 °C)-98.1 °F (36.7 °C)] 97.6 °F (36.4 °C)  Pulse:  [] 101  Resp:  [17-19] 17  SpO2:  [92 %-100 %] 92 %  BP: (130-179)/(80-99) 130/99     Weight: 61.2 kg (135 lb)  Body mass index is 19.37 kg/m².     Physical Exam  Constitutional:       Comments: Combative on assessment   HENT:      Head: Normocephalic.   Cardiovascular:      Rate and Rhythm: Regular rhythm. Tachycardia present.      Pulses: Normal pulses.       Heart sounds: Normal heart sounds.   Pulmonary:      Effort: Pulmonary effort is normal.      Breath sounds: Normal breath sounds.   Abdominal:      General: Bowel sounds are normal.      Palpations: Abdomen is soft.   Musculoskeletal:         General: Normal range of motion.   Skin:     General: Skin is warm and dry.      Capillary Refill: Capillary refill takes less than 2 seconds.      Comments: Skin tear right hand   Neurological:      Mental Status: He is alert. He is disoriented.   Psychiatric:      Comments: Combative when interacted with                Significant Labs: All pertinent labs within the past 24 hours have been reviewed.  Recent Lab Results         09/04/23  2313   09/04/23  2311        Albumin   3.3       Alkaline Phosphatase   206       ALT   9       Anion Gap   9       Appearance, UA Cloudy         AST   14       Bacteria, UA Occasional         Baso #   0.07       Basophil %   1.0       Bilirubin (UA) Negative         BILIRUBIN TOTAL   0.4  Comment: For infants and newborns, interpretation of results should be based  on gestational age, weight and in agreement with clinical  observations.    Premature Infant recommended reference ranges:  Up to 24 hours.............<8.0 mg/dL  Up to 48 hours............<12.0 mg/dL  3-5 days..................<15.0 mg/dL  6-29 days.................<15.0 mg/dL         BUN   18       Calcium   8.5       Carbamazepine Lvl   5.5       Chloride   110       CO2   21       Color, UA Orange         Creatinine   1.3       Differential Method   Automated       eGFR   52       Eos #   0.2       Eosinophil %   3.2       Glucose   115       Glucose, UA Negative         Gran # (ANC)   4.4       Gran %   62.0       Hematocrit   28.3       Hemoglobin   9.3       Hyaline Casts, UA 0         Immature Grans (Abs)   0.03  Comment: Mild elevation in immature granulocytes is non specific and   can be seen in a variety of conditions including stress response,   acute  inflammation, trauma and pregnancy. Correlation with other   laboratory and clinical findings is essential.         Immature Granulocytes   0.4       Ketones, UA Negative         Leukocytes, UA 3+         Lymph #   1.5       Lymph %   21.6       MCH   28.0       MCHC   32.9       MCV   85       Microscopic Comment SEE COMMENT  Comment: Other formed elements not mentioned in the report are not   present in the microscopic examination.            Mono #   0.8       Mono %   11.8       MPV   9.4       NITRITE UA Negative         nRBC   0       Occult Blood UA 2+         pH, UA 6.0         Platelets   158       Potassium   3.8       PROTEIN TOTAL   6.7       Protein, UA 1+  Comment: Recommend a 24 hour urine protein or a urine   protein/creatinine ratio if globulin induced proteinuria is  clinically suspected.           RBC   3.32       RBC, UA 62         RDW   15.2       Sodium   140       Specific [place], UA 1.010         Specimen UA Urine, Clean Catch         UROBILINOGEN UA Negative         WBC Clumps, UA Many         WBC, UA >100         WBC   7.13               Significant Imaging: I have reviewed all pertinent imaging results/findings within the past 24 hours.  I have reviewed and interpreted all pertinent imaging results/findings within the past 24 hours.

## 2023-09-05 NOTE — DISCHARGE SUMMARY
[facility] Medicine  Discharge Summary      Patient Name: Jaydon Mccarthy  MRN: [id number]  JEAN: [id number]  Patient Class: OP- Observation  Admission Date: 9/4/2023  Hospital Length of Stay: 0 days  Discharge Date and Time:  09/05/2023 11:49 AM  Attending Physician: Charles Cam MD   Discharging Provider: Charles Cam MD  Primary Care Provider: Javier Villanueva MD    Primary Care Team: Networked reference to record PCT     HPI:   Patient is a 89-year-old male at [facility] presented to ED earlier today for unwitnessed fall.  Patient was worked up x-ray hand, CT head and C-spine without acute abnormality.  Labwork benign and he was discharged.  Nursing home reports when patient returned he was becoming combative with staff.  Patient then presented back to ER.  ED MD spoke to family who reports this is very similar how patient was when he had a UTI.  Urine was checked and patient does have a UTI with pyuria.  Patient was started on Rocephin will await cultures.  On assessment in room patient was combative with staff trying to hit them while doing vital signs  And trying to change soiled brief.  Patient will be admitted to Hospital Medicine will place soft mittens to protect patient and staff      * No surgery found *      Hospital Course:   Cystitis with hematuria and enceph, placed in OBS  Improved overnight, not aggressive this am, very cooperative  IV ceft given, will switch to oral cefdinir (previous Viridans Strept UTI)    Given 89yo with frequent falls and +2 blood in urine, would reduced from Plavix and Xarelto to aspirin alone.    Previous DVT was several years ago, and last stent was placed several years ago.   No Afib on record, and in NSR on previous EKGs.  Follow-up with Cardiology and PCP to monitor and make adjustments  Follow-up with Urology in a couple weeks after antibiotics, to see if still blood in urine  Take cefdinir twice daily for 10 days, starting 09/06/2023    Thank  you for trusting [facility] and me with your care.  We are honored that you entrusted us with your healthcare needs. Your satisfaction is very important to us and we hope you have been very pleased with your experience at [facility]. After your discharge you may receive a survey asking you to rate your hospital experience. We encourage you to take the time to complete the survey as your feedback allows us to identify areas for improvement as well as recognize our staff for their care. We hope that you have received the very best care possible during your hospitalization at [facility], as your satisfaction is our top priority.    Let me know if there is anything more I can do!!        Charles Cam MD  Internal Medicine Staff    Goals of Care Treatment Preferences:  Code Status: Full Code    Living Will: Yes              Consults:     No new Assessment & Plan notes have been filed under this hospital service since the last note was generated.  Service: Hospital Medicine    Final Active Diagnoses:    Diagnosis Date Noted POA    PRINCIPAL PROBLEM:  UTI (urinary tract infection) [N39.0] 10/04/2022 Yes    Fall [W19.XXXA] 10/04/2022 Yes    Anemia of chronic disease [D63.8] 10/28/2015 Yes     Chronic    Epilepsy [G40.909] 10/28/2015 Yes     Chronic    Essential hypertension [I10] 09/17/2015 Yes     Chronic    Benign prostatic hyperplasia with lower urinary tract symptoms [N40.1] 08/20/2013 Yes      Problems Resolved During this Admission:       Discharged Condition: good    Disposition: back to NH    Follow Up:    Patient Instructions:      Ambulatory referral/consult to Internal Medicine   Standing Status: Future   Referral Priority: Routine Referral Type: Consultation   Referral Reason: Specialty Services Required   Requested Specialty: Internal Medicine   Number of Visits Requested: 1     Ambulatory referral/consult to Cardiology   Standing Status: Future   Referral Priority: Routine  Referral Type: Consultation   Referral Reason: Specialty Services Required   Requested Specialty: Cardiology   Number of Visits Requested: 1     Ambulatory referral/consult to Urology   Standing Status: Future   Referral Priority: Routine Referral Type: Consultation   Referral Reason: Specialty Services Required   Requested Specialty: Urology   Number of Visits Requested: 1       Recent Results (from the past 100 hour(s))   CBC auto differential    Collection Time: 09/04/23 11:11 PM   Result Value Ref Range    WBC 7.13 3.90 - 12.70 K/uL    RBC 3.32 (L) 4.60 - 6.20 M/uL    Hemoglobin 9.3 (L) 14.0 - 18.0 g/dL    Hematocrit 28.3 (L) 40.0 - 54.0 %    MCV 85 82 - 98 fL    MCH 28.0 27.0 - 31.0 pg    MCHC 32.9 32.0 - 36.0 g/dL    RDW 15.2 (H) 11.5 - 14.5 %    Platelets 158 150 - 450 K/uL    MPV 9.4 9.2 - 12.9 fL    Immature Granulocytes 0.4 0.0 - 0.5 %    Gran # (ANC) 4.4 1.8 - 7.7 K/uL    Immature Grans (Abs) 0.03 0.00 - 0.04 K/uL    Lymph # 1.5 1.0 - 4.8 K/uL    Mono # 0.8 0.3 - 1.0 K/uL    Eos # 0.2 0.0 - 0.5 K/uL    Baso # 0.07 0.00 - 0.20 K/uL    nRBC 0 0 /100 WBC    Gran % 62.0 38.0 - 73.0 %    Lymph % 21.6 18.0 - 48.0 %    Mono % 11.8 4.0 - 15.0 %    Eosinophil % 3.2 0.0 - 8.0 %    Basophil % 1.0 0.0 - 1.9 %    Differential Method Automated    Comprehensive metabolic panel    Collection Time: 09/04/23 11:11 PM   Result Value Ref Range    Sodium 140 136 - 145 mmol/L    Potassium 3.8 3.5 - 5.1 mmol/L    Chloride 110 95 - 110 mmol/L    CO2 21 (L) 23 - 29 mmol/L    Glucose 115 (H) 70 - 110 mg/dL    BUN 18 10 - 30 mg/dL    Creatinine 1.3 0.5 - 1.4 mg/dL    Calcium 8.5 (L) 8.7 - 10.5 mg/dL    Total Protein 6.7 6.0 - 8.4 g/dL    Albumin 3.3 (L) 3.5 - 5.2 g/dL    Total Bilirubin 0.4 0.1 - 1.0 mg/dL    Alkaline Phosphatase 206 (H) 55 - 135 U/L    AST 14 10 - 40 U/L    ALT 9 (L) 10 - 44 U/L    eGFR 52 (A) >60 mL/min/1.73 m^2    Anion Gap 9 8 - 16 mmol/L   Carbamazepine level, total    Collection Time: 09/04/23 11:11 PM    Result Value Ref Range    Carbamazepine Lvl 5.5 4.0 - 12.0 ug/mL   Urinalysis, Reflex to Urine Culture Urine, Clean Catch    Collection Time: 09/04/23 11:13 PM    Specimen: Urine   Result Value Ref Range    Specimen UA Urine, Clean Catch     Color, UA Orange (A) Yellow, Straw, Freya    Appearance, UA Cloudy (A) Clear    pH, UA 6.0 5.0 - 8.0    Specific Gravity, UA 1.010 1.005 - 1.030    Protein, UA 1+ (A) Negative    Glucose, UA Negative Negative    Ketones, UA Negative Negative    Bilirubin (UA) Negative Negative    Occult Blood UA 2+ (A) Negative    Nitrite, UA Negative Negative    Urobilinogen, UA Negative <2.0 EU/dL    Leukocytes, UA 3+ (A) Negative   Urinalysis Microscopic    Collection Time: 09/04/23 11:13 PM   Result Value Ref Range    RBC, UA 62 (H) 0 - 4 /hpf    WBC, UA >100 (H) 0 - 5 /hpf    WBC Clumps, UA Many (A) None-Rare    Bacteria Occasional None-Occ /hpf    Hyaline Casts, UA 0 0-1/lpf /lpf    Microscopic Comment SEE COMMENT    Urine culture    Collection Time: 09/04/23 11:13 PM    Specimen: Urine   Result Value Ref Range    Urine Culture, Routine No growth to date        Microbiology Results (last 7 days)       Procedure Component Value Units Date/Time    Urine culture [[id number]] Collected: 09/04/23 2313    Order Status: Completed Specimen: Urine Updated: 09/05/23 1007     Urine Culture, Routine No growth to date    Narrative:      Specimen Source->Urine            Imaging Results    None             Pending Diagnostic Studies:       None           Medications:  Reconciled Home Medications:      Medication List        START taking these medications      aspirin 81 MG EC tablet  Commonly known as: ECOTRIN  Take 1 tablet (81 mg total) by mouth once daily.  Start taking on: September 8, 2023     cefdinir 300 MG capsule  Commonly known as: OMNICEF  Take 1 capsule (300 mg total) by mouth 2 (two) times daily. for 10 days  Start taking on: September 6, 2023            CONTINUE taking these medications       acetaminophen 325 MG tablet  Commonly known as: TYLENOL  Take 650 mg by mouth every 6 (six) hours as needed for Pain.     atorvastatin 20 MG tablet  Commonly known as: LIPITOR  TAKE ONE TABLET BY MOUTH ONCE DAILY     carBAMazepine 100 mg chewable tablet  Commonly known as: TEGRETOL  TAKE 2 TABLETS BY MOUTH DAILY     cyanocobalamin (vitamin B-12) 1,000 mcg Subl  Place 1,000 mcg under the tongue once daily.     finasteride 5 mg tablet  Commonly known as: PROSCAR  TAKE 1 TABLET BY MOUTH ONCE DAILY.     melatonin 5 mg  Commonly known as: MELATIN  Take 1 tablet by mouth every evening.     metoprolol tartrate 25 MG tablet  Commonly known as: LOPRESSOR  Take 0.5 tablets (12.5 mg total) by mouth 2 (two) times daily.     tamsulosin 0.4 mg Cap  Commonly known as: FLOMAX  Take by mouth once daily.     vitamin D 1000 units Tab  Commonly known as: VITAMIN D3  Take 1 tablet (1,000 Units total) by mouth once daily.            STOP taking these medications      clopidogreL 75 mg tablet  Commonly known as: PLAVIX     hydrOXYzine HCL 10 MG Tab  Commonly known as: ATARAX     XARELTO 20 mg Tab  Generic drug: rivaroxaban              Indwelling Lines/Drains at time of discharge:   Lines/Drains/Airways       None                   Time spent on the discharge of patient: 35 minutes         Charles Cam MD  Department of Hospital Medicine  [facility] Surg

## 2023-09-05 NOTE — NURSING
[facility] [place]  Nurses Note -- 4 Eyes      9/5/2023       Skin assessed on: Q Shift      [x] No Pressure Injuries Present    []Prevention Measures Documented    [] Yes LDA  for Pressure Injury Previously documented     [] Yes New Pressure Injury Discovered   [] LDA for New Pressure Injury Added      Attending RN:  Batsheva Vergara RN     Second RN:  Lois Ramos RN

## 2023-09-05 NOTE — H&P
[facility] Medicine  History & Physical    Patient Name: Jaydon Mccarthy  MRN: [id number]  Patient Class: OP- Observation  Admission Date: 9/4/2023  Attending Physician: Moira Hammer MD   Primary Care Provider: Javier Villanueva MD         Patient information was obtained from ER records.     Subjective:     Principal Problem:UTI (urinary tract infection)    Chief Complaint:   Chief Complaint   Patient presents with    Altered Mental Status     Patient arrives via EMS from the [facility] for altered mental status. Recently discharged from here and NH staff reports AMS since he returned to them. Family on scene reports to EMS that he has had these symptoms before with a UTI. Patient combative with EMS, attempting to bite and kick medics. Patient arrived in 4-point restraints and yelling. He was calmed while being triaged and moved to hospital bed. Patient is now awake and calm.        HPI: Patient is a 89-year-old male at [facility] presented to ED earlier today for unwitnessed fall.  Patient was worked up x-ray hand, CT head and C-spine without acute abnormality.  Labwork benign and he was discharged.  Nursing [place] reports when patient returned he was becoming combative with staff.  Patient then presented back to ER.  ED MD spoke to family who reports this is very similar how patient was when he had a UTI.  Urine was checked and patient does have a UTI with pyuria.  Patient was started on Rocephin will await cultures.  On assessment in room patient was combative with staff trying to hit them while doing vital signs  And trying to change soiled brief.  Patient will be admitted to Hospital Medicine will place soft mittens to protect patient and staff      Past Medical History:   Diagnosis Date    Anticoagulant long-term use     Arthritis     Cancer     right  hand    Coronary artery disease     Deep vein thrombosis     Hyperlipidemia     Hypertension     Seizures 1988    none  since on tegretol       Past Surgical History:   Procedure Laterality Date    APPENDECTOMY      CLOSURE OF WOUND  12/9/2020    Procedure: CLOSURE, WOUND;  Surgeon: Yamil Bonilla MD;  Location: [facility] OR;  Service: Plastics;;    EXCISION OF LESION OF EYELID Right 12/9/2020    Procedure: EXCISION TUMOR RIGHT EYEBROW WITH COMPLEX CLOSURE;  Surgeon: Yamil Bonilla MD;  Location: [facility] OR;  Service: Plastics;  Laterality: Right;  FROZEN SECTION  RN PREOP 12/8/2020----COVID NEGATIVE  12/8    FRACTURE SURGERY Right     elbow    FRACTURE SURGERY Right     hip    JOSUÉ FILTER PLACEMENT      hx of deep vein thrombosis    OPEN REDUCTION AND INTERNAL FIXATION (ORIF) OF INTERTROCHANTERIC FRACTURE OF FEMUR Left 5/30/2018    Procedure: ORIF, FRACTURE, FEMUR, INTERTROCHANTERIC;  Surgeon: Pop Martin III, MD;  Location: [facility] OR;  Service: Orthopedics;  Laterality: Left;    TOTAL SHOULDER ARTHROPLASTY Right        Review of patient's allergies indicates:  No Known Allergies    Current Facility-Administered Medications on File Prior to Encounter   Medication    [DISCONTINUED] LIDOcaine 2%/EPINEPHrine 1:100,000 injection 10 mL    [DISCONTINUED] LIDOcaine-EPINEPHrine 1%-1:100,000 injection 10 mL     Current Outpatient Medications on File Prior to Encounter   Medication Sig    acetaminophen (TYLENOL) 325 MG tablet Take 650 mg by mouth every 6 (six) hours as needed for Pain.    atorvastatin (LIPITOR) 20 MG tablet TAKE ONE TABLET BY MOUTH ONCE DAILY    carBAMazepine (TEGRETOL) 100 mg chewable tablet TAKE 2 TABLETS BY MOUTH DAILY    clopidogreL (PLAVIX) 75 mg tablet Take 1 tablet (75 mg total) by mouth once daily.    cyanocobalamin, vitamin B-12, 1,000 mcg Subl Place 1,000 mcg under the tongue once daily.    finasteride (PROSCAR) 5 mg tablet TAKE 1 TABLET BY MOUTH ONCE DAILY.    hydrOXYzine HCL (ATARAX) 10 MG Tab TAKE 1 TABLET BY MOUTH TWICE A DAY.    melatonin (MELATIN) 5 mg Take 1 tablet by mouth every evening.    metoprolol  tartrate (LOPRESSOR) 25 MG tablet Take 0.5 tablets (12.5 mg total) by mouth 2 (two) times daily.    tamsulosin (FLOMAX) 0.4 mg Cap Take by mouth once daily.    vitamin D (VITAMIN D3) 1000 units Tab Take 1 tablet (1,000 Units total) by mouth once daily.    XARELTO 20 mg Tab TAKE 1 TABLET BY MOUTH ONCE DAILY.    [DISCONTINUED] ofloxacin (OCUFLOX) 0.3 % ophthalmic solution     [DISCONTINUED] prednisoLONE acetate (PRED FORTE) 1 % DrpS Place into both eyes.    [DISCONTINUED] triamcinolone acetonide 0.025% (KENALOG) 0.025 % cream Apply topically 2 (two) times daily.     Family History    None       Tobacco Use    Smoking status: Former     Current packs/day: 0.00     Average packs/day: 0.5 packs/day for 6.0 years (3.0 ttl pk-yrs)     Types: Cigarettes     Start date: 10/27/1969     Quit date: 10/27/1975     Years since quittin.8    Smokeless tobacco: Never   Substance and Sexual Activity    Alcohol use: No    Drug use: No    Sexual activity: Not Currently     Partners: Female     Review of Systems   All other systems reviewed and are negative.    Objective:     Vital Signs (Most Recent):  Temp: 97.6 °F (36.4 °C) (23)  Pulse: 101 (23)  Resp: 17 (23)  BP: (!) 130/99 (23)  SpO2: (!) 92 % (23) Vital Signs (24h Range):  Temp:  [97.6 °F (36.4 °C)-98.1 °F (36.7 °C)] 97.6 °F (36.4 °C)  Pulse:  [] 101  Resp:  [17-19] 17  SpO2:  [92 %-100 %] 92 %  BP: (130-179)/(80-99) 130/99     Weight: 61.2 kg (135 lb)  Body mass index is 19.37 kg/m².     Physical Exam  Constitutional:       Comments: Combative on assessment   HENT:      Head: Normocephalic.   Cardiovascular:      Rate and Rhythm: Regular rhythm. Tachycardia present.      Pulses: Normal pulses.      Heart sounds: Normal heart sounds.   Pulmonary:      Effort: Pulmonary effort is normal.      Breath sounds: Normal breath sounds.   Abdominal:      General: Bowel sounds are normal.      Palpations: Abdomen is soft.    Musculoskeletal:         General: Normal range of motion.   Skin:     General: Skin is warm and dry.      Capillary Refill: Capillary refill takes less than 2 seconds.      Comments: Skin tear right hand   Neurological:      Mental Status: He is alert. He is disoriented.   Psychiatric:      Comments: Combative when interacted with                Significant Labs: All pertinent labs within the past 24 hours have been reviewed.  Recent Lab Results         09/04/23  2313   09/04/23  2311        Albumin   3.3       Alkaline Phosphatase   206       ALT   9       Anion Gap   9       Appearance, UA Cloudy         AST   14       Bacteria, UA Occasional         Baso #   0.07       Basophil %   1.0       Bilirubin (UA) Negative         BILIRUBIN TOTAL   0.4  Comment: For infants and newborns, interpretation of results should be based  on gestational age, weight and in agreement with clinical  observations.    Premature Infant recommended reference ranges:  Up to 24 hours.............<8.0 mg/dL  Up to 48 hours............<12.0 mg/dL  3-5 days..................<15.0 mg/dL  6-29 days.................<15.0 mg/dL         BUN   18       Calcium   8.5       Carbamazepine Lvl   5.5       Chloride   110       CO2   21       Color, UA Orange         Creatinine   1.3       Differential Method   Automated       eGFR   52       Eos #   0.2       Eosinophil %   3.2       Glucose   115       Glucose, UA Negative         Gran # (ANC)   4.4       Gran %   62.0       Hematocrit   28.3       Hemoglobin   9.3       Hyaline Casts, UA 0         Immature Grans (Abs)   0.03  Comment: Mild elevation in immature granulocytes is non specific and   can be seen in a variety of conditions including stress response,   acute inflammation, trauma and pregnancy. Correlation with other   laboratory and clinical findings is essential.         Immature Granulocytes   0.4       Ketones, UA Negative         Leukocytes, UA 3+         Lymph #   1.5       Lymph %    21.6       MCH   28.0       MCHC   32.9       MCV   85       Microscopic Comment SEE COMMENT  Comment: Other formed elements not mentioned in the report are not   present in the microscopic examination.            Mono #   0.8       Mono %   11.8       MPV   9.4       NITRITE UA Negative         nRBC   0       Occult Blood UA 2+         pH, UA 6.0         Platelets   158       Potassium   3.8       PROTEIN TOTAL   6.7       Protein, UA 1+  Comment: Recommend a 24 hour urine protein or a urine   protein/creatinine ratio if globulin induced proteinuria is  clinically suspected.           RBC   3.32       RBC, UA 62         RDW   15.2       Sodium   140       Specific Horatio, UA 1.010         Specimen UA Urine, Clean Catch         UROBILINOGEN UA Negative         WBC Clumps, UA Many         WBC, UA >100         WBC   7.13               Significant Imaging: I have reviewed all pertinent imaging results/findings within the past 24 hours.  I have reviewed and interpreted all pertinent imaging results/findings within the past 24 hours.    Assessment/Plan:     * UTI (urinary tract infection)  UA infected with with 3+ leuks and pyuria  Started on Rocephin  Last UA positive for Candida  Await culture and sensitivity, change antibiotic if needed    Fall  Bed alarm in place  Monitor closely  Likely secondary to UTI and acute AMS      Epilepsy    Continue home antiepileptic    Anemia of chronic disease  Chronic stable at 9 and 28  Trend and follow baseline appears to be high 7 hemoglobin      CAD s/p PCI   Continue Plavix      Essential hypertension  Resume home metoprolol      Benign prostatic hyperplasia with lower urinary tract symptoms  Resume Flomax and finasteride        VTE Risk Mitigation (From admission, onward)           Ordered     IP VTE HIGH RISK PATIENT  Once         09/05/23 0249     Place sequential compression device  Until discontinued         09/05/23 0249                         On 09/05/2023, patient should  be placed in hospital observation services under my care in collaboration with Dr Hammer.      Aly Camp NP  Department of Hospital Medicine  [facility]

## 2023-09-05 NOTE — DISCHARGE INSTRUCTIONS
Given 89 with frequent falls and +2 blood in urine, would reduced from Plavix and Xarelto to aspirin alone.    Previous DVT was several years ago, and last stent was placed several years ago.   No Afib on record, and in NSR on previous EKGs.  Follow-up with Cardiology and PCP to monitor and make adjustments  Follow-up with Urology in a couple weeks after antibiotics, to see if still blood in urine  Take cefdinir twice daily for 10 days, starting 09/06/2023    Thank you for trusting [facility] and me with your care.  We are honored that you entrusted us with your healthcare needs. Your satisfaction is very important to us and we hope you have been very pleased with your experience at [facility]. After your discharge you may receive a survey asking you to rate your hospital experience. We encourage you to take the time to complete the survey as your feedback allows us to identify areas for improvement as well as recognize our staff for their care. We hope that you have received the very best care possible during your hospitalization at [facility], as your satisfaction is our top priority.    Let me know if there is anything more I can do!!        Charles Cam MD  Internal Medicine Staff        PATIENT GENERAL DISCHARGE INFORMATION   Things that YOU are responsible for to Manage Your Care At Home:  1. Getting your prescriptions filled.  2. Taking you medications as directed. (DO NOT MISS ANY DOSES!)  3. Going to your follow-up doctor appointments.                 *This is important because it allows the doctor to monitor your progress and make changes.      If you are unable to make your follow up appointments, please call the number listed and reschedule this appointment.   After discharge, if you need assistance, you can call [facility] On Call Nurse Care Line for 24/7 assistance at [phone]  If you are experience any signs or symptoms, Call your doctor or Call [phone] and come to your  nearest Emergency Room.    You should receive a call from [facility] Discharge Department within 48-72 hours to help manage your care after discharge.   Please try to make sure that you answer your phone for this important phone call.

## 2023-09-05 NOTE — NURSING
Discharge instructions, follow-up, appts., and prescriptions given and explained to patient's daughter. Patient's daughter verbalizes understanding of all. IV removed, pt tolerated well.

## 2023-09-05 NOTE — HPI
Patient is a 89-year-old male at [facility] presented to ED earlier today for unwitnessed fall.  Patient was worked up x-ray hand, CT head and C-spine without acute abnormality.  Labwork benign and he was discharged.  Nursing home reports when patient returned he was becoming combative with staff.  Patient then presented back to ER.  ED MD spoke to family who reports this is very similar how patient was when he had a UTI.  Urine was checked and patient does have a UTI with pyuria.  Patient was started on Rocephin will await cultures.  On assessment in room patient was combative with staff trying to hit them while doing vital signs  And trying to change soiled brief.  Patient will be admitted to Hospital Medicine will place soft mittens to protect patient and staff

## 2023-09-05 NOTE — PLAN OF CARE
09/05/23 1129   Discharge Planning   Assessment Type Discharge Planning Brief Assessment   Resource/Environmental Concerns none   Support Systems Children   Equipment Currently Used at Home walker, rolling;wheelchair   Current Living Arrangements assisted living facility   Care Facility Name [facility]   Patient/Family Anticipates Transition to   (back to Assisted Living)   Patient/Family Anticipated Services at Transition none   DME Needed Upon Discharge  none   Discharge Plan A Assisted Living     Assessment completed with pts daughter [facility] - [address]  [address]  [place] [address]  Phone: [phone] Fax: [phone]    [facility] [place] [facility] [address] [facility]  [address]  [facility] [address]  Phone: [phone] Fax: [phone]        
ADT 30 order placed for Stretcher Transportation.  Requested  time:2pm  If transportation does not arrive at ETA time nurse will be instructed to follow protocol for transportation below:  How can I get in touch directly with dispatch, if needed?                 Non-emergent (stretcher): [phone]  option 6     ++NURSING:  If Stretcher does not arrive at requested time please call the above Non Emergent Dispatcher.  If issue not resolved please escalate to your charge nurse for further instructions.         
Call placed to [facility] to advise that the pt will be discharging back to facility on today and inquire on transportation for pt. At this time [place] was advised that the pt will need transportation back to the facility.    ADT 30 entered   
Hpi Title: Evaluation of Skin Lesions
Additional History: Patient complains of a lesion on her nose, and a scaly and itchy lesion on her left hand that she would like evaluated.

## 2023-09-05 NOTE — NURSING
Patient arrived to floor via stretcher. Patient transferred to bed. Patient oriented to floor and room at this time. Basic setup placed at bedside. Patient very confused and combative. Patient fighting and cussing at staff. Admit assessment completed. Unable to complete home medication list. No family at bedside. Skin assessment completed. Scattered skin tears and bruising to bilateral upper extremities. Dressing in place. Mepilex foam dressing applied to sacrum. Bed in lowest position. Call bell within reach. Bed alarms audible.

## 2023-09-05 NOTE — ED PROVIDER NOTES
Encounter Date: 9/4/2023       History     Chief Complaint   Patient presents with    Altered Mental Status     Patient arrives via EMS from the [facility] for altered mental status. Recently discharged from here and NH staff reports AMS since he returned to them. Family on scene reports to EMS that he has had these symptoms before with a UTI. Patient combative with EMS, attempting to bite and kick medics. Patient arrived in 4-point restraints and yelling. He was calmed while being triaged and moved to hospital bed. Patient is now awake and calm.     HPI    89-year-old male with past medical history of CAD, DVT on Xarelto, hypertension, hyperlipidemia presents with altered mental status.  Patient was seen at this facility earlier today after a nonsyncopal fall with skin tear noted to the left hand.  Daughter reports the patient was at baseline, able to be transferred back to his assisted living facility however upon return shortly thereafter patient became extremely combative, yelling and kicking fairly membranes staff.  She reports this is previously happened around a year ago when he had a urinary tract infection.  She reports he is not been like this recently.  Reports that he has no issues like this normally, states that he is not ever been medicated for agitated behavior like he currently is.  No further associated symptoms reported.    Review of patient's allergies indicates:  No Known Allergies  Past Medical History:   Diagnosis Date    Anticoagulant long-term use     Arthritis     Cancer     right  hand    Coronary artery disease     Deep vein thrombosis     Hyperlipidemia     Hypertension     Seizures 1988    none since on tegretol     Past Surgical History:   Procedure Laterality Date    APPENDECTOMY      CLOSURE OF WOUND  12/9/2020    Procedure: CLOSURE, WOUND;  Surgeon: Yamil Bonilla MD;  Location: [facility] OR;  Service: Plastics;;    EXCISION OF LESION OF EYELID Right 12/9/2020    Procedure:  EXCISION TUMOR RIGHT EYEBROW WITH COMPLEX CLOSURE;  Surgeon: Yamil Bonilla MD;  Location: [facility] OR;  Service: Plastics;  Laterality: Right;  FROZEN SECTION  RN PREOP 2020----COVID NEGATIVE      FRACTURE SURGERY Right     elbow    FRACTURE SURGERY Right     hip    JOSUÉ FILTER PLACEMENT      hx of deep vein thrombosis    OPEN REDUCTION AND INTERNAL FIXATION (ORIF) OF INTERTROCHANTERIC FRACTURE OF FEMUR Left 2018    Procedure: ORIF, FRACTURE, FEMUR, INTERTROCHANTERIC;  Surgeon: Pop Martin III, MD;  Location: [facility] OR;  Service: Orthopedics;  Laterality: Left;    TOTAL SHOULDER ARTHROPLASTY Right      No family history on file.  Social History     Tobacco Use    Smoking status: Former     Current packs/day: 0.00     Average packs/day: 0.5 packs/day for 6.0 years (3.0 ttl pk-yrs)     Types: Cigarettes     Start date: 10/27/1969     Quit date: 10/27/1975     Years since quittin.8    Smokeless tobacco: Never   Substance Use Topics    Alcohol use: No    Drug use: No     Review of Systems   Unable to perform ROS: Mental status change       Physical Exam     Initial Vitals   BP Pulse Resp Temp SpO2   23 2304 23 2304 23 2304 23 2315 23 2304   (!) 172/96 74 18 98.1 °F (36.7 °C) 98 %      MAP       --                Physical Exam    Nursing note and vitals reviewed.  Constitutional: He is not diaphoretic. He appears distressed (mildly).   HENT:   Head: Normocephalic.   Nose: Nose normal.   Old appearing ecchymosis noted over the right frontal scalp.   Eyes: EOM are normal. Pupils are equal, round, and reactive to light.   Neck: Neck supple. No tracheal deviation present. No JVD present.   Normal range of motion.  Cardiovascular:  Normal rate and regular rhythm.           Pulmonary/Chest: Breath sounds normal. No respiratory distress. He has no wheezes. He has no rhonchi. He has no rales.   Abdominal: Abdomen is soft. Bowel sounds are normal. He exhibits no distension.  There is no abdominal tenderness. There is no rebound and no guarding.   Musculoskeletal:         General: Tenderness (mild tenderness, Steri-Strips over left dorsal hand skin tear, intact, no active hemorrhage noted.) present. No edema.      Cervical back: Normal range of motion and neck supple.     Neurological: He is alert. He has normal strength.   Alert, able to follow some simple commands, oriented to self, not to situation, place, time.   Skin: Skin is warm and dry. Capillary refill takes less than 2 seconds. No rash noted. No erythema.         ED Course   Procedures  Labs Reviewed   CBC W/ AUTO DIFFERENTIAL - Abnormal; Notable for the following components:       Result Value    RBC 3.32 (*)     Hemoglobin 9.3 (*)     Hematocrit 28.3 (*)     RDW 15.2 (*)     All other components within normal limits   COMPREHENSIVE METABOLIC PANEL - Abnormal; Notable for the following components:    CO2 21 (*)     Glucose 115 (*)     Calcium 8.5 (*)     Albumin 3.3 (*)     Alkaline Phosphatase 206 (*)     ALT 9 (*)     eGFR 52 (*)     All other components within normal limits   URINALYSIS, REFLEX TO URINE CULTURE - Abnormal; Notable for the following components:    Color, UA Orange (*)     Appearance, UA Cloudy (*)     Protein, UA 1+ (*)     Occult Blood UA 2+ (*)     Leukocytes, UA 3+ (*)     All other components within normal limits    Narrative:     Specimen Source->Urine   URINALYSIS MICROSCOPIC - Abnormal; Notable for the following components:    RBC, UA 62 (*)     WBC, UA >100 (*)     WBC Clumps, UA Many (*)     All other components within normal limits    Narrative:     Specimen Source->Urine   CARBAMAZEPINE LEVEL, TOTAL          Imaging Results    None          Medications   cefTRIAXone (ROCEPHIN) 1 g in dextrose 5 % in water (D5W) 100 mL IVPB (MB+) (0 g Intravenous Stopped 9/5/23 0035)   cefTRIAXone (ROCEPHIN) 1 g in dextrose 5 % in water (D5W) 100 mL IVPB (MB+) (0 g Intravenous Stopped 9/5/23 1249)     Medical  Decision Making  Amount and/or Complexity of Data Reviewed  Labs: ordered.      MDM:    89-year-old male with past medical history of CAD, DVT on Xarelto, hypertension, hyperlipidemia presents with altered mental status.  Exam as noted above.  ED workup notable for urinalysis with 3+ leukocytes, occasional bacteriuria, greater than 100 white blood cells, CBC with hemoglobin 9.3, CMP with creatinine 0.3, glucose 115.  Patient presentation consistent with altered mental status, no new evidence of trauma or fall or further injury compared to his presentation earlier today.  Patient has evidence of urinary tract infection, is currently resting comfortably, following some commands.  Appears to be near his baseline.  Started on Rocephin, pending urine culture, will continue to treat for possible urinary tract infection complicating his altered mental status and agitation.  Will place in observation for continued evaluation and management.  Discussed diagnosis and further treatment with patient and family at bedside. All questions answered, patient transferred to floor improved and stable.                               Clinical Impression:   Final diagnoses:  [R41.82] Altered mental status        ED Disposition Condition    Observation                 Blayne Miner MD  09/06/23 0997

## 2023-09-05 NOTE — ED NOTES
Telemetry monitor box # [id number] placed on pt. Tele monitor tech has been called and verified proper functioning.

## 2023-09-05 NOTE — ED NOTES
"Assumed care of pt BIB EMS s/p fall. Pt also has AMS, per daughter at bedside. Daughter states pt usually has a UTI when pt "acts like this." Pt denies any physical complaints. Pt A/O x person, ABCs intact, NAD. VSS. Pt has superficial lacerations to hands and forearms that have been bandaged w/ petroleum gauze. Pt now resting and wakes easily to voice.     Review of patient's allergies indicates:  No Known Allergies     Patient has verified the spelling of their name and  on armband.   APPEARANCE: Patient is alert, calm, oriented x 4, and does not appear distressed.  SKIN: Skin is normal for race, warm, and dry. Normal skin turgor and mucous membranes moist.  CARDIAC: Normal rate and rhythm, no murmur heard.   RESPIRATORY:Normal rate and effort. Breath sounds clear bilaterally throughout chest. Respirations are equal and unlabored.    GASTRO: Bowel sounds normal, abdomen is soft, no tenderness, and no abdominal distention.  MUSCLE: Full ROM. No bony tenderness or soft tissue tenderness. No obvious deformity.  PERIPHERAL VASCULAR: peripheral pulses present. Normal cap refill. No edema. Warm to touch.  NEURO: 5/5 strength major flexors/extensors bilaterally. Sensory intact to light touch bilaterally. Allen coma scale: eyes open spontaneously-4, oriented & converses-4, obeys commands-6. No neurological abnormalities.   MENTAL STATUS: awake, alert and aware of environment.  : Voids without complication, per pt    Ed orders in progress. Pt SR up x 2. Bed in lowest position with wheels locked. Call bell within reach of pt.       "

## 2023-09-06 LAB — BACTERIA UR CULT: NORMAL

## 2023-09-07 ENCOUNTER — HOSPITAL ENCOUNTER (EMERGENCY)
Facility: HOSPITAL | Age: 88
Discharge: HOME | End: 2023-09-08
Attending: EMERGENCY MEDICINE
Payer: MEDICARE
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
## 2023-09-07 DIAGNOSIS — R11.10 VOMITING, UNSPECIFIED VOMITING TYPE, UNSPECIFIED WHETHER NAUSEA PRESENT: Primary | ICD-10-CM

## 2023-09-07 LAB
ALBUMIN SERPL BCP-MCNC: 3.3 G/DL (ref 3.5–5.2)
ALP SERPL-CCNC: 198 U/L (ref 55–135)
ALT SERPL W/O P-5'-P-CCNC: 9 U/L (ref 10–44)
ANION GAP SERPL CALC-SCNC: 9 MMOL/L (ref 8–16)
AST SERPL-CCNC: 16 U/L (ref 10–40)
BASOPHILS # BLD AUTO: 0.03 K/UL (ref 0–0.2)
BASOPHILS NFR BLD: 0.2 % (ref 0–1.9)
BILIRUB SERPL-MCNC: 0.3 MG/DL (ref 0.1–1)
BUN SERPL-MCNC: 21 MG/DL (ref 10–30)
CALCIUM SERPL-MCNC: 8.4 MG/DL (ref 8.7–10.5)
CHLORIDE SERPL-SCNC: 109 MMOL/L (ref 95–110)
CO2 SERPL-SCNC: 23 MMOL/L (ref 23–29)
CREAT SERPL-MCNC: 1.3 MG/DL (ref 0.5–1.4)
DIFFERENTIAL METHOD: ABNORMAL
EOSINOPHIL # BLD AUTO: 0.2 K/UL (ref 0–0.5)
EOSINOPHIL NFR BLD: 1.5 % (ref 0–8)
ERYTHROCYTE [DISTWIDTH] IN BLOOD BY AUTOMATED COUNT: 15.2 % (ref 11.5–14.5)
EST. GFR  (NO RACE VARIABLE): 52 ML/MIN/1.73 M^2
GLUCOSE SERPL-MCNC: 130 MG/DL (ref 70–110)
HCT VFR BLD AUTO: 28.5 % (ref 40–54)
HGB BLD-MCNC: 9.5 G/DL (ref 14–18)
IMM GRANULOCYTES # BLD AUTO: 0.04 K/UL (ref 0–0.04)
IMM GRANULOCYTES NFR BLD AUTO: 0.3 % (ref 0–0.5)
LIPASE SERPL-CCNC: 19 U/L (ref 4–60)
LYMPHOCYTES # BLD AUTO: 1.2 K/UL (ref 1–4.8)
LYMPHOCYTES NFR BLD: 10.1 % (ref 18–48)
MCH RBC QN AUTO: 28.3 PG (ref 27–31)
MCHC RBC AUTO-ENTMCNC: 33.3 G/DL (ref 32–36)
MCV RBC AUTO: 85 FL (ref 82–98)
MONOCYTES # BLD AUTO: 0.8 K/UL (ref 0.3–1)
MONOCYTES NFR BLD: 6.9 % (ref 4–15)
NEUTROPHILS # BLD AUTO: 9.8 K/UL (ref 1.8–7.7)
NEUTROPHILS NFR BLD: 81 % (ref 38–73)
NRBC BLD-RTO: 0 /100 WBC
PLATELET # BLD AUTO: 153 K/UL (ref 150–450)
PMV BLD AUTO: 9.3 FL (ref 9.2–12.9)
POTASSIUM SERPL-SCNC: 3.9 MMOL/L (ref 3.5–5.1)
PROT SERPL-MCNC: 6.7 G/DL (ref 6–8.4)
RBC # BLD AUTO: 3.36 M/UL (ref 4.6–6.2)
SARS-COV-2 RDRP RESP QL NAA+PROBE: NEGATIVE
SODIUM SERPL-SCNC: 141 MMOL/L (ref 136–145)
WBC # BLD AUTO: 12.05 K/UL (ref 3.9–12.7)

## 2023-09-07 PROCEDURE — U0002 COVID-19 LAB TEST NON-CDC: HCPCS | Performed by: EMERGENCY MEDICINE

## 2023-09-07 PROCEDURE — 25500020 PHARM REV CODE 255: Performed by: EMERGENCY MEDICINE

## 2023-09-07 PROCEDURE — 93010 EKG 12-LEAD: ICD-10-PCS | Mod: ,,, | Performed by: INTERNAL MEDICINE

## 2023-09-07 PROCEDURE — 80053 COMPREHEN METABOLIC PANEL: CPT | Performed by: EMERGENCY MEDICINE

## 2023-09-07 PROCEDURE — 83690 ASSAY OF LIPASE: CPT | Performed by: EMERGENCY MEDICINE

## 2023-09-07 PROCEDURE — 99285 EMERGENCY DEPT VISIT HI MDM: CPT | Mod: 25

## 2023-09-07 PROCEDURE — 93005 ELECTROCARDIOGRAM TRACING: CPT

## 2023-09-07 PROCEDURE — 93010 ELECTROCARDIOGRAM REPORT: CPT | Mod: ,,, | Performed by: INTERNAL MEDICINE

## 2023-09-07 PROCEDURE — 85025 COMPLETE CBC W/AUTO DIFF WBC: CPT | Performed by: EMERGENCY MEDICINE

## 2023-09-07 RX ORDER — ONDANSETRON 4 MG/1
4 TABLET, ORALLY DISINTEGRATING ORAL EVERY 8 HOURS PRN
Qty: 20 TABLET | Refills: 0 | Status: SHIPPED | OUTPATIENT
Start: 2023-09-07 | End: 2023-09-07 | Stop reason: SDUPTHER

## 2023-09-07 RX ORDER — ONDANSETRON 4 MG/1
4 TABLET, ORALLY DISINTEGRATING ORAL EVERY 8 HOURS PRN
Qty: 20 TABLET | Refills: 0 | Status: SHIPPED | OUTPATIENT
Start: 2023-09-07

## 2023-09-07 RX ADMIN — IOHEXOL 70 ML: 350 INJECTION, SOLUTION INTRAVENOUS at 10:09

## 2023-09-08 VITALS
HEART RATE: 66 BPM | BODY MASS INDEX: 19.33 KG/M2 | HEIGHT: 70 IN | WEIGHT: 135 LBS | RESPIRATION RATE: 19 BRPM | DIASTOLIC BLOOD PRESSURE: 72 MMHG | SYSTOLIC BLOOD PRESSURE: 158 MMHG | TEMPERATURE: 98 F | OXYGEN SATURATION: 100 %

## 2023-09-08 NOTE — ED PROVIDER NOTES
Encounter Date: 9/7/2023    SCRIBE #1 NOTE: I, Kike Galan, am scribing for, and in the presence of,  Blayne Miner MD. I have scribed the following portions of the note - Other sections scribed: HPI, ROS.       History     Chief Complaint   Patient presents with    Fall     Pt arrived via ems, pt chief complaint is a Fall. Pt was sent from the suites of [facility] for an unwitnessed fall. Per nursing staff pt was found on the floor and had an episode of vomiting post fall.      Jaydon Mccarthy is a 89 y.o. male with a PMHx of HTN, HLD, DVT, cancer, CAD, seizures, who presents to the ED via EMS for evaluation of an unwitnessed fall today. History provided by independent historian, patient's daughter, reports patient had an unwitnessed fall at the suites of [facility], noting he was found down on the floor per nursing staff. She also reports nursing staff had endorsed a vomiting episode. Patient was last seen for a fall on Monday and was discharged on Tuesday. She reports current compliance with antibiotics. No medications taken PTA. No alleviating or exacerbating factors noted. Denies syncope, abdominal pain, CP, SOB, or other associated symptoms. NKDA.    The history is provided by a relative. No  was used.     Review of patient's allergies indicates:  No Known Allergies  Past Medical History:   Diagnosis Date    Anticoagulant long-term use     Arthritis     Cancer     right  hand    Coronary artery disease     Deep vein thrombosis     Hyperlipidemia     Hypertension     Seizures 1988    none since on tegretol     Past Surgical History:   Procedure Laterality Date    APPENDECTOMY      CLOSURE OF WOUND  12/9/2020    Procedure: CLOSURE, WOUND;  Surgeon: Yamil Bonilla MD;  Location: [facility] OR;  Service: Plastics;;    EXCISION OF LESION OF EYELID Right 12/9/2020    Procedure: EXCISION TUMOR RIGHT EYEBROW WITH COMPLEX CLOSURE;  Surgeon: Yamil Bonilla MD;  Location: [facility] OR;  Service: Plastics;   Laterality: Right;  FROZEN SECTION  RN PREOP 2020----COVID NEGATIVE      FRACTURE SURGERY Right     elbow    FRACTURE SURGERY Right     hip    JOSUÉ FILTER PLACEMENT      hx of deep vein thrombosis    OPEN REDUCTION AND INTERNAL FIXATION (ORIF) OF INTERTROCHANTERIC FRACTURE OF FEMUR Left 2018    Procedure: ORIF, FRACTURE, FEMUR, INTERTROCHANTERIC;  Surgeon: Pop Martin III, MD;  Location: [facility] OR;  Service: Orthopedics;  Laterality: Left;    TOTAL SHOULDER ARTHROPLASTY Right      No family history on file.  Social History     Tobacco Use    Smoking status: Former     Current packs/day: 0.00     Average packs/day: 0.5 packs/day for 6.0 years (3.0 ttl pk-yrs)     Types: Cigarettes     Start date: 10/27/1969     Quit date: 10/27/1975     Years since quittin.8    Smokeless tobacco: Never   Substance Use Topics    Alcohol use: No    Drug use: No     Review of Systems   Constitutional: Negative.    HENT: Negative.     Eyes: Negative.    Respiratory: Negative.     Cardiovascular: Negative.    Gastrointestinal:  Positive for vomiting. Negative for abdominal pain and nausea.   Genitourinary: Negative.    Musculoskeletal: Negative.    Skin: Negative.    Neurological: Negative.        Physical Exam     Initial Vitals [23 1943]   BP Pulse Resp Temp SpO2   (!) 168/77 68 16 98.3 °F (36.8 °C) 100 %      MAP       --         Physical Exam    Nursing note and vitals reviewed.  Constitutional: He appears well-developed. He is not diaphoretic. No distress.   Well-appearing elderly male, conversing with ease   HENT:   Head: Normocephalic.   Nose: Nose normal.   Old healed ecchymosis to left frontal scalp   Eyes:   Opacified right cornea, left pupil reactive to light, extraocular movement intact.   Neck: Neck supple. No tracheal deviation present. No JVD present.   Normal range of motion.  Cardiovascular:  Normal rate and regular rhythm.           Pulmonary/Chest: Breath sounds normal. No respiratory  distress. He has no wheezes. He has no rhonchi. He has no rales.   Abdominal: Abdomen is soft. Bowel sounds are normal. He exhibits no distension. There is no abdominal tenderness. There is no rebound and no guarding.   Musculoskeletal:         General: No tenderness or edema.      Cervical back: Normal range of motion and neck supple.     Neurological: He is alert.   Oriented to self, situation, not to date/time.   Skin: Skin is warm and dry. Capillary refill takes less than 2 seconds. No rash noted. No erythema.         ED Course   Procedures  Labs Reviewed   CBC W/ AUTO DIFFERENTIAL - Abnormal; Notable for the following components:       Result Value    RBC 3.36 (*)     Hemoglobin 9.5 (*)     Hematocrit 28.5 (*)     RDW 15.2 (*)     Gran # (ANC) 9.8 (*)     Gran % 81.0 (*)     Lymph % 10.1 (*)     All other components within normal limits   COMPREHENSIVE METABOLIC PANEL - Abnormal; Notable for the following components:    Glucose 130 (*)     Calcium 8.4 (*)     Albumin 3.3 (*)     Alkaline Phosphatase 198 (*)     ALT 9 (*)     eGFR 52 (*)     All other components within normal limits   LIPASE   SARS-COV-2 RNA AMPLIFICATION, QUAL          Imaging Results              CT Abdomen Pelvis With Contrast (Final result)  Result time 09/07/23 23:20:13      Final result by Leeann Beck MD (09/07/23 23:20:13)                   Impression:      1. No acute intra-abdominal abnormalities identified.  2. Sigmoid diverticulosis with no evidence of acute diverticulitis.  3. Postsurgical changes and additional findings as detailed above.      Electronically signed by: Leeann Beck MD  Date:    09/07/2023  Time:    23:20               Narrative:    EXAMINATION:  CT ABDOMEN PELVIS WITH CONTRAST    CLINICAL HISTORY:  Nausea/vomiting;    TECHNIQUE:  Low dose axial images, sagittal and coronal reformations were obtained from the lung bases to the pubic symphysis following the IV administration of 70 mL of Omnipaque 350 .  Oral  contrast was not given.    COMPARISON:  None.    FINDINGS:  The visualized portion of the heart is unremarkable.  There is mild right lower lobe atelectasis or scarring.  Visualized lung bases otherwise show no consolidation or pleural effusion.    No significant hepatic abnormalities are identified.  There is no intra-or extrahepatic biliary ductal dilatation.  The gallbladder is unremarkable.  The stomach, pancreas, spleen, and adrenal glands are unremarkable.    Kidneys enhance normally with no evidence of hydronephrosis.  Mild dilated left extrarenal pelvis is seen.  No definite abnormalities are otherwise appreciated along the ureteral courses.  Urinary bladder is unremarkable.    Appendix is not definitely visualized, however no abnormalities or inflammatory changes are seen in the region.  There is sigmoid diverticulosis.  The visualized loops of small and large bowel show no evidence of obstruction or inflammation.  No free air or free fluid.    Aorta tapers normally with moderate atherosclerosis.  IVC filter is visualized with multiple struts seen extending outside of the vessel lumen.    No acute osseous abnormality identified.  Bilateral remote rib fracture deformities are seen.  Advanced degenerative changes are seen throughout the lumbar spine.  Postsurgical ORIF changes are seen involving the visualized proximal femurs.  Small amount of fluid is seen in the right inguinal canal.                        Final result by Leeann Beck MD (09/07/23 23:20:13)                   Impression:      1. No acute intra-abdominal abnormalities identified.  2. Sigmoid diverticulosis with no evidence of acute diverticulitis.  3. Postsurgical changes and additional findings as detailed above.      Electronically signed by: Leeann Beck MD  Date:    09/07/2023  Time:    23:20               Narrative:    EXAMINATION:  CT ABDOMEN PELVIS WITH CONTRAST    CLINICAL HISTORY:  Nausea/vomiting;    TECHNIQUE:  Low dose  axial images, sagittal and coronal reformations were obtained from the lung bases to the pubic symphysis following the IV administration of 70 mL of Omnipaque 350 .  Oral contrast was not given.    COMPARISON:  None.    FINDINGS:  The visualized portion of the heart is unremarkable.  There is mild right lower lobe atelectasis or scarring.  Visualized lung bases otherwise show no consolidation or pleural effusion.    No significant hepatic abnormalities are identified.  There is no intra-or extrahepatic biliary ductal dilatation.  The gallbladder is unremarkable.  The stomach, pancreas, spleen, and adrenal glands are unremarkable.    Kidneys enhance normally with no evidence of hydronephrosis.  Mild dilated left extrarenal pelvis is seen.  No definite abnormalities are otherwise appreciated along the ureteral courses.  Urinary bladder is unremarkable.    Appendix is not definitely visualized, however no abnormalities or inflammatory changes are seen in the region.  There is sigmoid diverticulosis.  The visualized loops of small and large bowel show no evidence of obstruction or inflammation.  No free air or free fluid.    Aorta tapers normally with moderate atherosclerosis.  IVC filter is visualized with multiple struts seen extending outside of the vessel lumen.    No acute osseous abnormality identified.  Bilateral remote rib fracture deformities are seen.  Advanced degenerative changes are seen throughout the lumbar spine.  Postsurgical ORIF changes are seen involving the visualized proximal femurs.  Small amount of fluid is seen in the right inguinal canal.                                       CT Head Without Contrast (Final result)  Result time 09/07/23 20:58:20      Final result by Leeann Beck MD (09/07/23 20:58:20)                   Impression:      No acute intracranial abnormalities identified.  No significant detrimental change compared to recent CT head from 09/04/2023.      Electronically signed  by: Leeann Beck MD  Date:    09/07/2023  Time:    20:58               Narrative:    EXAMINATION:  CT HEAD WITHOUT CONTRAST    CLINICAL HISTORY:  Head trauma, minor (Age >= 65y);    TECHNIQUE:  Low dose axial images were obtained through the head.  Coronal and sagittal reformations were also performed. Contrast was not administered.    COMPARISON:  Recent CT head from 09/04/2023.    FINDINGS:  There is generalized cerebral volume loss and chronic microvascular ischemic disease.  There is stable diffuse prominence visualized of the ventricular system.  No evidence of acute/recent major vascular distribution cerebral infarction or intraparenchymal hemorrhage.  No effacement of the skull-base cisterns. No abnormal extra-axial fluid collections or blood products. Visualized paranasal sinuses and mastoid air cells are clear. The calvarium shows no significant abnormality.                                       CT Cervical Spine Without Contrast (Final result)  Result time 09/07/23 21:00:01      Final result by Leeann Beck MD (09/07/23 21:00:01)                   Impression:      No evidence of acute cervical spine fracture or dislocation.      Electronically signed by: Leeann Beck MD  Date:    09/07/2023  Time:    21:00               Narrative:    EXAMINATION:  CT CERVICAL SPINE WITHOUT CONTRAST    CLINICAL HISTORY:  Neck trauma (Age >= 65y);    TECHNIQUE:  Low dose axial images, sagittal and coronal reformations were performed though the cervical spine.  Contrast was not administered.    COMPARISON:  Recent CT cervical spine from 09/04/2023.    FINDINGS:  No evidence of acute cervical spine fracture or dislocation.  Odontoid process is intact.  Craniocervical junction is unremarkable.  Cervical spine alignment is within normal limits.  Grossly stable advanced multilevel degenerative changes are seen with fusion of the C3 and C4 vertebral bodies.    Surrounding soft tissues show no significant abnormalities.   Airway is patent.  Partially visualized lung apices are clear.                                       Medications   iohexoL (OMNIPAQUE 350) injection 70 mL (70 mLs Intravenous Given 9/7/23 7553)     Medical Decision Making  Amount and/or Complexity of Data Reviewed  Independent Historian:      Details: Patient's daughter.   Labs: ordered.  Radiology: ordered.  ECG/medicine tests: ordered and independent interpretation performed.    Risk  Prescription drug management.      MDM:    89-year-old male with past medical history as noted above presenting after fall.  Physical exam as noted above.  ED workup notable for COVID negative, CBC with hemoglobin 9.5, CMP with creatinine 1.3, lipase 19, CT abdomen pelvis unremarkable, CT head unremarkable, CT cervical spine unremarkable.  Patient presentation consistent with fall without significant traumatic injury today, reports of nausea vomiting earlier today however no episodes here, patient appears to be tolerating some oral liquids with unremarkable vital signs, stable lab work and unremarkable CT of the abdomen and pelvis.  Not suspect any acute surgical medical emergency, patient is currently at his baseline.  Discussed further with daughter regarding plan further care, follow-up as needed and ongoing symptomatic management. Patient in understanding of plan.  Pt discharged to home improved and stable.            Scribe Attestation:   Scribe #1: I performed the above scribed service and the documentation accurately describes the services I performed. I attest to the accuracy of the note.                        I, Blayne Miner M.D., personally performed the services described in this documentation. All medical record entries made by the scribe were at my direction and in my presence. I have reviewed the chart and agree that the record reflects my personal performance and is accurate and complete.      Clinical Impression:   Final diagnoses:  [W19.XXXA] Fall  [R11.10]  Vomiting, unspecified vomiting type, unspecified whether nausea present (Primary)        ED Disposition Condition    Discharge Stable          ED Prescriptions       Medication Sig Dispense Start Date End Date Auth. Provider    ondansetron (ZOFRAN-ODT) 4 MG TbDL  (Status: Discontinued) Take 1 tablet (4 mg total) by mouth every 8 (eight) hours as needed (nausea, vomiting). 20 tablet 9/7/2023 9/7/2023 Blayne Miner MD    ondansetron (ZOFRAN-ODT) 4 MG TbDL Take 1 tablet (4 mg total) by mouth every 8 (eight) hours as needed (nausea, vomiting). 20 tablet 9/7/2023 -- Blayne Miner MD          Follow-up Information       Follow up With Specialties Details Why Contact Info    [facility] [place] Go to  If symptoms worsen [address]  [facility] [address] [phone]    Javier Villanueva MD Family Medicine Go in 1 week As needed [address] [place] [address]  [phone]               Blayne Miner MD  09/08/23 0132

## 2023-09-08 NOTE — ED TRIAGE NOTES
Patient presents to ED via ems with reports of unwitnessed fall. Per EMS patient was found on the floor. Patient states that he don't remember falling. Per daughter in law patient has a history of falling. She states that he was just seen recently in this ED for a fall.

## 2023-09-08 NOTE — ED NOTES
EMS here for transport. Pt cleaned of urine, new, dry attends applied. Pajama pants and jacket bagged and labeled. Handed to EMS for transport with patient. Shoes applied to patient and key on lanyard in possession of EMS. PT awake and alert, talkative. ALEXANDR.

## 2023-09-08 NOTE — ED NOTES
Attempted report to the [facility], voicemail received. Message with my name and call back number left. No PHI given.

## 2023-11-08 ENCOUNTER — LAB VISIT (OUTPATIENT)
Dept: LAB | Facility: HOSPITAL | Age: 88
End: 2023-11-08
Attending: NURSE PRACTITIONER
Payer: MEDICARE

## 2023-11-08 DIAGNOSIS — I48.91 ATRIAL FIBRILLATION: ICD-10-CM

## 2023-11-08 DIAGNOSIS — I48.91 ATRIAL FIBRILLATION: Primary | ICD-10-CM

## 2023-11-08 LAB
ALBUMIN SERPL BCP-MCNC: 3.6 G/DL (ref 3.5–5.2)
ALP SERPL-CCNC: 212 U/L (ref 55–135)
ALT SERPL W/O P-5'-P-CCNC: 6 U/L (ref 10–44)
ANION GAP SERPL CALC-SCNC: 10 MMOL/L (ref 8–16)
AST SERPL-CCNC: 12 U/L (ref 10–40)
BASOPHILS # BLD AUTO: 0.07 K/UL (ref 0–0.2)
BASOPHILS NFR BLD: 0.9 % (ref 0–1.9)
BILIRUB SERPL-MCNC: 0.3 MG/DL (ref 0.1–1)
BUN SERPL-MCNC: 18 MG/DL (ref 10–30)
CALCIUM SERPL-MCNC: 8.8 MG/DL (ref 8.7–10.5)
CHLORIDE SERPL-SCNC: 110 MMOL/L (ref 95–110)
CO2 SERPL-SCNC: 23 MMOL/L (ref 23–29)
CREAT SERPL-MCNC: 1.4 MG/DL (ref 0.5–1.4)
DIFFERENTIAL METHOD: ABNORMAL
EOSINOPHIL # BLD AUTO: 0.4 K/UL (ref 0–0.5)
EOSINOPHIL NFR BLD: 5.3 % (ref 0–8)
ERYTHROCYTE [DISTWIDTH] IN BLOOD BY AUTOMATED COUNT: 14.6 % (ref 11.5–14.5)
EST. GFR  (NO RACE VARIABLE): 47 ML/MIN/1.73 M^2
GLUCOSE SERPL-MCNC: 113 MG/DL (ref 70–110)
HCT VFR BLD AUTO: 32.3 % (ref 40–54)
HGB BLD-MCNC: 10.3 G/DL (ref 14–18)
IMM GRANULOCYTES # BLD AUTO: 0.02 K/UL (ref 0–0.04)
IMM GRANULOCYTES NFR BLD AUTO: 0.3 % (ref 0–0.5)
LYMPHOCYTES # BLD AUTO: 1.7 K/UL (ref 1–4.8)
LYMPHOCYTES NFR BLD: 23 % (ref 18–48)
MCH RBC QN AUTO: 29.3 PG (ref 27–31)
MCHC RBC AUTO-ENTMCNC: 31.9 G/DL (ref 32–36)
MCV RBC AUTO: 92 FL (ref 82–98)
MONOCYTES # BLD AUTO: 0.6 K/UL (ref 0.3–1)
MONOCYTES NFR BLD: 7.8 % (ref 4–15)
NEUTROPHILS # BLD AUTO: 4.8 K/UL (ref 1.8–7.7)
NEUTROPHILS NFR BLD: 62.7 % (ref 38–73)
NRBC BLD-RTO: 0 /100 WBC
PLATELET # BLD AUTO: 192 K/UL (ref 150–450)
PMV BLD AUTO: 10.5 FL (ref 9.2–12.9)
POTASSIUM SERPL-SCNC: 5.1 MMOL/L (ref 3.5–5.1)
PROT SERPL-MCNC: 6.7 G/DL (ref 6–8.4)
RBC # BLD AUTO: 3.52 M/UL (ref 4.6–6.2)
SODIUM SERPL-SCNC: 143 MMOL/L (ref 136–145)
WBC # BLD AUTO: 7.58 K/UL (ref 3.9–12.7)

## 2023-11-08 PROCEDURE — 85025 COMPLETE CBC W/AUTO DIFF WBC: CPT | Performed by: NURSE PRACTITIONER

## 2023-11-08 PROCEDURE — 80053 COMPREHEN METABOLIC PANEL: CPT | Performed by: NURSE PRACTITIONER

## 2023-12-05 NOTE — ED TRIAGE NOTES
"Pt BIB EMS from home for a wellness exam. EMS reported home health called stating pt looked pale. Pt has no complaints. Contacted pt daughter who reports is on way. Pt denies any complaints- states he does not know why he is here either. States "only thing wrong with my stomach is it is empty." NADN.   " Please have patient f/u with another provider 3 mo after PDT

## 2023-12-11 PROBLEM — N39.0 UTI (URINARY TRACT INFECTION): Status: RESOLVED | Noted: 2022-10-04 | Resolved: 2023-12-11

## 2023-12-14 ENCOUNTER — HOSPITAL ENCOUNTER (EMERGENCY)
Facility: HOSPITAL | Age: 88
Discharge: HOME | End: 2023-12-15
Attending: STUDENT IN AN ORGANIZED HEALTH CARE EDUCATION/TRAINING PROGRAM
Payer: MEDICARE

## 2023-12-14 DIAGNOSIS — R65.10 SIRS (SYSTEMIC INFLAMMATORY RESPONSE SYNDROME): ICD-10-CM

## 2023-12-14 DIAGNOSIS — N39.0 URINARY TRACT INFECTION WITHOUT HEMATURIA, SITE UNSPECIFIED: Primary | ICD-10-CM

## 2023-12-14 DIAGNOSIS — R53.83 FATIGUE, UNSPECIFIED TYPE: ICD-10-CM

## 2023-12-14 DIAGNOSIS — R50.9 FEVER: ICD-10-CM

## 2023-12-14 LAB
ALBUMIN SERPL BCP-MCNC: 3.5 G/DL (ref 3.5–5.2)
ALP SERPL-CCNC: 179 U/L (ref 55–135)
ALT SERPL W/O P-5'-P-CCNC: 9 U/L (ref 10–44)
ANION GAP SERPL CALC-SCNC: 11 MMOL/L (ref 8–16)
AST SERPL-CCNC: 14 U/L (ref 10–40)
BACTERIA #/AREA URNS HPF: ABNORMAL /HPF
BASOPHILS # BLD AUTO: 0.06 K/UL (ref 0–0.2)
BASOPHILS NFR BLD: 0.4 % (ref 0–1.9)
BILIRUB SERPL-MCNC: 0.4 MG/DL (ref 0.1–1)
BILIRUB UR QL STRIP: NEGATIVE
BUN SERPL-MCNC: 16 MG/DL (ref 10–30)
CALCIUM SERPL-MCNC: 8.6 MG/DL (ref 8.7–10.5)
CHLORIDE SERPL-SCNC: 110 MMOL/L (ref 95–110)
CLARITY UR: ABNORMAL
CO2 SERPL-SCNC: 22 MMOL/L (ref 23–29)
COLOR UR: YELLOW
CREAT SERPL-MCNC: 1.3 MG/DL (ref 0.5–1.4)
CTP QC/QA: YES
CTP QC/QA: YES
DIFFERENTIAL METHOD: ABNORMAL
EOSINOPHIL # BLD AUTO: 0.1 K/UL (ref 0–0.5)
EOSINOPHIL NFR BLD: 0.9 % (ref 0–8)
ERYTHROCYTE [DISTWIDTH] IN BLOOD BY AUTOMATED COUNT: 13.5 % (ref 11.5–14.5)
EST. GFR  (NO RACE VARIABLE): 52 ML/MIN/1.73 M^2
GLUCOSE SERPL-MCNC: 138 MG/DL (ref 70–110)
GLUCOSE UR QL STRIP: NEGATIVE
HCT VFR BLD AUTO: 32.6 % (ref 40–54)
HGB BLD-MCNC: 10.9 G/DL (ref 14–18)
HGB UR QL STRIP: ABNORMAL
HYALINE CASTS #/AREA URNS LPF: 0 /LPF
IMM GRANULOCYTES # BLD AUTO: 0.07 K/UL (ref 0–0.04)
IMM GRANULOCYTES NFR BLD AUTO: 0.5 % (ref 0–0.5)
KETONES UR QL STRIP: NEGATIVE
LACTATE SERPL-SCNC: 1.4 MMOL/L (ref 0.5–2.2)
LEUKOCYTE ESTERASE UR QL STRIP: ABNORMAL
LYMPHOCYTES # BLD AUTO: 1.1 K/UL (ref 1–4.8)
LYMPHOCYTES NFR BLD: 7.6 % (ref 18–48)
MCH RBC QN AUTO: 29.7 PG (ref 27–31)
MCHC RBC AUTO-ENTMCNC: 33.4 G/DL (ref 32–36)
MCV RBC AUTO: 89 FL (ref 82–98)
MICROSCOPIC COMMENT: ABNORMAL
MONOCYTES # BLD AUTO: 1.2 K/UL (ref 0.3–1)
MONOCYTES NFR BLD: 8.7 % (ref 4–15)
NEUTROPHILS # BLD AUTO: 11.4 K/UL (ref 1.8–7.7)
NEUTROPHILS NFR BLD: 81.9 % (ref 38–73)
NITRITE UR QL STRIP: NEGATIVE
NRBC BLD-RTO: 0 /100 WBC
PH UR STRIP: 6 [PH] (ref 5–8)
PLATELET # BLD AUTO: 165 K/UL (ref 150–450)
PMV BLD AUTO: 9.6 FL (ref 9.2–12.9)
POC MOLECULAR INFLUENZA A AGN: NEGATIVE
POC MOLECULAR INFLUENZA B AGN: NEGATIVE
POTASSIUM SERPL-SCNC: 3.8 MMOL/L (ref 3.5–5.1)
PROT SERPL-MCNC: 7 G/DL (ref 6–8.4)
PROT UR QL STRIP: ABNORMAL
RBC # BLD AUTO: 3.67 M/UL (ref 4.6–6.2)
RBC #/AREA URNS HPF: 54 /HPF (ref 0–4)
SARS-COV-2 RDRP RESP QL NAA+PROBE: NEGATIVE
SODIUM SERPL-SCNC: 143 MMOL/L (ref 136–145)
SP GR UR STRIP: 1.01 (ref 1–1.03)
URN SPEC COLLECT METH UR: ABNORMAL
UROBILINOGEN UR STRIP-ACNC: NEGATIVE EU/DL
WBC # BLD AUTO: 13.89 K/UL (ref 3.9–12.7)
WBC #/AREA URNS HPF: >100 /HPF (ref 0–5)
YEAST URNS QL MICRO: ABNORMAL

## 2023-12-14 PROCEDURE — 93005 ELECTROCARDIOGRAM TRACING: CPT

## 2023-12-14 PROCEDURE — 87088 URINE BACTERIA CULTURE: CPT | Performed by: STUDENT IN AN ORGANIZED HEALTH CARE EDUCATION/TRAINING PROGRAM

## 2023-12-14 PROCEDURE — 93010 EKG 12-LEAD: ICD-10-PCS | Mod: ,,, | Performed by: INTERNAL MEDICINE

## 2023-12-14 PROCEDURE — 99285 EMERGENCY DEPT VISIT HI MDM: CPT | Mod: 25

## 2023-12-14 PROCEDURE — 87186 SC STD MICRODIL/AGAR DIL: CPT | Performed by: STUDENT IN AN ORGANIZED HEALTH CARE EDUCATION/TRAINING PROGRAM

## 2023-12-14 PROCEDURE — 87086 URINE CULTURE/COLONY COUNT: CPT | Performed by: STUDENT IN AN ORGANIZED HEALTH CARE EDUCATION/TRAINING PROGRAM

## 2023-12-14 PROCEDURE — 63600175 PHARM REV CODE 636 W HCPCS: Performed by: STUDENT IN AN ORGANIZED HEALTH CARE EDUCATION/TRAINING PROGRAM

## 2023-12-14 PROCEDURE — 85025 COMPLETE CBC W/AUTO DIFF WBC: CPT | Performed by: STUDENT IN AN ORGANIZED HEALTH CARE EDUCATION/TRAINING PROGRAM

## 2023-12-14 PROCEDURE — 93010 ELECTROCARDIOGRAM REPORT: CPT | Mod: ,,, | Performed by: INTERNAL MEDICINE

## 2023-12-14 PROCEDURE — 87502 INFLUENZA DNA AMP PROBE: CPT

## 2023-12-14 PROCEDURE — 96366 THER/PROPH/DIAG IV INF ADDON: CPT

## 2023-12-14 PROCEDURE — 80053 COMPREHEN METABOLIC PANEL: CPT | Performed by: STUDENT IN AN ORGANIZED HEALTH CARE EDUCATION/TRAINING PROGRAM

## 2023-12-14 PROCEDURE — 87040 BLOOD CULTURE FOR BACTERIA: CPT | Mod: 59 | Performed by: STUDENT IN AN ORGANIZED HEALTH CARE EDUCATION/TRAINING PROGRAM

## 2023-12-14 PROCEDURE — 83605 ASSAY OF LACTIC ACID: CPT | Performed by: STUDENT IN AN ORGANIZED HEALTH CARE EDUCATION/TRAINING PROGRAM

## 2023-12-14 PROCEDURE — 81000 URINALYSIS NONAUTO W/SCOPE: CPT | Performed by: STUDENT IN AN ORGANIZED HEALTH CARE EDUCATION/TRAINING PROGRAM

## 2023-12-14 PROCEDURE — 96365 THER/PROPH/DIAG IV INF INIT: CPT

## 2023-12-14 PROCEDURE — 87635 SARS-COV-2 COVID-19 AMP PRB: CPT | Performed by: STUDENT IN AN ORGANIZED HEALTH CARE EDUCATION/TRAINING PROGRAM

## 2023-12-14 PROCEDURE — 25000003 PHARM REV CODE 250: Performed by: STUDENT IN AN ORGANIZED HEALTH CARE EDUCATION/TRAINING PROGRAM

## 2023-12-14 PROCEDURE — 96367 TX/PROPH/DG ADDL SEQ IV INF: CPT

## 2023-12-14 PROCEDURE — 87077 CULTURE AEROBIC IDENTIFY: CPT | Performed by: STUDENT IN AN ORGANIZED HEALTH CARE EDUCATION/TRAINING PROGRAM

## 2023-12-14 RX ORDER — ACETAMINOPHEN 500 MG
1000 TABLET ORAL
Status: COMPLETED | OUTPATIENT
Start: 2023-12-14 | End: 2023-12-14

## 2023-12-14 RX ORDER — CEFPODOXIME PROXETIL 100 MG/1
200 TABLET, FILM COATED ORAL 2 TIMES DAILY
Qty: 40 TABLET | Refills: 0 | Status: SHIPPED | OUTPATIENT
Start: 2023-12-14 | End: 2023-12-24

## 2023-12-14 RX ADMIN — SODIUM CHLORIDE 1000 ML: 9 INJECTION, SOLUTION INTRAVENOUS at 08:12

## 2023-12-14 RX ADMIN — ACETAMINOPHEN 1000 MG: 500 TABLET ORAL at 08:12

## 2023-12-14 RX ADMIN — PIPERACILLIN AND TAZOBACTAM 4.5 G: 4; .5 INJECTION, POWDER, LYOPHILIZED, FOR SOLUTION INTRAVENOUS; PARENTERAL at 07:12

## 2023-12-14 RX ADMIN — VANCOMYCIN HYDROCHLORIDE 1250 MG: 1.25 INJECTION, POWDER, LYOPHILIZED, FOR SOLUTION INTRAVENOUS at 08:12

## 2023-12-14 NOTE — LETTER
Jaydon Mccarthy  [address] [place] [address]          12/20/2023  Visit Date: 12/15/2023        Multiple attempts to contact you via the provided telephone number have been unsuccessful. This written correspondence is to inform you that your test results from your recent visit to [facility] have abnormal results. Please return to the Emergency Department immediately or call [phone] and ask to speak with a provider for further information.        Thank you,  Emergency Department Staff  [facility]    [address]  ?  [place] [id number]  ?  phone [phone] ?   ?  fax [phone]  ?  [email or web address]

## 2023-12-15 ENCOUNTER — PATIENT OUTREACH (OUTPATIENT)
Dept: ADMINISTRATIVE | Facility: OTHER | Age: 88
End: 2023-12-15
Payer: MEDICARE

## 2023-12-15 VITALS
OXYGEN SATURATION: 98 % | HEART RATE: 65 BPM | TEMPERATURE: 99 F | SYSTOLIC BLOOD PRESSURE: 176 MMHG | RESPIRATION RATE: 19 BRPM | DIASTOLIC BLOOD PRESSURE: 77 MMHG | WEIGHT: 132 LBS | BODY MASS INDEX: 18.94 KG/M2

## 2023-12-15 NOTE — ED PROVIDER NOTES
"Encounter Date: 12/14/2023    SCRIBE #1 NOTE: I, Alysiastone Gallardo, am scribing for, and in the presence of,  Nathaniel Simmons MD. I have scribed the following portions of the note - Other sections scribed: HPI, MDM.       History     Chief Complaint   Patient presents with    Fatigue     Pt reports to ED via EMS from nursing home with elevated temperature of 100 today. Per EMS more weakness and fatigue, hx of dementia. Unable to get oral temp per EMS.      89 y.o. M with PMHx of epilepsy, HTN, DVT, CAD, A-fib, anemia, squamous cell carcinoma of skin, aortic arch and bilateral carotid arteries atherosclerosis, stage 3a CKD, long-term anticoag use, presents to the ED via EMS with fatigue onset today PTA. Pt denies any pain or sob at this time. States he feels "fine".     Additional history is provided by independent historian: EMS, who reports that pt was found "slumped" over in his room and had an elevated temperature of 100 F. States nursing home nurse states last known normal was 3:45 PM today. EMS also states pt was complaining of back pain en route. No other exacerbating or alleviating factors.         The history is provided by the EMS personnel and the nursing home. The history is limited by the absence of a caregiver. No  was used.     Review of patient's allergies indicates:  No Known Allergies  Past Medical History:   Diagnosis Date    Anticoagulant long-term use     Arthritis     Cancer     right  hand    Coronary artery disease     Deep vein thrombosis     Hyperlipidemia     Hypertension     Seizures 1988    none since on tegretol     Past Surgical History:   Procedure Laterality Date    APPENDECTOMY      CLOSURE OF WOUND  12/9/2020    Procedure: CLOSURE, WOUND;  Surgeon: Yamil Bonilla MD;  Location: [facility] OR;  Service: Plastics;;    EXCISION OF LESION OF EYELID Right 12/9/2020    Procedure: EXCISION TUMOR RIGHT EYEBROW WITH COMPLEX CLOSURE;  Surgeon: Yamil Bonilla MD;  Location: " [facility] OR;  Service: Plastics;  Laterality: Right;  FROZEN SECTION  RN PREOP 2020----COVID NEGATIVE      FRACTURE SURGERY Right     elbow    FRACTURE SURGERY Right     hip    JOSUÉ FILTER PLACEMENT      hx of deep vein thrombosis    OPEN REDUCTION AND INTERNAL FIXATION (ORIF) OF INTERTROCHANTERIC FRACTURE OF FEMUR Left 2018    Procedure: ORIF, FRACTURE, FEMUR, INTERTROCHANTERIC;  Surgeon: Pop Martin III, MD;  Location: [facility] OR;  Service: Orthopedics;  Laterality: Left;    TOTAL SHOULDER ARTHROPLASTY Right      History reviewed. No pertinent family history.  Social History     Tobacco Use    Smoking status: Former     Current packs/day: 0.00     Average packs/day: 0.5 packs/day for 6.0 years (3.0 ttl pk-yrs)     Types: Cigarettes     Start date: 10/27/1969     Quit date: 10/27/1975     Years since quittin.1    Smokeless tobacco: Never   Substance Use Topics    Alcohol use: No    Drug use: No     Review of Systems    Physical Exam     Initial Vitals   BP Pulse Resp Temp SpO2   23 1817 23 1817 23 1817 23 1822 23 1817   (!) 156/74 81 18 98.9 °F (37.2 °C) 99 %      MAP       --                Physical Exam    Nursing note and vitals reviewed.  Constitutional: He appears well-developed. He is not diaphoretic. No distress.   HENT:   Head: Normocephalic and atraumatic.   Eyes: Conjunctivae and EOM are normal. Pupils are equal, round, and reactive to light.   Neck: Neck supple.   Normal range of motion.  Cardiovascular:  Normal rate, regular rhythm, normal heart sounds and intact distal pulses.           No murmur heard.  Pulmonary/Chest: Breath sounds normal. No respiratory distress. He has no wheezes. He has no rhonchi. He has no rales.   Abdominal: Abdomen is soft. He exhibits no distension. There is no abdominal tenderness. There is no rebound and no guarding.   Musculoskeletal:         General: No tenderness or edema.      Cervical back: Normal range of motion  and neck supple.     Neurological: He is alert. GCS eye subscore is 4. GCS verbal subscore is 4. GCS motor subscore is 6.   Oriented to self only, moving all extremities   Skin: Skin is warm and dry. Capillary refill takes less than 2 seconds.         ED Course   Procedures  Labs Reviewed   CBC W/ AUTO DIFFERENTIAL - Abnormal; Notable for the following components:       Result Value    WBC 13.89 (*)     RBC 3.67 (*)     Hemoglobin 10.9 (*)     Hematocrit 32.6 (*)     Gran # (ANC) 11.4 (*)     Immature Grans (Abs) 0.07 (*)     Mono # 1.2 (*)     Gran % 81.9 (*)     Lymph % 7.6 (*)     All other components within normal limits   COMPREHENSIVE METABOLIC PANEL - Abnormal; Notable for the following components:    CO2 22 (*)     Glucose 138 (*)     Calcium 8.6 (*)     Alkaline Phosphatase 179 (*)     ALT 9 (*)     eGFR 52 (*)     All other components within normal limits   URINALYSIS, REFLEX TO URINE CULTURE - Abnormal; Notable for the following components:    Appearance, UA Hazy (*)     Protein, UA 1+ (*)     Occult Blood UA 2+ (*)     Leukocytes, UA 3+ (*)     All other components within normal limits    Narrative:     Specimen Source->Urine   URINALYSIS MICROSCOPIC - Abnormal; Notable for the following components:    RBC, UA 54 (*)     WBC, UA >100 (*)     Yeast, UA Few (*)     All other components within normal limits    Narrative:     Specimen Source->Urine   POCT INFLUENZA A/B MOLECULAR - Abnormal   CULTURE, BLOOD   CULTURE, BLOOD   CULTURE, URINE   LACTIC ACID, PLASMA   SARS-COV-2 RDRP GENE          Imaging Results              X-Ray Chest AP Portable (Final result)  Result time 12/14/23 20:20:11      Final result by Leeann Beck MD (12/14/23 20:20:11)                   Impression:      No acute cardiopulmonary process identified.      Electronically signed by: Leeann Beck MD  Date:    12/14/2023  Time:    20:20               Narrative:    EXAMINATION:  XR CHEST AP PORTABLE    CLINICAL  HISTORY:  Sepsis;    TECHNIQUE:  Single frontal view of the chest was performed.    COMPARISON:  July 2022.    FINDINGS:  Cardiac silhouette is normal in size.  Right-sided chest port distal tip is seen over the SVC.  Lungs are symmetrically expanded.  Minimal left basilar scarring or plate atelectasis.  No evidence of focal consolidative process, pneumothorax, or significant pleural effusion.  No acute osseous abnormality identified.                                       CT Head Without Contrast (Final result)  Result time 12/14/23 19:27:24      Final result by Leeann Beck MD (12/14/23 19:27:24)                   Impression:      No acute intracranial abnormalities identified.  Stable ventricular enlargement.  No significant detrimental change compared to previous CT head from 09/07/2023.      Electronically signed by: Leeann Beck MD  Date:    12/14/2023  Time:    19:27               Narrative:    EXAMINATION:  CT HEAD WITHOUT CONTRAST    CLINICAL HISTORY:  Mental status change, unknown cause;    TECHNIQUE:  Low dose axial images were obtained through the head.  Coronal and sagittal reformations were also performed. Contrast was not administered.    COMPARISON:  CT head from 09/07/2023.    FINDINGS:  There is advanced generalized cerebral volume loss with extensive chronic microvascular ischemic disease.  There is stable ventriculomegaly.  No evidence of acute/recent major vascular distribution cerebral infarction, intraparenchymal hemorrhage, or intra-axial space occupying lesion. No effacement of the skull-base cisterns. No abnormal extra-axial fluid collections or blood products. Visualized paranasal sinuses and mastoid air cells are clear. The calvarium shows no significant abnormality.                                       Medications   piperacillin-tazobactam (ZOSYN) 4.5 g in dextrose 5 % in water (D5W) 100 mL IVPB (MB+) (0 g Intravenous Stopped 12/14/23 1959)   vancomycin 1,250 mg in dextrose 5 %  (D5W) 250 mL IVPB (Vial-Mate) (0 mg Intravenous Stopped 12/14/23 2141)   acetaminophen tablet 1,000 mg (1,000 mg Oral Given 12/14/23 2002)   sodium chloride 0.9% bolus 1,000 mL 1,000 mL (0 mLs Intravenous Stopped 12/14/23 2140)     Medical Decision Making    Differential diagnoses considered include sepsis, UTI, pneumonia, ICH, intracranial mass, electrolyte abnormality, dysrhythmia    See ED course for additional information.  Tylenol given for fever.          Amount and/or Complexity of Data Reviewed  External Data Reviewed: radiology.     Details: Echo from 11/2016 showed Normal left ventricular systolic function (EF 55-60%).  Labs: ordered. Decision-making details documented in ED Course.  Radiology: ordered and independent interpretation performed. Decision-making details documented in ED Course.  ECG/medicine tests: ordered and independent interpretation performed. Decision-making details documented in ED Course.    Risk  OTC drugs.  Prescription drug management.  Decision regarding hospitalization.  Diagnosis or treatment significantly limited by social determinants of health.            Scribe Attestation:   Scribe #1: I performed the above scribed service and the documentation accurately describes the services I performed. I attest to the accuracy of the note.        ED Course as of 12/14/23 2154   Thu Dec 14, 2023   1924 Temp(!): 101.1 °F (38.4 °C) [BD]   1924 CBC auto differential(!)  Leukocytosis of 14.  Given fever, leukocytosis, tachypnea, sepsis workup initiated.  Stable anemia. [BD]   2009 SARS-CoV-2 RNA, Amplification, Qual: Negative [BD]   2009 POC Molecular Influenza A Ag: Negative [BD]   2009 Lactate, [place]: 1.4  Normal [BD]   2009 Comprehensive metabolic panel(!)  CMP unremarkable without significant electrolyte derangement, or elevated LFTs. Baseline impaired renal function   [BD]   2010 CT Head Without Contrast  CT Head unremarkable without evidence of large-vessel infarct or intracranial  hemorrhage   [BD]   2019 EKG 12-lead  EKG independently interpreted by me shows sinus rhythm, rate 81, 1st degree AV block (), no STEMI, flipped T waves in III [BD]   2035 X-Ray Chest AP Portable  Chest x-ray independently interpreted by me shows no acute process such as pneumonia, pneumothorax, or pulmonary edema.    [BD]   2148 Urinalysis, Reflex to Urine Culture Urine, Clean Catch(!)  UA consistent with Uti. Pt received his first dose of antibiotics while in the ED IV. He's hemodynamically and clinically stable, so will treat with an outpatient course of abx. Son is in agreement with plan. Patient will be discharged at this time. Patient has been given home care instructions, follow up instructions, and strict return precautions. They agree with and are comfortable with the plan.     This patient does have evidence of infective focus  My overall impression is sepsis.  Source: Urinary Tract  Antibiotics given- Vanc and Zosyn  Antibiotics (72h ago, onward)      None          Latest lactate reviewed-  Recent Labs   Lab 12/14/23  1858   LACTATE 1.4     Organ dysfunction indicated by Encephalopathy    Fluid challenge Not needed - patient is not hypotensive      Post- resuscitation assessment Yes Perfusion exam was performed within 6 hours of septic shock presentation after bolus shows Adequate tissue perfusion assessed by non-invasive monitoring       Will Not start Pressors- Levophed for MAP of 65  Source control achieved by: antibiotics      Procedure: Critical Care  Please put in 75 minutes of critical care due to patient having a high risk of circulatory failure.       [BD]      ED Course User Index  [BD] Nathaniel Simmons MD                             Clinical Impression:  Final diagnoses:  [R50.9] Fever  [R65.10] SIRS (systemic inflammatory response syndrome)  [R53.83] Fatigue, unspecified type  [N39.0] Urinary tract infection without hematuria, site unspecified (Primary)          ED Disposition Condition     Discharge Stable          ED Prescriptions       Medication Sig Dispense Start Date End Date Auth. Provider    cefpodoxime (VANTIN) 100 MG tablet Take 2 tablets (200 mg total) by mouth 2 (two) times daily. for 10 days 40 tablet 12/14/2023 12/24/2023 Nathaniel Simmons MD          Follow-up Information       Follow up With Specialties Details Why Contact Info    Javier Villanueva MD Family Medicine Schedule an appointment as soon as possible for a visit  To discuss your recent ER visit and any additional concerns that you may have [address]  [place] [address]  [phone]      [facility] Emergency Dept Emergency Medicine Go to  As needed, If symptoms worsen [address] [place] [address] [facility] [place] [address] [phone]          I, Nathaniel Simmons, personally performed the services described in this documentation. All medical record entries made by the scribe were at my direction and in my presence.  I have reviewed the chart and agree that the record reflects my personal performance and is accurate and complete.      Nathaniel Simmons MD  12/14/23 [id number]

## 2023-12-15 NOTE — ED TRIAGE NOTES
Pt presented to ER via EMS from nursing home for  back pain, fever and lethargy. Pt has a hx of dementia and denies any back pain. Pt  is hard of hearing and a poor historian.

## 2023-12-15 NOTE — DISCHARGE INSTRUCTIONS
It was a pleasure taking care of you today!     Diagnosis: Urinary Tract Infection    Home Care:   - Take Cefpodoxime 200mg twice daily for the next 10 days    Follow-Up Plan:  - Follow-up with primary care doctor within 3 - 5 days to discuss your recent ER visit and any additional concerns that you may have.  - Additional testing and/or evaluation as directed by your primary doctor    Return to the Emergency Department for symptoms including but not limited to: persistence or worsening of symptoms, shortness of breath or chest pain, inability to drink without vomiting, passing out/fainting/ loss of consciousness, or if you have other concerns.

## 2023-12-15 NOTE — ED NOTES
Called John at transfer center.  He will reach out to [facility] regarding transport back to [facility].

## 2023-12-15 NOTE — PROGRESS NOTES
CHW - Initial Contact    This Community Health Worker updated and verified the Social Determinant of Health questionnaire with patient  at bedside  today.    Pt identified barriers of most importance are: has no needs at this time.   Referrals to community agencies completed with patient/caregiver consent outside of [facility] include: no: none at this time.  Referrals were put through [facility] - no: none at this time.  Support and Services: has no support at this time.  Other information discussed the patient needs / wants help with: Updated and verified SDOH with pt at bedside today, pt has no needs at this time. Will follow up in one week to check pt's future needs or possible case closure.    Follow up required: yes.  Follow-up Outreach - Due: 12/21/2023

## 2023-12-17 LAB — BACTERIA UR CULT: ABNORMAL

## 2023-12-18 LAB
BACTERIA BLD CULT: NORMAL
BACTERIA BLD CULT: NORMAL

## 2024-01-07 ENCOUNTER — HOSPITAL ENCOUNTER (INPATIENT)
Facility: HOSPITAL | Age: 89
LOS: 4 days | Discharge: HOME | DRG: 690 | End: 2024-01-12
Attending: EMERGENCY MEDICINE | Admitting: STUDENT IN AN ORGANIZED HEALTH CARE EDUCATION/TRAINING PROGRAM
Payer: MEDICARE

## 2024-01-07 DIAGNOSIS — N39.0 COMPLICATED UTI (URINARY TRACT INFECTION): ICD-10-CM

## 2024-01-07 DIAGNOSIS — I60.9 SUBARACHNOID HEMORRHAGE: Primary | ICD-10-CM

## 2024-01-07 DIAGNOSIS — R53.81 DEBILITY: ICD-10-CM

## 2024-01-07 DIAGNOSIS — R07.9 CHEST PAIN: ICD-10-CM

## 2024-01-07 PROBLEM — R93.0 ABNORMAL CT OF THE HEAD: Status: ACTIVE | Noted: 2024-01-07

## 2024-01-07 PROBLEM — F03.90 DEMENTIA: Status: ACTIVE | Noted: 2024-01-07

## 2024-01-07 LAB
ALBUMIN SERPL BCP-MCNC: 3.2 G/DL (ref 3.5–5.2)
ALP SERPL-CCNC: 148 U/L (ref 55–135)
ALT SERPL W/O P-5'-P-CCNC: 7 U/L (ref 10–44)
ANION GAP SERPL CALC-SCNC: 11 MMOL/L (ref 8–16)
AST SERPL-CCNC: 13 U/L (ref 10–40)
BACTERIA #/AREA URNS AUTO: ABNORMAL /HPF
BASOPHILS # BLD AUTO: 0.05 K/UL (ref 0–0.2)
BASOPHILS NFR BLD: 0.4 % (ref 0–1.9)
BILIRUB SERPL-MCNC: 0.4 MG/DL (ref 0.1–1)
BILIRUB UR QL STRIP: NEGATIVE
BNP SERPL-MCNC: 142 PG/ML (ref 0–99)
BUN SERPL-MCNC: 13 MG/DL (ref 10–30)
CALCIUM SERPL-MCNC: 8.5 MG/DL (ref 8.7–10.5)
CHLORIDE SERPL-SCNC: 107 MMOL/L (ref 95–110)
CLARITY UR REFRACT.AUTO: ABNORMAL
CO2 SERPL-SCNC: 23 MMOL/L (ref 23–29)
COLOR UR AUTO: YELLOW
CREAT SERPL-MCNC: 1.1 MG/DL (ref 0.5–1.4)
DIFFERENTIAL METHOD BLD: ABNORMAL
EOSINOPHIL # BLD AUTO: 0.3 K/UL (ref 0–0.5)
EOSINOPHIL NFR BLD: 2.9 % (ref 0–8)
ERYTHROCYTE [DISTWIDTH] IN BLOOD BY AUTOMATED COUNT: 13.1 % (ref 11.5–14.5)
EST. GFR  (NO RACE VARIABLE): >60 ML/MIN/1.73 M^2
GLUCOSE SERPL-MCNC: 121 MG/DL (ref 70–110)
GLUCOSE UR QL STRIP: NEGATIVE
HCT VFR BLD AUTO: 31.2 % (ref 40–54)
HGB BLD-MCNC: 10.5 G/DL (ref 14–18)
HGB UR QL STRIP: ABNORMAL
HYALINE CASTS UR QL AUTO: 0 /LPF
IMM GRANULOCYTES # BLD AUTO: 0.03 K/UL (ref 0–0.04)
IMM GRANULOCYTES NFR BLD AUTO: 0.3 % (ref 0–0.5)
KETONES UR QL STRIP: NEGATIVE
LEUKOCYTE ESTERASE UR QL STRIP: ABNORMAL
LYMPHOCYTES # BLD AUTO: 1.5 K/UL (ref 1–4.8)
LYMPHOCYTES NFR BLD: 13.1 % (ref 18–48)
MCH RBC QN AUTO: 29.2 PG (ref 27–31)
MCHC RBC AUTO-ENTMCNC: 33.7 G/DL (ref 32–36)
MCV RBC AUTO: 87 FL (ref 82–98)
MICROSCOPIC COMMENT: ABNORMAL
MONOCYTES # BLD AUTO: 0.9 K/UL (ref 0.3–1)
MONOCYTES NFR BLD: 7.8 % (ref 4–15)
NEUTROPHILS # BLD AUTO: 8.5 K/UL (ref 1.8–7.7)
NEUTROPHILS NFR BLD: 75.5 % (ref 38–73)
NITRITE UR QL STRIP: NEGATIVE
NRBC BLD-RTO: 0 /100 WBC
PH UR STRIP: 6 [PH] (ref 5–8)
PLATELET # BLD AUTO: 213 K/UL (ref 150–450)
PMV BLD AUTO: 9.1 FL (ref 9.2–12.9)
POTASSIUM SERPL-SCNC: 3.9 MMOL/L (ref 3.5–5.1)
PROT SERPL-MCNC: 6.9 G/DL (ref 6–8.4)
PROT UR QL STRIP: ABNORMAL
RBC # BLD AUTO: 3.6 M/UL (ref 4.6–6.2)
RBC #/AREA URNS AUTO: 61 /HPF (ref 0–4)
SODIUM SERPL-SCNC: 141 MMOL/L (ref 136–145)
SP GR UR STRIP: 1.02 (ref 1–1.03)
TROPONIN I SERPL DL<=0.01 NG/ML-MCNC: 0.01 NG/ML (ref 0–0.03)
TROPONIN I SERPL DL<=0.01 NG/ML-MCNC: 0.01 NG/ML (ref 0–0.03)
URN SPEC COLLECT METH UR: ABNORMAL
WBC # BLD AUTO: 11.2 K/UL (ref 3.9–12.7)
WBC #/AREA URNS AUTO: >100 /HPF (ref 0–5)
YEAST UR QL AUTO: ABNORMAL

## 2024-01-07 PROCEDURE — 99291 CRITICAL CARE FIRST HOUR: CPT

## 2024-01-07 PROCEDURE — 63600175 PHARM REV CODE 636 W HCPCS: Performed by: EMERGENCY MEDICINE

## 2024-01-07 PROCEDURE — 80053 COMPREHEN METABOLIC PANEL: CPT | Performed by: EMERGENCY MEDICINE

## 2024-01-07 PROCEDURE — 96375 TX/PRO/DX INJ NEW DRUG ADDON: CPT

## 2024-01-07 PROCEDURE — 93010 ELECTROCARDIOGRAM REPORT: CPT | Mod: ,,, | Performed by: INTERNAL MEDICINE

## 2024-01-07 PROCEDURE — 81001 URINALYSIS AUTO W/SCOPE: CPT

## 2024-01-07 PROCEDURE — 87088 URINE BACTERIA CULTURE: CPT

## 2024-01-07 PROCEDURE — 84484 ASSAY OF TROPONIN QUANT: CPT | Performed by: EMERGENCY MEDICINE

## 2024-01-07 PROCEDURE — 0HQ1XZZ REPAIR FACE SKIN, EXTERNAL APPROACH: ICD-10-PCS | Performed by: EMERGENCY MEDICINE

## 2024-01-07 PROCEDURE — 99223 1ST HOSP IP/OBS HIGH 75: CPT | Mod: ,,, | Performed by: NEUROLOGICAL SURGERY

## 2024-01-07 PROCEDURE — 63600175 PHARM REV CODE 636 W HCPCS

## 2024-01-07 PROCEDURE — 25000003 PHARM REV CODE 250

## 2024-01-07 PROCEDURE — 83880 ASSAY OF NATRIURETIC PEPTIDE: CPT | Performed by: EMERGENCY MEDICINE

## 2024-01-07 PROCEDURE — 96365 THER/PROPH/DIAG IV INF INIT: CPT

## 2024-01-07 PROCEDURE — G0378 HOSPITAL OBSERVATION PER HR: HCPCS

## 2024-01-07 PROCEDURE — 85025 COMPLETE CBC W/AUTO DIFF WBC: CPT | Performed by: EMERGENCY MEDICINE

## 2024-01-07 PROCEDURE — 93005 ELECTROCARDIOGRAM TRACING: CPT

## 2024-01-07 PROCEDURE — 87086 URINE CULTURE/COLONY COUNT: CPT

## 2024-01-07 RX ORDER — IBUPROFEN 200 MG
24 TABLET ORAL
Status: DISCONTINUED | OUTPATIENT
Start: 2024-01-08 | End: 2024-01-12 | Stop reason: HOSPADM

## 2024-01-07 RX ORDER — IBUPROFEN 200 MG
16 TABLET ORAL
Status: DISCONTINUED | OUTPATIENT
Start: 2024-01-08 | End: 2024-01-12 | Stop reason: HOSPADM

## 2024-01-07 RX ORDER — LABETALOL HYDROCHLORIDE 5 MG/ML
20 INJECTION, SOLUTION INTRAVENOUS
Status: COMPLETED | OUTPATIENT
Start: 2024-01-07 | End: 2024-01-07

## 2024-01-07 RX ORDER — METOPROLOL TARTRATE 25 MG/1
12.5 TABLET ORAL 2 TIMES DAILY
Status: DISCONTINUED | OUTPATIENT
Start: 2024-01-08 | End: 2024-01-12 | Stop reason: HOSPADM

## 2024-01-07 RX ORDER — GLUCAGON 1 MG
1 KIT INJECTION
Status: DISCONTINUED | OUTPATIENT
Start: 2024-01-08 | End: 2024-01-12 | Stop reason: HOSPADM

## 2024-01-07 RX ORDER — ACETAMINOPHEN 500 MG
1000 TABLET ORAL
Status: COMPLETED | OUTPATIENT
Start: 2024-01-07 | End: 2024-01-07

## 2024-01-07 RX ORDER — NALOXONE HCL 0.4 MG/ML
0.02 VIAL (ML) INJECTION
Status: DISCONTINUED | OUTPATIENT
Start: 2024-01-08 | End: 2024-01-12 | Stop reason: HOSPADM

## 2024-01-07 RX ORDER — SODIUM CHLORIDE 0.9 % (FLUSH) 0.9 %
10 SYRINGE (ML) INJECTION EVERY 12 HOURS PRN
Status: DISCONTINUED | OUTPATIENT
Start: 2024-01-08 | End: 2024-01-12 | Stop reason: HOSPADM

## 2024-01-07 RX ORDER — ACETAMINOPHEN 325 MG/1
650 TABLET ORAL EVERY 4 HOURS PRN
Status: DISCONTINUED | OUTPATIENT
Start: 2024-01-08 | End: 2024-01-12 | Stop reason: HOSPADM

## 2024-01-07 RX ORDER — ATORVASTATIN CALCIUM 20 MG/1
20 TABLET, FILM COATED ORAL DAILY
Status: DISCONTINUED | OUTPATIENT
Start: 2024-01-08 | End: 2024-01-12 | Stop reason: HOSPADM

## 2024-01-07 RX ORDER — SODIUM CHLORIDE 0.9 % (FLUSH) 0.9 %
10 SYRINGE (ML) INJECTION
Status: DISCONTINUED | OUTPATIENT
Start: 2024-01-07 | End: 2024-01-12 | Stop reason: HOSPADM

## 2024-01-07 RX ORDER — MORPHINE SULFATE 4 MG/ML
4 INJECTION, SOLUTION INTRAMUSCULAR; INTRAVENOUS
Status: COMPLETED | OUTPATIENT
Start: 2024-01-07 | End: 2024-01-07

## 2024-01-07 RX ORDER — ASPIRIN 81 MG/1
81 TABLET ORAL DAILY
Status: DISCONTINUED | OUTPATIENT
Start: 2024-01-08 | End: 2024-01-12 | Stop reason: HOSPADM

## 2024-01-07 RX ORDER — TALC
6 POWDER (GRAM) TOPICAL NIGHTLY PRN
Status: DISCONTINUED | OUTPATIENT
Start: 2024-01-07 | End: 2024-01-12 | Stop reason: HOSPADM

## 2024-01-07 RX ORDER — TAMSULOSIN HYDROCHLORIDE 0.4 MG/1
0.4 CAPSULE ORAL DAILY
Status: DISCONTINUED | OUTPATIENT
Start: 2024-01-08 | End: 2024-01-12 | Stop reason: HOSPADM

## 2024-01-07 RX ORDER — FINASTERIDE 5 MG/1
5 TABLET, FILM COATED ORAL DAILY
Status: DISCONTINUED | OUTPATIENT
Start: 2024-01-08 | End: 2024-01-12 | Stop reason: HOSPADM

## 2024-01-07 RX ORDER — ONDANSETRON 8 MG/1
8 TABLET, ORALLY DISINTEGRATING ORAL EVERY 8 HOURS PRN
Status: DISCONTINUED | OUTPATIENT
Start: 2024-01-08 | End: 2024-01-12 | Stop reason: HOSPADM

## 2024-01-07 RX ORDER — NICARDIPINE HYDROCHLORIDE 0.2 MG/ML
0-15 INJECTION INTRAVENOUS CONTINUOUS
Status: DISCONTINUED | OUTPATIENT
Start: 2024-01-07 | End: 2024-01-07

## 2024-01-07 RX ADMIN — ACETAMINOPHEN 1000 MG: 500 TABLET ORAL at 08:01

## 2024-01-07 RX ADMIN — Medication 6 MG: at 11:01

## 2024-01-07 RX ADMIN — MORPHINE SULFATE 4 MG: 4 INJECTION INTRAVENOUS at 08:01

## 2024-01-07 RX ADMIN — CEFEPIME 1 G: 1 INJECTION, POWDER, FOR SOLUTION INTRAMUSCULAR; INTRAVENOUS at 10:01

## 2024-01-07 RX ADMIN — NICARDIPINE HYDROCHLORIDE 2.5 MG/HR: 0.2 INJECTION, SOLUTION INTRAVENOUS at 08:01

## 2024-01-07 RX ADMIN — LABETALOL HYDROCHLORIDE 20 MG: 5 INJECTION INTRAVENOUS at 04:01

## 2024-01-07 NOTE — ED TRIAGE NOTES
Pt presents to ER via EMS after fall at nursing home. Pt has lac to the top of his left eye. Pt [place]. Daughter at bedside.

## 2024-01-07 NOTE — ED NOTES
Norm Fields at transfer center, patient STAT transfer to [facility] ED, [phone].  Accepting Dr. Vo.

## 2024-01-07 NOTE — Clinical Note
Diagnosis: Subarachnoid hemorrhage [430.ICD-9-CM]   Future Attending Provider: LACEY WILLS [[id number]]   Is the patient being sent to ED Observation?: No   Admitting Provider:: LACEY WILLS [[id number]]   Special Needs:: No Special Needs [1]

## 2024-01-07 NOTE — ED PROVIDER NOTES
Encounter Date: 1/7/2024       History     Chief Complaint   Patient presents with    Fall     Transfer from [facility] here for neuro.Pt from [facility] s/p unwitnessed fall. Per EMS pt stopped plavix 3 weeks ago and only takes a baby aspirin daily. Lac noted to L side above eyebrow. Bandage placed per EMS. EMS states staff reports patient was conscious when they went in the room. Pt has a hx of dementia and is unable to report any LOC. Pt is alert in triage.      89 yo male presenting via EMS from [facility] s/p unwitnessed fall. Per EMS pt stopped plavix 3 weeks ago and only takes a baby aspirin daily. Lac noted to L side above eyebrow. Bandage placed per EMS. EMS states staff reports patient was conscious when they went in the room. Pt has a hx of dementia and is unable to report any LOC. Pt is alert in triage.  Daughter at bedside reports patient has had many falls.  Patient is hard of hearing and demented making history difficult.  Patient denies any pain or complaints at this time.          Review of patient's allergies indicates:  No Known Allergies  Past Medical History:   Diagnosis Date    Anticoagulant long-term use     Arthritis     Cancer     right  hand    Coronary artery disease     Deep vein thrombosis     Hyperlipidemia     Hypertension     Seizures 1988    none since on tegretol     Past Surgical History:   Procedure Laterality Date    APPENDECTOMY      CLOSURE OF WOUND  12/9/2020    Procedure: CLOSURE, WOUND;  Surgeon: Yamil Bonilla MD;  Location: [facility] OR;  Service: Plastics;;    EXCISION OF LESION OF EYELID Right 12/9/2020    Procedure: EXCISION TUMOR RIGHT EYEBROW WITH COMPLEX CLOSURE;  Surgeon: Yamil Bonilla MD;  Location: [facility] OR;  Service: Plastics;  Laterality: Right;  FROZEN SECTION  RN PREOP 12/8/2020----COVID NEGATIVE  12/8    FRACTURE SURGERY Right     elbow    FRACTURE SURGERY Right     hip    JOSUÉ FILTER PLACEMENT      hx of deep vein thrombosis     OPEN REDUCTION AND INTERNAL FIXATION (ORIF) OF INTERTROCHANTERIC FRACTURE OF FEMUR Left 2018    Procedure: ORIF, FRACTURE, FEMUR, INTERTROCHANTERIC;  Surgeon: Pop Martin III, MD;  Location: [facility] OR;  Service: Orthopedics;  Laterality: Left;    TOTAL SHOULDER ARTHROPLASTY Right      No family history on file.  Social History     Tobacco Use    Smoking status: Former     Current packs/day: 0.00     Average packs/day: 0.5 packs/day for 6.0 years (3.0 ttl pk-yrs)     Types: Cigarettes     Start date: 10/27/1969     Quit date: 10/27/1975     Years since quittin.2    Smokeless tobacco: Never   Substance Use Topics    Alcohol use: No    Drug use: No     Review of Systems   Unable to perform ROS: Dementia   Skin:  Positive for wound.       Physical Exam     Initial Vitals [24 1356]   BP Pulse Resp Temp SpO2   (!) 173/77 71 16 98 °F (36.7 °C) 97 %      MAP       --         Physical Exam    Nursing note and vitals reviewed.  Constitutional: He appears well-developed and well-nourished. He is not diaphoretic. No distress.   Chronically ill-appearing, frail   HENT:   Head: Normocephalic and atraumatic.   Eyes: Conjunctivae and EOM are normal. Pupils are equal, round, and reactive to light. No scleral icterus.   Neck: Neck supple.   Normal range of motion.  Cardiovascular:  Normal rate, regular rhythm, normal heart sounds and intact distal pulses.     Exam reveals no gallop and no friction rub.       No murmur heard.  Pulmonary/Chest: Breath sounds normal. No stridor. No respiratory distress. He has no wheezes. He has no rhonchi. He has no rales.   Abdominal: Abdomen is soft. Bowel sounds are normal. He exhibits no distension. There is no abdominal tenderness. There is no rebound and no guarding.   Musculoskeletal:         General: No tenderness or edema. Normal range of motion.      Cervical back: Normal range of motion and neck supple.     Neurological: He is alert. He has normal strength. No cranial  nerve deficit. GCS eye subscore is 4. GCS verbal subscore is 5. GCS motor subscore is 6.   Skin: Skin is warm and dry. No rash noted.   Left orbital temporal hematoma with skin tear in a proximally 3 cm laceration   Psychiatric: He has a normal mood and affect. His behavior is normal.         ED Course   Critical Care    Date/Time: 1/7/2024 3:32 PM    Performed by: Jose Price MD  Authorized by: Jose Price MD  Direct patient critical care time: 30 minutes  Total critical care time (exclusive of procedural time) : 30 minutes      Lac Repair    Date/Time: 1/7/2024 4:19 PM    Performed by: Jose Price MD  Authorized by: Jose Price MD    Consent:     Consent obtained:  Verbal    Consent given by:  Healthcare agent    Risks, benefits, and alternatives were discussed: yes      Risks discussed:  Infection, pain, need for additional repair and poor cosmetic result    Alternatives discussed:  No treatment  Universal protocol:     Procedure explained and questions answered to patient or proxy's satisfaction: yes      Test results available: yes      Site/side marked: yes      Immediately prior to procedure, a time out was called: yes      Patient identity confirmed:  Verbally with patient  Anesthesia:     Anesthesia method:  None  Laceration details:     Location:  Face    Face location:  L eyebrow    Length (cm):  2.5  Pre-procedure details:     Preparation:  Patient was prepped and draped in usual sterile fashion and imaging obtained to evaluate for foreign bodies  Exploration:     Hemostasis achieved with:  Direct pressure    Imaging obtained comment:  CT scan    Wound exploration: wound explored through full range of motion      Contaminated: no    Treatment:     Area cleansed with:  Saline    Amount of cleaning:  Standard    Irrigation solution:  Sterile saline    Irrigation method:  Syringe  Skin repair:     Repair method:  Steri-Strips    Number of Steri-Strips:   3  Approximation:     Approximation:  Close  Repair type:     Repair type:  Simple  Post-procedure details:     Dressing:  Bulky dressing    Procedure completion:  Tolerated    Labs Reviewed   CBC W/ AUTO DIFFERENTIAL - Abnormal; Notable for the following components:       Result Value    RBC 3.60 (*)     Hemoglobin 10.5 (*)     Hematocrit 31.2 (*)     MPV 9.1 (*)     Gran # (ANC) 8.5 (*)     Gran % 75.5 (*)     Lymph % 13.1 (*)     All other components within normal limits   COMPREHENSIVE METABOLIC PANEL - Abnormal; Notable for the following components:    Glucose 121 (*)     Calcium 8.5 (*)     Albumin 3.2 (*)     Alkaline Phosphatase 148 (*)     ALT 7 (*)     All other components within normal limits   B-TYPE NATRIURETIC PEPTIDE - Abnormal; Notable for the following components:     (*)     All other components within normal limits   URINALYSIS, REFLEX TO URINE CULTURE - Abnormal; Notable for the following components:    Appearance, UA Hazy (*)     Protein, UA 1+ (*)     Occult Blood UA 1+ (*)     Leukocytes, UA 3+ (*)     All other components within normal limits    Narrative:     Specimen Source->Urine   URINALYSIS MICROSCOPIC - Abnormal; Notable for the following components:    RBC, UA 61 (*)     WBC, UA >100 (*)     Bacteria Moderate (*)     All other components within normal limits    Narrative:     Specimen Source->Urine   COMPREHENSIVE METABOLIC PANEL - Abnormal; Notable for the following components:    Chloride 111 (*)     Glucose 120 (*)     Albumin 3.0 (*)     ALT 5 (*)     All other components within normal limits   CBC W/ AUTO DIFFERENTIAL - Abnormal; Notable for the following components:    RBC 3.00 (*)     Hemoglobin 8.9 (*)     Hematocrit 25.7 (*)     MPV 9.0 (*)     All other components within normal limits   TROPONIN I   TROPONIN I   MAGNESIUM   PHOSPHORUS        ECG Results              EKG 12-lead (Final result)  Result time 01/08/24 22:03:38      Final result by Interface, Lab In [facility]  (01/08/24 22:03:38)                   Narrative:    Test Reason : R07.9,    Vent. Rate : 066 BPM     Atrial Rate : 065 BPM     P-R Int : 000 ms          QRS Dur : 086 ms      QT Int : 428 ms       P-R-T Axes : 000 038 018 degrees     QTc Int : 448 ms    Normal sinus rhythm  Nonspecific ST abnormality  Abnormal ECG  When compared with ECG of 14-DEC-2023 20:39,  Significant changes have occurred  Confirmed by Kathy PLEITEZ, Bradley HERNANDEZ ([id number]) on 1/8/2024 10:03:28 PM    Referred By: BOUBACAR   SELF           Confirmed By:Bradley Chavez MD                                  Imaging Results              CT Head Without Contrast (Final result)  Result time 01/07/24 21:29:33      Final result by Car Ramachandran MD (01/07/24 21:29:33)                   Impression:      No detrimental change when compared with CT head obtained earlier the same day.  Persistent probable trace subarachnoid hemorrhage in the left cerebral hemisphere.    Stable generalized cerebral volume loss, chronic ischemic changes, and stable ventriculomegaly.    Electronically signed by resident: Teodora Reyes  Date:    01/07/2024  Time:    21:00    Electronically signed by: Car Ramachandran MD  Date:    01/07/2024  Time:    21:29               Narrative:    EXAMINATION:  CT HEAD WITHOUT CONTRAST    CLINICAL HISTORY:  Subarachnoid hemorrhage (SAH) suspected;    TECHNIQUE:  Low dose axial CT images obtained throughout the head without intravenous contrast. Sagittal and coronal reconstructions were performed.    COMPARISON:  CT head, 01/07/2024 at 14:33.    FINDINGS:  Stable advanced generalized cerebral volume loss with ex vacuo dilation of the ventricles more pronounced at expected for degree of sulcal enlargement.  No subdural hemorrhage identified.  Diffuse periventricular and subcortical white matter hypoattenuation suggestive of chronic microvascular ischemic change, similar when compared with multiple prior studies.  There is also mild volume loss in the  cerebellum.    Redemonstration of left posterior sylvian fissure 5 mm hyperdensity unchanged from prior.  No evidence of new parenchymal mass effect, edema, acute hemorrhage or acute major vascular distribution infarct.  No significant worsening hemorrhage.  No midline shift.    Skull/extracranial contents (limited evaluation): Calvarium unremarkable.  Interval improvement of left preorbital hematoma with persistent laceration in this region.  The remainder of the extracranial soft tissues are unchanged.                                       X-Ray Chest AP Portable (Final result)  Result time 01/07/24 15:02:06      Final result by Ian Carreno MD (01/07/24 15:02:06)                   Impression:      No acute radiographic abnormality.  See above comments.      Electronically signed by: Ian Carreno  Date:    01/07/2024  Time:    15:02               Narrative:    EXAMINATION:  XR CHEST AP PORTABLE    CLINICAL HISTORY:  Chest Pain;    TECHNIQUE:  Single frontal view of the chest was performed.    COMPARISON:  12/14/2023    FINDINGS:  Suboptimal inspiration.    Cardiac silhouette is within normal limits.  Aortic atherosclerosis.    Port catheter central venous catheter on the right is unchanged.    Right shoulder arthroplasty.    No effusion or pneumothorax.  No mass or consolidation.  Minimal nonspecific interstitial changes.  No detrimental change.  Mild left basilar atelectasis.  Chronic changes of the left shoulder.                                       CT Head Without Contrast (Final result)  Result time 01/07/24 15:02:40      Final result by Janine Hernandez MD (01/07/24 15:02:40)                   Impression:      There is a large left periorbital hematoma.  There is a punctate focus of hyperdensity in the posterior aspect of the left sylvian fissure concerning for a small focus of subarachnoid hemorrhage.  No definite parenchymal hemorrhage is seen.    Advanced generalized cerebral volume loss with  moderate severe chronic microvascular ischemic disease.  Stable ventricular enlargement.  No evidence for active hydrocephalus.    COMMUNICATION  This critical result was discovered/received at 15:00.  The critical information above was relayed directly by me via secure chat to Dr. Jose Price on 01/07/2024 at 15:01.      Electronically signed by: Janine Hernandez MD  Date:    01/07/2024  Time:    15:02               Narrative:    EXAMINATION:  CT HEAD WITHOUT CONTRAST    CLINICAL HISTORY:  Head trauma, minor (Age >= 65y);    TECHNIQUE:  Low dose axial CT images obtained throughout the head without intravenous contrast. Sagittal and coronal reconstructions were performed.    COMPARISON:  12/14/2023    FINDINGS:  Left periorbital soft tissue hematoma.  Age advanced generalized cerebral volume loss punctate focus of hyperdensity along the posterior margin of the left sylvian fissure measuring 5 mm suggestive for a small focus of subarachnoid hemorrhage.  No definite intraparenchymal hematoma.  There is advanced generalized cerebral volume loss with moderate severe chronic microvascular ischemic disease.  There is prominence of the ventricular system, similar to prior examinations without evidence for active hydrocephalus.  No calvarial fracture is seen.                                       CT Cervical Spine Without Contrast (Final result)  Result time 01/07/24 15:13:31      Final result by Adele Salvador MD (01/07/24 15:13:31)                   Impression:      No acute displaced fracture.  No traumatic malalignment.    Spondylosis of the cervical spine.    Stable left upper lobe pulmonary nodules from 06/26/2023 exam.    Accumulation of calcified carotid plaque especially at the right carotid bulb.    Left external ear cerumen.      Electronically signed by: Adele Salvador MD  Date:    01/07/2024  Time:    15:13               Narrative:    EXAMINATION:  CT CERVICAL SPINE WITHOUT CONTRAST    CLINICAL  HISTORY:  Neck trauma (Age >= 65y);    TECHNIQUE:  Low dose axial images, sagittal and coronal reformations were performed though the cervical spine.  Contrast was not administered.    COMPARISON:  09/07/2023 CT cervical spine    FINDINGS:  ALIGNMENT: Normal alignment    BONE: C3-C4 vertebral body fusion.  Severe disc space narrowing at C4-5, C5-6 and C6-C7.    JOINT: Mild facet joint degenerative change.    SPINAL CANAL: The spinal canal and its content is suboptimally evaluated by CT, there is posterior disc osteophyte complex at C4-5 through C6-C7.    There is moderate canal stenosis at C4-5 with moderate bilateral foraminal narrowing.  There is mild canal stenosis at C5-6 with moderate bilateral foraminal narrowing.    POSTERIOR FOSSA: No acute abnormality.    PARASPINAL SOFT TISSUES: Left external ear cerumen.  Central line entering from the right.  Calcified carotid plaque at the right carotid bulb.  Few small 0.3 mm pulmonary nodule left upper lobe, they are unchanged from 06/26/2023 CT cervical spine exam.                                       Medications   sodium chloride 0.9% flush 10 mL (has no administration in time range)   melatonin tablet 6 mg (6 mg Oral Not Given 1/10/24 0051)   aspirin EC tablet 81 mg (0 mg Oral Hold 1/10/24 0900)   atorvastatin tablet 20 mg (0 mg Oral Hold 1/10/24 0900)   finasteride tablet 5 mg (0 mg Oral Hold 1/10/24 0900)   metoprolol tartrate (LOPRESSOR) split tablet 12.5 mg (12.5 mg Oral Given 1/10/24 0913)   tamsulosin 24 hr capsule 0.4 mg (0 mg Oral Hold 1/10/24 0900)   sodium chloride 0.9% flush 10 mL (has no administration in time range)   naloxone 0.4 mg/mL injection 0.02 mg (has no administration in time range)   glucose chewable tablet 16 g (has no administration in time range)   glucose chewable tablet 24 g (has no administration in time range)   glucagon (human recombinant) injection 1 mg (has no administration in time range)   acetaminophen tablet 650 mg (650 mg Oral  Not Given 1/10/24 0051)   ondansetron disintegrating tablet 8 mg (has no administration in time range)   dextrose 10% bolus 125 mL 125 mL (has no administration in time range)   dextrose 10% bolus 250 mL 250 mL (has no administration in time range)   ceFEPIme (MAXIPIME) 1 g in dextrose 5 % in water (D5W) 100 mL IVPB (MB+) (0 g Intravenous Stopped 1/10/24 1150)   carBAMazepine chewable tablet 200 mg (200 mg Oral Given 1/10/24 0911)   NIFEdipine 24 hr tablet 30 mg (30 mg Oral Given 1/10/24 0916)   heparin (porcine) injection 5,000 Units (0 Units Subcutaneous Hold 1/10/24 1400)   labetaloL injection 20 mg (20 mg Intravenous Given 1/7/24 1608)   morphine injection 4 mg (4 mg Intravenous Given 1/7/24 2051)   acetaminophen tablet 1,000 mg (1,000 mg Oral Given 1/7/24 2033)   ceFEPIme (MAXIPIME) 1 g in dextrose 5 % in water (D5W) 100 mL IVPB (MB+) (0 g Intravenous Stopped 1/7/24 2335)   magnesium oxide tablet 400 mg (400 mg Oral Given 1/10/24 0916)   potassium chloride SA CR tablet 40 mEq (40 mEq Oral Given 1/10/24 1120)     Medical Decision Making  Amount and/or Complexity of Data Reviewed  Labs: ordered.  Radiology: ordered.    Risk  OTC drugs.  Prescription drug management.  Decision regarding hospitalization.               ED Course as of 01/10/24 1736   Sun Jan 07, 2024   3019 89-year-old male in no acute distress.  Neurosurgery was consulted after I did my initial evaluation of the patient and deemed him stable.  They recommended repeat CT scan now and blood pressure control.  Cardene drip initiated.  Differential includes but is not limited to complicated UTI versus evolving subarachnoid hemorrhage versus electrolyte derangement versus seizure [BP]   2250 I reviewed prior imaging, new CT scan was read as grossly stable.  Patient's UA was significant for infection.  Patient had a recent Pseudomonas UTI back on 12/14, we will cover with cefepime and admit to medicine for complicated UTI. [BP]      ED Course User  Index  [BP] Willie Nevarez MD               Medical Decision Making:   Initial Assessment:   89-year-old male presenting with skin tear and hematoma.  Patient at baseline mental status.  Mild hypertension.  Moving all extremities.  CT head reveals small subarachnoid hemorrhage.  Patient's daughter reports he is wheelchair-bound.  She suspects he tried to get up and walk which he does frequently and likely fell.  Patient denies complaints.  Reportedly not currently on blood thinners aside from ASA.  Awaiting labs, will contact transfer center for transfer to [facility] given subarachnoid hemorrhage.  Differential Diagnosis:   Subarachnoid hemorrhage, arrhythmia, metabolic abnormality, mechanical fall  ED Management:  Patient accepted to [facility] for further evaluation of subarachnoid hemorrhage.  Skin tear to thin for standard repair.  Steri-Strips used to approximate.  Bleeding significantly improved, minimal oozing noted.  Patient will be transferred to [facility].  Will attempt to avoid IV nicardipine as the patient has a tendency to pull at lines.  Will attempt treatment initially with labetalol.             Clinical Impression:  Final diagnoses:  [R07.9] Chest pain  [I60.9] Subarachnoid hemorrhage (Primary)  [N39.0] Complicated UTI (urinary tract infection)          ED Disposition Condition    Admit                 Jose Price MD  01/07/24 1621       Jose Price MD  01/10/24 [id number]

## 2024-01-08 ENCOUNTER — PATIENT OUTREACH (OUTPATIENT)
Dept: ADMINISTRATIVE | Facility: OTHER | Age: 89
End: 2024-01-08
Payer: MEDICARE

## 2024-01-08 PROBLEM — F03.90 DEMENTIA: Chronic | Status: ACTIVE | Noted: 2024-01-07

## 2024-01-08 PROBLEM — R53.81 DEBILITY: Status: ACTIVE | Noted: 2024-01-08

## 2024-01-08 LAB
ALBUMIN SERPL BCP-MCNC: 3 G/DL (ref 3.5–5.2)
ALP SERPL-CCNC: 131 U/L (ref 55–135)
ALT SERPL W/O P-5'-P-CCNC: 5 U/L (ref 10–44)
ANION GAP SERPL CALC-SCNC: 11 MMOL/L (ref 8–16)
AST SERPL-CCNC: 11 U/L (ref 10–40)
BASOPHILS # BLD AUTO: 0.07 K/UL (ref 0–0.2)
BASOPHILS NFR BLD: 0.7 % (ref 0–1.9)
BILIRUB SERPL-MCNC: 0.4 MG/DL (ref 0.1–1)
BUN SERPL-MCNC: 13 MG/DL (ref 10–30)
CALCIUM SERPL-MCNC: 8.8 MG/DL (ref 8.7–10.5)
CHLORIDE SERPL-SCNC: 111 MMOL/L (ref 95–110)
CO2 SERPL-SCNC: 23 MMOL/L (ref 23–29)
CREAT SERPL-MCNC: 1 MG/DL (ref 0.5–1.4)
DIFFERENTIAL METHOD BLD: ABNORMAL
EOSINOPHIL # BLD AUTO: 0.3 K/UL (ref 0–0.5)
EOSINOPHIL NFR BLD: 3.5 % (ref 0–8)
ERYTHROCYTE [DISTWIDTH] IN BLOOD BY AUTOMATED COUNT: 13.2 % (ref 11.5–14.5)
EST. GFR  (NO RACE VARIABLE): >60 ML/MIN/1.73 M^2
GLUCOSE SERPL-MCNC: 120 MG/DL (ref 70–110)
HCT VFR BLD AUTO: 25.7 % (ref 40–54)
HGB BLD-MCNC: 8.9 G/DL (ref 14–18)
IMM GRANULOCYTES # BLD AUTO: 0.02 K/UL (ref 0–0.04)
IMM GRANULOCYTES NFR BLD AUTO: 0.2 % (ref 0–0.5)
LYMPHOCYTES # BLD AUTO: 1.9 K/UL (ref 1–4.8)
LYMPHOCYTES NFR BLD: 20.8 % (ref 18–48)
MAGNESIUM SERPL-MCNC: 2 MG/DL (ref 1.6–2.6)
MCH RBC QN AUTO: 29.7 PG (ref 27–31)
MCHC RBC AUTO-ENTMCNC: 34.6 G/DL (ref 32–36)
MCV RBC AUTO: 86 FL (ref 82–98)
MONOCYTES # BLD AUTO: 0.9 K/UL (ref 0.3–1)
MONOCYTES NFR BLD: 9.4 % (ref 4–15)
NEUTROPHILS # BLD AUTO: 6.1 K/UL (ref 1.8–7.7)
NEUTROPHILS NFR BLD: 65.4 % (ref 38–73)
NRBC BLD-RTO: 0 /100 WBC
PHOSPHATE SERPL-MCNC: 3.3 MG/DL (ref 2.7–4.5)
PLATELET # BLD AUTO: 183 K/UL (ref 150–450)
PMV BLD AUTO: 9 FL (ref 9.2–12.9)
POTASSIUM SERPL-SCNC: 4.7 MMOL/L (ref 3.5–5.1)
PROT SERPL-MCNC: 6.3 G/DL (ref 6–8.4)
RBC # BLD AUTO: 3 M/UL (ref 4.6–6.2)
SODIUM SERPL-SCNC: 145 MMOL/L (ref 136–145)
WBC # BLD AUTO: 9.34 K/UL (ref 3.9–12.7)

## 2024-01-08 PROCEDURE — 25000003 PHARM REV CODE 250

## 2024-01-08 PROCEDURE — 63600175 PHARM REV CODE 636 W HCPCS

## 2024-01-08 PROCEDURE — 84100 ASSAY OF PHOSPHORUS: CPT

## 2024-01-08 PROCEDURE — 85025 COMPLETE CBC W/AUTO DIFF WBC: CPT

## 2024-01-08 PROCEDURE — 83735 ASSAY OF MAGNESIUM: CPT

## 2024-01-08 PROCEDURE — 21400001 HC TELEMETRY ROOM

## 2024-01-08 PROCEDURE — 11000001 HC ACUTE MED/SURG PRIVATE ROOM

## 2024-01-08 PROCEDURE — 80053 COMPREHEN METABOLIC PANEL: CPT

## 2024-01-08 RX ORDER — CARBAMAZEPINE 100 MG/1
200 TABLET, CHEWABLE ORAL DAILY
Status: DISCONTINUED | OUTPATIENT
Start: 2024-01-08 | End: 2024-01-12 | Stop reason: HOSPADM

## 2024-01-08 RX ADMIN — TAMSULOSIN HYDROCHLORIDE 0.4 MG: 0.4 CAPSULE ORAL at 08:01

## 2024-01-08 RX ADMIN — FINASTERIDE 5 MG: 5 TABLET, FILM COATED ORAL at 08:01

## 2024-01-08 RX ADMIN — METOPROLOL TARTRATE 12.5 MG: 25 TABLET, FILM COATED ORAL at 11:01

## 2024-01-08 RX ADMIN — ASPIRIN 81 MG: 81 TABLET, COATED ORAL at 08:01

## 2024-01-08 RX ADMIN — CEFEPIME 1 G: 1 INJECTION, POWDER, FOR SOLUTION INTRAMUSCULAR; INTRAVENOUS at 11:01

## 2024-01-08 RX ADMIN — METOPROLOL TARTRATE 12.5 MG: 25 TABLET, FILM COATED ORAL at 08:01

## 2024-01-08 RX ADMIN — CARBAMAZEPINE 200 MG: 100 TABLET, CHEWABLE ORAL at 11:01

## 2024-01-08 RX ADMIN — ATORVASTATIN CALCIUM 20 MG: 20 TABLET, FILM COATED ORAL at 08:01

## 2024-01-08 NOTE — CONSULTS
[facility] - Emergency Dept  Neurosurgery  Consult Note    Inpatient consult to Neurosurgery  Consult performed by: Adrian Herrera MD  Consult ordered by: Willie Nevarez MD        Subjective:     Chief Complaint/Reason for Admission: fall    History of Present Illness: Pt is 89M w dementia at assisted living who had Ground level fall today striking his head and lacerating right forehead. No LOC. At neurobaseline. CTH shoed small hyperdensity in L sylvian fissure and neurosurgery consulted for evaluation.    (Not in a hospital admission)      Review of patient's allergies indicates:  No Known Allergies    Past Medical History:   Diagnosis Date    Anticoagulant long-term use     Arthritis     Cancer     right  hand    Coronary artery disease     Deep vein thrombosis     Hyperlipidemia     Hypertension     Seizures 1988    none since on tegretol     Past Surgical History:   Procedure Laterality Date    APPENDECTOMY      CLOSURE OF WOUND  12/9/2020    Procedure: CLOSURE, WOUND;  Surgeon: Yamil Bonilla MD;  Location: [facility] OR;  Service: Plastics;;    EXCISION OF LESION OF EYELID Right 12/9/2020    Procedure: EXCISION TUMOR RIGHT EYEBROW WITH COMPLEX CLOSURE;  Surgeon: Yamil Bonilla MD;  Location: [facility] OR;  Service: Plastics;  Laterality: Right;  FROZEN SECTION  RN PREOP 12/8/2020----COVID NEGATIVE  12/8    FRACTURE SURGERY Right     elbow    FRACTURE SURGERY Right     hip    JOSUÉ FILTER PLACEMENT      hx of deep vein thrombosis    OPEN REDUCTION AND INTERNAL FIXATION (ORIF) OF INTERTROCHANTERIC FRACTURE OF FEMUR Left 5/30/2018    Procedure: ORIF, FRACTURE, FEMUR, INTERTROCHANTERIC;  Surgeon: Pop Martin III, MD;  Location: [facility] OR;  Service: Orthopedics;  Laterality: Left;    TOTAL SHOULDER ARTHROPLASTY Right      Family History    None       Tobacco Use    Smoking status: Former     Current packs/day: 0.00     Average packs/day: 0.5 packs/day for 6.0 years (3.0 ttl pk-yrs)     Types: Cigarettes      "Start date: 10/27/1969     Quit date: 10/27/1975     Years since quittin.2    Smokeless tobacco: Never   Substance and Sexual Activity    Alcohol use: No    Drug use: No    Sexual activity: Not Currently     Partners: Female     Review of Systems  Objective:     Weight: 59.9 kg (132 lb 0.9 oz)  Body mass index is 18.95 kg/m².  Vital Signs (Most Recent):  Temp: 98 °F (36.7 °C) (24 1356)  Pulse: 74 (24)  Resp: (!) 27 (24)  BP: 137/62 (24)  SpO2: 99 % (24) Vital Signs (24h Range):  Temp:  [98 °F (36.7 °C)] 98 °F (36.7 °C)  Pulse:  [58-89] 74  Resp:  [16-27] 27  SpO2:  [97 %-100 %] 99 %  BP: (137-192)/(62-88) 137/62                                 Physical Exam         Neurosurgery Physical Exam  Awake, alert, demented at baseline and very ahrd of hearing  Ox3, oriented to situation  Bsi  Moving all purposefully and symetrically    Significant Labs:  Recent Labs   Lab 24  1529   *      K 3.9      CO2 23   BUN 13   CREATININE 1.1   CALCIUM 8.5*     Recent Labs   Lab 24  1529   WBC 11.20   HGB 10.5*   HCT 31.2*        No results for input(s): "LABPT", "INR", "APTT" in the last 48 hours.  Microbiology Results (last 7 days)       ** No results found for the last 168 hours. **          All pertinent labs from the last 24 hours have been reviewed.    Significant Diagnostics:  I have reviewed all pertinent imaging results/findings within the past 24 hours.  I have reviewed and interpreted all pertinent imaging results/findings within the past 24 hours.  Assessment/Plan:     Fall  Pt is 89M w dementia at assisted living who had Ground level fall today striking his head and lacerating right forehead. No LOC. At neurobaseline. CTH shoed small hyperdensity in L sylvian fissure and neurosurgery consulted for evaluation.    Plan:  No surgery indicated  Repeat CTH stable with punctate hyperdensity representing small bleed vs calcification  Ok " to DC from neurosurgical perspective        Thank you for your consult. I will sign off. Please contact us if you have any additional questions.    Adrian Herrera MD  Neurosurgery  [facility] - Emergency Dept

## 2024-01-08 NOTE — ASSESSMENT & PLAN NOTE
Patient with fall and head laceration. Patient family stating this is often related to UTIs.  -Continuing to treat UTI

## 2024-01-08 NOTE — SUBJECTIVE & OBJECTIVE
(Not in a hospital admission)      Review of patient's allergies indicates:  No Known Allergies    Past Medical History:   Diagnosis Date    Anticoagulant long-term use     Arthritis     Cancer     right  hand    Coronary artery disease     Deep vein thrombosis     Hyperlipidemia     Hypertension     Seizures 1988    none since on tegretol     Past Surgical History:   Procedure Laterality Date    APPENDECTOMY      CLOSURE OF WOUND  2020    Procedure: CLOSURE, WOUND;  Surgeon: Yamil Bonilla MD;  Location: [facility] OR;  Service: Plastics;;    EXCISION OF LESION OF EYELID Right 2020    Procedure: EXCISION TUMOR RIGHT EYEBROW WITH COMPLEX CLOSURE;  Surgeon: Yamil Bonilla MD;  Location: [facility] OR;  Service: Plastics;  Laterality: Right;  FROZEN SECTION  RN PREOP 2020----COVID NEGATIVE      FRACTURE SURGERY Right     elbow    FRACTURE SURGERY Right     hip    JOSUÉ FILTER PLACEMENT      hx of deep vein thrombosis    OPEN REDUCTION AND INTERNAL FIXATION (ORIF) OF INTERTROCHANTERIC FRACTURE OF FEMUR Left 2018    Procedure: ORIF, FRACTURE, FEMUR, INTERTROCHANTERIC;  Surgeon: Pop Martin III, MD;  Location: [facility] OR;  Service: Orthopedics;  Laterality: Left;    TOTAL SHOULDER ARTHROPLASTY Right      Family History    None       Tobacco Use    Smoking status: Former     Current packs/day: 0.00     Average packs/day: 0.5 packs/day for 6.0 years (3.0 ttl pk-yrs)     Types: Cigarettes     Start date: 10/27/1969     Quit date: 10/27/1975     Years since quittin.2    Smokeless tobacco: Never   Substance and Sexual Activity    Alcohol use: No    Drug use: No    Sexual activity: Not Currently     Partners: Female     Review of Systems  Objective:     Weight: 59.9 kg (132 lb 0.9 oz)  Body mass index is 18.95 kg/m².  Vital Signs (Most Recent):  Temp: 98 °F (36.7 °C) (24 135)  Pulse: 74 (24)  Resp: (!) 27 (24)  BP: 137/62 (24)  SpO2: 99 % (24) Vital  "Signs (24h Range):  Temp:  [98 °F (36.7 °C)] 98 °F (36.7 °C)  Pulse:  [58-89] 74  Resp:  [16-27] 27  SpO2:  [97 %-100 %] 99 %  BP: (137-192)/(62-88) 137/62                                 Physical Exam         Neurosurgery Physical Exam  Awake, alert, demented at baseline and very ahrd of hearing  Ox3, oriented to situation  Bsi  Moving all purposefully and symetrically    Significant Labs:  Recent Labs   Lab 01/07/24  1529   *      K 3.9      CO2 23   BUN 13   CREATININE 1.1   CALCIUM 8.5*     Recent Labs   Lab 01/07/24  1529   WBC 11.20   HGB 10.5*   HCT 31.2*        No results for input(s): "LABPT", "INR", "APTT" in the last 48 hours.  Microbiology Results (last 7 days)       ** No results found for the last 168 hours. **          All pertinent labs from the last 24 hours have been reviewed.    Significant Diagnostics:  I have reviewed all pertinent imaging results/findings within the past 24 hours.  I have reviewed and interpreted all pertinent imaging results/findings within the past 24 hours.  "

## 2024-01-08 NOTE — HPI
Patient is a 89 yo male with a PMH of arthritis, coronary artery disease, hyperlipidemia, hypertension, seizures, and dementia who initially presented to [facility] from [facility] after a fall. He was transferred to [facility] for neurosurgery evaluation after CT head discovered a hyperdensity in the L sylvian fissure possibly representing a SAH. Patient with dementia and is unable to describe how he fell but was found down with a laceration above his L eyebrow. Family states that he has more falls when he has a UTI. He was treated for a pseudomonal UTI last month. Interview difficult with patient that has severe dementia and hearing loss but patient states he has no active complaints.     While in the ED:  Patient evaluated by Neurosurgery with repeat CT head at [facility] that revealed stable hyperdensity did Neurosurgery believes is calcification versus possible small bleed.  UA was suspicious with 3+ leukocytes and >100 WBC.  Patient was started on cefepime for complicated UTI as patient recently had a urine culture positive for Pseudomonas.

## 2024-01-08 NOTE — ASSESSMENT & PLAN NOTE
Patient with hyperdensity in the L sylvian fissure possibly representing a SAH vs calcification. Stable in size on repeat imaging.    -Neurosurgery consulted and signed off with no plans for intervention  -If changes to neuro exam, get STAT CT head and consult NSGY

## 2024-01-08 NOTE — ASSESSMENT & PLAN NOTE
Patient with known CAD s/p stent placement, which is controlled Will continue ASA and monitor for S/Sx of angina/ACS. Recently discontinued plavix.

## 2024-01-08 NOTE — ASSESSMENT & PLAN NOTE
Patient with fall. Family states he has more falls when he has UTI. Afebrile. WBC 11.2. UA with 3+ leukocytes, >100 WBC. Patient with recent history of Pseudomonal UTI in 2023.    Plan:  - Cefepime 1g IV q12h (start date 01/07)  - F/u urine gram stain and culture

## 2024-01-08 NOTE — ASSESSMENT & PLAN NOTE
Patient with dementia with likely etiology of unknown dementia. Dementia is severe. The patient does have signs of behavioral disturbance. Not currently on dementia medications.     -Continue non-pharmacologic interventions to prevent delirium (No VS between 11PM-5AM, activity during day, opening blinds, providing glasses/hearing aids, and up in chair during daytime).  -Avoid narcotics and benzos unless necessary

## 2024-01-08 NOTE — ED PROVIDER NOTES
Encounter Date: 1/7/2024       History     Chief Complaint   Patient presents with    Fall     Transfer from [facility] here for neuro.Pt from [facility] s/p unwitnessed fall. Per EMS pt stopped plavix 3 weeks ago and only takes a baby aspirin daily. Lac noted to L side above eyebrow. Bandage placed per EMS. EMS states staff reports patient was conscious when they went in the room. Pt has a hx of dementia and is unable to report any LOC. Pt is alert in triage.      89-year-old male with a past medical history of arthritis, coronary artery disease, hyperlipidemia, hypertension, seizures, dementia presents as a transfer from [facility] for subarachnoid hemorrhage.  Family who is with him says that they think that he has had a UTI.  They say that whenever he gets UTI, he was prone to falls.  They said that he was wheelchair-bound but appears to be at his mental baseline.    The history is provided by a relative. No  was used.     Review of patient's allergies indicates:  No Known Allergies  Past Medical History:   Diagnosis Date    Anticoagulant long-term use     Arthritis     Cancer     right  hand    Coronary artery disease     Deep vein thrombosis     Hyperlipidemia     Hypertension     Seizures 1988    none since on tegretol     Past Surgical History:   Procedure Laterality Date    APPENDECTOMY      CLOSURE OF WOUND  12/9/2020    Procedure: CLOSURE, WOUND;  Surgeon: Yamil Bonilla MD;  Location: [facility] OR;  Service: Plastics;;    EXCISION OF LESION OF EYELID Right 12/9/2020    Procedure: EXCISION TUMOR RIGHT EYEBROW WITH COMPLEX CLOSURE;  Surgeon: Yamil Bonilla MD;  Location: [facility] OR;  Service: Plastics;  Laterality: Right;  FROZEN SECTION  RN PREOP 12/8/2020----COVID NEGATIVE  12/8    FRACTURE SURGERY Right     elbow    FRACTURE SURGERY Right     hip    JOSUÉ FILTER PLACEMENT      hx of deep vein thrombosis    OPEN REDUCTION AND INTERNAL FIXATION (ORIF) OF INTERTROCHANTERIC  FRACTURE OF FEMUR Left 2018    Procedure: ORIF, FRACTURE, FEMUR, INTERTROCHANTERIC;  Surgeon: Pop Martin III, MD;  Location: [facility] OR;  Service: Orthopedics;  Laterality: Left;    TOTAL SHOULDER ARTHROPLASTY Right      No family history on file.  Social History     Tobacco Use    Smoking status: Former     Current packs/day: 0.00     Average packs/day: 0.5 packs/day for 6.0 years (3.0 ttl pk-yrs)     Types: Cigarettes     Start date: 10/27/1969     Quit date: 10/27/1975     Years since quittin.2    Smokeless tobacco: Never   Substance Use Topics    Alcohol use: No    Drug use: No     Review of Systems    Physical Exam     Initial Vitals [24 1356]   BP Pulse Resp Temp SpO2   (!) 173/77 71 16 98 °F (36.7 °C) 97 %      MAP       --         Physical Exam    Nursing note and vitals reviewed.  Constitutional: He appears well-developed and well-nourished. He is not diaphoretic. No distress.   HENT:   Head laceration with Steri-Strips as picture below   Eyes: EOM are normal. Pupils are equal, round, and reactive to light. Right eye exhibits no discharge. Left eye exhibits no discharge.   Neck: Neck supple.   Cardiovascular:  Normal rate, regular rhythm, normal heart sounds and intact distal pulses.           Pulmonary/Chest: Breath sounds normal. He has no wheezes. He has no rhonchi. He has no rales.   Abdominal: Abdomen is soft. There is no abdominal tenderness. There is no rebound and no guarding.   Musculoskeletal:         General: No tenderness or edema. Normal range of motion.      Cervical back: Neck supple.     Neurological: He is alert.   Patient is oriented to self, is moving upper extremities purposefully, is irritable   Skin: Skin is warm and dry. Capillary refill takes less than 2 seconds. No rash noted. No erythema. No pallor.         ED Course   Procedures  Labs Reviewed   CBC W/ AUTO DIFFERENTIAL - Abnormal; Notable for the following components:       Result Value    RBC 3.60 (*)      Hemoglobin 10.5 (*)     Hematocrit 31.2 (*)     MPV 9.1 (*)     Gran # (ANC) 8.5 (*)     Gran % 75.5 (*)     Lymph % 13.1 (*)     All other components within normal limits   COMPREHENSIVE METABOLIC PANEL - Abnormal; Notable for the following components:    Glucose 121 (*)     Calcium 8.5 (*)     Albumin 3.2 (*)     Alkaline Phosphatase 148 (*)     ALT 7 (*)     All other components within normal limits   B-TYPE NATRIURETIC PEPTIDE - Abnormal; Notable for the following components:     (*)     All other components within normal limits   URINALYSIS, REFLEX TO URINE CULTURE - Abnormal; Notable for the following components:    Appearance, UA Hazy (*)     Protein, UA 1+ (*)     Occult Blood UA 1+ (*)     Leukocytes, UA 3+ (*)     All other components within normal limits    Narrative:     Specimen Source->Urine   URINALYSIS MICROSCOPIC - Abnormal; Notable for the following components:    RBC, UA 61 (*)     WBC, UA >100 (*)     Bacteria Moderate (*)     All other components within normal limits    Narrative:     Specimen Source->Urine   CULTURE, URINE   TROPONIN I   TROPONIN I          Imaging Results              CT Head Without Contrast (Final result)  Result time 01/07/24 21:29:33      Final result by Car Ramachandran MD (01/07/24 21:29:33)                   Impression:      No detrimental change when compared with CT head obtained earlier the same day.  Persistent probable trace subarachnoid hemorrhage in the left cerebral hemisphere.    Stable generalized cerebral volume loss, chronic ischemic changes, and stable ventriculomegaly.    Electronically signed by resident: Teodora Reyes  Date:    01/07/2024  Time:    21:00    Electronically signed by: Car Ramachandran MD  Date:    01/07/2024  Time:    21:29               Narrative:    EXAMINATION:  CT HEAD WITHOUT CONTRAST    CLINICAL HISTORY:  Subarachnoid hemorrhage (SAH) suspected;    TECHNIQUE:  Low dose axial CT images obtained throughout the head without  intravenous contrast. Sagittal and coronal reconstructions were performed.    COMPARISON:  CT head, 01/07/2024 at 14:33.    FINDINGS:  Stable advanced generalized cerebral volume loss with ex vacuo dilation of the ventricles more pronounced at expected for degree of sulcal enlargement.  No subdural hemorrhage identified.  Diffuse periventricular and subcortical white matter hypoattenuation suggestive of chronic microvascular ischemic change, similar when compared with multiple prior studies.  There is also mild volume loss in the cerebellum.    Redemonstration of left posterior sylvian fissure 5 mm hyperdensity unchanged from prior.  No evidence of new parenchymal mass effect, edema, acute hemorrhage or acute major vascular distribution infarct.  No significant worsening hemorrhage.  No midline shift.    Skull/extracranial contents (limited evaluation): Calvarium unremarkable.  Interval improvement of left preorbital hematoma with persistent laceration in this region.  The remainder of the extracranial soft tissues are unchanged.                                       X-Ray Chest AP Portable (Final result)  Result time 01/07/24 15:02:06      Final result by Ian Carreno MD (01/07/24 15:02:06)                   Impression:      No acute radiographic abnormality.  See above comments.      Electronically signed by: Ian Carreno  Date:    01/07/2024  Time:    15:02               Narrative:    EXAMINATION:  XR CHEST AP PORTABLE    CLINICAL HISTORY:  Chest Pain;    TECHNIQUE:  Single frontal view of the chest was performed.    COMPARISON:  12/14/2023    FINDINGS:  Suboptimal inspiration.    Cardiac silhouette is within normal limits.  Aortic atherosclerosis.    Port catheter central venous catheter on the right is unchanged.    Right shoulder arthroplasty.    No effusion or pneumothorax.  No mass or consolidation.  Minimal nonspecific interstitial changes.  No detrimental change.  Mild left basilar atelectasis.   Chronic changes of the left shoulder.                                       CT Head Without Contrast (Final result)  Result time 01/07/24 15:02:40      Final result by Janine Hernandez MD (01/07/24 15:02:40)                   Impression:      There is a large left periorbital hematoma.  There is a punctate focus of hyperdensity in the posterior aspect of the left sylvian fissure concerning for a small focus of subarachnoid hemorrhage.  No definite parenchymal hemorrhage is seen.    Advanced generalized cerebral volume loss with moderate severe chronic microvascular ischemic disease.  Stable ventricular enlargement.  No evidence for active hydrocephalus.    COMMUNICATION  This critical result was discovered/received at 15:00.  The critical information above was relayed directly by me via secure chat to Dr. Jose Price on 01/07/2024 at 15:01.      Electronically signed by: Janine Hernandez MD  Date:    01/07/2024  Time:    15:02               Narrative:    EXAMINATION:  CT HEAD WITHOUT CONTRAST    CLINICAL HISTORY:  Head trauma, minor (Age >= 65y);    TECHNIQUE:  Low dose axial CT images obtained throughout the head without intravenous contrast. Sagittal and coronal reconstructions were performed.    COMPARISON:  12/14/2023    FINDINGS:  Left periorbital soft tissue hematoma.  Age advanced generalized cerebral volume loss punctate focus of hyperdensity along the posterior margin of the left sylvian fissure measuring 5 mm suggestive for a small focus of subarachnoid hemorrhage.  No definite intraparenchymal hematoma.  There is advanced generalized cerebral volume loss with moderate severe chronic microvascular ischemic disease.  There is prominence of the ventricular system, similar to prior examinations without evidence for active hydrocephalus.  No calvarial fracture is seen.                                       CT Cervical Spine Without Contrast (Final result)  Result time 01/07/24 15:13:31      Final result  by Adele Salvador MD (01/07/24 15:13:31)                   Impression:      No acute displaced fracture.  No traumatic malalignment.    Spondylosis of the cervical spine.    Stable left upper lobe pulmonary nodules from 06/26/2023 exam.    Accumulation of calcified carotid plaque especially at the right carotid bulb.    Left external ear cerumen.      Electronically signed by: Adele Salvador MD  Date:    01/07/2024  Time:    15:13               Narrative:    EXAMINATION:  CT CERVICAL SPINE WITHOUT CONTRAST    CLINICAL HISTORY:  Neck trauma (Age >= 65y);    TECHNIQUE:  Low dose axial images, sagittal and coronal reformations were performed though the cervical spine.  Contrast was not administered.    COMPARISON:  09/07/2023 CT cervical spine    FINDINGS:  ALIGNMENT: Normal alignment    BONE: C3-C4 vertebral body fusion.  Severe disc space narrowing at C4-5, C5-6 and C6-C7.    JOINT: Mild facet joint degenerative change.    SPINAL CANAL: The spinal canal and its content is suboptimally evaluated by CT, there is posterior disc osteophyte complex at C4-5 through C6-C7.    There is moderate canal stenosis at C4-5 with moderate bilateral foraminal narrowing.  There is mild canal stenosis at C5-6 with moderate bilateral foraminal narrowing.    POSTERIOR FOSSA: No acute abnormality.    PARASPINAL SOFT TISSUES: Left external ear cerumen.  Central line entering from the right.  Calcified carotid plaque at the right carotid bulb.  Few small 0.3 mm pulmonary nodule left upper lobe, they are unchanged from 06/26/2023 CT cervical spine exam.                                       Medications   ceFEPIme (MAXIPIME) 1 g in dextrose 5 % in water (D5W) 100 mL IVPB (MB+) (has no administration in time range)   labetaloL injection 20 mg (20 mg Intravenous Given 1/7/24 1608)   morphine injection 4 mg (4 mg Intravenous Given 1/7/24 2051)   acetaminophen tablet 1,000 mg (1,000 mg Oral Given 1/7/24 2033)     Medical Decision  Making  See ED course for remainder of care    Amount and/or Complexity of Data Reviewed  Labs: ordered.  Radiology: ordered.    Risk  OTC drugs.  Prescription drug management.               ED Course as of 01/07/24 2251   Sun Jan 07, 2024   0106 89-year-old male in no acute distress.  Neurosurgery was consulted after I did my initial evaluation of the patient and deemed him stable.  They recommended repeat CT scan now and blood pressure control.  Cardene drip initiated.  Differential includes but is not limited to complicated UTI versus evolving subarachnoid hemorrhage versus electrolyte derangement versus seizure [BP]   2250 I reviewed prior imaging, new CT scan was read as grossly stable.  Patient's UA was significant for infection.  Patient had a recent Pseudomonas UTI back on 12/14, we will cover with cefepime and admit to medicine for complicated UTI. [BP]      ED Course User Index  [BP] Willie Nevarez MD                           Clinical Impression:  Final diagnoses:  [R07.9] Chest pain  [I60.9] Subarachnoid hemorrhage (Primary)  [N39.0] Complicated UTI (urinary tract infection)          ED Disposition Condition    Observation Stable                Willie Nevarez MD  Resident  01/07/24 2251

## 2024-01-08 NOTE — ED NOTES
Jaydon Mccarthy, a 89 y.o. male presents to the ED w/ complaint of Transfer   Checked and correct       Triage note:  Chief Complaint   Patient presents with    Fall     Transfer from [facility]- here for neuro.Pt from [facility] s/p unwitnessed fall. Per EMS pt stopped plavix 3 weeks ago and only takes a baby aspirin daily. Lac noted to L side above eyebrow. Bandage placed per EMS. EMS states staff reports patient was conscious when they went in the room. Pt has a hx of dementia and is unable to report any LOC. Pt is alert in triage.      Review of patient's allergies indicates:  No Known Allergies  Past Medical History:   Diagnosis Date    Anticoagulant long-term use     Arthritis     Cancer     right  hand    Coronary artery disease     Deep vein thrombosis     Hyperlipidemia     Hypertension     Seizures 1988    none since on tegretol

## 2024-01-08 NOTE — SUBJECTIVE & OBJECTIVE
Past Medical History:   Diagnosis Date    Anticoagulant long-term use     Arthritis     Cancer     right  hand    Coronary artery disease     Deep vein thrombosis     Hyperlipidemia     Hypertension     Seizures 1988    none since on tegretol       Past Surgical History:   Procedure Laterality Date    APPENDECTOMY      CLOSURE OF WOUND  12/9/2020    Procedure: CLOSURE, WOUND;  Surgeon: Yamil Bonilla MD;  Location: [facility] OR;  Service: Plastics;;    EXCISION OF LESION OF EYELID Right 12/9/2020    Procedure: EXCISION TUMOR RIGHT EYEBROW WITH COMPLEX CLOSURE;  Surgeon: Yamil Bonilla MD;  Location: [facility] OR;  Service: Plastics;  Laterality: Right;  FROZEN SECTION  RN PREOP 12/8/2020----COVID NEGATIVE  12/8    FRACTURE SURGERY Right     elbow    FRACTURE SURGERY Right     hip    JOSUÉ FILTER PLACEMENT      hx of deep vein thrombosis    OPEN REDUCTION AND INTERNAL FIXATION (ORIF) OF INTERTROCHANTERIC FRACTURE OF FEMUR Left 5/30/2018    Procedure: ORIF, FRACTURE, FEMUR, INTERTROCHANTERIC;  Surgeon: Pop Martin III, MD;  Location: [facility] OR;  Service: Orthopedics;  Laterality: Left;    TOTAL SHOULDER ARTHROPLASTY Right        Review of patient's allergies indicates:  No Known Allergies    No current facility-administered medications on file prior to encounter.     Current Outpatient Medications on File Prior to Encounter   Medication Sig    acetaminophen (TYLENOL) 325 MG tablet Take 650 mg by mouth every 6 (six) hours as needed for Pain.    aspirin (ECOTRIN) 81 MG EC tablet Take 1 tablet (81 mg total) by mouth once daily.    atorvastatin (LIPITOR) 20 MG tablet TAKE ONE TABLET BY MOUTH ONCE DAILY    carBAMazepine (TEGRETOL) 100 mg chewable tablet TAKE 2 TABLETS BY MOUTH DAILY    cyanocobalamin, vitamin B-12, 1,000 mcg Subl Place 1,000 mcg under the tongue once daily.    finasteride (PROSCAR) 5 mg tablet TAKE 1 TABLET BY MOUTH ONCE DAILY.    melatonin (MELATIN) 5 mg Take 1 tablet by mouth every evening.     metoprolol tartrate (LOPRESSOR) 25 MG tablet Take 0.5 tablets (12.5 mg total) by mouth 2 (two) times daily.    ondansetron (ZOFRAN-ODT) 4 MG TbDL Take 1 tablet (4 mg total) by mouth every 8 (eight) hours as needed (nausea, vomiting).    tamsulosin (FLOMAX) 0.4 mg Cap Take by mouth once daily.    vitamin D (VITAMIN D3) 1000 units Tab Take 1 tablet (1,000 Units total) by mouth once daily.     Family History    None       Tobacco Use    Smoking status: Former     Current packs/day: 0.00     Average packs/day: 0.5 packs/day for 6.0 years (3.0 ttl pk-yrs)     Types: Cigarettes     Start date: 10/27/1969     Quit date: 10/27/1975     Years since quittin.2    Smokeless tobacco: Never   Substance and Sexual Activity    Alcohol use: No    Drug use: No    Sexual activity: Not Currently     Partners: Female     Review of Systems   Unable to perform ROS: Dementia     Objective:     Vital Signs (Most Recent):  Temp: 98 °F (36.7 °C) (24 1356)  Pulse: 68 (24)  Resp: 17 (24)  BP: (!) 141/68 (24)  SpO2: 98 % (24) Vital Signs (24h Range):  Temp:  [98 °F (36.7 °C)] 98 °F (36.7 °C)  Pulse:  [58-89] 68  Resp:  [16-27] 17  SpO2:  [96 %-100 %] 98 %  BP: (127-192)/(62-88) 141/68     Weight: 59.9 kg (132 lb 0.9 oz)  Body mass index is 18.95 kg/m².     Physical Exam  Constitutional:       General: He is not in acute distress.     Appearance: Normal appearance. He is not ill-appearing.      Comments: Cachectic   HENT:      Head: Normocephalic and atraumatic.      Ears:      Comments: Severely impaired hearing      Nose: Nose normal.      Mouth/Throat:      Mouth: Mucous membranes are moist.   Eyes:      Extraocular Movements: Extraocular movements intact.      Conjunctiva/sclera: Conjunctivae normal.      Pupils: Pupils are equal, round, and reactive to light.      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      Pulses: Normal  pulses.      Heart sounds: No murmur heard.  Pulmonary:      Effort: Pulmonary effort is normal. No respiratory distress.      Breath sounds: Normal breath sounds. No wheezing, rhonchi or rales.   Abdominal:      General: Abdomen is flat. Bowel sounds are normal. There is no distension.      Palpations: Abdomen is soft.      Tenderness: There is no abdominal tenderness.   Musculoskeletal:         General: Normal range of motion.      Right lower leg: No edema.      Left lower leg: No edema.   Skin:     General: Skin is warm and dry.      Capillary Refill: Capillary refill takes less than 2 seconds.      Comments: Laceration above L eyebrow   Neurological:      Mental Status: He is alert. He is disoriented.              CRANIAL NERVES     CN III, IV, VI   Pupils are equal, round, and reactive to light.       Significant Labs: All pertinent labs within the past 24 hours have been reviewed.  CBC:   Recent Labs   Lab 01/07/24  1529   WBC 11.20   HGB 10.5*   HCT 31.2*        CMP:   Recent Labs   Lab 01/07/24  1529      K 3.9      CO2 23   *   BUN 13   CREATININE 1.1   CALCIUM 8.5*   PROT 6.9   ALBUMIN 3.2*   BILITOT 0.4   ALKPHOS 148*   AST 13   ALT 7*   ANIONGAP 11       Significant Imaging: I have reviewed all pertinent imaging results/findings within the past 24 hours.

## 2024-01-08 NOTE — HPI
Pt is 89M w dementia at assisted living who had Ground level fall today striking his head and lacerating right forehead. No LOC. At neurobaseline. CTH shoed small hyperdensity in L sylvian fissure and neurosurgery consulted for evaluation.

## 2024-01-08 NOTE — H&P
[facility] - Emergency Dept  [facility]  History & Physical    Patient Name: Jaydon Mccarthy  MRN: [id number]  Patient Class: OP- Observation  Admission Date: 1/7/2024  Attending Physician: Adrian Merritt, *   Primary Care Provider: Javier Villanueva MD         Patient information was obtained from patient, relative(s), and ER records.     Subjective:     Principal Problem:UTI (urinary tract infection)    Chief Complaint:   Chief Complaint   Patient presents with    Fall     Transfer from [facility]- here for neuro.Pt from [facility] s/p unwitnessed fall. Per EMS pt stopped plavix 3 weeks ago and only takes a baby aspirin daily. Lac noted to L side above eyebrow. Bandage placed per EMS. EMS states staff reports patient was conscious when they went in the room. Pt has a hx of dementia and is unable to report any LOC. Pt is alert in triage.         HPI: Patient is a 89 yo male with a PMH of arthritis, coronary artery disease, hyperlipidemia, hypertension, seizures, and dementia who initially presented to [facility] after a fall. He was transferred to [facility] for neurosurgery evaluation after CT head discovered a hyperdensity in the L sylvian fissure possibly representing a SAH. Patient with dementia and is unable to describe how he fell but was found down with a laceration above his L eyebrow. Family states that he has more falls when he has a UTI. He was treated for a pseudomonal UTI last month. Interview difficult with patient that has severe dementia and hearing loss but patient states he has no active complaints.     While in the ED:  Patient evaluated by Neurosurgery with repeat CT head at [facility] that revealed stable hyperdensity did Neurosurgery believes is calcification versus possible small bleed.  UA was suspicious with 3+ leukocytes and >100 WBC.  Patient was started on cefepime for complicated UTI as patient recently had a urine culture positive for  Pseudomonas.    Past Medical History:   Diagnosis Date    Anticoagulant long-term use     Arthritis     Cancer     right  hand    Coronary artery disease     Deep vein thrombosis     Hyperlipidemia     Hypertension     Seizures 1988    none since on tegretol       Past Surgical History:   Procedure Laterality Date    APPENDECTOMY      CLOSURE OF WOUND  12/9/2020    Procedure: CLOSURE, WOUND;  Surgeon: Yamil Bonilla MD;  Location: [facility] OR;  Service: Plastics;;    EXCISION OF LESION OF EYELID Right 12/9/2020    Procedure: EXCISION TUMOR RIGHT EYEBROW WITH COMPLEX CLOSURE;  Surgeon: Yamil Bonilla MD;  Location: [facility] OR;  Service: Plastics;  Laterality: Right;  FROZEN SECTION  RN PREOP 12/8/2020----COVID NEGATIVE  12/8    FRACTURE SURGERY Right     elbow    FRACTURE SURGERY Right     hip    JOSUÉ FILTER PLACEMENT      hx of deep vein thrombosis    OPEN REDUCTION AND INTERNAL FIXATION (ORIF) OF INTERTROCHANTERIC FRACTURE OF FEMUR Left 5/30/2018    Procedure: ORIF, FRACTURE, FEMUR, INTERTROCHANTERIC;  Surgeon: Pop Martin III, MD;  Location: [facility] OR;  Service: Orthopedics;  Laterality: Left;    TOTAL SHOULDER ARTHROPLASTY Right        Review of patient's allergies indicates:  No Known Allergies    No current facility-administered medications on file prior to encounter.     Current Outpatient Medications on File Prior to Encounter   Medication Sig    acetaminophen (TYLENOL) 325 MG tablet Take 650 mg by mouth every 6 (six) hours as needed for Pain.    aspirin (ECOTRIN) 81 MG EC tablet Take 1 tablet (81 mg total) by mouth once daily.    atorvastatin (LIPITOR) 20 MG tablet TAKE ONE TABLET BY MOUTH ONCE DAILY    carBAMazepine (TEGRETOL) 100 mg chewable tablet TAKE 2 TABLETS BY MOUTH DAILY    cyanocobalamin, vitamin B-12, 1,000 mcg Subl Place 1,000 mcg under the tongue once daily.    finasteride (PROSCAR) 5 mg tablet TAKE 1 TABLET BY MOUTH ONCE DAILY.    melatonin (MELATIN) 5 mg Take 1 tablet by mouth every  evening.    metoprolol tartrate (LOPRESSOR) 25 MG tablet Take 0.5 tablets (12.5 mg total) by mouth 2 (two) times daily.    ondansetron (ZOFRAN-ODT) 4 MG TbDL Take 1 tablet (4 mg total) by mouth every 8 (eight) hours as needed (nausea, vomiting).    tamsulosin (FLOMAX) 0.4 mg Cap Take by mouth once daily.    vitamin D (VITAMIN D3) 1000 units Tab Take 1 tablet (1,000 Units total) by mouth once daily.     Family History    None       Tobacco Use    Smoking status: Former     Current packs/day: 0.00     Average packs/day: 0.5 packs/day for 6.0 years (3.0 ttl pk-yrs)     Types: Cigarettes     Start date: 10/27/1969     Quit date: 10/27/1975     Years since quittin.2    Smokeless tobacco: Never   Substance and Sexual Activity    Alcohol use: No    Drug use: No    Sexual activity: Not Currently     Partners: Female     Review of Systems   Unable to perform ROS: Dementia     Objective:     Vital Signs (Most Recent):  Temp: 98 °F (36.7 °C) (24 1356)  Pulse: 68 (24)  Resp: 17 (24)  BP: (!) 141/68 (24)  SpO2: 98 % (24) Vital Signs (24h Range):  Temp:  [98 °F (36.7 °C)] 98 °F (36.7 °C)  Pulse:  [58-89] 68  Resp:  [16-27] 17  SpO2:  [96 %-100 %] 98 %  BP: (127-192)/(62-88) 141/68     Weight: 59.9 kg (132 lb 0.9 oz)  Body mass index is 18.95 kg/m².     Physical Exam  Constitutional:       General: He is not in acute distress.     Appearance: Normal appearance. He is not ill-appearing.      Comments: Cachectic   HENT:      Head: Normocephalic and atraumatic.      Ears:      Comments: Severely impaired hearing      Nose: Nose normal.      Mouth/Throat:      Mouth: Mucous membranes are moist.   Eyes:      Extraocular Movements: Extraocular movements intact.      Conjunctiva/sclera: Conjunctivae normal.      Pupils: Pupils are equal, round, and reactive to light.      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      Pulses:  Normal pulses.      Heart sounds: No murmur heard.  Pulmonary:      Effort: Pulmonary effort is normal. No respiratory distress.      Breath sounds: Normal breath sounds. No wheezing, rhonchi or rales.   Abdominal:      General: Abdomen is flat. Bowel sounds are normal. There is no distension.      Palpations: Abdomen is soft.      Tenderness: There is no abdominal tenderness.   Musculoskeletal:         General: Normal range of motion.      Right lower leg: No edema.      Left lower leg: No edema.   Skin:     General: Skin is warm and dry.      Capillary Refill: Capillary refill takes less than 2 seconds.      Comments: Laceration above L eyebrow   Neurological:      Mental Status: He is alert. He is disoriented.              CRANIAL NERVES     CN III, IV, VI   Pupils are equal, round, and reactive to light.       Significant Labs: All pertinent labs within the past 24 hours have been reviewed.  CBC:   Recent Labs   Lab 01/07/24  1529   WBC 11.20   HGB 10.5*   HCT 31.2*        CMP:   Recent Labs   Lab 01/07/24  1529      K 3.9      CO2 23   *   BUN 13   CREATININE 1.1   CALCIUM 8.5*   PROT 6.9   ALBUMIN 3.2*   BILITOT 0.4   ALKPHOS 148*   AST 13   ALT 7*   ANIONGAP 11       Significant Imaging: I have reviewed all pertinent imaging results/findings within the past 24 hours.  Assessment/Plan:     * UTI (urinary tract infection)  Patient with fall. Family states he has more falls when he has UTI. Afebrile. WBC 11.2. UA with 3+ leukocytes, >100 WBC. Patient with recent history of Pseudomonal UTI in 2023.    Plan:  - Cefepime 1g IV q12h (start date 01/07)  - F/u urine gram stain and culture      Abnormal CT of the head  Patient with hyperdensity in the L sylvian fissure possibly representing a SAH vs calcification. Stable in size on repeat imaging.    -Neurosurgery consulted and signed off with no plans for intervention  -If changes to neuro exam, get STAT CT head and consult  NSGY      Epilepsy  Stable on home medication.    -Continuing home carbamazepine      Debility  Wheelchair bound at baseline.    Dementia  Patient with dementia with likely etiology of unknown dementia. Dementia is severe. The patient does have signs of behavioral disturbance. Not currently on dementia medications.     -Continue non-pharmacologic interventions to prevent delirium (No VS between 11PM-5AM, activity during day, opening blinds, providing glasses/hearing aids, and up in chair during daytime).  -Avoid narcotics and benzos unless necessary    Fall  Patient with fall and head laceration. Patient family stating this is often related to UTIs.  -Continuing to treat UTI      Stage 3a chronic kidney disease  Creatine stable for now. BMP reviewed- noted Estimated Creatinine Clearance: 37.1 mL/min (based on SCr of 1.1 mg/dL). according to latest data. Based on current GFR, CKD stage is stage 3 - GFR 30-59.     -Monitor UOP and serial BMP and adjust therapy as needed.   -Renally dose meds.   -Avoid nephrotoxic medications and procedures.    CAD s/p PCI   Patient with known CAD s/p stent placement, which is controlled Will continue ASA and monitor for S/Sx of angina/ACS. Recently discontinued plavix.    Hyperlipidemia  -Continue home atorvostatin      Essential hypertension  Patient with elevated BP in setting of recent fall.   -Continue home metoprolol  -Consider further antihypertensives if BP continues to remain elevated    Benign prostatic hyperplasia with lower urinary tract symptoms  -Continuing home flomax and finasteride        VTE Risk Mitigation (From admission, onward)           Ordered     Reason for No Pharmacological VTE Prophylaxis  Once        Question:  Reasons:  Answer:  Risk of Bleeding    01/07/24 2327     IP VTE HIGH RISK PATIENT  Once         01/07/24 2327     Place sequential compression device  Until discontinued         01/07/24 2327     Place sequential compression device  Until  discontinued         01/07/24 2323                           On 01/08/2024, patient should be placed in hospital observation services under my care in collaboration with Allen Rodriguez MD.         Car Espinoza DO  Department of Hospital Medicine  [facility] - Emergency Dept

## 2024-01-08 NOTE — PROGRESS NOTES
CHW - Case Closure    This Community Health Worker spoke to caregiver via telephone today.   Pt/Caregiver reported: Patient's daughter/caregiver states pt is currently admitted in the hospital, but also lives in a nursing facility. Pt's daughter/caregiving states pt has no needs and agreed to case closure.   Pt/Caregiver denied any additional needs at this time and agrees with episode closure at this time.  Provided caregiver with Community Health Worker's contact information and encouraged her to contact this Community Health Worker if additional needs arise.

## 2024-01-08 NOTE — ASSESSMENT & PLAN NOTE
Pt is 89M w dementia at assisted living who had Ground level fall today striking his head and lacerating right forehead. No LOC. At neurobaseline. CTH shoed small hyperdensity in L sylvian fissure and neurosurgery consulted for evaluation.    Plan:  No surgery indicated  Repeat CTH stable with punctate hyperdensity representing small bleed vs calcification  Ok to DC from neurosurgical perspective

## 2024-01-08 NOTE — PLAN OF CARE
Sw attempted to complete assessment, Pt stated he did not wish to answer any questions at this time. Sw informed pt that sw will return at a later time.          Gladys Dalton LMSW  Case Management  Emergency Department  [phone]

## 2024-01-08 NOTE — ASSESSMENT & PLAN NOTE
Creatine stable for now. BMP reviewed- noted Estimated Creatinine Clearance: 37.1 mL/min (based on SCr of 1.1 mg/dL). according to latest data. Based on current GFR, CKD stage is stage 3 - GFR 30-59.     -Monitor UOP and serial BMP and adjust therapy as needed.   -Renally dose meds.   -Avoid nephrotoxic medications and procedures.

## 2024-01-08 NOTE — ASSESSMENT & PLAN NOTE
Patient with elevated BP in setting of recent fall.   -Continue home metoprolol  -Consider further antihypertensives if BP continues to remain elevated

## 2024-01-09 LAB
ALBUMIN SERPL BCP-MCNC: 2.9 G/DL (ref 3.5–5.2)
ALP SERPL-CCNC: 134 U/L (ref 55–135)
ALT SERPL W/O P-5'-P-CCNC: 6 U/L (ref 10–44)
ANION GAP SERPL CALC-SCNC: 10 MMOL/L (ref 8–16)
AST SERPL-CCNC: 13 U/L (ref 10–40)
BACTERIA UR CULT: ABNORMAL
BASOPHILS # BLD AUTO: 0.08 K/UL (ref 0–0.2)
BASOPHILS NFR BLD: 0.8 % (ref 0–1.9)
BILIRUB SERPL-MCNC: 0.5 MG/DL (ref 0.1–1)
BUN SERPL-MCNC: 13 MG/DL (ref 10–30)
CALCIUM SERPL-MCNC: 8.4 MG/DL (ref 8.7–10.5)
CHLORIDE SERPL-SCNC: 107 MMOL/L (ref 95–110)
CO2 SERPL-SCNC: 21 MMOL/L (ref 23–29)
CREAT SERPL-MCNC: 1.1 MG/DL (ref 0.5–1.4)
DIFFERENTIAL METHOD BLD: ABNORMAL
EOSINOPHIL # BLD AUTO: 0.4 K/UL (ref 0–0.5)
EOSINOPHIL NFR BLD: 3.6 % (ref 0–8)
ERYTHROCYTE [DISTWIDTH] IN BLOOD BY AUTOMATED COUNT: 13.3 % (ref 11.5–14.5)
EST. GFR  (NO RACE VARIABLE): >60 ML/MIN/1.73 M^2
GLUCOSE SERPL-MCNC: 100 MG/DL (ref 70–110)
HCT VFR BLD AUTO: 27.9 % (ref 40–54)
HGB BLD-MCNC: 9.2 G/DL (ref 14–18)
IMM GRANULOCYTES # BLD AUTO: 0.03 K/UL (ref 0–0.04)
IMM GRANULOCYTES NFR BLD AUTO: 0.3 % (ref 0–0.5)
LYMPHOCYTES # BLD AUTO: 1.3 K/UL (ref 1–4.8)
LYMPHOCYTES NFR BLD: 12 % (ref 18–48)
MAGNESIUM SERPL-MCNC: 1.9 MG/DL (ref 1.6–2.6)
MCH RBC QN AUTO: 29.2 PG (ref 27–31)
MCHC RBC AUTO-ENTMCNC: 33 G/DL (ref 32–36)
MCV RBC AUTO: 89 FL (ref 82–98)
MONOCYTES # BLD AUTO: 0.9 K/UL (ref 0.3–1)
MONOCYTES NFR BLD: 8.2 % (ref 4–15)
NEUTROPHILS # BLD AUTO: 7.9 K/UL (ref 1.8–7.7)
NEUTROPHILS NFR BLD: 75.1 % (ref 38–73)
NRBC BLD-RTO: 0 /100 WBC
PHOSPHATE SERPL-MCNC: 3.2 MG/DL (ref 2.7–4.5)
PLATELET # BLD AUTO: 192 K/UL (ref 150–450)
PMV BLD AUTO: 10.2 FL (ref 9.2–12.9)
POTASSIUM SERPL-SCNC: 3.7 MMOL/L (ref 3.5–5.1)
PROT SERPL-MCNC: 6.2 G/DL (ref 6–8.4)
RBC # BLD AUTO: 3.15 M/UL (ref 4.6–6.2)
SODIUM SERPL-SCNC: 138 MMOL/L (ref 136–145)
WBC # BLD AUTO: 10.45 K/UL (ref 3.9–12.7)

## 2024-01-09 PROCEDURE — 25000003 PHARM REV CODE 250

## 2024-01-09 PROCEDURE — 94761 N-INVAS EAR/PLS OXIMETRY MLT: CPT

## 2024-01-09 PROCEDURE — 36415 COLL VENOUS BLD VENIPUNCTURE: CPT

## 2024-01-09 PROCEDURE — 83735 ASSAY OF MAGNESIUM: CPT

## 2024-01-09 PROCEDURE — 80053 COMPREHEN METABOLIC PANEL: CPT

## 2024-01-09 PROCEDURE — 84100 ASSAY OF PHOSPHORUS: CPT

## 2024-01-09 PROCEDURE — 85025 COMPLETE CBC W/AUTO DIFF WBC: CPT

## 2024-01-09 PROCEDURE — 63600175 PHARM REV CODE 636 W HCPCS

## 2024-01-09 PROCEDURE — 21400001 HC TELEMETRY ROOM

## 2024-01-09 RX ORDER — DOCUSATE SODIUM 100 MG/1
100 CAPSULE, LIQUID FILLED ORAL 2 TIMES DAILY
COMMUNITY

## 2024-01-09 RX ORDER — NITROFURANTOIN (MACROCRYSTALS) 100 MG/1
100 CAPSULE ORAL NIGHTLY
Status: ON HOLD | COMMUNITY
End: 2024-01-11 | Stop reason: HOSPADM

## 2024-01-09 RX ORDER — NIFEDIPINE 30 MG/1
30 TABLET, EXTENDED RELEASE ORAL DAILY
Status: DISCONTINUED | OUTPATIENT
Start: 2024-01-09 | End: 2024-01-12 | Stop reason: HOSPADM

## 2024-01-09 RX ORDER — FERROUS SULFATE 325(65) MG
325 TABLET ORAL DAILY
COMMUNITY

## 2024-01-09 RX ADMIN — ATORVASTATIN CALCIUM 20 MG: 20 TABLET, FILM COATED ORAL at 09:01

## 2024-01-09 RX ADMIN — CEFEPIME 1 G: 1 INJECTION, POWDER, FOR SOLUTION INTRAMUSCULAR; INTRAVENOUS at 12:01

## 2024-01-09 RX ADMIN — METOPROLOL TARTRATE 12.5 MG: 25 TABLET, FILM COATED ORAL at 09:01

## 2024-01-09 RX ADMIN — CARBAMAZEPINE 200 MG: 100 TABLET, CHEWABLE ORAL at 09:01

## 2024-01-09 RX ADMIN — CEFEPIME 1 G: 1 INJECTION, POWDER, FOR SOLUTION INTRAMUSCULAR; INTRAVENOUS at 11:01

## 2024-01-09 RX ADMIN — TAMSULOSIN HYDROCHLORIDE 0.4 MG: 0.4 CAPSULE ORAL at 09:01

## 2024-01-09 RX ADMIN — NIFEDIPINE 30 MG: 30 TABLET, FILM COATED, EXTENDED RELEASE ORAL at 09:01

## 2024-01-09 RX ADMIN — ASPIRIN 81 MG: 81 TABLET, COATED ORAL at 09:01

## 2024-01-09 RX ADMIN — FINASTERIDE 5 MG: 5 TABLET, FILM COATED ORAL at 09:01

## 2024-01-09 NOTE — ED NOTES
Received report and assumed care of patient at this time. Pt presents to ED w/ c/o fall. Pt is awake and alert. Disoriented x 4 with mild agitation present. Airway is open and patent, respirations are spontaneous, normal effort and rate noted. No evidence of chest pain at this time. Pt has lacerations to face and forehead. Movement to all extremities noted. Bed placed in low position, side rails up x 2, call light is within reach of patient. Explanation of care provided to patient, pt placed on cardiac monitoring. Awaiting further MD orders and bed assignment, POC continues.

## 2024-01-09 NOTE — PHARMACY MED REC
"  Admission Medication History     The home medication history was taken by Silke Lucero.    You may go to "Admission" then "Reconcile Home Medications" tabs to review and/or act upon these items.     The home medication list has been updated by the Pharmacy department.   Please read ALL comments highlighted in yellow.   Please address this information as you see fit.    Feel free to contact us if you have any questions or require assistance.      The medications listed below were removed from the home medication list. Please reorder if appropriate:  Patient reports no longer taking the following medication(s):  ACETAMINOPHEN 325 MG  TAMSULOSIN 0.4 MG    Medications listed below were obtained from: Nursing home  PTA Medications   Medication Sig    aspirin (ECOTRIN) 81 MG EC tablet Take 1 tablet (81 mg total) by mouth once daily.    atorvastatin (LIPITOR) 20 MG tablet TAKE ONE TABLET BY MOUTH ONCE DAILY    carBAMazepine (TEGRETOL) 100 mg chewable tablet TAKE 2 TABLETS BY MOUTH DAILY    cyanocobalamin, vitamin B-12, 1,000 mcg Subl Take 1,000 mcg by mouth once daily.    docusate sodium (COLACE) 100 MG capsule Take 100 mg by mouth 2 (two) times daily.    ferrous sulfate (FEROSUL) 325 mg (65 mg iron) Tab tablet Take 325 mg by mouth once daily.    finasteride (PROSCAR) 5 mg tablet TAKE 1 TABLET BY MOUTH ONCE DAILY.    lanolin/mineral oil/petrolatum (ARTIFICIAL TEARS OPHT) Place 2 drops into both eyes 2 (two) times daily as needed (Dry eyes).    melatonin (MELATIN) 5 mg Take 1 tablet by mouth every evening.    metoprolol tartrate (LOPRESSOR) 25 MG tablet Take 0.5 tablets (12.5 mg total) by mouth 2 (two) times daily.    nitrofurantoin (MACRODANTIN) 100 MG capsule Take 100 mg by mouth nightly.    ondansetron (ZOFRAN-ODT) 4 MG TbDL  Dissolve 1 tablet on tongue every 8 (eight) hours as needed (nausea, vomiting).    vitamin D (VITAMIN D3) 1000 units Tab Take 1 tablet (1,000 Units total) by mouth once daily.     Potential issues " to be addressed PRIOR TO DISCHARGE  The listed medications were obtained from another facility ([facility] The patient may not have been able to fill these prescriptions prior to this admission and may require new scripts upon discharge.           Silke Lucero  EXT [id number]                .

## 2024-01-09 NOTE — PLAN OF CARE
Problem: Fall Injury Risk  Goal: Absence of Fall and Fall-Related Injury  Outcome: Ongoing, Progressing     Problem: Impaired Wound Healing  Goal: Optimal Wound Healing  Outcome: Ongoing, Progressing   Pt instructed to remain in the bed for fall prevention. Bed locked and in lowest position. Call light in reach.

## 2024-01-09 NOTE — NURSING
Nurses Note -- 4 Eyes      1/9/2024   6:51 AM      Skin assessed during: Admit      [] No Altered Skin Integrity Present    []Prevention Measures Documented      [x] Yes- Altered Skin Integrity Present or Discovered   [] LDA Added if Not in Epic (Describe Wound)   [x] New Altered Skin Integrity was Present on Admit and Documented in LDA   [x] Wound Image Taken    Wound Care Consulted? Yes    Attending Nurse:  BOONE Brar    Second RN/Staff Member:  BOONE Mckee

## 2024-01-09 NOTE — PLAN OF CARE
Benjamin Mcmanus - Med Surg ([facility]-16)  Initial Discharge Assessment       Primary Care Provider: Javier Villanueva MD    Admission Diagnosis: Subarachnoid hemorrhage [I60.9]  Chest pain [R07.9]  Complicated UTI (urinary tract infection) [N39.0]    Admission Date: 1/7/2024  Expected Discharge Date: 1/11/2024    Transition of Care Barriers: (P) None    Payor: Duriana MGD [facility] / Plan: Duriana CHOICES / Product Type: Medicare Advantage /     Extended Emergency Contact Information  Primary Emergency Contact: Rupal Barakat  Address: [address]           [address] [place] [address] [facility]  Home Phone: [phone]  Mobile Phone: [phone]  Relation: Daughter  Secondary Emergency Contact: Vicente Mccarthy   [facility]  Home Phone: [phone]  Mobile Phone: [phone]  Relation: Son    Discharge Plan A: (P) Assisted Living, Home Health  Discharge Plan B: (P) Assisted Living      [facility] - [place] - [address]  [address]  [place] [address]  Phone: [phone] Fax: [phone]    [facility] [address]  [address]  [place] [address]  Phone: [phone] Fax: [phone]      Initial Assessment (most recent)       Adult Discharge Assessment - 01/09/24 1431          Discharge Assessment    Assessment Type Discharge Planning Assessment (P)      Confirmed/corrected address, phone number and insurance Yes (P)      Confirmed Demographics Correct on Facesheet (P)      Source of Information family (P)      Communicated CORNELL with patient/caregiver No (P)      Reason For Admission UTI (P)      People in Home alone (P)    LASHELL    Facility Arrived From: [facility] [place] (P)      Do you expect to return to your current living situation? Yes (P)      Do you have help at home or someone to help you manage your care at home? Yes (P)      Who are your caregiver(s) and their phone  number(s)? [facility] staff (P)      Prior to hospitilization cognitive status: Unable to Assess (P)      Current cognitive status: Unable to Assess (P)      Walking or Climbing Stairs Difficulty yes (P)      Walking or Climbing Stairs ambulation difficulty, requires equipment (P)      Mobility Management uses w/c (P)      Dressing/Bathing Difficulty yes (P)      Dressing/Bathing bathing difficulty, assistance 1 person;dressing difficulty, assistance 1 person (P)      Dressing/Bathing Management [facility] helps (P)      Home Accessibility wheelchair accessible (P)      Home Layout Other (see comments) (P)    Lives on 3rd floor of [facility] with elevator    Equipment Currently Used at Home walker, rolling;wheelchair (P)      Readmission within 30 days? No (P)      Do you currently have service(s) that help you manage your care at home? Yes (P)      Name and Contact number of agency [facility] staff (P)      Is the pt/caregiver preference to resume services with current agency Yes (P)      Do you take prescription medications? Yes (P)      Do you have prescription coverage? Yes (P)      Coverage PHN (P)      Do you have any problems affording any of your prescribed medications? No (P)      Who is going to help you get home at discharge? Pt will need w/c van transport back to facility (P)      How do you get to doctors appointments? -- (P)    CG states either NH MD's or he goes to Urgent Care    Are you on dialysis? No (P)      Do you take coumadin? No (P)      Discharge Plan A Assisted Living;Home Health (P)      Discharge Plan B Assisted Living (P)      DME Needed Upon Discharge  wheelchair (P)      Discharge Plan discussed with: Adult children (P)      Transition of Care Barriers None (P)         Physical Activity    On average, how many days per week do you engage in moderate to strenuous exercise (like a brisk walk)? 0 days (P)      On average, how many minutes do you engage in exercise at this level? 0 min (P)         Financial Resource  Strain    How hard is it for you to pay for the very basics like food, housing, medical care, and heating? Not hard at all (P)         Housing Stability    In the last 12 months, was there a time when you were not able to pay the mortgage or rent on time? No (P)      In the last 12 months, how many places have you lived? 1 (P)      In the last 12 months, was there a time when you did not have a steady place to sleep or slept in a shelter (including now)? No (P)         Transportation Needs    In the past 12 months, has lack of transportation kept you from medical appointments or from getting medications? No (P)      In the past 12 months, has lack of transportation kept you from meetings, work, or from getting things needed for daily living? No (P)         Food Insecurity    Within the past 12 months, you worried that your food would run out before you got the money to buy more. Never true (P)      Within the past 12 months, the food you bought just didn't last and you didn't have money to get more. Never true (P)         Stress    Do you feel stress - tense, restless, nervous, or anxious, or unable to sleep at night because your mind is troubled all the time - these days? Not at all (P)         Social Connections    In a typical week, how many times do you talk on the phone with family, friends, or neighbors? Three times a week (P)      How often do you get together with friends or relatives? Three times a week (P)      How often do you attend Yarsanism or Christian services? Never (P)      Do you belong to any clubs or organizations such as Yarsanism groups, unions, fraternal or athletic groups, or school groups? No (P)      How often do you attend meetings of the clubs or organizations you belong to? Never (P)      Are you , , , , never , or living with a partner?  (P)         Alcohol Use    Q1: How often do you have a drink containing alcohol? Never (P)      Q2: How many  drinks containing alcohol do you have on a typical day when you are drinking? Patient does not drink (P)      Q3: How often do you have six or more drinks on one occasion? Never (P)                  CM spoke with dtr Rupal Yuval [phone].  She states pt lives at [facility] on 3rd floor with elevator, lives in private room.  Pt will need w/c van transport home, and gets MD appts at facility, or goes to ER by EMS.  30D readmission, came from [facility].  Pt has gotten HH recently, CG does not remember agency.  DME: w/c; CG states pt needs a new one.  No coumadin or HD.  [facility] staff help with ADL's.      Celsa Tian, ANTHONYN, BS, RN, CCM

## 2024-01-09 NOTE — ED NOTES
16th floor is out of telemetry boxes. Pt will be transported upstairs by ED tech with ED telemetry box.

## 2024-01-10 LAB
ALBUMIN SERPL BCP-MCNC: 2.8 G/DL (ref 3.5–5.2)
ALP SERPL-CCNC: 122 U/L (ref 55–135)
ALT SERPL W/O P-5'-P-CCNC: <5 U/L (ref 10–44)
ANION GAP SERPL CALC-SCNC: 6 MMOL/L (ref 8–16)
AST SERPL-CCNC: 14 U/L (ref 10–40)
BASOPHILS # BLD AUTO: 0.06 K/UL (ref 0–0.2)
BASOPHILS NFR BLD: 0.8 % (ref 0–1.9)
BILIRUB SERPL-MCNC: 0.4 MG/DL (ref 0.1–1)
BUN SERPL-MCNC: 15 MG/DL (ref 10–30)
CALCIUM SERPL-MCNC: 8.2 MG/DL (ref 8.7–10.5)
CHLORIDE SERPL-SCNC: 109 MMOL/L (ref 95–110)
CO2 SERPL-SCNC: 22 MMOL/L (ref 23–29)
CREAT SERPL-MCNC: 1.2 MG/DL (ref 0.5–1.4)
DIFFERENTIAL METHOD BLD: ABNORMAL
EOSINOPHIL # BLD AUTO: 0.4 K/UL (ref 0–0.5)
EOSINOPHIL NFR BLD: 5.7 % (ref 0–8)
ERYTHROCYTE [DISTWIDTH] IN BLOOD BY AUTOMATED COUNT: 13.2 % (ref 11.5–14.5)
EST. GFR  (NO RACE VARIABLE): 57.1 ML/MIN/1.73 M^2
GLUCOSE SERPL-MCNC: 97 MG/DL (ref 70–110)
HCT VFR BLD AUTO: 26.4 % (ref 40–54)
HGB BLD-MCNC: 8.8 G/DL (ref 14–18)
IMM GRANULOCYTES # BLD AUTO: 0.02 K/UL (ref 0–0.04)
IMM GRANULOCYTES NFR BLD AUTO: 0.3 % (ref 0–0.5)
LYMPHOCYTES # BLD AUTO: 1.4 K/UL (ref 1–4.8)
LYMPHOCYTES NFR BLD: 18.4 % (ref 18–48)
MAGNESIUM SERPL-MCNC: 1.9 MG/DL (ref 1.6–2.6)
MCH RBC QN AUTO: 29.3 PG (ref 27–31)
MCHC RBC AUTO-ENTMCNC: 33.3 G/DL (ref 32–36)
MCV RBC AUTO: 88 FL (ref 82–98)
MONOCYTES # BLD AUTO: 0.8 K/UL (ref 0.3–1)
MONOCYTES NFR BLD: 11.1 % (ref 4–15)
NEUTROPHILS # BLD AUTO: 4.8 K/UL (ref 1.8–7.7)
NEUTROPHILS NFR BLD: 63.7 % (ref 38–73)
NRBC BLD-RTO: 0 /100 WBC
PHOSPHATE SERPL-MCNC: 3.1 MG/DL (ref 2.7–4.5)
PLATELET # BLD AUTO: 190 K/UL (ref 150–450)
PMV BLD AUTO: 10 FL (ref 9.2–12.9)
POTASSIUM SERPL-SCNC: 3.4 MMOL/L (ref 3.5–5.1)
PROT SERPL-MCNC: 6 G/DL (ref 6–8.4)
RBC # BLD AUTO: 3 M/UL (ref 4.6–6.2)
SODIUM SERPL-SCNC: 137 MMOL/L (ref 136–145)
WBC # BLD AUTO: 7.59 K/UL (ref 3.9–12.7)

## 2024-01-10 PROCEDURE — 21400001 HC TELEMETRY ROOM

## 2024-01-10 PROCEDURE — 97162 PT EVAL MOD COMPLEX 30 MIN: CPT

## 2024-01-10 PROCEDURE — 25000003 PHARM REV CODE 250

## 2024-01-10 PROCEDURE — 83735 ASSAY OF MAGNESIUM: CPT

## 2024-01-10 PROCEDURE — 80053 COMPREHEN METABOLIC PANEL: CPT

## 2024-01-10 PROCEDURE — 97165 OT EVAL LOW COMPLEX 30 MIN: CPT

## 2024-01-10 PROCEDURE — 97112 NEUROMUSCULAR REEDUCATION: CPT

## 2024-01-10 PROCEDURE — 63600175 PHARM REV CODE 636 W HCPCS

## 2024-01-10 PROCEDURE — 94761 N-INVAS EAR/PLS OXIMETRY MLT: CPT

## 2024-01-10 PROCEDURE — 97530 THERAPEUTIC ACTIVITIES: CPT

## 2024-01-10 PROCEDURE — 84100 ASSAY OF PHOSPHORUS: CPT

## 2024-01-10 PROCEDURE — 85025 COMPLETE CBC W/AUTO DIFF WBC: CPT

## 2024-01-10 RX ORDER — POTASSIUM CHLORIDE 20 MEQ/1
40 TABLET, EXTENDED RELEASE ORAL
Status: COMPLETED | OUTPATIENT
Start: 2024-01-10 | End: 2024-01-10

## 2024-01-10 RX ORDER — LANOLIN ALCOHOL/MO/W.PET/CERES
400 CREAM (GRAM) TOPICAL ONCE
Status: COMPLETED | OUTPATIENT
Start: 2024-01-10 | End: 2024-01-10

## 2024-01-10 RX ORDER — HEPARIN SODIUM 5000 [USP'U]/ML
5000 INJECTION, SOLUTION INTRAVENOUS; SUBCUTANEOUS EVERY 8 HOURS
Status: DISCONTINUED | OUTPATIENT
Start: 2024-01-10 | End: 2024-01-11

## 2024-01-10 RX ADMIN — POTASSIUM CHLORIDE 40 MEQ: 1500 TABLET, EXTENDED RELEASE ORAL at 11:01

## 2024-01-10 RX ADMIN — NIFEDIPINE 30 MG: 30 TABLET, FILM COATED, EXTENDED RELEASE ORAL at 09:01

## 2024-01-10 RX ADMIN — CEFEPIME 1 G: 1 INJECTION, POWDER, FOR SOLUTION INTRAMUSCULAR; INTRAVENOUS at 11:01

## 2024-01-10 RX ADMIN — Medication 400 MG: at 09:01

## 2024-01-10 RX ADMIN — POTASSIUM CHLORIDE 40 MEQ: 1500 TABLET, EXTENDED RELEASE ORAL at 09:01

## 2024-01-10 RX ADMIN — CARBAMAZEPINE 200 MG: 100 TABLET, CHEWABLE ORAL at 09:01

## 2024-01-10 RX ADMIN — METOPROLOL TARTRATE 12.5 MG: 25 TABLET, FILM COATED ORAL at 09:01

## 2024-01-10 NOTE — PLAN OF CARE
Problem: Fall Injury Risk  Goal: Absence of Fall and Fall-Related Injury  Outcome: Ongoing, Progressing     Problem: Impaired Wound Healing  Goal: Optimal Wound Healing  Outcome: Ongoing, Progressing   Iv abx given.  Bed locked and in lowest position. Call light in reach.

## 2024-01-10 NOTE — PROGRESS NOTES
Benjamin Mcmanus - Med Surg ([address] Medicine  Progress Note    Patient Name: Jaydon Mccarthy  MRN: [id number]  Patient Class: IP- Inpatient   Admission Date: 1/7/2024  Length of Stay: 1 days  Attending Physician: Adrian Merritt, *  Primary Care Provider: Javier Villanueva MD        Subjective:     Principal Problem:UTI (urinary tract infection)        HPI:  Patient is a 89 yo male with a PMH of arthritis, coronary artery disease, hyperlipidemia, hypertension, seizures, and dementia who initially presented to [facility] [place] [place] from [facility] after a fall. He was [place] [facility] [place] after CT head discovered a hyperdensity in the L sylvian fissure possibly representing a SAH. Patient with dementia and is unable to describe how he fell but was found down with a laceration above his L eyebrow. Family states that he has more falls when he has a UTI. He was treated for a pseudomonal UTI last month. Interview difficult with patient that has severe dementia and hearing loss but patient states he has no active complaints.     While in the ED:  Patient evaluated by Neurosurgery with repeat [place] head at [facility] that revealed stable hyperdensity did Neurosurgery believes is calcification versus possible small bleed.  UA was suspicious with 3+ leukocytes and >100 WBC.  Patient was started on cefepime for complicated UTI as patient recently had a urine culture positive for Pseudomonas.    Overview/Hospital Course:  Patient admitted to  for acute encephalopathy on chronic dementia at this time thought to be secondary to sepsis in setting of UTI. Ucx this admission has so far only grown Candida however high suspicion for pseudomonal infection given his previous UCx grew pseudomonas and he was inappropriately treated (given Cefpodoxime). Patient's mentation has drastically improved with Cefepime therapy. May be close to mental baseline and discharge.     Interval History: NAEON. Afebrile.  Hypertensive on vitals, nifedipine 30 started. Patient's mentation is much improved and he is more cooperative and talkative on exam today. Attempted to call daughter but no . VM left. Continuing Cefepime therapy.     Review of Systems   Constitutional:  Negative for chills and fever.   Respiratory:  Negative for cough and shortness of breath.    Cardiovascular:  Negative for chest pain.   Gastrointestinal:  Negative for abdominal pain.     Objective:     Vital Signs (Most Recent):  Temp: 98.1 °F (36.7 °C) (01/09/24 1913)  Pulse: 75 (01/09/24 1913)  Resp: 18 (01/09/24 1913)  BP: (!) 167/96 (01/09/24 1913)  SpO2: 95 % (01/09/24 1913) Vital Signs (24h Range):  Temp:  [97.6 °F (36.4 °C)-98.5 °F (36.9 °C)] 98.1 °F (36.7 °C)  Pulse:  [67-84] 75  Resp:  [16-18] 18  SpO2:  [95 %-98 %] 95 %  BP: (137-175)/(70-96) 167/96     Weight: 59.9 kg (132 lb 0.9 oz)  Body mass index is 18.95 kg/m².  No intake or output data in the 24 hours ending 01/09/24 2248      Physical Exam  Constitutional:       General: He is not in acute distress.     Appearance: Normal appearance. He is not ill-appearing.      Comments: Cachectic   HENT:      Head: Normocephalic and atraumatic.      Ears:      Comments: Severely impaired hearing      Nose: Nose normal.      Mouth/Throat:      Mouth: Mucous membranes are moist.   Eyes:      Extraocular Movements: Extraocular movements intact.      Conjunctiva/sclera: Conjunctivae normal.      Pupils: Pupils are equal, round, and reactive to light.      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      Pulses: Normal pulses.      Heart sounds: No murmur heard.  Pulmonary:      Effort: Pulmonary effort is normal. No respiratory distress.      Breath sounds: Normal breath sounds. No wheezing, rhonchi or rales.   Abdominal:      General: Abdomen is flat. Bowel sounds are normal. There is no distension.      Palpations: Abdomen is soft.      Tenderness:  There is no abdominal tenderness.   Musculoskeletal:         General: Normal range of motion.      Right lower leg: No edema.      Left lower leg: No edema.   Skin:     General: Skin is warm and dry.      Capillary Refill: Capillary refill takes less than 2 seconds.      Comments: Laceration above L eyebrow   Neurological:      Mental Status: He is alert.      Comments: Patient oriented to self and place (hospital). Pleasantly demented. More interactive on exam today.            Significant Labs: All pertinent labs within the past 24 hours have been reviewed.  CBC:   Recent Labs   Lab 01/08/24 0258 01/09/24  0433   WBC 9.34 10.45   HGB 8.9* 9.2*   HCT 25.7* 27.9*    192     CMP:   Recent Labs   Lab 01/08/24 0258 01/09/24 0433    138   K 4.7 3.7   * 107   CO2 23 21*   * 100   BUN 13 13   CREATININE 1.0 1.1   CALCIUM 8.8 8.4*   PROT 6.3 6.2   ALBUMIN 3.0* 2.9*   BILITOT 0.4 0.5   ALKPHOS 131 134   AST 11 13   ALT 5* 6*   ANIONGAP 11 10       Significant Imaging: I have reviewed all pertinent imaging results/findings within the past 24 hours.    Assessment/Plan:      * UTI (urinary tract infection)  Patient with fall. Family states he has more falls when he has UTI. Afebrile. WBC 11.2. UA with 3+ leukocytes, >100 WBC. Patient with recent history of Pseudomonal UTI in December. Was inadequately treated with Cefpodoxime. HIgh suspicion that patient remains with pseudomonal infection despite UCx this admission only growing Candida so far.     Plan:  - Cefepime 1g IV q12h (start date 01/07)  - Will plan on continuing 5-7d course, likely can transition to FQ on disharge  - Attempted to call daughter on 1/9 to obtain patient's baseline but no answer, will re-attempt tomorrow. Patient's mentation is much improved.       Debility  Wheelchair bound at baseline.    Dementia  Patient with dementia with likely etiology of unknown dementia. Dementia is severe. The patient does have signs of behavioral  disturbance. Not currently on dementia medications.     -Continue non-pharmacologic interventions to prevent delirium (No VS between 11PM-5AM, activity during day, opening blinds, providing glasses/hearing aids, and up in chair during daytime).  -Avoid narcotics and benzos unless necessary    Abnormal CT of the head  Patient with hyperdensity in the L sylvian fissure possibly representing a SAH vs calcification. Stable in size on repeat imaging.    -Neurosurgery consulted and signed off with no plans for intervention  -If changes to neuro exam, get STAT CT head and consult NSGY      Fall  Patient with fall and head laceration. Patient family stating this is often related to UTIs.  -Continuing to treat UTI      Stage 3a chronic kidney disease  Creatine stable for now. BMP reviewed- noted Estimated Creatinine Clearance: 37.1 mL/min (based on SCr of 1.1 mg/dL). according to latest data. Based on current GFR, CKD stage is stage 3 - GFR 30-59.     -Monitor UOP and serial BMP and adjust therapy as needed.   -Renally dose meds.   -Avoid nephrotoxic medications and procedures.    Epilepsy  Stable on home medication.  -Continuing home carbamazepine      CAD s/p PCI   Patient with known CAD s/p stent placement, which is controlled Will continue ASA and monitor for S/Sx of angina/ACS. Recently discontinued plavix.    Hyperlipidemia  -Continue home atorvostatin      Essential hypertension  Patient with elevated BP in setting of recent fall.   -Continue home metoprolol  -Nifedipine 30mg q.d. initiated as patient's BP remained elevated.    Benign prostatic hyperplasia with lower urinary tract symptoms  -Continuing home flomax and finasteride        VTE Risk Mitigation (From admission, onward)           Ordered     Reason for No Pharmacological VTE Prophylaxis  Once        Question:  Reasons:  Answer:  Risk of Bleeding    01/07/24 2327     IP VTE HIGH RISK PATIENT  Once         01/07/24 2326     Place sequential compression device   Until discontinued         01/07/24 2327                    Discharge Planning   CORNELL: 1/11/2024     Code Status: Full Code   Is the patient medically ready for discharge?:     Reason for patient still in hospital (select all that apply): Treatment  Discharge Plan A: Assisted Living, Home Health        Odalys Valdez MD  Department of Hospital Medicine   [facility] ([place]-16)

## 2024-01-10 NOTE — PLAN OF CARE
Benjamin Dawn - Med Surg ([place] [address])      HOME HEALTH ORDERS  FACE TO FACE ENCOUNTER    Patient Name: Jaydon Mccarthy  YOB: 1932    PCP: Javier Villanueva MD   PCP Address: [address] [place] [address]  PCP Phone Number: [phone]  PCP Fax: [phone]    Encounter Date: 1/7/24    Admit to Home Health    Diagnoses:  Active Hospital Problems    Diagnosis  POA    *UTI (urinary tract infection) [N39.0]  Yes    Debility [R53.81]  Yes    Abnormal CT of the head [R93.0]  Yes    Dementia [F03.90]  Yes     Chronic    Fall [W19.XXXA]  Yes    Stage 3a chronic kidney disease [N18.31]  Yes    Hyperlipidemia [E78.5]  Yes     Chronic     Last lipid 9/25/2015 controlled on atorvastatin      CAD s/p PCI  [I25.10]  Yes     Chronic     Hx MI, Stent to RCA Sept 25, 2015 follow by cardiology        Epilepsy [G40.909]  Yes     Chronic     No reported seizures since 1980s on carbamazepine and levetiracetam       Essential hypertension [I10]  Yes     Chronic     Controlled on metoprolol 12.5 twice daily      Benign prostatic hyperplasia with lower urinary tract symptoms [N40.1]  Yes     Chronic     Follow by urology on finasteride, tamsulosin, bethanechol         Resolved Hospital Problems   No resolved problems to display.       Follow Up Appointments:  No future appointments.    Allergies:Review of patient's allergies indicates:  No Known Allergies    Medications: Review discharge medications with patient and family and provide education.    Current Facility-Administered Medications   Medication Dose Route Frequency Provider Last Rate Last Admin    acetaminophen tablet 650 mg  650 mg Oral Q4H PRN Car Espinoza DO        aspirin EC tablet 81 mg  81 mg Oral Daily Car Espinoza DO   81 mg at 01/09/24 0947    atorvastatin tablet 20 mg  20 mg Oral Daily Car Espinoza DO   20 mg at 01/09/24 0947    carBAMazepine chewable tablet 200 mg  200 mg Oral Daily Car Espinoza DO   200 mg at 01/10/24 0911     ceFEPIme (MAXIPIME) 1 g in dextrose 5 % in water (D5W) 100 mL IVPB (MB+)  1 g Intravenous Q12H Car Espinoza DO   Stopped at 01/11/24 0234    dextrose 10% bolus 125 mL 125 mL  12.5 g Intravenous PRN Car Espinoza DO        dextrose 10% bolus 250 mL 250 mL  25 g Intravenous PRN Car Espinoza DO        finasteride tablet 5 mg  5 mg Oral Daily Car Espinoza DO   5 mg at 01/09/24 0947    glucagon (human recombinant) injection 1 mg  1 mg Intramuscular PRN Car Espinoza DO        glucose chewable tablet 16 g  16 g Oral PRN Car Espinoza DO        glucose chewable tablet 24 g  24 g Oral PRN Car Espinoza DO        melatonin tablet 6 mg  6 mg Oral Nightly PRN Willie Nevarez MD   6 mg at 01/07/24 2358    metoprolol tartrate (LOPRESSOR) split tablet 12.5 mg  12.5 mg Oral BID Car Espinoza DO   12.5 mg at 01/11/24 0929    naloxone 0.4 mg/mL injection 0.02 mg  0.02 mg Intravenous PRN Car Espinoza DO        NIFEdipine 24 hr tablet 30 mg  30 mg Oral Daily Odalys Valdez MD   30 mg at 01/11/24 0929    ondansetron disintegrating tablet 8 mg  8 mg Oral Q8H PRN Car Espinoza DO        sodium chloride 0.9% flush 10 mL  10 mL Intravenous PRN Willie Nevarez MD        sodium chloride 0.9% flush 10 mL  10 mL Intravenous Q12H PRN Car Espinoza DO        tamsulosin 24 hr capsule 0.4 mg  0.4 mg Oral Daily Car Espinoza DO   0.4 mg at 01/11/24 0929     Current Discharge Medication List        START taking these medications    Details   NIFEdipine (PROCARDIA-XL) 30 MG (OSM) 24 hr tablet Take 1 tablet (30 mg total) by mouth once daily.  Qty: 30 tablet, Refills: 11    Comments: .           CONTINUE these medications which have NOT CHANGED    Details   aspirin (ECOTRIN) 81 MG EC tablet Take 1 tablet (81 mg total) by mouth once daily.  Qty: 90 tablet, Refills: 3      atorvastatin (LIPITOR) 20 MG tablet TAKE ONE TABLET BY MOUTH ONCE DAILY  Qty: 90 tablet, Refills: 3    Associated Diagnoses: Mixed hyperlipidemia       carBAMazepine (TEGRETOL) 100 mg chewable tablet TAKE 2 TABLETS BY MOUTH DAILY  Qty: 180 tablet, Refills: 3    Comments: This prescription was filled on 1/25/2022. Any refills authorized will be placed on file.  Associated Diagnoses: Nonintractable epilepsy without status epilepticus, unspecified epilepsy type      cyanocobalamin, vitamin B-12, 1,000 mcg Subl Place 1,000 mcg under the tongue once daily.  Qty: 30 tablet, Refills: 3      docusate sodium (COLACE) 100 MG capsule Take 100 mg by mouth 2 (two) times daily.      ferrous sulfate (FEROSUL) 325 mg (65 mg iron) Tab tablet Take 325 mg by mouth once daily.      finasteride (PROSCAR) 5 mg tablet TAKE 1 TABLET BY MOUTH ONCE DAILY.  Qty: 90 tablet, Refills: 3    Associated Diagnoses: Benign non-nodular prostatic hyperplasia without lower urinary tract symptoms      lanolin/mineral oil/petrolatum (ARTIFICIAL TEARS OPHT) Place 2 drops into both eyes 2 (two) times daily as needed (Dry eyes).      melatonin (MELATIN) 5 mg Take 1 tablet by mouth every evening.      metoprolol tartrate (LOPRESSOR) 25 MG tablet Take 0.5 tablets (12.5 mg total) by mouth 2 (two) times daily.  Qty: 30 tablet, Refills: 11    Comments: This prescription was filled on 7/13/2021. Any refills authorized will be placed on file.  Associated Diagnoses: Nonintractable epilepsy without status epilepticus, unspecified epilepsy type; Coronary artery disease involving native coronary artery of native heart without angina pectoris      ondansetron (ZOFRAN-ODT) 4 MG TbDL Take 1 tablet (4 mg total) by mouth every 8 (eight) hours as needed (nausea, vomiting).  Qty: 20 tablet, Refills: 0      vitamin D (VITAMIN D3) 1000 units Tab Take 1 tablet (1,000 Units total) by mouth once daily.  Qty: 90 tablet, Refills: 3           STOP taking these medications       nitrofurantoin (MACRODANTIN) 100 MG capsule Comments:   Reason for Stopping:         tamsulosin (FLOMAX) 0.4 mg Cap Comments:   Reason for Stopping:                  I have seen and examined this patient within the last 30 days. My clinical findings that support the need for the home health skilled services and home bound status are the following:no   Weakness/numbness causing balance and gait disturbance due to Weakness/Debility making it taxing to leave home.     Diet:   cardiac diet    Labs:  Routine per facility    Referrals/ Consults  Physical Therapy to evaluate and treat. Evaluate for home safety and equipment needs; Establish/upgrade home exercise program. Perform / instruct on therapeutic exercises, gait training, transfer training, and Range of Motion.  Occupational Therapy to evaluate and treat. Evaluate home environment for safety and equipment needs. Perform/Instruct on transfers, ADL training, ROM, and therapeutic exercises.    Activities:   activity as tolerated    Nursing:   Agency to admit patient within 24 hours of hospital discharge unless specified on physician order or at patient request    SN to complete comprehensive assessment including routine vital signs. Instruct on disease process and s/s of complications to report to MD. Review/verify medication list sent home with the patient at time of discharge  and instruct patient/caregiver as needed. Frequency may be adjusted depending on start of care date.     Skilled nurse to perform up to 3 visits PRN for symptoms related to diagnosis    Notify MD if SBP > 160 or < 90; DBP > 90 or < 50; HR > 120 or < 50; Temp > 101; O2 < 88%; Other:       Ok to schedule additional visits based on staff availability and patient request on consecutive days within the home health episode.    When multiple disciplines ordered:    Start of Care occurs on Sunday - Wednesday schedule remaining discipline evaluations as ordered on separate consecutive days following the start of care.    Thursday SOC -schedule subsequent evaluations Friday and Monday the following week.     Friday - Saturday SOC - schedule subsequent  discipline evaluations on consecutive days starting Monday of the following week.    For all post-discharge communication and subsequent orders please contact patient's primary care physician. If unable to reach primary care physician or do not receive response within 30 minutes, please contact PCP for clinical staff order clarification    Miscellaneous       Home Health Aide:  Physical Therapy Three times weekly and Occupational Therapy Three times weekly    Wound Care Orders  yes:       Perform wound care to L and R temporals:  Cleanse wound with normal saline  Pat dry.  Cover with a foam border (Mepilex)   Apply this Weekly.    I certify that this patient is confined to his home and needs physical therapy and occupational therapy.

## 2024-01-10 NOTE — PROGRESS NOTES
[facility] - Med Surg ([address] Medicine  Progress Note    Patient Name: Jaydon Mccarthy  MRN: [id number]  Patient Class: IP- Inpatient   Admission Date: 1/7/2024  Length of Stay: 2 days  Attending Physician: Adrian Merritt, *  Primary Care Provider: Javier Villanueva MD        Subjective:     Principal Problem:UTI (urinary tract infection)        HPI:  Patient is a 89 yo male with a PMH of arthritis, coronary artery disease, hyperlipidemia, hypertension, seizures, and dementia who initially presented to [facility] from [facility] after a fall. He was transferred to [facility] for neurosurgery evaluation after CT head discovered a hyperdensity in the L sylvian fissure possibly representing a SAH. Patient with dementia and is unable to describe how he fell but was found down with a laceration above his L eyebrow. Family states that he has more falls when he has a UTI. He was treated for a pseudomonal UTI last month. Interview difficult with patient that has severe dementia and hearing loss but patient states he has no active complaints.     While in the ED:  Patient evaluated by Neurosurgery with repeat CT head at [facility] that revealed stable hyperdensity did Neurosurgery believes is calcification versus possible small bleed.  UA was suspicious with 3+ leukocytes and >100 WBC.  Patient was started on cefepime for complicated UTI as patient recently had a urine culture positive for Pseudomonas.    Overview/Hospital Course:  Patient admitted to  for acute encephalopathy on chronic dementia at this time thought to be secondary to sepsis in setting of UTI. Ucx this admission has so far only grown Candida however high suspicion for pseudomonal infection given his previous UCx grew pseudomonas and he was inappropriately treated (given Cefpodoxime). Patient's mentation has drastically improved with Cefepime therapy. May be close to mental baseline and discharge.     Interval History: No acute events  overnight, afebrile, hemodynamically stable.    Daughter and son and law came and see the patient. Daughter states that he is at baseline. In and out confusion, but able to recognize close family members.         Review of Systems   Unable to perform ROS: Dementia     Objective:     Vital Signs (Most Recent):  Temp: 97.7 °F (36.5 °C) (01/10/24 0323)  Pulse: 68 (01/10/24 0323)  Resp: 18 (01/10/24 0323)  BP: 132/60 (01/10/24 0323)  SpO2: (!) 93 % (01/10/24 0323) Vital Signs (24h Range):  Temp:  [97.6 °F (36.4 °C)-98.5 °F (36.9 °C)] 97.7 °F (36.5 °C)  Pulse:  [68-81] 68  Resp:  [16-18] 18  SpO2:  [93 %-99 %] 93 %  BP: (132-167)/(60-96) 132/60     Weight: 59.9 kg (132 lb 0.9 oz)  Body mass index is 18.95 kg/m².    Intake/Output Summary (Last 24 hours) at 1/10/2024 0711  Last data filed at 1/9/2024 2130  Gross per 24 hour   Intake 100 ml   Output --   Net 100 ml         Physical Exam  Constitutional:       General: He is not in acute distress.     Appearance: Normal appearance. He is not ill-appearing.      Comments: Cachectic   HENT:      Head: Normocephalic and atraumatic.      Ears:      Comments: Severely impaired hearing      Nose: Nose normal.      Mouth/Throat:      Mouth: Mucous membranes are moist.   Eyes:      Extraocular Movements: Extraocular movements intact.      Conjunctiva/sclera: Conjunctivae normal.      Pupils: Pupils are equal, round, and reactive to light.      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      Pulses: Normal pulses.      Heart sounds: No murmur heard.  Pulmonary:      Effort: Pulmonary effort is normal. No respiratory distress.      Breath sounds: Normal breath sounds. No wheezing, rhonchi or rales.   Abdominal:      General: Abdomen is flat. Bowel sounds are normal. There is no distension.      Palpations: Abdomen is soft.      Tenderness: There is no abdominal tenderness.   Musculoskeletal:         General: Normal range of  motion.      Right lower leg: No edema.      Left lower leg: No edema.   Skin:     General: Skin is warm and dry.      Capillary Refill: Capillary refill takes less than 2 seconds.      Comments: Laceration above L eyebrow   Neurological:      Mental Status: He is alert.      Comments: Patient oriented to self and place (hospital). Pleasantly demented. More interactive on exam today. At baseline.             Significant Labs: All pertinent labs within the past 24 hours have been reviewed.  CBC:   Recent Labs   Lab 01/09/24 0433   WBC 10.45   HGB 9.2*   HCT 27.9*        CMP:   Recent Labs   Lab 01/09/24  0433 01/10/24  0544    137   K 3.7 3.4*    109   CO2 21* 22*    97   BUN 13 15   CREATININE 1.1 1.2   CALCIUM 8.4* 8.2*   PROT 6.2 6.0   ALBUMIN 2.9* 2.8*   BILITOT 0.5 0.4   ALKPHOS 134 122   AST 13 14   ALT 6* <5*   ANIONGAP 10 6*       Significant Imaging: I have reviewed all pertinent imaging results/findings within the past 24 hours.    Assessment/Plan:      * UTI (urinary tract infection)  Patient with fall. Family states he has more falls when he has UTI. Afebrile. WBC 11.2. UA with 3+ leukocytes, >100 WBC. Patient with recent history of Pseudomonal UTI in December. Was inadequately treated with Cefpodoxime. HIgh suspicion that patient remains with pseudomonal infection despite UCx this admission only growing Candida so far.     Plan:  - Cefepime 1g IV q12h (start date 01/07)   - Will plan on continuing 5-7d course, likely can transition to FQ on disharge  - spoke to daughter on 1/10, she visited the patient yesterday and state that he is at baseline mentation status. He recognized family members and asked her about them. Planing for discharge tomorrow to nursing facility. Will pos transition to aminoglycoside tomorrow.       Debility  Wheelchair bound at baseline.    Dementia  Patient with dementia with likely etiology of unknown dementia. Dementia is severe. The patient does have  signs of behavioral disturbance. Not currently on dementia medications.     -Continue non-pharmacologic interventions to prevent delirium (No VS between 11PM-5AM, activity during day, opening blinds, providing glasses/hearing aids, and up in chair during daytime).  -Avoid narcotics and benzos unless necessary    Abnormal CT of the head  Patient with hyperdensity in the L sylvian fissure possibly representing a SAH vs calcification. Stable in size on repeat imaging.    -Neurosurgery consulted and signed off with no plans for intervention  -If changes to neuro exam, get STAT CT head and consult NSGY      Fall  Patient with fall and head laceration. Patient family stating this is often related to UTIs.  -Continuing to treat UTI      Stage 3a chronic kidney disease  Creatine stable for now. BMP reviewed- noted Estimated Creatinine Clearance: 37.1 mL/min (based on SCr of 1.1 mg/dL). according to latest data. Based on current GFR, CKD stage is stage 3 - GFR 30-59.     -Monitor UOP and serial BMP and adjust therapy as needed.   -Renally dose meds.   -Avoid nephrotoxic medications and procedures.    Epilepsy  Stable on home medication.  -Continuing home carbamazepine      CAD s/p PCI   Patient with known CAD s/p stent placement, which is controlled Will continue ASA and monitor for S/Sx of angina/ACS. Recently discontinued plavix.    Hyperlipidemia  -Continue home atorvostatin      Essential hypertension  Patient with elevated BP in setting of recent fall.   -Continue home metoprolol  -Nifedipine 30mg q.d. initiated as patient's BP remained elevated.    Benign prostatic hyperplasia with lower urinary tract symptoms  -Continuing home flomax and finasteride        VTE Risk Mitigation (From admission, onward)           Ordered     Reason for No Pharmacological VTE Prophylaxis  Once        Question:  Reasons:  Answer:  Risk of Bleeding    01/07/24 2317     IP VTE HIGH RISK PATIENT  Once         01/07/24 2799     Place  sequential compression device  Until discontinued         01/07/24 2327                    Discharge Planning   CORNELL: 1/11/2024     Code Status: Full Code   Is the patient medically ready for discharge?:     Reason for patient still in hospital (select all that apply): Treatment and Pending disposition  Discharge Plan A: Assisted Living, Home Health                  Dwain Cortez DO  Department of Hospital Medicine   [facility] ([place])

## 2024-01-10 NOTE — PT/OT/SLP EVAL
"Occupational Therapy   Co-Evaluation    Name: Jaydon Mccarthy  MRN: [id number]  Admitting Diagnosis: UTI (urinary tract infection)  Recent Surgery: * No surgery found *      Recommendations:     Discharge Recommendations: Low Intensity Therapy  Discharge Equipment Recommendations:  none  Barriers to discharge:       Assessment:     Jaydon Mccarthy is a 89 y.o. male with a medical diagnosis of UTI (urinary tract infection).  He presents with performance deficits affecting function: weakness, visual deficits, impaired endurance, impaired cognition, impaired self care skills, decreased upper extremity function, decreased lower extremity function, impaired functional mobility, gait instability, decreased safety awareness, impaired balance, decreased ROM.    Pt limited to functional mobility 2/2 confusion and difficulty following direction since he is hard of hearing. Required 2 person assist for bed mobiity but pt able to get to the chair with CGA. Wheelchair bound PTA living at an [facility]. Pt seems to be functioning at baseline, recommending low intensity therapy for next level of care. Pt will continue to benefit from skilled OT services during acute stay to address impairments listed above to maximize independence with ADLs and functional mobility to ensure safe return to OF.     Rehab Prognosis: Good; patient would benefit from acute skilled OT services to address these deficits and reach maximum level of function.       Plan:     Patient to be seen 3 x/week to address the above listed problems via self-care/home management, therapeutic exercises, therapeutic activities, neuromuscular re-education  Plan of Care Expires: 02/09/24  Plan of Care Reviewed with: patient    Subjective     Chief Complaint:   Patient/Family Comments/goals: "My name is Jaydon"    Occupational Profile:  Per chart review, pt resides at [facility]) and is wheelchair bound. Requires assistance with ADLs  Equipment Used at Home: walker, " "rolling, wheelchair  Assistance upon Discharge: [facility] staff    Pain/Comfort:  Pain Rating 1: other (see comments) (unrated)  Pain Rating Post-Intervention 1: other (see comments) (unrated)    Patients cultural, spiritual, Gnosticism conflicts given the current situation: no    Objective:   Co-evaluation/treatment performed due to patient's multiple deficits requiring two skilled therapists to appropriately and safely assess patient's strength and endurance while facilitating functional tasks in addition to accommodating for patient's activity tolerance.      Communicated with: RN prior to session.  Patient found HOB elevated with peripheral IV, pulse ox (continuous) (avasys) upon OT entry to room.    General Precautions: Standard, fall, vision impaired, hearing impaired  Orthopedic Precautions: N/A  Braces: N/A  Respiratory Status: Room air    Occupational Performance:    Bed Mobility:    Patient completed Rolling/Turning to Left with  maximal assistance  Patient completed Scooting/Bridging with maximal assistance and 2 persons  Patient completed Supine to Sit with maximal assistance and 2 persons  Patient completed Sit to Supine with maximal assistance and 2 persons  Required Max A x 2 persons for bed mobility 2/2 pt confusion. Pt not able to follow commands well.     Functional Mobility/Transfers:  Patient completed Sit <> Stand Transfer with contact guard assistance  with  no assistive device   Patient completed Bed <> Chair Transfer using Squat Pivot technique with contact guard assistance and Min A with no assistive device  Functional Mobility: Pt able to pivot onto chair with CGA and verbal cues for sequencing. Required Min A for chair>bed transfer.     Activities of Daily Living:  Pt refused all self care tasks. Presented pt with toothbrush and demonstrated brushing teeth, pt took the toothbrush and motioned the task but refused to brush teeth and wash face seated EOB, stating "I don't want to, I did that the " "other morning" despite encouragement from therapist.    Cognitive/Visual Perceptual:  Cognitive/Psychosocial Skills:     -       Oriented to: Person and Time   -       Follows Commands/attention:Inattentive and Easily distracted  -       Memory: Impaired STM and Impaired LTM  -       Safety awareness/insight to disability: impaired     Physical Exam:  Balance: -       Sitting balance ranged from SBA-max A 2/2 posterior lean. Pt required Max A 2/2 confusion and FOF, kept leaning back despite max cues.    AMPAC 6 Click ADL:  AMPAC Total Score: 13    Treatment & Education:  Pt educated on   Role of OT and OT POC  Safe transfer techniques and proper body mechanics for fall prevention and improved independence with functional transfers  Utilizing the call bell to request for assistance with all functional mobility to ensure safety during hospital stay.    Pt verbalized understanding and all questions were addressed within the scope of OT.     Patient left HOB elevated with all lines intact, call button in reach, bed alarm on, and RN notified    GOALS:   Multidisciplinary Problems       Occupational Therapy Goals          Problem: Occupational Therapy    Goal Priority Disciplines Outcome Interventions   Occupational Therapy Goal     OT, PT/OT Ongoing, Progressing    Description: Goals to be met by: 1/17/24     Patient will increase functional independence with ADLs by performing:    UE Dressing with Moderate Assistance.  LE Dressing with Moderate Assistance.  Grooming while bedside chair with Minimal Assistance.  Supine to sit with Minimal Assistance.  Step transfer with Supervision                         History:     Past Medical History:   Diagnosis Date    Anticoagulant long-term use     Arthritis     Cancer     right  hand    Coronary artery disease     Deep vein thrombosis     Hyperlipidemia     Hypertension     Seizures 1988    none since on tegretol         Past Surgical History:   Procedure Laterality Date    " APPENDECTOMY      CLOSURE OF WOUND  12/9/2020    Procedure: CLOSURE, WOUND;  Surgeon: Yamil Bonilla MD;  Location: [facility] OR;  Service: Plastics;;    EXCISION OF LESION OF EYELID Right 12/9/2020    Procedure: EXCISION TUMOR RIGHT EYEBROW WITH COMPLEX CLOSURE;  Surgeon: Yamil Bonilla MD;  Location: [facility] OR;  Service: Plastics;  Laterality: Right;  FROZEN SECTION  RN PREOP 12/8/2020----COVID NEGATIVE  12/8    FRACTURE SURGERY Right     elbow    FRACTURE SURGERY Right     hip    JOSUÉ FILTER PLACEMENT      hx of deep vein thrombosis    OPEN REDUCTION AND INTERNAL FIXATION (ORIF) OF INTERTROCHANTERIC FRACTURE OF FEMUR Left 5/30/2018    Procedure: ORIF, FRACTURE, FEMUR, INTERTROCHANTERIC;  Surgeon: Pop Martin III, MD;  Location: [facility] OR;  Service: Orthopedics;  Laterality: Left;    TOTAL SHOULDER ARTHROPLASTY Right        Time Tracking:     OT Date of Treatment: 01/10/24  OT Start Time: 1009  OT Stop Time: 1034  OT Total Time (min): 25 min    Billable Minutes:Evaluation 10  Therapeutic Activity 15    1/10/2024

## 2024-01-10 NOTE — ASSESSMENT & PLAN NOTE
Patient with fall. Family states he has more falls when he has UTI. Afebrile. WBC 11.2. UA with 3+ leukocytes, >100 WBC. Patient with recent history of Pseudomonal UTI in December. Was inadequately treated with Cefpodoxime. HIgh suspicion that patient remains with pseudomonal infection despite UCx this admission only growing Candida so far.     Plan:  - Cefepime 1g IV q12h (start date 01/07)  - Will plan on continuing 5-7d course, likely can transition to FQ on disharge  - Attempted to call daughter on 1/9 to obtain patient's baseline but no answer, will re-attempt tomorrow. Patient's mentation is much improved.

## 2024-01-10 NOTE — CONSULTS
[facility] - Med Surg ([place] [address])  Wound Care    Patient Name:  Jaydon Mccarthy   MRN:  [id number]  Date: 2024  Diagnosis: UTI (urinary tract infection)    History:     Past Medical History:   Diagnosis Date    Anticoagulant long-term use     Arthritis     Cancer     right  hand    Coronary artery disease     Deep vein thrombosis     Hyperlipidemia     Hypertension     Seizures     none since on tegretol       Social History     Socioeconomic History    Marital status:    Tobacco Use    Smoking status: Former     Current packs/day: 0.00     Average packs/day: 0.5 packs/day for 6.0 years (3.0 ttl pk-yrs)     Types: Cigarettes     Start date: 10/27/1969     Quit date: 10/27/1975     Years since quittin.2    Smokeless tobacco: Never   Substance and Sexual Activity    Alcohol use: No    Drug use: No    Sexual activity: Not Currently     Partners: Female     Social Determinants of Health     Financial Resource Strain: Low Risk  (2024)    Overall Financial Resource Strain (CARDIA)     Difficulty of Paying Living Expenses: Not hard at all   Food Insecurity: No Food Insecurity (2024)    Hunger Vital Sign     Worried About Running Out of Food in the Last Year: Never true     Ran Out of Food in the Last Year: Never true   Transportation Needs: No Transportation Needs (2024)    PRAPARE - Transportation     Lack of Transportation (Medical): No     Lack of Transportation (Non-Medical): No   Physical Activity: Inactive (2024)    Exercise Vital Sign     Days of Exercise per Week: 0 days     Minutes of Exercise per Session: 0 min   Stress: No Stress Concern Present (2024)    [employer] - Occupational Stress Questionnaire     Feeling of Stress : Not at all   Social Connections: Socially Isolated (2024)    Social Connection and Isolation Panel [NHANES]     Frequency of Communication with Friends and Family: Three times a week     Frequency of Social Gatherings  with Friends and Family: Three times a week     Attends Jew Services: Never     Active Member of Clubs or Organizations: No     Attends Club or Organization Meetings: Never     Marital Status:    Housing Stability: Low Risk  (1/9/2024)    Housing Stability Vital Sign     Unable to Pay for Housing in the Last Year: No     Number of Places Lived in the Last Year: 1     Unstable Housing in the Last Year: No       Precautions:     Allergies as of 01/07/2024    (No Known Allergies)       [facility] Assessment Details/Treatment   Patient seen for wound care consultation to L temporal/above eyebrow.   Reviewed chart for this encounter.   See Flow Sheet for findings.      Per chart review. Patient is a 89 yo male with a PMH of arthritis, coronary artery disease, hyperlipidemia, hypertension, seizures, and dementia who initially presented to [facility] from [facility] after a fall. He was transferred to [facility] for neurosurgery evaluation after CT head discovered a hyperdensity in the L sylvian fissure possibly representing a SAH. Wound care removed old dressings before cleansing with normal saline. Wound care then applied a foam to right and left temporals for protection. Pt denied any needs at this time.    RECOMMENDATIONS:  - Bedside nurse to perform wound care to L and R temporals:  Cleanse wound with normal saline  Pat dry.  Cover with a foam border (Mepilex)   Apply this Weekly.      Recommendations made to primary team for above plan via secured chat. Wound Care to follow up. Orders placed.     Discussed POC with patient and primary RN.   See EMR for orders & patient education.  Discussed POC with primary team.    Nursing to continue care.  Nursing to maintain pressure injury prevention interventions.  Contact wound care for any further questions.         01/09/24 1540   WOCN Assessment   WOCN Total Time (mins) 30   Visit Date 01/09/24   Visit Time 1540   Consult Type New   WOCN Speciality Wound    Wound skin tear   Intervention assessed;changed;applied;chart review;orders   Teaching on-going        Altered Skin Integrity 01/09/24 0430 Left lateral Frontal region #1 Laceration   Date First Assessed/Time First Assessed: 01/09/24 0430   Altered Skin Integrity Present on Admission - Did Patient arrive to the hospital with altered skin?: yes  Side: Left  Orientation: lateral  Location: Frontal region  Wound Number: #1  Is this in...   Dressing Appearance Dry;Intact;Clean   Drainage Amount Scant   Drainage Characteristics/Odor Serosanguineous   Appearance Pink;Red   Tissue loss description Partial thickness   Care Cleansed with:;Sterile normal saline   Dressing Applied;Foam   Dressing Change Due 01/16/24 01/09/2024

## 2024-01-10 NOTE — NURSING
Pt started to become restless and started pulling everything off (gown, sheets, bandage on his forehead, and his iv), and scratching his arms. Pt was not combative. He started to calm down once everything was taken off. Pt did try not to get out of bed. MD notified. MD stated night team can place PIV so pt can get night dose of iv abx and won't pull it out before usage.

## 2024-01-10 NOTE — SUBJECTIVE & OBJECTIVE
Interval History: NAEON. Afebrile. Hypertensive on vitals, nifedipine 30 started. Patient's mentation is much improved and he is more cooperative and talkative on exam today. Attempted to call daughter but no . VM left. Continuing Cefepime therapy.     Review of Systems   Constitutional:  Negative for chills and fever.   Respiratory:  Negative for cough and shortness of breath.    Cardiovascular:  Negative for chest pain.   Gastrointestinal:  Negative for abdominal pain.     Objective:     Vital Signs (Most Recent):  Temp: 98.1 °F (36.7 °C) (01/09/24 1913)  Pulse: 75 (01/09/24 1913)  Resp: 18 (01/09/24 1913)  BP: (!) 167/96 (01/09/24 1913)  SpO2: 95 % (01/09/24 1913) Vital Signs (24h Range):  Temp:  [97.6 °F (36.4 °C)-98.5 °F (36.9 °C)] 98.1 °F (36.7 °C)  Pulse:  [67-84] 75  Resp:  [16-18] 18  SpO2:  [95 %-98 %] 95 %  BP: (137-175)/(70-96) 167/96     Weight: 59.9 kg (132 lb 0.9 oz)  Body mass index is 18.95 kg/m².  No intake or output data in the 24 hours ending 01/09/24 2248      Physical Exam  Constitutional:       General: He is not in acute distress.     Appearance: Normal appearance. He is not ill-appearing.      Comments: Cachectic   HENT:      Head: Normocephalic and atraumatic.      Ears:      Comments: Severely impaired hearing      Nose: Nose normal.      Mouth/Throat:      Mouth: Mucous membranes are moist.   Eyes:      Extraocular Movements: Extraocular movements intact.      Conjunctiva/sclera: Conjunctivae normal.      Pupils: Pupils are equal, round, and reactive to light.      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      Pulses: Normal pulses.      Heart sounds: No murmur heard.  Pulmonary:      Effort: Pulmonary effort is normal. No respiratory distress.      Breath sounds: Normal breath sounds. No wheezing, rhonchi or rales.   Abdominal:      General: Abdomen is flat. Bowel sounds are normal. There is no distension.       Palpations: Abdomen is soft.      Tenderness: There is no abdominal tenderness.   Musculoskeletal:         General: Normal range of motion.      Right lower leg: No edema.      Left lower leg: No edema.   Skin:     General: Skin is warm and dry.      Capillary Refill: Capillary refill takes less than 2 seconds.      Comments: Laceration above L eyebrow   Neurological:      Mental Status: He is alert.      Comments: Patient oriented to self and place (hospital). Pleasantly demented. More interactive on exam today.            Significant Labs: All pertinent labs within the past 24 hours have been reviewed.  CBC:   Recent Labs   Lab 01/08/24  0258 01/09/24  0433   WBC 9.34 10.45   HGB 8.9* 9.2*   HCT 25.7* 27.9*    192     CMP:   Recent Labs   Lab 01/08/24  0258 01/09/24  0433    138   K 4.7 3.7   * 107   CO2 23 21*   * 100   BUN 13 13   CREATININE 1.0 1.1   CALCIUM 8.8 8.4*   PROT 6.3 6.2   ALBUMIN 3.0* 2.9*   BILITOT 0.4 0.5   ALKPHOS 131 134   AST 11 13   ALT 5* 6*   ANIONGAP 11 10       Significant Imaging: I have reviewed all pertinent imaging results/findings within the past 24 hours.

## 2024-01-10 NOTE — CARE UPDATE
Full note to follow.    89 year old male with CAD, HLD, HTN, CKD3, dementia and hx of seizures on carbamazepine who presents as transfer for NSGY eval after being admitted to OSH for fall c/b laceration of his head with cocnern for SAH found to have UTI.  NSGY reviewed CT and feels this represents a small stable bleed or calcification.  He remains somewhat confused and slow to respond/ will not consistently follow commands. This is not his baseline but he is somewhat improved today, I favor continued inpatient monitoring and treatment of UTI.  Previous micro data with Pseudomonas so will target this, culture with candida, will not treat.  Abx choice is complicated given his hx of seizures and dementia, for now continue cefepime, consider one time dose of tobramycin.  Clarify baseline with family.  PT/OT ordered.      Adrian Merritt M.D.  Hospital Medicine  Pager [phone]

## 2024-01-10 NOTE — HOSPITAL COURSE
Patient admitted to  for acute encephalopathy on chronic dementia at this time thought to be secondary to sepsis in setting of UTI. Ucx this admission has so far only grown Candida however high suspicion for pseudomonal infection given his previous UCx grew pseudomonas and he was inappropriately treated (given Cefpodoxime). Patient's mentation has drastically improved with Cefepime therapy. Mentation is back to baseline per phones with daughter.

## 2024-01-10 NOTE — PLAN OF CARE
Problem: Occupational Therapy  Goal: Occupational Therapy Goal  Description: Goals to be met by: 1/17/24     Patient will increase functional independence with ADLs by performing:    UE Dressing with Moderate Assistance.  LE Dressing with Moderate Assistance.  Grooming while bedside chair with Minimal Assistance.  Supine to sit with Minimal Assistance.  Step transfer with Supervision    Outcome: Ongoing, Progressing

## 2024-01-10 NOTE — PT/OT/SLP EVAL
Physical Therapy Evaluation  Co-evaluation with OT due to acuity of condition, level of skilled assist needed for assessment of safety with mobility.   Patient Name:  Jaydon Mccarthy   MRN:  [id number]    Recommendations:     Discharge Recommendations: Low Intensity Therapy (return to [facility])   Discharge Equipment Recommendations: none   Barriers to discharge: None    Assessment:     Jaydon Mccarthy is a 89 y.o. male admitted with a medical diagnosis of UTI (urinary tract infection).  He presents with the following impairments/functional limitations: weakness, impaired balance, impaired functional mobility, impaired self care skills, visual deficits, impaired cognition, decreased lower extremity function, decreased safety awareness, decreased upper extremity function, decreased coordination, impaired skin. The patient demonstrates confusion and memory impairment due to underlying dementia, his ability to fully participate with functional mobility was further limited due to significant hearing impairment, blind in R eye. He was unable to consistently follow therapist cuing for mobility, but was able to spontaneously mobilize and transfer to/from bedside chair with little physical assist. His sitting balance was primarily contact guard assist to stand by assistance, but when he became anxious about falling, he tended to lean posteriorly causing his hips to slip to the edge of the bed, requiring moderate assistance for trunk support. Per chart review he was wheelchair bound at [facility] and had access to caregiver assist. He appears near his functional baseline and would benefit from low intensity therapy on discharge to prevent functional decline.       Rehab Prognosis: Fair and Poor; patient would benefit from acute skilled PT services to address these deficits and reach maximum level of function.    Recent Surgery: * No surgery found *      Plan:     During this hospitalization, patient to be seen 3 x/week to address the identified  "rehab impairments via gait training, therapeutic activities, therapeutic exercises, neuromuscular re-education, wheelchair management/training and progress toward the following goals:    Plan of Care Expires:  02/09/24    Subjective     Chief Complaint: "How old do you think I am?" Patient jokingly asked repeatedly throughout session; "Don't let me fall"  Patient/Family Comments/goals: patient unable to state, maintain level of current mobility  Pain/Comfort:  Pain Rating 1: 0/10    Patients cultural, spiritual, [employer] conflicts given the current situation: no    Living Environment:  Per chart review, patient lives at [facility] on 3rd floor with elevator access.   Prior to admission, patients level of function was wheelchair bound.  Equipment used at home: walker, rolling, wheelchair.  DME owned (not currently used): none.  Upon discharge, patient will have assistance from [facility] staff.    Objective:     Communicated with RN prior to session.  Patient found HOB elevated with peripheral IV, pulse ox (continuous), bed alarm (avasys)  upon PT entry to room.    General Precautions: Standard, fall, vision impaired, hearing impaired  Orthopedic Precautions:N/A   Braces: N/A  Respiratory Status: Room air    Exams:    Cognitive Exam  Patient is A&O xself and follows occasional one step commands with max verbal/tactile/visual cuing   Fine Motor Coordination   -       WNL     Postural Exam Patient presented with the following abnormalities:    -       Rounded shoulders  -       Forward head  -       Kyphosis  -       Posterior pelvic tilt  -       Weight shift posterior and R, multidirectional instability   Sensation    -       Light touch unable to assess 2/2 cognition   Skin Integrity/Edema     -       Skin integrity: L facial laceration and swelling   -       Edema: facial swelling   R LE ROM WNL   R LE Strength Limited due to cognition, [place], grossly 3/5 hip flexion, knee ext/flex, and ankle DF/PF   L LE ROM WNL   L LE " "Strength  Limited due to cognition, [place], grossly 3/5 hip flexion, knee ext/flex, and ankle DF/PF       Balance   Static Sitting contact guard assist to moderate assistance due to posterior lean   Dynamic Sitting Reaching 1-2 inches anteriorly outside DOMENICA with contact guard assist    Static Standing NA   Dynamic Standing       NA        Functional Mobility:    Bed Mobility  Supine to Sit on the R side:  maximum assistance x2 as patient was unable to follow commands to actively participate with mobility  Sit to supine: maximum assistance x2  Scoot to HOB in supine: total assistance x2 drawsheet  Scoot to EOB in sitting: maximum assistance    Transfers Squat pivot bed to bedside chair on R: contact guard assist, patient insisting on "doing it himself", he reached both hands out for chair hand rails and pivoted 180 deg with short shuffling steps    Squat pivot bedside chair to bed on L:minimum assistance, assist for anterior weight shift for partial stand          AM-PAC 6 CLICK MOBILITY  Total Score:13       Treatment & Education:  Patient educated on:  -role of therapy  -goals of session  -PT POC  -benefits of out of bed mobility and consequences of immobility  -calling for staff assist to mobilize safely  Patient agreeable to mobilize with therapy.      Sitting edge of bed 10 minutes, contact guard assist to moderate assistance , weight shift posterior and R, multidirectional lean  -Posterior pelvic tilt, kyphosis, cervical flexion  -Visual/verbal/manual cues for midline orientation, thoracic and cervical extension  -When patient spontaneously shifted weight posterior and requiring increased assist for trunk support, performed unilateral anterior reaching 2" outside DOMENICA to correct alignment and maintain COM over DOMENICA, performed 4 reps with improvement in alignment and decreased need for external support    Patient safe for squat pivot transfer with RN assist x1 person, RN alerted. Patient not left up in chair due to " confusion,  impulsivity, memory impairment, fall risk.       Patient left HOB elevated with all lines intact, call button in reach, bed alarm on, and RN and MD notified about patient progress.     GOALS:   Multidisciplinary Problems       Physical Therapy Goals          Problem: Physical Therapy    Goal Priority Disciplines Outcome Goal Variances Interventions   Physical Therapy Goal     PT, PT/OT Ongoing, Progressing     Description: Goals to be met by:      Patient will increase functional independence with mobility by performin. Supine to sit with MInimal Assistance  2. Sit to supine with MInimal Assistance  3. Sit to stand transfer with Contact Guard Assistance  4. Bed to chair transfer with Stand-by Assistance using LRAD  5. Gait  x 10 feet with Minimal Assistance using LRAD.   6. Wheelchair propulsion x25 feet with Contact Guard Assistance using bilateral upper/lower extremities                         History:     Past Medical History:   Diagnosis Date    Anticoagulant long-term use     Arthritis     Cancer     right  hand    Coronary artery disease     Deep vein thrombosis     Hyperlipidemia     Hypertension     Seizures     none since on tegretol       Past Surgical History:   Procedure Laterality Date    APPENDECTOMY      CLOSURE OF WOUND  2020    Procedure: CLOSURE, WOUND;  Surgeon: Yamil Bonilla MD;  Location: [facility] OR;  Service: Plastics;;    EXCISION OF LESION OF EYELID Right 2020    Procedure: EXCISION TUMOR RIGHT EYEBROW WITH COMPLEX CLOSURE;  Surgeon: Yamil Bonilla MD;  Location: [facility] OR;  Service: Plastics;  Laterality: Right;  FROZEN SECTION  RN PREOP 2020----COVID NEGATIVE      FRACTURE SURGERY Right     elbow    FRACTURE SURGERY Right     hip    JOSUÉ FILTER PLACEMENT      hx of deep vein thrombosis    OPEN REDUCTION AND INTERNAL FIXATION (ORIF) OF INTERTROCHANTERIC FRACTURE OF FEMUR Left 2018    Procedure: ORIF, FRACTURE, FEMUR,  INTERTROCHANTERIC;  Surgeon: Pop Martin III, MD;  Location[facility];  Service: Orthopedics;  Laterality: Left;    TOTAL SHOULDER ARTHROPLASTY Right        Time Tracking:     PT Received On: 01/10/24  PT Start Time: 1012     PT Stop Time: 1037  PT Total Time (min): 25 min     Billable Minutes: Evaluation 12 and Neuromuscular Re-education 13      01/10/2024

## 2024-01-10 NOTE — ASSESSMENT & PLAN NOTE
Patient with fall. Family states he has more falls when he has UTI. Afebrile. WBC 11.2. UA with 3+ leukocytes, >100 WBC. Patient with recent history of Pseudomonal UTI in December. Was inadequately treated with Cefpodoxime. HIgh suspicion that patient remains with pseudomonal infection despite UCx this admission only growing Candida so far.     Plan:  - Cefepime 1g IV q12h (start date 01/07)   - Will plan on continuing 5-7d course, likely can transition to FQ on disharge  - spoke to daughter on 1/10, she visited the patient yesterday and state that he is at baseline mentation status. He recognized family members and asked her about them. Planing for discharge tomorrow to nursing facility. Will pos transition to aminoglycoside tomorrow.

## 2024-01-10 NOTE — ASSESSMENT & PLAN NOTE
Patient with elevated BP in setting of recent fall.   -Continue home metoprolol  -Nifedipine 30mg q.d. initiated as patient's BP remained elevated.

## 2024-01-10 NOTE — SUBJECTIVE & OBJECTIVE
Interval History: No acute events overnight, afebrile, hemodynamically stable.    Daughter and son and law came and see the patient. Daughter states that he is at baseline. In and out confusion, but able to recognize close family members.         Review of Systems   Unable to perform ROS: Dementia     Objective:     Vital Signs (Most Recent):  Temp: 97.7 °F (36.5 °C) (01/10/24 0323)  Pulse: 68 (01/10/24 0323)  Resp: 18 (01/10/24 0323)  BP: 132/60 (01/10/24 0323)  SpO2: (!) 93 % (01/10/24 0323) Vital Signs (24h Range):  Temp:  [97.6 °F (36.4 °C)-98.5 °F (36.9 °C)] 97.7 °F (36.5 °C)  Pulse:  [68-81] 68  Resp:  [16-18] 18  SpO2:  [93 %-99 %] 93 %  BP: (132-167)/(60-96) 132/60     Weight: 59.9 kg (132 lb 0.9 oz)  Body mass index is 18.95 kg/m².    Intake/Output Summary (Last 24 hours) at 1/10/2024 0711  Last data filed at 1/9/2024 2130  Gross per 24 hour   Intake 100 ml   Output --   Net 100 ml         Physical Exam  Constitutional:       General: He is not in acute distress.     Appearance: Normal appearance. He is not ill-appearing.      Comments: Cachectic   HENT:      Head: Normocephalic and atraumatic.      Ears:      Comments: Severely impaired hearing      Nose: Nose normal.      Mouth/Throat:      Mouth: Mucous membranes are moist.   Eyes:      Extraocular Movements: Extraocular movements intact.      Conjunctiva/sclera: Conjunctivae normal.      Pupils: Pupils are equal, round, and reactive to light.      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      Pulses: Normal pulses.      Heart sounds: No murmur heard.  Pulmonary:      Effort: Pulmonary effort is normal. No respiratory distress.      Breath sounds: Normal breath sounds. No wheezing, rhonchi or rales.   Abdominal:      General: Abdomen is flat. Bowel sounds are normal. There is no distension.      Palpations: Abdomen is soft.      Tenderness: There is no abdominal tenderness.   Musculoskeletal:          General: Normal range of motion.      Right lower leg: No edema.      Left lower leg: No edema.   Skin:     General: Skin is warm and dry.      Capillary Refill: Capillary refill takes less than 2 seconds.      Comments: Laceration above L eyebrow   Neurological:      Mental Status: He is alert.      Comments: Patient oriented to self and place (hospital). Pleasantly demented. More interactive on exam today. At baseline.             Significant Labs: All pertinent labs within the past 24 hours have been reviewed.  CBC:   Recent Labs   Lab 01/09/24  0433   WBC 10.45   HGB 9.2*   HCT 27.9*        CMP:   Recent Labs   Lab 01/09/24  0433 01/10/24  0544    137   K 3.7 3.4*    109   CO2 21* 22*    97   BUN 13 15   CREATININE 1.1 1.2   CALCIUM 8.4* 8.2*   PROT 6.2 6.0   ALBUMIN 2.9* 2.8*   BILITOT 0.5 0.4   ALKPHOS 134 122   AST 13 14   ALT 6* <5*   ANIONGAP 10 6*       Significant Imaging: I have reviewed all pertinent imaging results/findings within the past 24 hours.

## 2024-01-10 NOTE — PLAN OF CARE
Problem: Physical Therapy  Goal: Physical Therapy Goal  Description: Goals to be met by:      Patient will increase functional independence with mobility by performin. Supine to sit with MInimal Assistance  2. Sit to supine with MInimal Assistance  3. Sit to stand transfer with Contact Guard Assistance  4. Bed to chair transfer with Stand-by Assistance using LRAD  5. Gait  x 10 feet with Minimal Assistance using LRAD.   6. Wheelchair propulsion x25 feet with Contact Guard Assistance using bilateral upper/lower extremities    Outcome: Ongoing, Progressing   Evaluation completed, initiated plan of care.   Marnie Pacheco, PT  1/10/2024

## 2024-01-11 PROCEDURE — 25000003 PHARM REV CODE 250

## 2024-01-11 PROCEDURE — 21400001 HC TELEMETRY ROOM

## 2024-01-11 PROCEDURE — 94761 N-INVAS EAR/PLS OXIMETRY MLT: CPT

## 2024-01-11 PROCEDURE — 63600175 PHARM REV CODE 636 W HCPCS

## 2024-01-11 RX ORDER — NIFEDIPINE 30 MG/1
30 TABLET, EXTENDED RELEASE ORAL DAILY
Qty: 30 TABLET | Refills: 11 | Status: SHIPPED | OUTPATIENT
Start: 2024-01-11 | End: 2025-01-10

## 2024-01-11 RX ADMIN — NIFEDIPINE 30 MG: 30 TABLET, FILM COATED, EXTENDED RELEASE ORAL at 09:01

## 2024-01-11 RX ADMIN — METOPROLOL TARTRATE 12.5 MG: 25 TABLET, FILM COATED ORAL at 09:01

## 2024-01-11 RX ADMIN — CEFEPIME 1 G: 1 INJECTION, POWDER, FOR SOLUTION INTRAMUSCULAR; INTRAVENOUS at 02:01

## 2024-01-11 RX ADMIN — TAMSULOSIN HYDROCHLORIDE 0.4 MG: 0.4 CAPSULE ORAL at 09:01

## 2024-01-11 RX ADMIN — CEFEPIME 1 G: 1 INJECTION, POWDER, FOR SOLUTION INTRAMUSCULAR; INTRAVENOUS at 11:01

## 2024-01-11 NOTE — PT/OT/SLP PROGRESS
Physical Therapy      Patient Name:  Jaydon Mccarthy   MRN:  [id number]    Patient not seen today secondary to Unarousable (Several tactile and verbal cues provided to patient, unable to be aroused. Will attempt again as time allows.). Will follow-up 1/12/24.

## 2024-01-11 NOTE — NURSING
Pt confused and spit out his nightly medication, attempted to give in vanilla pudding pt continued to spit it out MD notified

## 2024-01-11 NOTE — DISCHARGE SUMMARY
[facility] - Med Surg ([place] [address])  [facility] Medicine  Discharge Summary      Patient Name: Jaydon Mccarthy  MRN: [id number]  JEAN: [id number]  Patient Class: IP- Inpatient  Admission Date: 1/7/2024  Hospital Length of Stay: 3 days  Discharge Date and Time:  01/11/2024 6:48 AM  Attending Physician: Adrian Merritt, *   Discharging Provider: Dwain Cortez DO  Primary Care Provider: Javier Villanueva MD  [facility] Medicine Team: [facility] 5 Dwain Cortez DO  Primary Care Team: [facility] 5    HPI:   Patient is a 89 yo male with a PMH of arthritis, coronary artery disease, hyperlipidemia, hypertension, seizures, and dementia who initially presented to [facility] after a fall. He was [place] [facility] [place] after CT head discovered a hyperdensity in the L sylvian fissure possibly representing a SAH. Patient with dementia and is unable to describe how he fell but was found down with a laceration above his L eyebrow. Family states that he has more falls when he has a UTI. He was treated for a pseudomonal UTI last month. Interview difficult with patient that has severe dementia and hearing loss but patient states he has no active complaints.     While in the ED:  Patient evaluated by Neurosurgery with repeat CT head at [facility] that revealed stable hyperdensity did Neurosurgery believes is calcification versus possible small bleed.  UA was suspicious with 3+ leukocytes and >100 WBC.  Patient was started on cefepime for complicated UTI as patient recently had a urine culture positive for Pseudomonas.    * No surgery found *      Hospital Course:   Patient admitted to  for acute encephalopathy on chronic dementia at this time thought to be secondary to sepsis in setting of UTI. Ucx this admission has so far only grown Candida however high suspicion for pseudomonal infection given his previous UCx grew pseudomonas and he was inappropriately treated (given Cefpodoxime).  "Patient's mentation has drastically improved with Cefepime therapy. May be close to mental baseline and discharge.      BP (!) 142/70 (BP Location: Left arm, Patient Position: Lying)   Pulse 73   Temp 97.6 °F (36.4 °C) (Axillary)   Resp 18   Ht 5' 10" (1.778 m)   Wt 59.9 kg (132 lb 0.9 oz)   SpO2 (!) 94%   BMI 18.95 kg/m²      General Appear: No acute distress. Alert and cooperative, baseline demented, hard at hearing, frail, laughing, smiling, and pointing.    Head:  left and right temporal region with lacerate and bandaged. Forehead has a small superficial cut  HEENT: MMM, PERRL, EOMI  Neck:  Supple, normal ROM, nontender, no JVD  Cardio: RRR S1/S2 nl, No m/r/g.   Lungs: CTA bilaterally, no wheezing or rales  Abdomen: normal bowel sounds, soft, nontender, no guarding, rebound, or masses  Ext:  Normal ROM, no edema  Skin: warm and dry. No rashes or lesions  Neuro:  Alert and Oriented x 3 No focal deficits. Normal strength and sensation   Psyc: demented,      Goals of Care Treatment Preferences:  Code Status: Full Code    Living Will: Yes              Consults:   Consults (From admission, onward)          Status Ordering Provider     Inpatient consult to Neurosurgery  Once        Provider:  (Not yet assigned)    Completed GEE GERARD            No new Assessment & Plan notes have been filed under this hospital service since the last note was generated.  Service: Hospital Medicine    Final Active Diagnoses:    Diagnosis Date Noted POA    PRINCIPAL PROBLEM:  UTI (urinary tract infection) [N39.0] 10/04/2022 Yes    Debility [R53.81] 01/08/2024 Yes    Abnormal CT of the head [R93.0] 01/07/2024 Yes    Dementia [F03.90] 01/07/2024 Yes     Chronic    Fall [W19.XXXA] 10/04/2022 Yes    Stage 3a chronic kidney disease [N18.31] 06/03/2022 Yes    Hyperlipidemia [E78.5] 10/28/2015 Yes     Chronic    CAD s/p PCI  [I25.10] 10/28/2015 Yes     Chronic    Epilepsy [G40.909] 10/28/2015 Yes     Chronic    Essential hypertension " [I10] 09/17/2015 Yes     Chronic    Benign prostatic hyperplasia with lower urinary tract symptoms [N40.1] 08/20/2013 Yes     Chronic      Problems Resolved During this Admission:       Discharged Condition: fair    Disposition:     Follow Up:    Patient Instructions:   No discharge procedures on file.    Significant Diagnostic Studies: Labs: CMP   Recent Labs   Lab 01/10/24  0544      K 3.4*      CO2 22*   GLU 97   BUN 15   CREATININE 1.2   CALCIUM 8.2*   PROT 6.0   ALBUMIN 2.8*   BILITOT 0.4   ALKPHOS 122   AST 14   ALT <5*   ANIONGAP 6*    and CBC   Recent Labs   Lab 01/10/24  0544   WBC 7.59   HGB 8.8*   HCT 26.4*          Pending Diagnostic Studies:       None           Medications:  Reconciled Home Medications:      Medication List        START taking these medications      NIFEdipine 30 MG (OSM) 24 hr tablet  Commonly known as: PROCARDIA-XL  Take 1 tablet (30 mg total) by mouth once daily.            CONTINUE taking these medications      ARTIFICIAL TEARS OPHT  Place 2 drops into both eyes 2 (two) times daily as needed (Dry eyes).     aspirin 81 MG EC tablet  Commonly known as: ECOTRIN  Take 1 tablet (81 mg total) by mouth once daily.     atorvastatin 20 MG tablet  Commonly known as: LIPITOR  TAKE ONE TABLET BY MOUTH ONCE DAILY     carBAMazepine 100 mg chewable tablet  Commonly known as: TEGRETOL  TAKE 2 TABLETS BY MOUTH DAILY     cyanocobalamin (vitamin B-12) 1,000 mcg Subl  Place 1,000 mcg under the tongue once daily.     docusate sodium 100 MG capsule  Commonly known as: COLACE  Take 100 mg by mouth 2 (two) times daily.     FeroSuL 325 mg (65 mg iron) Tab tablet  Generic drug: ferrous sulfate  Take 325 mg by mouth once daily.     finasteride 5 mg tablet  Commonly known as: PROSCAR  TAKE 1 TABLET BY MOUTH ONCE DAILY.     melatonin 5 mg  Commonly known as: MELATIN  Take 1 tablet by mouth every evening.     metoprolol tartrate 25 MG tablet  Commonly known as: LOPRESSOR  Take 0.5 tablets  (12.5 mg total) by mouth 2 (two) times daily.     ondansetron 4 MG Tbdl  Commonly known as: ZOFRAN-ODT  Take 1 tablet (4 mg total) by mouth every 8 (eight) hours as needed (nausea, vomiting).     vitamin D 1000 units Tab  Commonly known as: VITAMIN D3  Take 1 tablet (1,000 Units total) by mouth once daily.            STOP taking these medications      nitrofurantoin 100 MG capsule  Commonly known as: MACRODANTIN              Indwelling Lines/Drains at time of discharge:   Lines/Drains/Airways       None                   Time spent on the discharge of patient: 30 minutes         Dwain Cortez DO  Department of Hospital Medicine  [facility] - Med Surg ([place]-[facility])

## 2024-01-11 NOTE — PLAN OF CARE
Per MD pt to be d/c'd today.  CM called [facility] to make sure they can receive him back today [phone], LVM requesting call back.      Met with CG Rupal Barakat to review discharge recommendation of HH and is agreeable to plan    Patient/family provided list of facilities in-network with patient's payor plan. Providers that are owned, operated, or affiliated with [facility] are included on the list.     Notified that referral sent to below listed facilities from in-network list based on proximity to home/family support:   [facility]    Patient/family instructed to identify preference.    Preferred Facility: (if more than 1, listed in order of descending preference)  No preference - CG states pt had HH before, but she's not sure what agency. CM instructed CG that pt to be d/c'd today.     CG requested new w/c.  CM requested orders from MD and messaged ODME to eval for w/c.    If an additional preferred facility not listed above is identified, additional referral to be sent. If above facilities unable to accept, will send additional referrals to in-network providers.     11:23 AM  Per CP, [facility] can accept pt.    CM called [facility], spoke with Bernardo LIZARRAGA, who requests new orders be faxed to [phone].  CM faxed info.     12:32 PM  CM called [facility], left message with Nita to have [facility] call CM back.    1:38 PM  CM called [facility] [phone], spoke with Bernardo, they are ready to receive him.    3:22 PM  Per Saritha Herrmann, pt rec'd w/c at bedside.  CM instructed Pramagalys to call [facility] at [facility] [phone] for report.    Celsa Tian, ANTHONYN, BS, RN, CCM

## 2024-01-11 NOTE — PT/OT/SLP PROGRESS
Physical Therapy Treatment    Patient Name:  Jaydon Mccarthy   MRN:  [id number]    Recommendations:     Discharge Recommendations: Low Intensity Therapy (return to [facility])  Discharge Equipment Recommendations: wheelchair  Barriers to discharge: None      Jaydon Mccarthy has a mobility limitation that significantly impairs his ability to participate in one or more mobility related activities of daily living (MRADL's) such as toileting, feeding, dressing, grooming, and bathing in customary locations in the home. The mobility limitation cannot be sufficiently resolved by the use of a cane or walker.   The use of a manual wheelchair will significantly improve the patient's ability to participate in MRADLS and the patient will use it on regular basis in the home. Jaydon Mccarthy has expressed his willingness to use a manual wheelchair in the home. He also has a caregiver who is available, willing, and able to provide assistance with the wheelchair when needed.         01/11/2024

## 2024-01-11 NOTE — DISCHARGE INSTRUCTIONS
You were diagnosis and treated for a UTI.    Please follow up with your PCP regarding the medication nitrofurantoin whether to discontinue/restart

## 2024-01-12 VITALS
RESPIRATION RATE: 18 BRPM | OXYGEN SATURATION: 96 % | BODY MASS INDEX: 18.91 KG/M2 | HEIGHT: 70 IN | DIASTOLIC BLOOD PRESSURE: 73 MMHG | HEART RATE: 69 BPM | TEMPERATURE: 98 F | SYSTOLIC BLOOD PRESSURE: 167 MMHG | WEIGHT: 132.06 LBS

## 2024-01-12 NOTE — PT/OT/SLP PROGRESS
Physical Therapy      Patient Name:  Jaydon Mccarthy   MRN:  [id number]     Patient not seen today secondary to discharged this AM. PT attempted at 9:50 and pt ART.

## 2024-01-12 NOTE — PROGRESS NOTES
[facility] - Med Surg ([address] Medicine  Progress Note    Patient Name: Jaydon Mccarthy  MRN: [id number]  Patient Class: IP- Inpatient   Admission Date: 1/7/2024  Length of Stay: 4 days  Attending Physician: No att. providers found  Primary Care Provider: Javier Villanueva MD        Subjective:     Principal Problem:UTI (urinary tract infection)        HPI:  Patient is a 89 yo male with a PMH of arthritis, coronary artery disease, hyperlipidemia, hypertension, seizures, and dementia who initially presented to [facility] after a fall. He was transferred to [facility] for neurosurgery evaluation after CT head discovered a hyperdensity in the L sylvian fissure possibly representing a SAH. Patient with dementia and is unable to describe how he fell but was found down with a laceration above his L eyebrow. Family states that he has more falls when he has a UTI. He was treated for a pseudomonal UTI last month. Interview difficult with patient that has severe dementia and hearing loss but patient states he has no active complaints.     While in the ED:  Patient evaluated by Neurosurgery with repeat CT head at [facility] that revealed stable hyperdensity did Neurosurgery believes is calcification versus possible small bleed.  UA was suspicious with 3+ leukocytes and >100 WBC.  Patient was started on cefepime for complicated UTI as patient recently had a urine culture positive for Pseudomonas.    Overview/Hospital Course:  Patient admitted to  for acute encephalopathy on chronic dementia at this time thought to be secondary to sepsis in setting of UTI. Ucx this admission has so far only grown Candida however high suspicion for pseudomonal infection given his previous UCx grew pseudomonas and he was inappropriately treated (given Cefpodoxime). Patient's mentation has drastically improved with Cefepime therapy. Mentation is back to baseline per phones with daughter.     Interval History: No acute  events overnight, afebrile, hemodynamically stable.      Smile, points, and laughs whenever see the patient. Called patient daughter regarding is this is baseline mental status, she said yes.   Patient is pending transportation for discharge.    Review of Systems   Unable to perform ROS: Dementia     Objective:     Vital Signs (Most Recent):  Temp: 97.5 °F (36.4 °C) (01/12/24 0725)  Pulse: 69 (01/12/24 0725)  Resp: 18 (01/12/24 0725)  BP: (!) 167/73 (01/12/24 0725)  SpO2: 96 % (01/12/24 0725) Vital Signs (24h Range):  Temp:  [97.5 °F (36.4 °C)-98.7 °F (37.1 °C)] 97.5 °F (36.4 °C)  Pulse:  [69-95] 69  Resp:  [17-18] 18  SpO2:  [95 %-99 %] 96 %  BP: (155-170)/(73-97) 167/73     Weight: 59.9 kg (132 lb 0.9 oz)  Body mass index is 18.95 kg/m².    Intake/Output Summary (Last 24 hours) at 1/12/2024 1430  Last data filed at 1/12/2024 0800  Gross per 24 hour   Intake 190 ml   Output --   Net 190 ml         Physical Exam  Constitutional:       General: He is not in acute distress.     Appearance: Normal appearance. He is not ill-appearing.      Comments: Cachectic   HENT:      Head: Normocephalic and atraumatic.      Ears:      Comments: Severely impaired hearing      Nose: Nose normal.      Mouth/Throat:      Mouth: Mucous membranes are moist.   Eyes:      Extraocular Movements: Extraocular movements intact.      Conjunctiva/sclera: Conjunctivae normal.      Pupils: Pupils are equal, round, and reactive to light.      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      Pulses: Normal pulses.      Heart sounds: No murmur heard.  Pulmonary:      Effort: Pulmonary effort is normal. No respiratory distress.      Breath sounds: Normal breath sounds. No wheezing, rhonchi or rales.   Abdominal:      General: Abdomen is flat. Bowel sounds are normal. There is no distension.      Palpations: Abdomen is soft.      Tenderness: There is no abdominal tenderness.   Musculoskeletal:     "     General: Normal range of motion.      Right lower leg: No edema.      Left lower leg: No edema.   Skin:     General: Skin is warm and dry.      Capillary Refill: Capillary refill takes less than 2 seconds.      Comments: Laceration above L eyebrow   Neurological:      Mental Status: He is alert.      Comments: Pleasantly demented. More interactive on exam today. At baseline.             Significant Labs: All pertinent labs within the past 24 hours have been reviewed.  CBC: No results for input(s): "WBC", "HGB", "HCT", "PLT" in the last 48 hours.  CMP: No results for input(s): "NA", "K", "CL", "CO2", "GLU", "BUN", "CREATININE", "CALCIUM", "PROT", "ALBUMIN", "BILITOT", "ALKPHOS", "AST", "ALT", "ANIONGAP", "EGFRNONAA" in the last 48 hours.    Invalid input(s): "ESTGFAFRICA"    Significant Imaging: I have reviewed all pertinent imaging results/findings within the past 24 hours.    Assessment/Plan:      * UTI (urinary tract infection)  Patient with fall. Family states he has more falls when he has UTI. Afebrile. WBC 11.2. UA with 3+ leukocytes, >100 WBC. Patient with recent history of Pseudomonal UTI in December. Was inadequately treated with Cefpodoxime. HIgh suspicion that patient remains with pseudomonal infection despite UCx this admission only growing Candida so far.     Plan:  - Cefepime 1g IV q12h (start date 01/07) completed course of antibiotic and will not be sending home with abx.  - Will plan on continuing 5-7d course, likely can transition to FQ on disharge  - spoke to daughter on 1/10, she visited the patient yesterday and state that he is at baseline mentation status. He recognized family members and asked her about them. Planing for discharge tomorrow to nursing facility.       Debility  Wheelchair bound at baseline.    Dementia  Patient with dementia with likely etiology of unknown dementia. Dementia is severe. The patient does have signs of behavioral disturbance. Not currently on dementia " medications.     -Continue non-pharmacologic interventions to prevent delirium (No VS between 11PM-5AM, activity during day, opening blinds, providing glasses/hearing aids, and up in chair during daytime).  -Avoid narcotics and benzos unless necessary    Abnormal CT of the head  Patient with hyperdensity in the L sylvian fissure possibly representing a SAH vs calcification. Stable in size on repeat imaging.    -Neurosurgery consulted and signed off with no plans for intervention  -If changes to neuro exam, get STAT CT head and consult NSGY      Fall  Patient with fall and head laceration. Patient family stating this is often related to UTIs.  -Continuing to treat UTI      Stage 3a chronic kidney disease  Creatine stable for now. BMP reviewed- noted Estimated Creatinine Clearance: 37.1 mL/min (based on SCr of 1.1 mg/dL). according to latest data. Based on current GFR, CKD stage is stage 3 - GFR 30-59.     -Monitor UOP and serial BMP and adjust therapy as needed.   -Renally dose meds.   -Avoid nephrotoxic medications and procedures.    Epilepsy  Stable on home medication.  -Continuing home carbamazepine      CAD s/p PCI   Patient with known CAD s/p stent placement, which is controlled Will continue ASA and monitor for S/Sx of angina/ACS. Recently discontinued plavix.    Hyperlipidemia  -Continue home atorvostatin      Essential hypertension  Patient with elevated BP in setting of recent fall.   -Continue home metoprolol  -Nifedipine 30mg q.d. initiated as patient's BP remained elevated.    Benign prostatic hyperplasia with lower urinary tract symptoms  -Continuing home flomax and finasteride        VTE Risk Mitigation (From admission, onward)           Ordered     IP VTE HIGH RISK PATIENT  Once         01/10/24 1041     Reason for No Pharmacological VTE Prophylaxis  Once        Question:  Reasons:  Answer:  Risk of Bleeding    01/07/24 4987     Place sequential compression device  Until discontinued         01/07/24  2327                    Discharge Planning   CORNELL: 1/12/2024     Code Status: Full Code   Is the patient medically ready for discharge?: Yes    Reason for patient still in hospital (select all that apply): Pending disposition  Discharge Plan A: Assisted Living, Home Health   Discharge Delays: None known at this time              Dwain Cortez DO  Department of Hospital Medicine   [facility] Surg ([place]-[facility])

## 2024-01-12 NOTE — PLAN OF CARE
[facility] - Med Surg ([facility])  Discharge Final Note    Primary Care Provider: Javier Villanueva MD    Expected Discharge Date: 1/12/2024    Final Discharge Note (most recent)       Final Note - 01/12/24 1427          Final Note    Assessment Type Final Discharge Note     Anticipated Discharge Disposition Planned Readmission - Nursing Facility        Post-Acute Status    Post-Acute Authorization Placement     Coverage PHN     Discharge Delays None known at this time                     Important Message from Medicare  Important Message from Medicare regarding Discharge Appeal Rights: Given to patient/caregiver, Explained to patient/caregiver, Signed/date by patient/caregiver, Other (comments) (verbal: spoke with Rupal/daughter)     Date IMM was signed: 01/11/24  Time IMM was signed: 1125  Pt was d'c'd to [facility].    ANTHONY BellN, BS, RN, [facility]

## 2024-01-12 NOTE — PLAN OF CARE
Problem: Adult Inpatient Plan of Care  Goal: Plan of Care Review  Outcome: Met  Flowsheets (Taken 1/11/2024 5178)  Plan of Care Reviewed With: patient  Goal: Patient-Specific Goal (Individualized)  Outcome: Met  Goal: Absence of Hospital-Acquired Illness or Injury  Outcome: Met  Goal: Optimal Comfort and Wellbeing  Outcome: Met  Pt is AAOX1, medication and wheelchair delivered at bedside, report given to receiving nurse. Pt awaiting for wheelchair van to be transported to the nursing home.

## 2024-01-12 NOTE — PLAN OF CARE
CM called transport, they confirm that transport set up at 8:12, wait time is 3 hours.    CM called [place] with [facility] [phone], informed that pt not d/c'd last night d/t transportation issue.  Bernardo states that she was informed last night by charge nurse.  She doesn't need any paperwork from CM, they can receive him today.    ANTHONY BellN, BS, RN, CCM

## 2024-01-12 NOTE — PLAN OF CARE
Problem: Adult Inpatient Plan of Care  Goal: Readiness for Transition of Care  Outcome: Met  Pt is AAOX1, medication and wheelchair delivered at bedside, report given to receiving nurse. Pt transported By J[facility] via stretcher to the nursing home.

## 2024-01-12 NOTE — PT/OT/SLP PROGRESS
Occupational Therapy      Patient Name:  Jaydon Mccarthy   MRN:  [id number]    Patient not seen today secondary to discharged this AM. OT attempted at 9:50 and pt ART.    1/12/2024

## 2024-01-12 NOTE — SUBJECTIVE & OBJECTIVE
Interval History: No acute events overnight, afebrile, hemodynamically stable.      Smile, points, and laughs whenever see the patient. Called patient daughter regarding is this is baseline mental status, she said yes.   Patient is pending transportation for discharge.    Review of Systems   Unable to perform ROS: Dementia     Objective:     Vital Signs (Most Recent):  Temp: 97.5 °F (36.4 °C) (01/12/24 0725)  Pulse: 69 (01/12/24 0725)  Resp: 18 (01/12/24 0725)  BP: (!) 167/73 (01/12/24 0725)  SpO2: 96 % (01/12/24 0725) Vital Signs (24h Range):  Temp:  [97.5 °F (36.4 °C)-98.7 °F (37.1 °C)] 97.5 °F (36.4 °C)  Pulse:  [69-95] 69  Resp:  [17-18] 18  SpO2:  [95 %-99 %] 96 %  BP: (155-170)/(73-97) 167/73     Weight: 59.9 kg (132 lb 0.9 oz)  Body mass index is 18.95 kg/m².    Intake/Output Summary (Last 24 hours) at 1/12/2024 1430  Last data filed at 1/12/2024 0800  Gross per 24 hour   Intake 190 ml   Output --   Net 190 ml         Physical Exam  Constitutional:       General: He is not in acute distress.     Appearance: Normal appearance. He is not ill-appearing.      Comments: Cachectic   HENT:      Head: Normocephalic and atraumatic.      Ears:      Comments: Severely impaired hearing      Nose: Nose normal.      Mouth/Throat:      Mouth: Mucous membranes are moist.   Eyes:      Extraocular Movements: Extraocular movements intact.      Conjunctiva/sclera: Conjunctivae normal.      Pupils: Pupils are equal, round, and reactive to light.      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      Pulses: Normal pulses.      Heart sounds: No murmur heard.  Pulmonary:      Effort: Pulmonary effort is normal. No respiratory distress.      Breath sounds: Normal breath sounds. No wheezing, rhonchi or rales.   Abdominal:      General: Abdomen is flat. Bowel sounds are normal. There is no distension.      Palpations: Abdomen is soft.      Tenderness: There is no abdominal  "tenderness.   Musculoskeletal:         General: Normal range of motion.      Right lower leg: No edema.      Left lower leg: No edema.   Skin:     General: Skin is warm and dry.      Capillary Refill: Capillary refill takes less than 2 seconds.      Comments: Laceration above L eyebrow   Neurological:      Mental Status: He is alert.      Comments: Pleasantly demented. More interactive on exam today. At baseline.             Significant Labs: All pertinent labs within the past 24 hours have been reviewed.  CBC: No results for input(s): "WBC", "HGB", "HCT", "PLT" in the last 48 hours.  CMP: No results for input(s): "NA", "K", "CL", "CO2", "GLU", "BUN", "CREATININE", "CALCIUM", "PROT", "ALBUMIN", "BILITOT", "ALKPHOS", "AST", "ALT", "ANIONGAP", "EGFRNONAA" in the last 48 hours.    Invalid input(s): "ESTGFAFRICA"    Significant Imaging: I have reviewed all pertinent imaging results/findings within the past 24 hours.  "

## 2024-01-12 NOTE — PLAN OF CARE
Benjamin Mcmanus - Med Surg ([facility])  Discharge Final Note    Primary Care Provider: Javier Villanueva MD    Expected Discharge Date: 1/12/2024    Final Discharge Note (most recent)       Final Note - 01/12/24 1427          Final Note    Assessment Type Final Discharge Note (P)      Anticipated Discharge Disposition Planned Readmission - Nursing Facility (P)         Post-Acute Status    Post-Acute Authorization Placement (P)      Coverage PHN (P)      Discharge Delays None known at this time (P)                      Important Message from Medicare  Important Message from Medicare regarding Discharge Appeal Rights: Given to patient/caregiver, Explained to patient/caregiver, Signed/date by patient/caregiver, Other (comments) (verbal: spoke with Rupal/daughter)     Date IMM was signed: 01/11/24  Time IMM was signed: 1125    Pt d/c'd to [facility].    ANTHONY BellN, BS, RN, CCM

## 2024-02-26 ENCOUNTER — DOCUMENT SCAN (OUTPATIENT)
Dept: HOME HEALTH SERVICES | Facility: HOSPITAL | Age: 89
End: 2024-02-26
Payer: MEDICARE

## 2024-03-01 NOTE — PLAN OF CARE
Dr. Mendoza Problem: Patient Care Overview  Goal: Plan of Care Review  Outcome: Ongoing (interventions implemented as appropriate)  All systems assessed. No falls or injuries this am. Patient was very confused on last night.Up in chair this am, without distress. Will continue to monitor. Avisys present and on.       Dr Mendoza ED

## 2024-03-08 ENCOUNTER — HOSPITAL ENCOUNTER (EMERGENCY)
Facility: HOSPITAL | Age: 89
Discharge: HOME | End: 2024-03-08
Attending: EMERGENCY MEDICINE
Payer: MEDICARE

## 2024-03-08 VITALS
OXYGEN SATURATION: 100 % | DIASTOLIC BLOOD PRESSURE: 75 MMHG | TEMPERATURE: 98 F | BODY MASS INDEX: 19.33 KG/M2 | RESPIRATION RATE: 20 BRPM | HEART RATE: 63 BPM | WEIGHT: 135 LBS | HEIGHT: 70 IN | SYSTOLIC BLOOD PRESSURE: 156 MMHG

## 2024-03-08 DIAGNOSIS — S51.811A LACERATION OF RIGHT FOREARM: ICD-10-CM
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
## 2024-03-08 PROCEDURE — 25000003 PHARM REV CODE 250: Performed by: EMERGENCY MEDICINE

## 2024-03-08 PROCEDURE — 99283 EMERGENCY DEPT VISIT LOW MDM: CPT | Mod: 25

## 2024-03-08 PROCEDURE — 12005 RPR S/N/A/GEN/TRK12.6-20.0CM: CPT

## 2024-03-08 RX ORDER — LIDOCAINE HYDROCHLORIDE 10 MG/ML
10 INJECTION, SOLUTION EPIDURAL; INFILTRATION; INTRACAUDAL; PERINEURAL ONCE
Status: COMPLETED | OUTPATIENT
Start: 2024-03-08 | End: 2024-03-08

## 2024-03-08 RX ORDER — HYDROCODONE BITARTRATE AND ACETAMINOPHEN 5; 325 MG/1; MG/1
1 TABLET ORAL EVERY 6 HOURS PRN
Qty: 12 TABLET | Refills: 0 | Status: SHIPPED | OUTPATIENT
Start: 2024-03-08

## 2024-03-08 RX ADMIN — LIDOCAINE HYDROCHLORIDE 100 MG: 10 INJECTION, SOLUTION EPIDURAL; INFILTRATION; INTRACAUDAL; PERINEURAL at 09:03

## 2024-03-08 NOTE — DISCHARGE INSTRUCTIONS
Tylenol 1000 mg by mouth every 8 hours as needed for pain or Tylenol with hydrocodone as directed.  Please keep the wound clean and dry.  Change the dressing in 48 hours then daily.  Use Neosporin.  Please return for swelling redness tenderness or drainage from the wound.  Keep your wound clean and dry.  Please have the patient follow up with primary care in 7 days for staple removal.

## 2024-03-08 NOTE — ED TRIAGE NOTES
Pt presents to the ER c/o by [facility] c/o  a slip and fall while  trying to get into his wheelchair. No LOC and did not hit his head. He has bilateral skin tears to his forearms.
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
## 2024-03-11 ENCOUNTER — PATIENT OUTREACH (OUTPATIENT)
Dept: EMERGENCY MEDICINE | Facility: HOSPITAL | Age: 89
End: 2024-03-11
Payer: MEDICARE

## 2024-03-12 NOTE — PROGRESS NOTES
Patient was seen in the ED on 3/8/24. Phoned patient on 2 separate occasions to assist with Post ED Discharge Navigation. Patient was unavailable. Encounter closed.  Jessy Delcid

## 2024-03-15 PROCEDURE — G0179 MD RECERTIFICATION HHA PT: HCPCS | Mod: ,,, | Performed by: FAMILY MEDICINE

## 2024-03-21 ENCOUNTER — HOSPITAL ENCOUNTER (EMERGENCY)
Facility: HOSPITAL | Age: 89
Discharge: HOME | End: 2024-03-21
Attending: STUDENT IN AN ORGANIZED HEALTH CARE EDUCATION/TRAINING PROGRAM
Payer: MEDICARE

## 2024-03-21 VITALS
SYSTOLIC BLOOD PRESSURE: 159 MMHG | RESPIRATION RATE: 20 BRPM | HEART RATE: 62 BPM | DIASTOLIC BLOOD PRESSURE: 70 MMHG | WEIGHT: 135 LBS | TEMPERATURE: 98 F | BODY MASS INDEX: 19.37 KG/M2 | OXYGEN SATURATION: 99 %

## 2024-03-21 DIAGNOSIS — Z48.02 ENCOUNTER FOR STAPLE REMOVAL: Primary | ICD-10-CM

## 2024-03-21 PROCEDURE — 99283 EMERGENCY DEPT VISIT LOW MDM: CPT

## 2024-03-21 PROCEDURE — 25000003 PHARM REV CODE 250

## 2024-03-21 RX ORDER — MUPIROCIN 20 MG/G
1 OINTMENT TOPICAL
Status: COMPLETED | OUTPATIENT
Start: 2024-03-21 | End: 2024-03-21

## 2024-03-21 RX ORDER — TALC
9 POWDER (GRAM) TOPICAL ONCE
Status: DISCONTINUED | OUTPATIENT
Start: 2024-03-21 | End: 2024-03-21 | Stop reason: HOSPADM

## 2024-03-21 RX ADMIN — MUPIROCIN 1 TUBE: 20 OINTMENT TOPICAL at 03:03

## 2024-03-21 NOTE — DISCHARGE INSTRUCTIONS

## 2024-03-21 NOTE — ED PROVIDER NOTES
"Encounter Date: 3/21/2024       History     Chief Complaint   Patient presents with    Suture / Staple Removal     Pt arrives via ems from [facility] for suture removal from R forearm, reports pt had a fall 3 weeks ago when sutures were placed. Per ems pts daughter is requesting sutures to be removed and "something else placed." Hx dementia.       89-year-old male with a history of dementia presenting to the emergency department for staple removal.  He was sent by his nursing home where a provider attempted to remove the staples but the patient said it was too painful.  Staples were placed at this facility 13 days ago.  Per independent historian, EMS, with the patient and the staff at the nursing home deny any erythema, warmth, drainage, or other problems with wound healing.    The history is provided by the patient. No  was used.     Review of patient's allergies indicates:  No Known Allergies  Past Medical History:   Diagnosis Date    Anticoagulant long-term use     Arthritis     Cancer     right  hand    Coronary artery disease     Deep vein thrombosis     Hyperlipidemia     Hypertension     Seizures 1988    none since on tegretol     Past Surgical History:   Procedure Laterality Date    APPENDECTOMY      CLOSURE OF WOUND  12/9/2020    Procedure: CLOSURE, WOUND;  Surgeon: Yamil Bonilla MD;  Location: [facility] OR;  Service: Plastics;;    EXCISION OF LESION OF EYELID Right 12/9/2020    Procedure: EXCISION TUMOR RIGHT EYEBROW WITH COMPLEX CLOSURE;  Surgeon: Yamil Bonilla MD;  Location: [facility] OR;  Service: Plastics;  Laterality: Right;  FROZEN SECTION  RN PREOP 12/8/2020----COVID NEGATIVE  12/8    FRACTURE SURGERY Right     elbow    FRACTURE SURGERY Right     hip    JOSUÉ FILTER PLACEMENT      hx of deep vein thrombosis    OPEN REDUCTION AND INTERNAL FIXATION (ORIF) OF INTERTROCHANTERIC FRACTURE OF FEMUR Left 5/30/2018    Procedure: ORIF, FRACTURE, FEMUR, INTERTROCHANTERIC;  Surgeon: " Pop Martin III, MD;  Location: [facility] OR;  Service: Orthopedics;  Laterality: Left;    TOTAL SHOULDER ARTHROPLASTY Right      No family history on file.  Social History     Tobacco Use    Smoking status: Former     Current packs/day: 0.00     Average packs/day: 0.5 packs/day for 6.0 years (3.0 ttl pk-yrs)     Types: Cigarettes     Start date: 10/27/1969     Quit date: 10/27/1975     Years since quittin.4    Smokeless tobacco: Never   Substance Use Topics    Alcohol use: No    Drug use: No     Review of Systems   Constitutional:  Negative for fever.   HENT:  Negative for sore throat.    Respiratory:  Negative for shortness of breath.    Cardiovascular:  Negative for chest pain.   Gastrointestinal:  Negative for nausea.   Genitourinary:  Negative for dysuria.   Musculoskeletal:  Negative for back pain.   Skin:  Positive for wound. Negative for rash.   Neurological:  Negative for weakness.   Hematological:  Does not bruise/bleed easily.       Physical Exam     Initial Vitals [24 1443]   BP Pulse Resp Temp SpO2   (!) 159/70 62 20 98 °F (36.7 °C) 99 %      MAP       --         Physical Exam    Nursing note and vitals reviewed.  Constitutional: Vital signs are normal. He appears well-developed and well-nourished. He is cooperative. He does not appear ill. No distress.   Patient was very hard of hearing.  Well-appearing.  No acute distress.  Alert and interactive.   HENT:   Head: Normocephalic and atraumatic.   Right Ear: External ear normal.   Left Ear: External ear normal.   Nose: Nose normal.   Eyes: Conjunctivae and EOM are normal.   Neck: Phonation normal.   Normal range of motion.  Cardiovascular:  Normal rate and regular rhythm.           No murmur heard.  Pulmonary/Chest: Effort normal. No respiratory distress.   Abdominal: Abdomen is flat. He exhibits no distension. There is no abdominal tenderness.   Musculoskeletal:      Cervical back: Normal range of motion.     Neurological: He is alert and  oriented to person, place, and time. GCS eye subscore is 4. GCS verbal subscore is 5. GCS motor subscore is 6.   Skin: Skin is warm and dry. Capillary refill takes less than 2 seconds. No rash noted.        Healing wound on the dorsum of the right arm with staples in place.  The skin flap that was stapled down is hyperpigmented.  There is no significant erythema, drainage, or warmth in the area.         ED Course   Suture Removal    Date/Time: 3/21/2024 6:16 PM  Location procedure was performed: [facility] EMERGENCY DEPARTMENT    Performed by: Lui Loaiza PA-C  Authorized by: Isatu Barnes DO  Assisting provider: Luis Vasquez  Body area: upper extremity  Location details: right lower arm  Wound Appearance: clean, no drainage and nontender  Staples Removed: 12  Post-removal: antibiotic ointment applied and dressing applied  Facility: sutures placed in this facility  Complications: No  Estimated blood loss (mL): 0  Patient tolerance: Patient tolerated the procedure well with no immediate complications        Labs Reviewed - No data to display       Imaging Results    None          Medications   melatonin tablet 9 mg (has no administration in time range)   mupirocin 2 % ointment 1 Tube (1 Tube Topical (Top) Given 3/21/24 1508)     Medical Decision Making  89-year-old male presenting to the emergency department for suture removal.  Had a skin avulsion injury 13 days ago that was repaired with staples.  Denies any problems with wound healing.  Per EMS, nursing home attempted removal but the patient said it was too painful.  On physical exam, he was clinically well-appearing and in no acute distress.  There is a healing wound on the right forearm as described above.  Staples are in place.    Differential diagnosis includes but is not limited to laceration with or without muscle, tendon, ligament, bone, or neurovascular damage, foreign body, or surrounding soft tissue infection such as cellulitis or  erysipelas.    Staples were removed as described in procedure note.  Patient said it was mildly uncomfortable but there was no acute distress in the patient tolerated the procedure well.  He was stable for discharge home.  I spoke to his daughter Rupal on the telephone.  She was aware of this happening today and has no other concerns at this time.  She was unable to come to the hospital to meet the patient but will check on him as soon as she was able.  Patient was transferred back to his nursing home via EMS.    Prior to his transfer back to his nursing home, patient has started getting increasingly agitated and confused.  Likely sundowning.  One dose of melatonin was ordered by Dr. Jose Bryan but the patient's transportation arrived prior to this medication being administered.    Return precautions were discussed, all patient questions were answered, and the patient was agreeable to the plan of care.  He was discharged home in stable condition and will follow up with his primary care provider or return to the emergency department if his symptoms worsen or do not improve.     Risk  Prescription drug management.  Diagnosis or treatment significantly limited by social determinants of health.                                      Clinical Impression:  Final diagnoses:  [Z48.02] Encounter for staple removal (Primary)          ED Disposition Condition    Discharge Stable          ED Prescriptions    None       Follow-up Information       Follow up With Specialties Details Why Contact Info    Javier Villanueva MD Family Medicine Schedule an appointment as soon as possible for a visit  As needed [address] [address]  [phone]               Lui Loaiza, PASONYA  03/21/24 2423

## 2024-03-22 ENCOUNTER — PATIENT OUTREACH (OUTPATIENT)
Dept: EMERGENCY MEDICINE | Facility: HOSPITAL | Age: 89
End: 2024-03-22
Payer: MEDICARE

## 2024-03-22 NOTE — PROGRESS NOTES
Unable to reach pt after 2 attempts. Encounter will be closed.    Renata Anglin  ED Navigator  [phone]

## 2024-04-02 ENCOUNTER — HOSPITAL ENCOUNTER (EMERGENCY)
Facility: HOSPITAL | Age: 89
Discharge: HOME | End: 2024-04-03
Attending: EMERGENCY MEDICINE
Payer: MEDICARE

## 2024-04-02 DIAGNOSIS — S41.111A SKIN TEAR OF RIGHT UPPER EXTREMITY: ICD-10-CM
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
## 2024-04-02 DIAGNOSIS — S00.81XA ABRASION OF FOREHEAD, INITIAL ENCOUNTER: ICD-10-CM

## 2024-04-02 DIAGNOSIS — S09.8XXA BLUNT HEAD TRAUMA, INITIAL ENCOUNTER: ICD-10-CM

## 2024-04-02 DIAGNOSIS — S41.112A SKIN TEAR OF LEFT UPPER EXTREMITY: ICD-10-CM

## 2024-04-02 PROCEDURE — 99284 EMERGENCY DEPT VISIT MOD MDM: CPT | Mod: 25

## 2024-04-03 ENCOUNTER — DOCUMENT SCAN (OUTPATIENT)
Dept: HOME HEALTH SERVICES | Facility: HOSPITAL | Age: 89
End: 2024-04-03
Payer: MEDICARE

## 2024-04-03 VITALS
TEMPERATURE: 98 F | BODY MASS INDEX: 19.33 KG/M2 | SYSTOLIC BLOOD PRESSURE: 180 MMHG | DIASTOLIC BLOOD PRESSURE: 92 MMHG | WEIGHT: 135 LBS | OXYGEN SATURATION: 100 % | HEART RATE: 73 BPM | RESPIRATION RATE: 14 BRPM | HEIGHT: 70 IN

## 2024-04-03 NOTE — ED PROVIDER NOTES
Encounter Date: 4/2/2024       History     Chief Complaint   Patient presents with    Fall     Pt arrived via ems, pt chief complaint is a Fall. Pt had an unwitnessed fall from the suites of [place]. Pt has a skin tear to left arm and abrasions to forehead. Per nursing home pt is on xarelto.       89-year-old male presents via EMS from nursing home to [facility] ER status post unwitnessed fall.  History is limited as patient is hard of hearing and has dementia and cannot give answers to questions.  He is reported to be on Xarelto.    LA-POLST at bedside indicates comfort measures.      Review of patient's allergies indicates:  No Known Allergies  Past Medical History:   Diagnosis Date    Anticoagulant long-term use     Arthritis     Cancer     right  hand    Coronary artery disease     Deep vein thrombosis     Hyperlipidemia     Hypertension     Seizures 1988    none since on tegretol     Past Surgical History:   Procedure Laterality Date    APPENDECTOMY      CLOSURE OF WOUND  12/9/2020    Procedure: CLOSURE, WOUND;  Surgeon: Yamil Bonilla MD;  Location: [facility] OR;  Service: Plastics;;    EXCISION OF LESION OF EYELID Right 12/9/2020    Procedure: EXCISION TUMOR RIGHT EYEBROW WITH COMPLEX CLOSURE;  Surgeon: Yamil Bonilla MD;  Location: [facility] OR;  Service: Plastics;  Laterality: Right;  FROZEN SECTION  RN PREOP 12/8/2020----COVID NEGATIVE  12/8    FRACTURE SURGERY Right     elbow    FRACTURE SURGERY Right     hip    JOSUÉ FILTER PLACEMENT      hx of deep vein thrombosis    OPEN REDUCTION AND INTERNAL FIXATION (ORIF) OF INTERTROCHANTERIC FRACTURE OF FEMUR Left 5/30/2018    Procedure: ORIF, FRACTURE, FEMUR, INTERTROCHANTERIC;  Surgeon: Pop Martin III, MD;  Location: [facility] OR;  Service: Orthopedics;  Laterality: Left;    TOTAL SHOULDER ARTHROPLASTY Right      No family history on file.  Social History     Tobacco Use    Smoking status: Former     Current packs/day: 0.00     Average packs/day: 0.5  packs/day for 6.0 years (3.0 ttl pk-yrs)     Types: Cigarettes     Start date: 10/27/1969     Quit date: 10/27/1975     Years since quittin.4    Smokeless tobacco: Never   Substance Use Topics    Alcohol use: No    Drug use: No     Review of Systems   Unable to perform ROS: Dementia   HENT:  Positive for hearing loss.    Hematological:  Bruises/bleeds easily (on Xarelto).       Physical Exam     Initial Vitals [24 2243]   BP Pulse Resp Temp SpO2   (!) 160/82 62 16 98 °F (36.7 °C) 100 %      MAP       --         Physical Exam    Nursing note and vitals reviewed.  Constitutional: He appears well-developed and well-nourished. He is not diaphoretic.   Awake, alert. Appropriately interactive with staff, smiling, but cannot answer questions -- ?hard of hearing vs dementia.   HENT:   Head: Normocephalic.   Subacute-appearing abrasion to R anterior forehead.   Eyes: Conjunctivae and EOM are normal.   Chronic-appearing changes to R cornea.   Neck: Neck supple.   Normal range of motion.  Cardiovascular:  Normal rate, regular rhythm and intact distal pulses.           Pulmonary/Chest: Breath sounds normal. No respiratory distress. He has no wheezes. He has no rhonchi. He has no rales.   Abdominal: Abdomen is soft. There is no abdominal tenderness.   Genitourinary:    Genitourinary Comments: Diapered. Stool in diaper.     Musculoskeletal:         General: Edema (1+ bilat feet) present. No tenderness. Normal range of motion.      Cervical back: Normal range of motion and neck supple.     Neurological: He is alert.   Moving all extremities   Skin: Skin is warm and dry. There is pallor.   L upper ventral arm with 5cm skin tear, oozing.    L dorsal elbow with abrasions.  R forearm dorsal arm with ?subacute skin tear.   Abrasion to L thoracic back in mid-axillary line.   Psychiatric: He has a normal mood and affect.         ED Course   Procedures  Labs Reviewed - No data to display       Imaging Results              X-Ray  Chest AP Portable (Final result)  Result time 04/03/24 01:19:50      Final result by Leeann Beck MD (04/03/24 01:19:50)                   Impression:      No acute cardiopulmonary process identified.      Electronically signed by: Leeann Beck MD  Date:    04/03/2024  Time:    01:19               Narrative:    EXAMINATION:  XR CHEST AP PORTABLE    CLINICAL HISTORY:  Unspecified fall, initial encounter    TECHNIQUE:  Single frontal view of the chest was performed.    COMPARISON:  01/07/2024.    FINDINGS:  Cardiac silhouette is normal in size.  Right-sided chest port is in stable position.  Lungs are symmetrically expanded.  No evidence of focal consolidative process, pneumothorax, or significant pleural effusion.  No acute osseous abnormality identified.  Unchanged appearance of right shoulder arthroplasty.                                       X-Ray Hips Bilateral 2 View Incl AP Pelvis (Final result)  Result time 04/03/24 01:21:52      Final result by Leeann Beck MD (04/03/24 01:21:52)                   Impression:      No acute displaced fracture seen.      Electronically signed by: Leeann Beck MD  Date:    04/03/2024  Time:    01:21               Narrative:    EXAMINATION:  XR HIPS BILATERAL 2 VIEW INCL AP PELVIS    CLINICAL HISTORY:  Unspecified fall, initial encounter    TECHNIQUE:  AP view of the pelvis and frogleg lateral views of both hips were performed.    COMPARISON:  May 2018.    FINDINGS:  No evidence of acute displaced fracture or dislocation.  Postsurgical ORIF changes are partially visualized involving the bilateral femurs.  Osteopenic changes are seen.  Mild degenerative changes are seen involving the hips and lower visualized lumbar spine.                                       X-Ray Humerus 2 View Left (Final result)  Result time 04/03/24 01:23:11      Final result by Leeann Beck MD (04/03/24 01:23:11)                   Impression:      No acute displaced fracture  seen.      Electronically signed by: Leeann Beck MD  Date:    04/03/2024  Time:    01:23               Narrative:    EXAMINATION:  XR HUMERUS 2 VIEW LEFT    CLINICAL HISTORY:  Unspecified fall, initial encounter    TECHNIQUE:  Left humerus AP and lateral.    COMPARISON:  None    FINDINGS:  No evidence of acute displaced fracture or dislocation.  Degenerative changes are seen at the glenohumeral and acromioclavicular joint.  Osteopenic changes are seen.                                       CT Cervical Spine Without Contrast (Final result)  Result time 04/03/24 00:57:12      Final result by Leeann Beck MD (04/03/24 00:57:12)                   Impression:      No evidence of acute cervical spine fracture or dislocation.      Electronically signed by: Leeann Beck MD  Date:    04/03/2024  Time:    00:57               Narrative:    EXAMINATION:  CT CERVICAL SPINE WITHOUT CONTRAST    CLINICAL HISTORY:  Neck trauma (Age >= 65y);    TECHNIQUE:  Low dose axial images, sagittal and coronal reformations were performed though the cervical spine.  Contrast was not administered.    COMPARISON:  CT cervical spine from 01/07/2024.    FINDINGS:  No evidence of acute cervical spine fracture or dislocation.  Odontoid process is intact.  Craniocervical junction is unremarkable.  Cervical spine alignment is stable.  There is fusion of the C3 and C4 vertebral bodies.  Severe intervertebral disc space narrowing with degenerative endplate change and likely partial vertebral body fusion are seen at the C4-5 through C6-7 levels.    Surrounding soft tissues show no significant abnormalities.  Airway is patent.  Partially visualized lung apices are clear.                                       CT Head Without Contrast (Final result)  Result time 04/03/24 00:31:42      Final result by Leeann Beck MD (04/03/24 00:31:42)                   Impression:      No acute intracranial abnormalities identified.  No significant  detrimental change compared to previous CT head from 01/07/2024.      Electronically signed by: Leeann Beck MD  Date:    04/03/2024  Time:    00:31               Narrative:    EXAMINATION:  CT HEAD WITHOUT CONTRAST    CLINICAL HISTORY:  Facial trauma, blunt;    TECHNIQUE:  Low dose axial images were obtained through the head.  Coronal and sagittal reformations were also performed. Contrast was not administered.    COMPARISON:  CT head from 01/07/2024.    FINDINGS:  Motion limited examination.  There is generalized cerebral volume loss and chronic microvascular ischemic disease.  No evidence of acute/recent major vascular distribution cerebral infarction, intraparenchymal hemorrhage, or intra-axial space occupying lesion.  There is stable diffuse prominence of the ventricular system.  No effacement of the skull-base cisterns. No abnormal extra-axial fluid collections or blood products. Visualized paranasal sinuses and mastoid air cells are clear. The calvarium shows no significant abnormality.                                       Medications - No data to display  Medical Decision Making  89-year-old male s/p unwitnessed fall with skin tears to extremities, forehead (?subacute).    Ddx includes ICH, skull fracture, cspine injury, PTX, rib fracture, hip or pelvic fracture, extremity fracture, other.    Local wound care provided.      Imaging reassuring.  No acute pathology.    Patient discharged back to care facility via ambulance.     Amount and/or Complexity of Data Reviewed  Radiology: ordered.                                      Clinical Impression:  Final diagnoses:  [W19.XXXA] Fall (Primary)  [S00.81XA] Abrasion of forehead, initial encounter  [S41.112A] Skin tear of left upper extremity  [S41.111A] Skin tear of right upper extremity  [S09.8XXA] Blunt head trauma, initial encounter          ED Disposition Condition    Discharge Stable          ED Prescriptions    None       Follow-up Information    None           Brittani Bradford MD  04/03/24 0910

## 2024-04-03 NOTE — ED NOTES
Upon entering room, pt had scooted down to the end of the bed and was playing with his stool. Pt was cleaned and new linens and a clean gown replaced. Pt was repositioned in the bed

## 2024-04-03 NOTE — ED NOTES
Pt presents to ED via EMS from the suites at [place] due to an unwitnessed fall. Pt is on xarelto. He has a large skin tear to bilateral forearms with multiple abrasions all over at different stages of healing. Pt has a small laceration to rt forehead, bleeding controlled. Abrasion noted to left thoracic back    LOC: Pt is awake alert and aware of environment, oriented X3 and speaking appropriately  Appearance: Pt is in no acute distress, Pt is well groomed and clean  Skin: skin is warm and dry with normal turgor, mucus membranes are moist and pink, there are multiple abrasions and skin tears to bilateral forearms, left forehead, rt elbow and left mid back  Muskuloskeletal: Pt moves all extremities well, there is no obvious swelling or deformities noted, pulses are intact.  Respiratory: Airway is open and patent, respirations are spontaneous and even.  Cardiac: no edema and cap refill is <3sec  Neuro: Pt follows commands easily and has no obvious deficits

## 2024-04-04 ENCOUNTER — PATIENT OUTREACH (OUTPATIENT)
Dept: EMERGENCY MEDICINE | Facility: HOSPITAL | Age: 89
End: 2024-04-04
Payer: MEDICARE

## 2024-04-05 NOTE — PROGRESS NOTES
Patient was seen in the ED on 4/3/24. Phoned patient on 2 separate occasions to assist with Post ED discharge Navigation. Patient was unavailable. Message left on voice mail. Encounter closed.  Jessy Delcid

## 2024-04-08 PROBLEM — N39.0 UTI (URINARY TRACT INFECTION): Status: RESOLVED | Noted: 2022-10-04 | Resolved: 2024-04-08

## 2024-04-18 ENCOUNTER — EXTERNAL HOME HEALTH (OUTPATIENT)
Dept: HOME HEALTH SERVICES | Facility: HOSPITAL | Age: 89
End: 2024-04-18
Payer: MEDICARE

## 2024-04-29 ENCOUNTER — DOCUMENT SCAN (OUTPATIENT)
Dept: HOME HEALTH SERVICES | Facility: HOSPITAL | Age: 89
End: 2024-04-29
Payer: MEDICARE

## 2024-05-06 ENCOUNTER — PATIENT MESSAGE (OUTPATIENT)
Dept: FAMILY MEDICINE | Facility: CLINIC | Age: 89
End: 2024-05-06
Payer: MEDICARE

## 2024-05-14 PROCEDURE — G0179 MD RECERTIFICATION HHA PT: HCPCS | Mod: ,,, | Performed by: FAMILY MEDICINE

## 2024-06-01 ENCOUNTER — EXTERNAL HOME HEALTH (OUTPATIENT)
Dept: HOME HEALTH SERVICES | Facility: HOSPITAL | Age: 89
End: 2024-06-01
Payer: MEDICARE

## 2024-06-23 ENCOUNTER — HOSPITAL ENCOUNTER (EMERGENCY)
Facility: HOSPITAL | Age: 89
Discharge: HOME | End: 2024-06-23
Attending: EMERGENCY MEDICINE
Payer: MEDICARE

## 2024-06-23 VITALS
DIASTOLIC BLOOD PRESSURE: 91 MMHG | BODY MASS INDEX: 19.33 KG/M2 | TEMPERATURE: 98 F | HEIGHT: 70 IN | RESPIRATION RATE: 16 BRPM | WEIGHT: 135 LBS | OXYGEN SATURATION: 100 % | HEART RATE: 93 BPM | SYSTOLIC BLOOD PRESSURE: 164 MMHG
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
## 2024-06-23 DIAGNOSIS — F03.90 DEMENTIA, UNSPECIFIED DEMENTIA SEVERITY, UNSPECIFIED DEMENTIA TYPE, UNSPECIFIED WHETHER BEHAVIORAL, PSYCHOTIC, OR MOOD DISTURBANCE OR ANXIETY: ICD-10-CM

## 2024-06-23 DIAGNOSIS — S10.81XA ABRASION, FACE WITH INFECTION, INITIAL ENCOUNTER: Primary | ICD-10-CM

## 2024-06-23 DIAGNOSIS — S09.90XA CLOSED HEAD INJURY: ICD-10-CM

## 2024-06-23 DIAGNOSIS — L08.9 ABRASION, FACE WITH INFECTION, INITIAL ENCOUNTER: Primary | ICD-10-CM

## 2024-06-23 LAB
ALBUMIN SERPL BCP-MCNC: 3.6 G/DL (ref 3.5–5.2)
ALP SERPL-CCNC: 183 U/L (ref 55–135)
ALT SERPL W/O P-5'-P-CCNC: 13 U/L (ref 10–44)
ANION GAP SERPL CALC-SCNC: 8 MMOL/L (ref 8–16)
AST SERPL-CCNC: 19 U/L (ref 10–40)
BASOPHILS # BLD AUTO: 0.06 K/UL (ref 0–0.2)
BASOPHILS NFR BLD: 0.7 % (ref 0–1.9)
BILIRUB SERPL-MCNC: 0.3 MG/DL (ref 0.1–1)
BILIRUB UR QL STRIP: NEGATIVE
BUN SERPL-MCNC: 22 MG/DL (ref 10–30)
CALCIUM SERPL-MCNC: 9.1 MG/DL (ref 8.7–10.5)
CHLORIDE SERPL-SCNC: 111 MMOL/L (ref 95–110)
CLARITY UR: CLEAR
CO2 SERPL-SCNC: 24 MMOL/L (ref 23–29)
COLOR UR: YELLOW
CREAT SERPL-MCNC: 1.5 MG/DL (ref 0.5–1.4)
DIFFERENTIAL METHOD BLD: ABNORMAL
EOSINOPHIL # BLD AUTO: 0.3 K/UL (ref 0–0.5)
EOSINOPHIL NFR BLD: 3.7 % (ref 0–8)
ERYTHROCYTE [DISTWIDTH] IN BLOOD BY AUTOMATED COUNT: 13.7 % (ref 11.5–14.5)
EST. GFR  (NO RACE VARIABLE): 43 ML/MIN/1.73 M^2
GLUCOSE SERPL-MCNC: 105 MG/DL (ref 70–110)
GLUCOSE UR QL STRIP: NEGATIVE
HCT VFR BLD AUTO: 33.3 % (ref 40–54)
HGB BLD-MCNC: 11.4 G/DL (ref 14–18)
HGB UR QL STRIP: NEGATIVE
IMM GRANULOCYTES # BLD AUTO: 0.02 K/UL (ref 0–0.04)
IMM GRANULOCYTES NFR BLD AUTO: 0.2 % (ref 0–0.5)
KETONES UR QL STRIP: NEGATIVE
LEUKOCYTE ESTERASE UR QL STRIP: NEGATIVE
LYMPHOCYTES # BLD AUTO: 2 K/UL (ref 1–4.8)
LYMPHOCYTES NFR BLD: 23.4 % (ref 18–48)
MCH RBC QN AUTO: 30.6 PG (ref 27–31)
MCHC RBC AUTO-ENTMCNC: 34.2 G/DL (ref 32–36)
MCV RBC AUTO: 89 FL (ref 82–98)
MONOCYTES # BLD AUTO: 0.7 K/UL (ref 0.3–1)
MONOCYTES NFR BLD: 8 % (ref 4–15)
NEUTROPHILS # BLD AUTO: 5.3 K/UL (ref 1.8–7.7)
NEUTROPHILS NFR BLD: 64 % (ref 38–73)
NITRITE UR QL STRIP: NEGATIVE
NRBC BLD-RTO: 0 /100 WBC
PH UR STRIP: 7 [PH] (ref 5–8)
PLATELET # BLD AUTO: 163 K/UL (ref 150–450)
PMV BLD AUTO: 10.1 FL (ref 9.2–12.9)
POTASSIUM SERPL-SCNC: 4.9 MMOL/L (ref 3.5–5.1)
PROT SERPL-MCNC: 6.7 G/DL (ref 6–8.4)
PROT UR QL STRIP: NEGATIVE
RBC # BLD AUTO: 3.73 M/UL (ref 4.6–6.2)
SODIUM SERPL-SCNC: 143 MMOL/L (ref 136–145)
SP GR UR STRIP: 1.01 (ref 1–1.03)
URN SPEC COLLECT METH UR: NORMAL
UROBILINOGEN UR STRIP-ACNC: NEGATIVE EU/DL
WBC # BLD AUTO: 8.33 K/UL (ref 3.9–12.7)

## 2024-06-23 PROCEDURE — 96360 HYDRATION IV INFUSION INIT: CPT

## 2024-06-23 PROCEDURE — 25000003 PHARM REV CODE 250: Performed by: EMERGENCY MEDICINE

## 2024-06-23 PROCEDURE — 99285 EMERGENCY DEPT VISIT HI MDM: CPT | Mod: 25

## 2024-06-23 PROCEDURE — 85025 COMPLETE CBC W/AUTO DIFF WBC: CPT | Performed by: EMERGENCY MEDICINE

## 2024-06-23 PROCEDURE — 81003 URINALYSIS AUTO W/O SCOPE: CPT | Performed by: EMERGENCY MEDICINE

## 2024-06-23 PROCEDURE — 80053 COMPREHEN METABOLIC PANEL: CPT | Performed by: EMERGENCY MEDICINE

## 2024-06-23 RX ORDER — CLINDAMYCIN HYDROCHLORIDE 300 MG/1
300 CAPSULE ORAL 3 TIMES DAILY
Qty: 21 CAPSULE | Refills: 0 | Status: SHIPPED | OUTPATIENT
Start: 2024-06-23 | End: 2024-06-30

## 2024-06-23 RX ADMIN — BACITRACIN ZINC, NEOMYCIN, POLYMYXIN B 1 EACH: 400; 3.5; 5 OINTMENT TOPICAL at 06:06

## 2024-06-23 RX ADMIN — SODIUM CHLORIDE 1000 ML: 9 INJECTION, SOLUTION INTRAVENOUS at 02:06

## 2024-06-23 NOTE — ED PROVIDER NOTES
Encounter Date: 6/23/2024    SCRIBE #1 NOTE: I, Neena Ferrera, am scribing for, and in the presence of,  Regino Hardwick MD. I have scribed the following portions of the note - Other sections scribed: HPI, ROS.       History     Chief Complaint   Patient presents with    Fall     89 yo male to ED via EMS for fall at [facility]. Pt was found on the floor w/ abrasion and bruising to his face. Pt normally has a GCS of 14 secondary to dementia, also hard of hearing. States patient has not been behaving normally, so that sent him for evaluation. Pt currently not on blood thinners.     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 Additional history obtained from independent historian: EMS personnel reports abrasion to right forehead.     states the patient had a fall at [facility]. Patient was found on the floor with bruising to his face. Patient normally has a GCS of 14 secondary to dementia, also hard of hearing. Patient is currently not on blood thinners. Patient denies cough, shortness of breath, chest pain, fever, chills, abdominal pain, nausea, vomiting, diarrhea, dysuria, headaches, congestion, sore throat, arm or leg trouble, eye pain, ear pain, rash, or other associated symptoms. No other alleviating or exacerbating factors. This is the extent of the patient's complaints in the ED. NKDA.                    No  was used.     Review of patient's allergies indicates:  No Known Allergies  Past Medical History:   Diagnosis Date    Anticoagulant long-term use     Arthritis     Cancer     right  hand    Coronary artery disease     Deep vein thrombosis     Hyperlipidemia     Hypertension     Seizures 1988    none since on tegretol     Past Surgical History:    Procedure Laterality Date    APPENDECTOMY      CLOSURE OF WOUND  2020    Procedure: CLOSURE, WOUND;  Surgeon: Yamil Bonilla MD;  Location: [facility] OR;  Service: Plastics;;    EXCISION OF LESION OF EYELID Right 2020    Procedure: EXCISION TUMOR RIGHT EYEBROW WITH COMPLEX CLOSURE;  Surgeon: Yamil Bonilla MD;  Location: [facility] OR;  Service: Plastics;  Laterality: Right;  FROZEN SECTION  RN PREOP 2020----COVID NEGATIVE      FRACTURE SURGERY Right     elbow    FRACTURE SURGERY Right     hip    JOSUÉ FILTER PLACEMENT      hx of deep vein thrombosis    OPEN REDUCTION AND INTERNAL FIXATION (ORIF) OF INTERTROCHANTERIC FRACTURE OF FEMUR Left 2018    Procedure: ORIF, FRACTURE, FEMUR, INTERTROCHANTERIC;  Surgeon: Pop Martin III, MD;  Location: [facility] OR;  Service: Orthopedics;  Laterality: Left;    TOTAL SHOULDER ARTHROPLASTY Right      No family history on file.  Social History     Tobacco Use    Smoking status: Former     Current packs/day: 0.00     Average packs/day: 0.5 packs/day for 6.0 years (3.0 ttl pk-yrs)     Types: Cigarettes     Start date: 10/27/1969     Quit date: 10/27/1975     Years since quittin.6    Smokeless tobacco: Never   Substance Use Topics    Alcohol use: No    Drug use: No     Review of Systems   Reason unable to perform ROS: patint's mental status.   Constitutional:  Negative for chills, diaphoresis and fever.   HENT:  Negative for congestion, ear pain and sore throat.    Eyes:  Negative for pain and visual disturbance.   Respiratory:  Negative for cough and shortness of breath.    Cardiovascular:  Negative for chest pain.   Gastrointestinal:  Negative for abdominal pain, diarrhea, nausea and vomiting.   Genitourinary:  Negative for dysuria.   Musculoskeletal:  Negative for myalgias.   Skin:  Negative for rash.        (+) abrasion to right forehead     Neurological:  Negative for headaches.       Physical Exam     Initial Vitals [24 1121]   BP Pulse Resp  Temp SpO2   121/63 69 16 97.4 °F (36.3 °C) 99 %      MAP       --         Physical Exam  The patient was examined specifically for the following:   General:No significant distress, Good color, Warm and dry. Head and neck:Scalp atraumatic, Neck supple. Neurological:Appropriate conversation, Gross motor deficits. Eyes:Conjugate gaze, Clear corneas. ENT: No epistaxis. Cardiac: Regular rate and rhythm, Grossly normal heart tones. Pulmonary: Wheezing, Rales. Gastrointestinal: Abdominal tenderness, Abdominal distention. Musculoskeletal: Extremity deformity, Apparent pain with range of motion of the joints. Skin: Rash.   The findings on examination were normal except for the following:  There may be some minimal tenderness of the right thumb.  No deformity or wound.  The patient has an abrasion on the right forehead.  It is superficial.  There is some surrounding erythema.  The patient has an opacified right cornea.  The right eye socket seems very dry.  The lungs are clear the heart tones are normal chest abdomen pelvis are nontender there is no spinal tenderness along the entire course the remainder of the scalp is atraumatic.  There is no midline tenderness.  The extremities are otherwise nontender there is no apparent pain with range of motion any joints.  The patient is not oriented to month day of the week or place.  Reviewed the case with his legal power-of-, daughter, Rupal, who also expect some concern about the patient thumb.  The patient may have some mild tenderness of the left thumb.  There is no deformity.  There is no cervical or the spinal tenderness.  No other focal neurologic deficits.  ED Course   Procedures  Labs Reviewed   COMPREHENSIVE METABOLIC PANEL - Abnormal; Notable for the following components:       Result Value    Chloride 111 (*)     Creatinine 1.5 (*)     Alkaline Phosphatase 183 (*)     eGFR 43 (*)     All other components within normal limits   CBC W/ AUTO DIFFERENTIAL - Abnormal;  Notable for the following components:    RBC 3.73 (*)     Hemoglobin 11.4 (*)     Hematocrit 33.3 (*)     All other components within normal limits   URINALYSIS, REFLEX TO URINE CULTURE    Narrative:     Specimen Source->Urine          Imaging Results              X-Ray Finger 2 or More Views Left (Final result)  Result time 06/23/24 13:47:06      Final result by Henry Hummel MD (06/23/24 13:47:06)                   Impression:      No evidence for acute fracture, bone destruction, or dislocation.    Degenerative changes.    Probable 2 mm radiopaque foreign body at the level of the mid 1st metacarpal bone, stable.      Electronically signed by: Henry Hummel MD  Date:    06/23/2024  Time:    13:47               Narrative:    EXAMINATION:  XR FINGER 2 OR MORE VIEWS LEFT    CLINICAL HISTORY:  Left thumb pain;    TECHNIQUE:  Three views of the fingers were obtained.    COMPARISON:  Radiograph of the left hand dated 9/4/23    FINDINGS:  The bones are osteopenic.  There is no evidence for acute fracture or bone destruction.  There is no evidence for dislocation.  There are degenerative changes without evidence for bony erosions.  There is a 2 mm density at the level of the mid 1st metacarpal bone, stable.                                       CT Head Without Contrast (Final result)  Result time 06/23/24 12:51:26      Final result by Lauri Faustin DO (06/23/24 12:51:26)                   Impression:      CT head: No acute intracranial findings specifically without evidence for acute intracranial hemorrhage or significant new abnormal parenchymal attenuation    Prominent cerebral volume loss most pronounced in the temporal lobes underlying neuro degenerative process not excluded    Please note there is prominence of the lateral and 3rd ventricles somewhat disproportionate to degree of volume loss although 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      Electronically signed by: Lauri Faustin DO  Date:    06/23/2024  Time:    12:51               Narrative:    EXAMINATION:  CT HEAD WITHOUT CONTRAST; CT CERVICAL SPINE WITHOUT CONTRAST    CLINICAL HISTORY:  Head trauma, minor (Age >= 65y);; Neck trauma (Age >= 65y);  Unspecified injury of head, initial encounter    TECHNIQUE:  CT head: Multiple sequential 5 mm axial images of the head without contrast.  Coronal and sagittal reformatted imaging from the axial acquisition.    CT cervical spine: Multiple sequential 1.25 mm axial images of the cervical spine without contrast.  Coronal and sagittal reformatted imaging from the axial acquisition.    COMPARISON:  CT head and cervical spine 04/03/2024    FINDINGS:  CT head: Generalized cerebral volume loss with patchy and confluent decreased attenuation supratentorial white matter while nonspecific concerning for chronic ischemic change    There is slight greater degree of volume loss within the temporal lobes underlying neuro degenerative process not excluded    Please note there is prominence of the lateral and 3rd ventricles somewhat disproportionate to degree of volume loss cannot exclude normal pressure hydrocephalus and unchanged.  There is no evidence for acute intracranial hemorrhage or significant new abnormal parenchymal attenuation.  Mild patchy ethmoid air cell opacities.    CT cervical spine: Cervical sagittal alignment is straightened in similar to prior.  There is advanced degenerative changes with intervertebral disc height loss and endplate degeneration at all levels with grade 1 anterolisthesis of T2 on T3 included in field of view.  There is osseous bridging across the C3/C4 and C4/C5 disc spaces similar to prior.  Allowing for  degenerative change the cervical vertebral body heights and contours are relatively stable without evidence for acute fracture.  Partially visualized right-sided central venous catheter.  No consolidation visualized lung apices    Allowing artifact from CT technique and beam hardening degenerative change similar to prior and most prominent at C4/C5 level with posterior disc osteophyte, uncovertebral joint hypertrophy and facet arthropathy with superimposed ligamentum flavum hypertrophy with moderate central canal stenosis and moderate bilateral bony neural foraminal stenosis right greater than left                                       CT Cervical Spine Without Contrast (Final result)  Result time 06/23/24 12:51:26      Final result by Lauri Faustin DO (06/23/24 12:51:26)                   Impression:      CT head: No acute intracranial findings specifically without evidence for acute intracranial hemorrhage or significant new abnormal parenchymal attenuation    Prominent cerebral volume loss most pronounced in the temporal lobes underlying neuro degenerative process not excluded    Please note there is prominence of the lateral and 3rd ventricles somewhat disproportionate to degree of volume loss although stable from prior normal pressure hydrocephalus to be considered in differential    CT cervical spine: Continued advanced degenerative change cervical spine without evidence for acute fracture or traumatic subluxation    Please see above for additional details    Further evaluation with MRI as warranted if patient compatible.      Electronically signed by: Lauri Faustin DO  Date:    06/23/2024  Time:    12:51               Narrative:    EXAMINATION:  CT HEAD WITHOUT CONTRAST; CT CERVICAL SPINE WITHOUT CONTRAST    CLINICAL HISTORY:  Head trauma, minor (Age >= 65y);; Neck trauma (Age >= 65y);  Unspecified injury of head, initial encounter    TECHNIQUE:  CT head: Multiple sequential 5 mm axial images of the head  without contrast.  Coronal and sagittal reformatted imaging from the axial acquisition.    CT cervical spine: Multiple sequential 1.25 mm axial images of the cervical spine without contrast.  Coronal and sagittal reformatted imaging from the axial acquisition.    COMPARISON:  CT head and cervical spine 04/03/2024    FINDINGS:  CT head: Generalized cerebral volume loss with patchy and confluent decreased attenuation supratentorial white matter while nonspecific concerning for chronic ischemic change    There is slight greater degree of volume loss within the temporal lobes underlying neuro degenerative process not excluded    Please note there is prominence of the lateral and 3rd ventricles somewhat disproportionate to degree of volume loss cannot exclude normal pressure hydrocephalus and unchanged.  There is no evidence for acute intracranial hemorrhage or significant new abnormal parenchymal attenuation.  Mild patchy ethmoid air cell opacities.    CT cervical spine: Cervical sagittal alignment is straightened in similar to prior.  There is advanced degenerative changes with intervertebral disc height loss and endplate degeneration at all levels with grade 1 anterolisthesis of T2 on T3 included in field of view.  There is osseous bridging across the C3/C4 and C4/C5 disc spaces similar to prior.  Allowing for degenerative change the cervical vertebral body heights and contours are relatively stable without evidence for acute fracture.  Partially visualized right-sided central venous catheter.  No consolidation visualized lung apices    Allowing artifact from CT technique and beam hardening degenerative change similar to prior and most prominent at C4/C5 level with posterior disc osteophyte, uncovertebral joint hypertrophy and facet arthropathy with superimposed ligamentum flavum hypertrophy with moderate central canal stenosis and moderate bilateral bony neural foraminal stenosis right greater than left                                        Medications   neomycin-bacitracin-polymyxin ointment (has no administration in time range)   sodium chloride 0.9% bolus 1,000 mL 1,000 mL (1,000 mLs Intravenous New Bag 6/23/24 4733)     Medical Decision Making  Amount and/or Complexity of Data Reviewed  Independent Historian: EMS     Details: See HPI.   Labs: ordered.  Radiology: ordered.    Given the above, this patient presents emergency room with a history of dementia and a history of a recent fall yesterday.  There is some history that the patient may have injured his thumb.  I could find limited physical findings on examination of the hands.  The patient has what appears to be an old abrasion on the forehead the family relates this has been old.  There is some yellow eschar over a superficial abrasion there.  I will treat with clindamycin and Neosporin.  There is some surrounding erythema.  I will have the patient follow up with primary care.  There is no evidence of intracranial bleeding cervical spine fracture other spinal tenderness or evidence of other spinal injury.  There are no is no extremity tenderness or pain with range of motion any joints.  The patient does not.  To be demented at the same time he is alert and bright and conversational.  He has a chronic injury to his right eye.  He has blind in the right eye.  I spoke with Ms. Mills, the patient is daughter at patient arrival and at discharge.  She assures me that this is the patient's baseline level of neurologic function.  She knows of no other injuries or issues.  I will discharge this patient to follow up with primary care.        Scribe Attestation:   Scribe #1: I performed the above scribed service and the documentation accurately describes the services I performed. I attest to the accuracy of the note.           Please note that the documentation on this chart was provided by the scribe above on the date of service noted above, and that the documentation in the  chart accurately reflects the work and decisions made by me alone.  Signed, Dr. Hardwick                      Clinical Impression:  Final diagnoses:  [S09.90XA] Closed head injury  [S10.81XA, L08.9] Abrasion, face with infection, initial encounter (Primary)  [W19.XXXA] Fall, initial encounter  [F03.90] Dementia, unspecified dementia severity, unspecified dementia type, unspecified whether behavioral, psychotic, or mood disturbance or anxiety          ED Disposition Condition    Discharge Stable          ED Prescriptions       Medication Sig Dispense Start Date End Date Auth. Provider    clindamycin (CLEOCIN) 300 MG capsule Take 1 capsule (300 mg total) by mouth 3 (three) times daily. for 7 days 21 capsule 6/23/2024 6/30/2024 Regino Hardwick MD          Follow-up Information       Follow up With Specialties Details Why Contact Info    Javier Villanueva MD Family Medicine In 1 week  [address] [facility]  [place] [address]  [phone]               Regino Hardwick MD  06/23/24 [id number]

## 2024-06-23 NOTE — DISCHARGE INSTRUCTIONS
Clindamycin as directed for possible early cellulitis of the face.  Please use Neosporin cream on the wound twice a day with dressing change.  Keep the wound clean and dry.  Please have the patient follow up with the primary care doctor 1 week.  Usual medicines.

## 2024-06-27 ENCOUNTER — DOCUMENT SCAN (OUTPATIENT)
Dept: HOME HEALTH SERVICES | Facility: HOSPITAL | Age: 89
End: 2024-06-27
Payer: MEDICARE

## 2024-06-30 ENCOUNTER — HOSPITAL ENCOUNTER (EMERGENCY)
Facility: HOSPITAL | Age: 89
Discharge: HOME | End: 2024-06-30
Attending: EMERGENCY MEDICINE
Payer: MEDICARE

## 2024-06-30 VITALS
HEART RATE: 66 BPM | TEMPERATURE: 98 F | HEIGHT: 70 IN | RESPIRATION RATE: 18 BRPM | WEIGHT: 135 LBS | DIASTOLIC BLOOD PRESSURE: 75 MMHG | SYSTOLIC BLOOD PRESSURE: 163 MMHG | BODY MASS INDEX: 19.33 KG/M2 | OXYGEN SATURATION: 97 %

## 2024-06-30 DIAGNOSIS — S09.90XA INJURY OF HEAD, INITIAL ENCOUNTER: Primary | ICD-10-CM
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
## 2024-06-30 DIAGNOSIS — T14.8XXA ABRASION: ICD-10-CM

## 2024-06-30 LAB — POCT GLUCOSE: 108 MG/DL (ref 70–110)

## 2024-06-30 PROCEDURE — 82962 GLUCOSE BLOOD TEST: CPT

## 2024-06-30 PROCEDURE — 99284 EMERGENCY DEPT VISIT MOD MDM: CPT | Mod: 25

## 2024-06-30 RX ORDER — ACETAMINOPHEN 500 MG
500 TABLET ORAL EVERY 6 HOURS PRN
Qty: 13 TABLET | Refills: 0 | Status: SHIPPED | OUTPATIENT
Start: 2024-06-30

## 2024-07-01 NOTE — ED TRIAGE NOTES
Jaydon Mccarthy, a 89 y.o. male presents to the ED  w/ complaint of pain and swelling over left side of forehead and left arm. Pt has hematoma , Laceration (actively bleeding ) on the left forehead. Patient also has laceration on his right forehead (no bleeding). Pt had a laceration and swelling on his left upper arm. As per EMS patient recently had a fall a few days ago while he was trying to stand from his wheelchair . Pt is on blood thinners.     Triage note:  Chief Complaint   Patient presents with    Fall     Patient arrives via EMS from [facility] after a fall. He attempted to stand from his wheelchair and fell forward. Contusion to left side forehead, bleeding controlled, also left arm pain. Abrasion noted to right side forehead - from recent fall a few days ago. No LOC, denies pain to neck, no blood thinners.      Review of patient's allergies indicates:  No Known Allergies  Past Medical History:   Diagnosis Date    Anticoagulant long-term use     Arthritis     Cancer     right  hand    Coronary artery disease     Deep vein thrombosis     Hyperlipidemia     Hypertension     Seizures 1988    none since on tegretol

## 2024-07-01 NOTE — DISCHARGE INSTRUCTIONS

## 2024-07-01 NOTE — ED PROVIDER NOTES
Encounter Date: 6/30/2024       History     Chief Complaint   Patient presents with    Fall     Patient arrives via EMS from [facility] after a fall. He attempted to stand from his wheelchair and fell forward. Contusion to left side forehead, bleeding controlled, also left arm pain. Abrasion noted to right side forehead - from recent fall a few days ago. No LOC, denies pain to neck, no blood thinners.      89-year-old male past medical history hypertension, hyperlipidemia, seizures presenting today secondary to mechanical fall out of wheelchair.  This was witnessed at his facility where he tripped trying to get up out of his wheelchair.  Hit his head.  No loss conscious.  Staff says baseline mental status.  Not complaining of anything.  Vitals reassuring other mild hypertension with EMS.  Patient's only complaining of head pain.  Can not give me a history or guarding what medications he takes.  Patient said he was trying to get out of his wheelchair when he fell.  Denies any chest pain or shortness breath.        Review of patient's allergies indicates:  No Known Allergies  Past Medical History:   Diagnosis Date    Anticoagulant long-term use     Arthritis     Cancer     right  hand    Coronary artery disease     Deep vein thrombosis     Hyperlipidemia     Hypertension     Seizures 1988    none since on tegretol     Past Surgical History:   Procedure Laterality Date    APPENDECTOMY      CLOSURE OF WOUND  12/9/2020    Procedure: CLOSURE, WOUND;  Surgeon: Yamil Bonilla MD;  Location: [facility] OR;  Service: Plastics;;    EXCISION OF LESION OF EYELID Right 12/9/2020    Procedure: EXCISION TUMOR RIGHT EYEBROW WITH COMPLEX CLOSURE;  Surgeon: Yamil Bonilla MD;  Location: [facility] OR;  Service: Plastics;  Laterality: Right;  FROZEN SECTION  RN PREOP 12/8/2020----COVID NEGATIVE  12/8    FRACTURE SURGERY Right     elbow    FRACTURE SURGERY Right     hip    JOSUÉ FILTER PLACEMENT      hx of deep vein  thrombosis    OPEN REDUCTION AND INTERNAL FIXATION (ORIF) OF INTERTROCHANTERIC FRACTURE OF FEMUR Left 2018    Procedure: ORIF, FRACTURE, FEMUR, INTERTROCHANTERIC;  Surgeon: Pop Martin III, MD;  Location: [facility];  Service: Orthopedics;  Laterality: Left;    TOTAL SHOULDER ARTHROPLASTY Right      No family history on file.  Social History     Tobacco Use    Smoking status: Former     Current packs/day: 0.00     Average packs/day: 0.5 packs/day for 6.0 years (3.0 ttl pk-yrs)     Types: Cigarettes     Start date: 10/27/1969     Quit date: 10/27/1975     Years since quittin.7    Smokeless tobacco: Never   Substance Use Topics    Alcohol use: No    Drug use: No     Review of Systems   Unable to perform ROS: Age       Physical Exam     Initial Vitals [24]   BP Pulse Resp Temp SpO2   (!) 163/79 67 18 97.9 °F (36.6 °C) 100 %      MAP       --         Physical Exam    Nursing note and vitals reviewed.  Constitutional: He appears well-developed and well-nourished.   HENT:   Head: Normocephalic.   Mouth/Throat: Oropharynx is clear and moist.   Abrasion on in her left ear.  Hematoma and skin tear to the left side of the forehead.  Old skin tear to the right side of the forehead.      No Mathias sign or raccoon eyes or septal hematoma.   Eyes: EOM are normal. Pupils are equal, round, and reactive to light.   Neck:   Normal range of motion.  Cardiovascular:  Normal rate and regular rhythm.           Pulmonary/Chest: Breath sounds normal. No stridor. No respiratory distress. He has no wheezes.   Abdominal: Abdomen is soft. Bowel sounds are normal. He exhibits no distension. There is no abdominal tenderness.   Musculoskeletal:         General: No tenderness or edema. Normal range of motion.      Cervical back: Normal range of motion.      Comments: Old skin tear to the left elbow,  New skin tear to left elbow.  Abrasion to the right forearm.  Old scab on the right hand.  New scab on the left knee.  Old  abrasions on bilateral shins.  Abrasion on chest wall.  No tenderness to anywhere abrasions.  No tenderness to extremities.  No abdominal tenderness.  No chest wall tenderness.  No C or T or L-spine tenderness or step-off or crepitus.  Moving all extremities.     Neurological: He is alert. GCS score is 15. GCS eye subscore is 4. GCS verbal subscore is 5. GCS motor subscore is 6.   Answering basic questions.  Alert to person.   Skin: Skin is warm and dry. Capillary refill takes less than 2 seconds.   Psychiatric: He has a normal mood and affect. Thought content normal.         ED Course   Procedures  Labs Reviewed   POCT GLUCOSE   POCT GLUCOSE MONITORING CONTINUOUS          Imaging Results              CT Head Without Contrast (Final result)  Result time 06/30/24 20:59:05      Final result by Leeann Beck MD (06/30/24 20:59:05)                   Impression:      No acute intracranial abnormalities identified.  No significant detrimental change compared to recent CT head from 06/23/2024.    No acute facial fractures identified.      Electronically signed by: Leeann Beck MD  Date:    06/30/2024  Time:    20:59               Narrative:    EXAMINATION:  CT HEAD WITHOUT CONTRAST; CT MAXILLOFACIAL WITHOUT CONTRAST    CLINICAL HISTORY:  Head trauma, minor (Age >= 65y);; Facial trauma, blunt;    TECHNIQUE:  Low dose axial images were obtained through the head and maxillofacial region.  Coronal and sagittal reformations were also performed. Contrast was not administered.    COMPARISON:  Recent CT head from 06/23/2024.    FINDINGS:  There is advanced generalized cerebral volume loss with moderate chronic microvascular ischemic disease.  No evidence of acute/recent major vascular distribution cerebral infarction, intraparenchymal hemorrhage, or intra-axial space occupying lesion.  There is stable diffuse ventricular dilatation..  No effacement of the skull-base cisterns. No abnormal extra-axial fluid collections or  blood products.    No acute facial fractures are identified.  Soft tissue swelling and small hematoma are seen involving the left frontal scalp region.  Orbits show no acute abnormalities.  Visualized paranasal sinuses and mastoid air cells are essentially clear.  The calvarium shows no significant abnormality.                                       CT Maxillofacial Without Contrast (Final result)  Result time 06/30/24 20:59:05      Final result by Leeann Beck MD (06/30/24 20:59:05)                   Impression:      No acute intracranial abnormalities identified.  No significant detrimental change compared to recent CT head from 06/23/2024.    No acute facial fractures identified.      Electronically signed by: Leeann Beck MD  Date:    06/30/2024  Time:    20:59               Narrative:    EXAMINATION:  CT HEAD WITHOUT CONTRAST; CT MAXILLOFACIAL WITHOUT CONTRAST    CLINICAL HISTORY:  Head trauma, minor (Age >= 65y);; Facial trauma, blunt;    TECHNIQUE:  Low dose axial images were obtained through the head and maxillofacial region.  Coronal and sagittal reformations were also performed. Contrast was not administered.    COMPARISON:  Recent CT head from 06/23/2024.    FINDINGS:  There is advanced generalized cerebral volume loss with moderate chronic microvascular ischemic disease.  No evidence of acute/recent major vascular distribution cerebral infarction, intraparenchymal hemorrhage, or intra-axial space occupying lesion.  There is stable diffuse ventricular dilatation..  No effacement of the skull-base cisterns. No abnormal extra-axial fluid collections or blood products.    No acute facial fractures are identified.  Soft tissue swelling and small hematoma are seen involving the left frontal scalp region.  Orbits show no acute abnormalities.  Visualized paranasal sinuses and mastoid air cells are essentially clear.  The calvarium shows no significant abnormality.                                       CT  Cervical Spine Without Contrast (Final result)  Result time 06/30/24 21:07:48      Final result by Leeann Beck MD (06/30/24 21:07:48)                   Impression:      No evidence of acute cervical spine fracture or dislocation.      Electronically signed by: Leeann Beck MD  Date:    06/30/2024  Time:    21:07               Narrative:    EXAMINATION:  CT CERVICAL SPINE WITHOUT CONTRAST    CLINICAL HISTORY:  Neck trauma (Age >= 65y);    TECHNIQUE:  Low dose axial images, sagittal and coronal reformations were performed though the cervical spine.  Contrast was not administered.    COMPARISON:  Recent CT cervical spine from 06/23/2024.    FINDINGS:  No evidence of acute cervical spine fracture or dislocation.  Odontoid process is intact.  Craniocervical junction is unremarkable.  Cervical spine alignment is within normal limits.  Grossly similar severe advanced degenerative changes are seen in the spine.  There is C3-4 vertebral body fusion.    Surrounding soft tissues show no significant abnormalities.  Airway is patent.  Partially visualized lung apices are clear.                                       X-Ray Chest AP Portable (Final result)  Result time 06/30/24 20:52:09      Final result by Remington Trujillo DO (06/30/24 20:52:09)                   Impression:      No acute abnormality.      Electronically signed by: Remington Trujillo  Date:    06/30/2024  Time:    20:52               Narrative:    EXAMINATION:  XR CHEST AP PORTABLE    CLINICAL HISTORY:  fall;    TECHNIQUE:  Single frontal view of the chest was performed.    COMPARISON:  04/03/2024.    FINDINGS:  There is an image of the right forearm within the jacket, which is to be disregarded for this study.    Partially imaged IVC filter.    There is a right chest wall port, stable in position.  The lungs are well expanded and clear. No focal opacities are seen. The pleural spaces are clear. The cardiac silhouette is borderline.  There are calcifications  of the aortic arch.  There are postop changes of prior right shoulder hemiarthroplasty.  There is diffuse osteopenia.  There are degenerative changes.                                       X-Ray Pelvis Routine AP (Final result)  Result time 06/30/24 20:47:19      Final result by Remington Trujillo DO (06/30/24 20:47:19)                   Impression:      No acute fracture or dislocation.      Electronically signed by: Remington Trujillo  Date:    06/30/2024  Time:    20:47               Narrative:    EXAMINATION:  XR PELVIS ROUTINE AP    CLINICAL HISTORY:  Unspecified fall, initial encounter    TECHNIQUE:  AP view of the pelvis was performed.    COMPARISON:  Radiographs from 04/03/2024.    FINDINGS:  There is diffuse osteopenia.  There are postop changes of the bilateral proximal femurs.  Hardware is intact.  There is no evidence of an acute fracture or dislocation on this single, limited view.  There is mild joint space narrowing of the bilateral femoroacetabular joints.  There are degenerative changes of the partially visualized lower lumbar spine.  There is at least moderate volume of stool in the rectum.                                       X-Ray Forearm Right (Final result)  Result time 06/30/24 20:44:18      Final result by Remington Trujillo DO (06/30/24 20:44:18)                   Impression:      No acute fracture or dislocation.      Electronically signed by: Remington Trujillo  Date:    06/30/2024  Time:    20:44               Narrative:    EXAMINATION:  XR FOREARM RIGHT    CLINICAL HISTORY:  Unspecified fall, initial encounter    TECHNIQUE:  AP and lateral views of the right forearm were performed.    COMPARISON:  03/08/2024.    FINDINGS:  There is hardware in the proximal ulna.  Hardware is intact.  There is osteopenia.  There is no evidence of an acute fracture or dislocation of the right forearm.  Alignment is normal.  There is mild joint space narrowing of the radiocarpal articulations.  Soft tissues are  unremarkable.                                       X-Ray Elbow Complete Left (Final result)  Result time 06/30/24 20:42:38      Final result by Remington Trujillo DO (06/30/24 20:42:38)                   Impression:      No acute fracture or dislocation of the elbow.      Electronically signed by: Remington Trujillo  Date:    06/30/2024  Time:    20:42               Narrative:    EXAMINATION:  XR ELBOW COMPLETE 3 VIEW LEFT    CLINICAL HISTORY:  Unspecified fall, initial encounter    TECHNIQUE:  AP, lateral, and oblique views of the left elbow were performed.    COMPARISON:  None    FINDINGS:  There is diffuse osteopenia.  There is no evidence of an acute fracture or dislocation of the left elbow.  Alignment is normal.  There is no joint effusion.  Joint spaces are preserved.  There are vascular calcifications.                                       X-Ray Knee 1 or 2 View Left (Final result)  Result time 06/30/24 20:39:37      Final result by Remington Trujillo DO (06/30/24 20:39:37)                   Impression:      No acute fracture or dislocation.    Osteoarthritis as above.      Electronically signed by: Remington Trujillo  Date:    06/30/2024  Time:    20:39               Narrative:    EXAMINATION:  XR KNEE 1 OR 2 VIEW LEFT    CLINICAL HISTORY:  fall;    TECHNIQUE:  One or two views of the left knee were performed.    COMPARISON:  None    FINDINGS:  There is diffuse osteopenia.  There is no evidence of an acute fracture or dislocation of the knee.  Alignment is normal.  There is mild joint space narrowing of the medial and lateral tibiofemoral compartments and moderate joint space narrowing of the patellofemoral compartment with marginal osteophytes.  There is no joint effusion.  There are vascular calcifications.                                       Medications - No data to display  Medical Decision Making  89-year-old male presenting today secondary to mechanical fall after trying to get out of his wheelchair.   Witnessed.  Patient not complaining of any major pain.  Abrasions on multiple spots.  Get x-rays.  Spoke to family and patient at baseline mental status.  EMS also reports that care facility states he is at baseline mental status.  Tetanus at 2 years ago.  Neurovascular intact.  Vitals reassuring.  Gives distal pulses.  Discharged with outpatient follow-up.  Family was updated regarding plan in agreement with plan.  Family updated that he is a DNR. I discussed with the patient/family the diagnosis, treatment plan, indications for return to the emergency department, and for expected follow-up. The patient/family verbalized an understanding. The patient/family is asked if there are any questions or concerns. We discuss the case, until all issues are addressed to the patient/family's satisfaction. Patient/family understands and is agreeable to the plan.   Enzo Laurent    DISCLAIMER: This note was prepared with [employer] voice recognition transcription software. Garbled syntax, mangled pronouns, and other bizarre constructions may be attributed to that software system.      Amount and/or Complexity of Data Reviewed  Radiology: ordered.    Risk  OTC drugs.                                      Clinical Impression:  Final diagnoses:  [W19.XXXA] Fall  [S09.90XA] Injury of head, initial encounter (Primary)  [T14.8XXA] Abrasion          ED Disposition Condition    Discharge Stable          ED Prescriptions       Medication Sig Dispense Start Date End Date Auth. Provider    acetaminophen (TYLENOL) 500 MG tablet Take 1 tablet (500 mg total) by mouth every 6 (six) hours as needed. 13 tablet 6/30/2024 -- Enzo Laurent MD          Follow-up Information       Follow up With Specialties Details Why Contact Info    Javier Villanueva MD Family Medicine Schedule an appointment as soon as possible for a visit in 2 days  [address]  [place] [address]  [phone]               Enzo Laurent MD  06/30/24 2924

## 2024-07-10 ENCOUNTER — HOSPITAL ENCOUNTER (EMERGENCY)
Facility: HOSPITAL | Age: 89
Discharge: OTHER INSTITUTION | End: 2024-07-10
Attending: EMERGENCY MEDICINE
Payer: MEDICARE

## 2024-07-10 VITALS
BODY MASS INDEX: 19.33 KG/M2 | OXYGEN SATURATION: 98 % | HEART RATE: 70 BPM | TEMPERATURE: 98 F | HEIGHT: 70 IN | RESPIRATION RATE: 16 BRPM | SYSTOLIC BLOOD PRESSURE: 162 MMHG | DIASTOLIC BLOOD PRESSURE: 72 MMHG | WEIGHT: 135 LBS

## 2024-07-10 DIAGNOSIS — F03.90 DEMENTIA, UNSPECIFIED DEMENTIA SEVERITY, UNSPECIFIED DEMENTIA TYPE, UNSPECIFIED WHETHER BEHAVIORAL, PSYCHOTIC, OR MOOD DISTURBANCE OR ANXIETY: ICD-10-CM

## 2024-07-10 DIAGNOSIS — S01.21XA LACERATION OF NOSE, INITIAL ENCOUNTER: Primary | ICD-10-CM

## 2024-07-10 DIAGNOSIS — R29.6 FREQUENT FALLS: ICD-10-CM

## 2024-07-10 DIAGNOSIS — S61.210A LACERATION OF RIGHT INDEX FINGER WITHOUT FOREIGN BODY WITHOUT DAMAGE TO NAIL, INITIAL ENCOUNTER: ICD-10-CM

## 2024-07-10 PROCEDURE — 99283 EMERGENCY DEPT VISIT LOW MDM: CPT

## 2024-07-10 NOTE — ED TRIAGE NOTES
Fall (Pt bib [facility] today from [facility] today for an unwitnessed fall from his wheelchair, new abrasion to the nose and right index finger. Hx of dementia.) AP

## 2024-07-10 NOTE — DISCHARGE INSTRUCTIONS
"  Outpatient Physical Therapy Ortho Treatment Note   [facility]     Patient Name: Cindi Enamorado  : 1953  MRN: [id number]  Landry Miller MD;  Location: [facility];  Tylenol for discomfort.  Please keep the wounds clean and dry.  Daily dressing changes with Neosporin, all wounds.  Please use a seat belt in the wheelchair.  Please follow up with your primary care doctor in 1 week   "Lora Crespo, PT GP 1                      Lora Crespo, PT  8/7/2023

## 2024-07-10 NOTE — ED NOTES
Spoke w son Vicente who appreciated the update and says that YES, his Dad can transport w a WC van as long as he can be placed in the WC and out of it once he gets to facility.

## 2024-07-10 NOTE — ED PROVIDER NOTES
Encounter Date: 7/10/2024    SCRIBE #1 NOTE: ISuzanne, am scribing for, and in the presence of,  Regino Hardwick MD. Other sections scribed: HPI, ROS.       History     Chief Complaint   Patient presents with    Fall     [facility] for an unwitnessed fall from his wheelchair, new abrasion to the nose and right index finger. Hx of dementia.     CC: Fall    HPI: History is obtained from independent historian: EMS personnel. This is a 89 y.o. M who has a PMHx of Anticoagulant long-term use, Arthritis, Cancer of the right hand, CAD, DVT, HLD, HTN, and Seizures who presents to the ED via EMS transportation from the [facility] for emergent evaluation of new abrasion to the nose, and second digit of the right hand after an unwitnessed fall out of a wheelchair today. The pt has a Hx of frequent falls. He was recently seen at this ED for a fall out of the wheelchair las week. The pt is hard of hearing and states that he does not know why he is here. He is a DNR patient with [facility].      The history is provided by the EMS personnel. No  was used.     Review of patient's allergies indicates:  No Known Allergies  Past Medical History:   Diagnosis Date    Anticoagulant long-term use     Arthritis     Cancer     right  hand    Coronary artery disease     Deep vein thrombosis     Hyperlipidemia     Hypertension     Seizures 1988    none since on tegretol     Past Surgical History:   Procedure Laterality Date    APPENDECTOMY      CLOSURE OF WOUND  12/9/2020    Procedure: CLOSURE, WOUND;  Surgeon: Yamil Bonilla MD;  Location: [facility] OR;  Service: Plastics;;    EXCISION OF LESION OF EYELID Right 12/9/2020    Procedure: EXCISION TUMOR RIGHT EYEBROW WITH COMPLEX CLOSURE;  Surgeon: Yamil Bonilla MD;  Location: [facility] OR;  Service: Plastics;  Laterality: Right;  FROZEN SECTION  RN PREOP 12/8/2020----COVID NEGATIVE  12/8    FRACTURE  SURGERY Right     elbow    FRACTURE SURGERY Right     hip    JOSUÉ FILTER PLACEMENT      hx of deep vein thrombosis    OPEN REDUCTION AND INTERNAL FIXATION (ORIF) OF INTERTROCHANTERIC FRACTURE OF FEMUR Left 2018    Procedure: ORIF, FRACTURE, FEMUR, INTERTROCHANTERIC;  Surgeon: Pop Martin III, MD;  Location: [facility];  Service: Orthopedics;  Laterality: Left;    TOTAL SHOULDER ARTHROPLASTY Right      No family history on file.  Social History     Tobacco Use    Smoking status: Former     Current packs/day: 0.00     Average packs/day: 0.5 packs/day for 6.0 years (3.0 ttl pk-yrs)     Types: Cigarettes     Start date: 10/27/1969     Quit date: 10/27/1975     Years since quittin.7    Smokeless tobacco: Never   Substance Use Topics    Alcohol use: No    Drug use: No     Review of Systems   Unable to perform ROS: Other   Skin:  Positive for wound.       Physical Exam     Initial Vitals [07/10/24 1133]   BP Pulse Resp Temp SpO2   (!) 184/79 72 18 98.5 °F (36.9 °C) 98 %      MAP       --         Physical Exam  The patient was examined specifically for the following:   General:No significant distress, Good color, Warm and dry. Head and neck:Scalp atraumatic, Neck supple. Neurological:Appropriate conversation, Gross motor deficits. Eyes:Conjugate gaze, Clear corneas. ENT: No epistaxis. Cardiac: Regular rate and rhythm, Grossly normal heart tones. Pulmonary: Wheezing, Rales. Gastrointestinal: Abdominal tenderness, Abdominal distention. Musculoskeletal: Extremity deformity, Apparent pain with range of motion of the joints. Skin: Rash.   The findings on examination were normal except for the following:  The patient has a 1 cm partial-thickness laceration over the metacarpophalangeal joint of his right hand there is no apparent pain with range of motion of the finger.  I see no deformity.  I doubt fracture.  The patient has a laceration on his nose that has a brown fibrin clot on it.  There is no deformity of  the nose.  The patient has healing wounds on both sides of the forehead.  The left has a well-healed fibrin clot the right has a dressing in place with a clean wound base this appears to be a healing superficial infection.  There is an opacified right cornea.  The patient has poor hearing.  He will respond to me.  Is lower extremities are contracted.  He resists manipulation of his upper extremities and asked repeatedly to be left alone.  He was not really cooperative with history taking.  He sleeps when left alone but rouses easily.  He complains that my fingers are cold.  ED Course   Procedures  Labs Reviewed - No data to display       Imaging Results    None          Medications - No data to display  Medical Decision Making  Amount and/or Complexity of Data Reviewed  Independent Historian: EMS     Details: See HPI    Given the above this patient appears to have a laceration of the nose and a laceration of the right hand.  He has frequent falls.  He last had a negative CT scan on June 30th.  I discussed this case at length with Rupal Barakat, his primary care provider.  She understands the situation.  We agree that he has dementia.  There is no history of any significant new injuries otherwise.  He has an advanced care directive that requests DNR and comfort measures only.  I will treat his lacerations on the nose in the hand and release him to follow up with the nursing home.  I will focus on his comfort.        Scribe Attestation:   Scribe #1: I performed the above scribed service and the documentation accurately describes the services I performed. I attest to the accuracy of the note.                           Please note that the documentation on this chart was provided by the scribe above on the date of service noted above, and that the documentation in the chart accurately reflects the work and decisions made by me alone.  Signed, Dr. Hardwick    Clinical Impression:  Final diagnoses:  [S01.21XA] Laceration  of nose, initial encounter (Primary)  [S61.210A] Laceration of right index finger without foreign body without damage to nail, initial encounter  [F03.90] Dementia, unspecified dementia severity, unspecified dementia type, unspecified whether behavioral, psychotic, or mood disturbance or anxiety  [R29.6] Frequent falls          ED Disposition Condition    Discharge Stable          ED Prescriptions    None       Follow-up Information       Follow up With Specialties Details Why Contact Info    Javier Villanueva MD Family Medicine In 1 week  [address] [facility]  [place] [id number] [phone]               Regino Hardwick MD  07/10/24 6982

## 2024-07-11 ENCOUNTER — EXTERNAL HOME HEALTH (OUTPATIENT)
Dept: HOME HEALTH SERVICES | Facility: HOSPITAL | Age: 89
End: 2024-07-11
Payer: MEDICARE

## 2024-07-13 PROCEDURE — G0179 MD RECERTIFICATION HHA PT: HCPCS | Mod: ,,, | Performed by: FAMILY MEDICINE

## 2024-08-16 ENCOUNTER — EXTERNAL HOME HEALTH (OUTPATIENT)
Dept: HOME HEALTH SERVICES | Facility: HOSPITAL | Age: 89
End: 2024-08-16
Payer: MEDICARE

## 2025-01-22 NOTE — PROGRESS NOTES
Chief Complaint   Patient presents with    Urinary Frequency       SUBJECTIVE:  Jaydon Mccarthy is a 86 y.o. male here for follow up of chronic conditions.  He is compliant with his medications.  He is having chronic anticoagulation with BPH, going 2 x a night and he is having more urine issues and a little smell.  He is doing well with the CAD.  He has had no seizures.  .  Currently has co-morbidities including per problem list.      Social History   Substance Use Topics    Smoking status: Former Smoker     Packs/day: 0.50     Years: 6.00     Types: Cigarettes     Quit date: 10/27/1975    Smokeless tobacco: Never Used    Alcohol use No       Patient Active Problem List    Diagnosis Date Noted    Hyperlipidemia 10/28/2015     Last lipid 9/25/2015 controlled on atorvastatin      CAD s/p PCI  10/28/2015     Hx MI, Stent to RCA Sept 25, 2015 follow by cardiology        Chronic anticoagulation 10/28/2015    Anemia of chronic disease 10/28/2015    Epilepsy 10/28/2015     No reported seizures since 1980s on carbamazepine and levetiracetam       Essential hypertension 09/17/2015     Controlled on metoprolol 12.5 twice daily      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    HENT: Negative.    Eyes: Negative.    Respiratory: Negative.    Cardiovascular: Negative.    Gastrointestinal: Negative.    Genitourinary: Positive for frequency. Negative for dysuria, flank pain, hematuria and urgency.        Nocturia   Musculoskeletal: Negative.    Skin: Negative.   "  Neurological: Negative.    Endo/Heme/Allergies: Negative.    Psychiatric/Behavioral: Negative.        OBJECTIVE:  /60   Temp 98.3 °F (36.8 °C) (Oral)   Ht 5' 8" (1.727 m)   Wt 72.7 kg (160 lb 4.4 oz)   SpO2 99%   BMI 24.37 kg/m²     Wt Readings from Last 3 Encounters:   03/22/18 72.7 kg (160 lb 4.4 oz)   09/18/17 72.1 kg (158 lb 15.2 oz)   03/10/17 (!) 156.5 kg (345 lb 0.3 oz)     BP Readings from Last 3 Encounters:   03/22/18 120/60   09/18/17 134/64   03/10/17 120/64       He appears well, in no apparent distress.  Alert and oriented times three, pleasant and cooperative. Vital signs are as documented in vital signs section.  S1 and S2 normal, no murmurs, clicks, gallops or rubs. Regular rate and rhythm. Chest is clear; no wheezes or rales. No edema or JVD.      Review of old Records:  Reviewed per [facility]    Review of old labs:    Lab Results   Component Value Date    TSH 1.756 09/17/2015     Lab Results   Component Value Date    WBC 5.68 10/04/2016    HGB 13.1 (L) 10/04/2016    HCT 37.5 (L) 10/04/2016    MCV 90 10/04/2016     (L) 10/04/2016       Chemistry        Component Value Date/Time     10/04/2016 1620    K 4.3 10/04/2016 1620     10/04/2016 1620    CO2 25 10/04/2016 1620    BUN 14 10/04/2016 1620    CREATININE 1.1 10/04/2016 1620     10/04/2016 1620        Component Value Date/Time    CALCIUM 8.9 10/04/2016 1620    ALKPHOS 165 (H) 05/25/2016 1529    AST 25 05/25/2016 1529    ALT 22 05/25/2016 1529    BILITOT 0.3 05/25/2016 1529    ESTGFRAFRICA >60 10/04/2016 1620    EGFRNONAA >60 10/04/2016 1620        Lab Results   Component Value Date    CHOL 140 09/26/2015    CHOL 204 (H) 01/21/2015    CHOL 207 (H) 01/15/2014     Lab Results   Component Value Date    HDL 22 (L) 09/26/2015    HDL 61 01/21/2015    HDL 62 01/15/2014     Lab Results   Component Value Date    LDLCALC 98.0 09/26/2015    LDLCALC 123.2 01/21/2015    LDLCALC 125.0 01/15/2014     Lab Results   Component Value " Date    TRIG 100 09/26/2015    TRIG 99 01/21/2015    TRIG 100 01/15/2014     Lab Results   Component Value Date    CHOLHDL 15.7 (L) 09/26/2015    CHOLHDL 29.9 01/21/2015    CHOLHDL 30.0 01/15/2014         Review of old imaging:  n/a      ASSESSMENT:  Problem List Items Addressed This Visit     Hyperlipidemia (Chronic)    Relevant Medications    atorvastatin (LIPITOR) 20 MG tablet    Epilepsy (Chronic)    Relevant Medications    carBAMazepine (TEGRETOL) 100 mg chewable tablet    levETIRAcetam (KEPPRA) 500 MG Tab    Chronic anticoagulation (Chronic)    Relevant Medications    clopidogrel (PLAVIX) 75 mg tablet    CAD s/p PCI  (Chronic)    BPH (benign prostatic hyperplasia) - Primary    Relevant Medications    bethanechol (URECHOLINE) 50 MG tablet    finasteride (PROSCAR) 5 mg tablet    tamsulosin (FLOMAX) 0.4 mg Cp24    Other Relevant Orders    Urinalysis    Anemia of chronic disease (Chronic)      Other Visit Diagnoses     Benign non-nodular prostatic hyperplasia without lower urinary tract symptoms        Relevant Medications    bethanechol (URECHOLINE) 50 MG tablet    finasteride (PROSCAR) 5 mg tablet    tamsulosin (FLOMAX) 0.4 mg Cp24          ICD-10-CM ICD-9-CM   1. Benign prostatic hyperplasia without lower urinary tract symptoms N40.0 600.00   2. Mixed hyperlipidemia E78.2 272.2   3. Benign non-nodular prostatic hyperplasia without lower urinary tract symptoms N40.0 600.90   4. Nonintractable epilepsy without status epilepticus, unspecified epilepsy type G40.909 345.90   5. Chronic anticoagulation Z79.01 V58.61   6. Coronary artery disease involving native coronary artery of native heart without angina pectoris I25.10 414.01   7. Anemia of chronic disease D63.8 285.29               PLAN:     get UA and monitor  Increase fluids  Continue flomax and proscar    HLD continue    BPH continue    Epilepsy continue with treatment.    CAD no chest pain    Anemia is asymptomatic     Medication List with Changes/Refills    Current Medications    METOPROLOL TARTRATE (LOPRESSOR) 25 MG TABLET    Take 0.5 tablets (12.5 mg total) by mouth 2 (two) times daily.    MUPIROCIN (BACTROBAN) 2 % OINTMENT        RIVAROXABAN (XARELTO) 20 MG TAB    Take 1 tablet (20 mg total) by mouth once daily.    VITAMIN D2 50,000 UNIT CAPSULE    TAKE ONE CAPSULE BY MOUTH TWICE WEEKLY   Changed and/or Refilled Medications    Modified Medication Previous Medication    ATORVASTATIN (LIPITOR) 20 MG TABLET atorvastatin (LIPITOR) 20 MG tablet       Take 1 tablet (20 mg total) by mouth once daily.    Take 1 tablet (20 mg total) by mouth once daily.    BETHANECHOL (URECHOLINE) 50 MG TABLET bethanechol (URECHOLINE) 50 MG tablet       TAKE ONE TABLET BY MOUTH THREE TIMES DAILY ON AN EMPTY STOMACH *DO NOT TAKE WITH MILK OR ANTACIDS, TAKE WITH WATER *    TAKE ONE TABLET BY MOUTH THREE TIMES DAILY ON AN EMPTY STOMACH *DO NOT TAKE WITH MILK OR ANTACIDS, TAKE WITH WATER *    CARBAMAZEPINE (TEGRETOL) 100 MG CHEWABLE TABLET carbamazepine (TEGRETOL) 100 mg chewable tablet       Take 2 tablets (200 mg total) by mouth once daily.    Take 2 tablets (200 mg total) by mouth once daily.    CLOPIDOGREL (PLAVIX) 75 MG TABLET clopidogrel (PLAVIX) 75 mg tablet       Take 1 tablet (75 mg total) by mouth once daily.    Take 1 tablet (75 mg total) by mouth once daily.    FINASTERIDE (PROSCAR) 5 MG TABLET finasteride (PROSCAR) 5 mg tablet       Take 1 tablet (5 mg total) by mouth once daily.    Take 1 tablet (5 mg total) by mouth once daily.    LEVETIRACETAM (KEPPRA) 500 MG TAB levetiracetam (KEPPRA) 500 MG Tab       Take 1 tablet (500 mg total) by mouth 2 (two) times daily.    Take 1 tablet (500 mg total) by mouth 2 (two) times daily.    TAMSULOSIN (FLOMAX) 0.4 MG CP24 tamsulosin (FLOMAX) 0.4 mg Cp24       Take 1 capsule (0.4 mg total) by mouth once daily.    Take 1 capsule (0.4 mg total) by mouth once daily.       Follow-up in about 6 months (around 9/22/2018) for assess treatment plan,  Home wellness exam.

## 2025-03-24 DIAGNOSIS — Z00.00 ENCOUNTER FOR MEDICARE ANNUAL WELLNESS EXAM: ICD-10-CM

## 2025-05-23 ENCOUNTER — PATIENT OUTREACH (OUTPATIENT)
Dept: ADMINISTRATIVE | Facility: HOSPITAL | Age: OVER 89
End: 2025-05-23
Payer: MEDICARE

## 2025-08-06 ENCOUNTER — PATIENT MESSAGE (OUTPATIENT)
Dept: FAMILY MEDICINE | Facility: CLINIC | Age: OVER 89
End: 2025-08-06
Payer: MEDICARE

## (undated) DEVICE — BIT DRILL 3FLUTE 4.2X330 SS ST

## (undated) DEVICE — SUPPORT ULNA NERVE PROTECTOR

## (undated) DEVICE — CHLORAPREP W TINT 26ML APPL

## (undated) DEVICE — SEE MEDLINE ITEM 157117

## (undated) DEVICE — DRESSING TRANS 2X2 TEGADERM

## (undated) DEVICE — GLOVE SURGICAL LATEX SZ 7

## (undated) DEVICE — PACK HEAD & NECK

## (undated) DEVICE — STAPLER SKIN PROXIMATE WIDE

## (undated) DEVICE — BLADE SURG STAINLESS STEEL #15

## (undated) DEVICE — SEE MEDLINE ITEM 146292

## (undated) DEVICE — DRAPE STERI-DRAPE 83X125 IOBAN

## (undated) DEVICE — SUT CTD VICRYL 0 UND BR CT

## (undated) DEVICE — BANDAGE ACE ELASTIC 6"

## (undated) DEVICE — GLOVE SURG BIOGEL LATEX SZ 7.5

## (undated) DEVICE — SEE MEDLINE ITEM 154981

## (undated) DEVICE — SEE MEDLINE ITEM 157110

## (undated) DEVICE — Device

## (undated) DEVICE — ELECTRODE REM PLYHSV RETURN 9

## (undated) DEVICE — SYS LABLNG CORECT MED 4 FLG

## (undated) DEVICE — SOL 9P NACL IRR PIC IL

## (undated) DEVICE — SEE MEDLINE ITEM 152622

## (undated) DEVICE — GAUZE SPONGE 4X4 12PLY

## (undated) DEVICE — BLANKET UPPER BODY 78.7X29.9IN

## (undated) DEVICE — NDL HYPO REG 25G X 1 1/2

## (undated) DEVICE — CORD FOR BIPOLAR FORCEPS 12

## (undated) DEVICE — APPLICATOR STERILE 3IN

## (undated) DEVICE — BLANKET LOWER BODY 55.9X40.2IN

## (undated) DEVICE — SEE MEDLINE ITEM 146345

## (undated) DEVICE — STRIP STERI REIN CLSR 1/2X2IN

## (undated) DEVICE — CONTAINER SPECIMEN STRL 4OZ

## (undated) DEVICE — SYR 10CC LUER LOCK

## (undated) DEVICE — MAT QUICK 40X30 FLOOR FLUID LF

## (undated) DEVICE — CANISTER SUCTION 2 LTR

## (undated) DEVICE — DRESSING XEROFORM FOIL PK 1X8

## (undated) DEVICE — SUT ETHILON 3/0 18IN PS-1

## (undated) DEVICE — REAMER STEPPED DHS DCS

## (undated) DEVICE — GLOVE SURGICAL LATEX SZ 8

## (undated) DEVICE — SUT 2/0 36IN COATED VICRYL

## (undated) DEVICE — SEE MEDLINE ITEM 107746

## (undated) DEVICE — LINER GLOVE POWDERFREE SZ 7

## (undated) DEVICE — LINER GLOVE POWDERFREE 8

## (undated) DEVICE — SUT MONOCRYL 4-0 SH UND MON

## (undated) DEVICE — KIT PREP E-Z WET W/2-6